# Patient Record
Sex: MALE | Race: WHITE | Employment: UNEMPLOYED | URBAN - METROPOLITAN AREA
[De-identification: names, ages, dates, MRNs, and addresses within clinical notes are randomized per-mention and may not be internally consistent; named-entity substitution may affect disease eponyms.]

---

## 2022-06-30 ENCOUNTER — APPOINTMENT (OUTPATIENT)
Dept: GENERAL RADIOLOGY | Age: 62
DRG: 271 | End: 2022-06-30
Attending: EMERGENCY MEDICINE

## 2022-06-30 ENCOUNTER — APPOINTMENT (OUTPATIENT)
Dept: NON INVASIVE DIAGNOSTICS | Age: 62
DRG: 271 | End: 2022-06-30
Attending: FAMILY MEDICINE

## 2022-06-30 ENCOUNTER — HOSPITAL ENCOUNTER (INPATIENT)
Age: 62
LOS: 8 days | Discharge: HOME HEALTH | DRG: 271 | End: 2022-07-08
Attending: EMERGENCY MEDICINE | Admitting: FAMILY MEDICINE

## 2022-06-30 DIAGNOSIS — L03.115 CELLULITIS OF RIGHT FOOT: ICD-10-CM

## 2022-06-30 DIAGNOSIS — M10.9 GOUTY ARTHRITIS: ICD-10-CM

## 2022-06-30 DIAGNOSIS — L08.9 TOE INFECTION: Primary | ICD-10-CM

## 2022-06-30 DIAGNOSIS — I73.9 PAD (PERIPHERAL ARTERY DISEASE) (HCC): ICD-10-CM

## 2022-06-30 DIAGNOSIS — M86.9 OSTEOMYELITIS OF RIGHT FOOT, UNSPECIFIED TYPE (HCC): ICD-10-CM

## 2022-06-30 LAB
ALBUMIN SERPL-MCNC: 3.6 G/DL (ref 3.5–5)
ALBUMIN/GLOB SERPL: 0.8 {RATIO} (ref 1.1–2.2)
ALP SERPL-CCNC: 119 U/L (ref 45–117)
ALT SERPL-CCNC: 26 U/L (ref 12–78)
ANION GAP SERPL CALC-SCNC: 5 MMOL/L (ref 5–15)
AST SERPL W P-5'-P-CCNC: 13 U/L (ref 15–37)
BASOPHILS # BLD: 0.1 K/UL (ref 0–0.1)
BASOPHILS NFR BLD: 1 % (ref 0–1)
BILIRUB SERPL-MCNC: 0.5 MG/DL (ref 0.2–1)
BUN SERPL-MCNC: 11 MG/DL (ref 6–20)
BUN/CREAT SERPL: 14 (ref 12–20)
CA-I BLD-MCNC: 9.2 MG/DL (ref 8.5–10.1)
CHLORIDE SERPL-SCNC: 100 MMOL/L (ref 97–108)
CO2 SERPL-SCNC: 29 MMOL/L (ref 21–32)
CREAT SERPL-MCNC: 0.78 MG/DL (ref 0.7–1.3)
DIFFERENTIAL METHOD BLD: ABNORMAL
EOSINOPHIL # BLD: 0.3 K/UL (ref 0–0.4)
EOSINOPHIL NFR BLD: 2 % (ref 0–7)
ERYTHROCYTE [DISTWIDTH] IN BLOOD BY AUTOMATED COUNT: 13.1 % (ref 11.5–14.5)
ERYTHROCYTE [SEDIMENTATION RATE] IN BLOOD: 52 MM/HR (ref 0–20)
GLOBULIN SER CALC-MCNC: 4.4 G/DL (ref 2–4)
GLUCOSE BLD STRIP.AUTO-MCNC: 130 MG/DL (ref 65–117)
GLUCOSE BLD STRIP.AUTO-MCNC: 138 MG/DL (ref 65–117)
GLUCOSE BLD STRIP.AUTO-MCNC: 175 MG/DL (ref 65–117)
GLUCOSE SERPL-MCNC: 228 MG/DL (ref 65–100)
HCT VFR BLD AUTO: 45.9 % (ref 36.6–50.3)
HGB BLD-MCNC: 15.1 G/DL (ref 12.1–17)
IMM GRANULOCYTES # BLD AUTO: 0.1 K/UL (ref 0–0.04)
IMM GRANULOCYTES NFR BLD AUTO: 1 % (ref 0–0.5)
LYMPHOCYTES # BLD: 1.7 K/UL (ref 0.8–3.5)
LYMPHOCYTES NFR BLD: 13 % (ref 12–49)
MCH RBC QN AUTO: 29.5 PG (ref 26–34)
MCHC RBC AUTO-ENTMCNC: 32.9 G/DL (ref 30–36.5)
MCV RBC AUTO: 89.8 FL (ref 80–99)
MONOCYTES # BLD: 0.9 K/UL (ref 0–1)
MONOCYTES NFR BLD: 7 % (ref 5–13)
NEUTS SEG # BLD: 9.5 K/UL (ref 1.8–8)
NEUTS SEG NFR BLD: 76 % (ref 32–75)
NRBC # BLD: 0 K/UL (ref 0–0.01)
NRBC BLD-RTO: 0 PER 100 WBC
PERFORMED BY, TECHID: ABNORMAL
PLATELET # BLD AUTO: 272 K/UL (ref 150–400)
PMV BLD AUTO: 10.2 FL (ref 8.9–12.9)
POTASSIUM SERPL-SCNC: 4 MMOL/L (ref 3.5–5.1)
PROT SERPL-MCNC: 8 G/DL (ref 6.4–8.2)
RBC # BLD AUTO: 5.11 M/UL (ref 4.1–5.7)
SODIUM SERPL-SCNC: 134 MMOL/L (ref 136–145)
URATE SERPL-MCNC: 3.9 MG/DL (ref 3.5–7.2)
WBC # BLD AUTO: 12.7 K/UL (ref 4.1–11.1)

## 2022-06-30 PROCEDURE — 74011250636 HC RX REV CODE- 250/636: Performed by: EMERGENCY MEDICINE

## 2022-06-30 PROCEDURE — 85652 RBC SED RATE AUTOMATED: CPT

## 2022-06-30 PROCEDURE — 99222 1ST HOSP IP/OBS MODERATE 55: CPT | Performed by: INTERNAL MEDICINE

## 2022-06-30 PROCEDURE — 96374 THER/PROPH/DIAG INJ IV PUSH: CPT

## 2022-06-30 PROCEDURE — 80053 COMPREHEN METABOLIC PANEL: CPT

## 2022-06-30 PROCEDURE — 84550 ASSAY OF BLOOD/URIC ACID: CPT

## 2022-06-30 PROCEDURE — 82962 GLUCOSE BLOOD TEST: CPT

## 2022-06-30 PROCEDURE — 96375 TX/PRO/DX INJ NEW DRUG ADDON: CPT

## 2022-06-30 PROCEDURE — 74011250636 HC RX REV CODE- 250/636: Performed by: FAMILY MEDICINE

## 2022-06-30 PROCEDURE — 93922 UPR/L XTREMITY ART 2 LEVELS: CPT | Performed by: SURGERY

## 2022-06-30 PROCEDURE — 74011250637 HC RX REV CODE- 250/637: Performed by: FAMILY MEDICINE

## 2022-06-30 PROCEDURE — 87040 BLOOD CULTURE FOR BACTERIA: CPT

## 2022-06-30 PROCEDURE — 74011000258 HC RX REV CODE- 258: Performed by: EMERGENCY MEDICINE

## 2022-06-30 PROCEDURE — 93922 UPR/L XTREMITY ART 2 LEVELS: CPT

## 2022-06-30 PROCEDURE — 74011000258 HC RX REV CODE- 258: Performed by: FAMILY MEDICINE

## 2022-06-30 PROCEDURE — 65270000029 HC RM PRIVATE

## 2022-06-30 PROCEDURE — 99285 EMERGENCY DEPT VISIT HI MDM: CPT

## 2022-06-30 PROCEDURE — 83036 HEMOGLOBIN GLYCOSYLATED A1C: CPT

## 2022-06-30 PROCEDURE — 94761 N-INVAS EAR/PLS OXIMETRY MLT: CPT

## 2022-06-30 PROCEDURE — 36415 COLL VENOUS BLD VENIPUNCTURE: CPT

## 2022-06-30 PROCEDURE — 73630 X-RAY EXAM OF FOOT: CPT

## 2022-06-30 PROCEDURE — 85025 COMPLETE CBC W/AUTO DIFF WBC: CPT

## 2022-06-30 RX ORDER — AMLODIPINE AND BENAZEPRIL HYDROCHLORIDE 5; 20 MG/1; MG/1
1 CAPSULE ORAL DAILY
COMMUNITY
End: 2022-07-08

## 2022-06-30 RX ORDER — MORPHINE SULFATE 4 MG/ML
4 INJECTION INTRAVENOUS ONCE
Status: COMPLETED | OUTPATIENT
Start: 2022-06-30 | End: 2022-06-30

## 2022-06-30 RX ORDER — AMLODIPINE AND BENAZEPRIL HYDROCHLORIDE 5; 10 MG/1; MG/1
2 CAPSULE ORAL DAILY
Status: DISCONTINUED | OUTPATIENT
Start: 2022-07-01 | End: 2022-06-30 | Stop reason: SDUPTHER

## 2022-06-30 RX ORDER — POLYETHYLENE GLYCOL 3350 17 G/17G
17 POWDER, FOR SOLUTION ORAL DAILY PRN
Status: DISCONTINUED | OUTPATIENT
Start: 2022-06-30 | End: 2022-07-08 | Stop reason: HOSPADM

## 2022-06-30 RX ORDER — ENOXAPARIN SODIUM 100 MG/ML
40 INJECTION SUBCUTANEOUS DAILY
Status: DISCONTINUED | OUTPATIENT
Start: 2022-06-30 | End: 2022-07-04

## 2022-06-30 RX ORDER — MORPHINE SULFATE 2 MG/ML
1 INJECTION, SOLUTION INTRAMUSCULAR; INTRAVENOUS
Status: DISPENSED | OUTPATIENT
Start: 2022-06-30 | End: 2022-07-02

## 2022-06-30 RX ORDER — ACETAMINOPHEN 650 MG/1
650 SUPPOSITORY RECTAL
Status: DISCONTINUED | OUTPATIENT
Start: 2022-06-30 | End: 2022-07-08 | Stop reason: HOSPADM

## 2022-06-30 RX ORDER — ONDANSETRON 2 MG/ML
4 INJECTION INTRAMUSCULAR; INTRAVENOUS
Status: DISCONTINUED | OUTPATIENT
Start: 2022-06-30 | End: 2022-07-08 | Stop reason: HOSPADM

## 2022-06-30 RX ORDER — INSULIN LISPRO 100 [IU]/ML
INJECTION, SOLUTION INTRAVENOUS; SUBCUTANEOUS
Status: DISCONTINUED | OUTPATIENT
Start: 2022-06-30 | End: 2022-07-08 | Stop reason: HOSPADM

## 2022-06-30 RX ORDER — MAGNESIUM SULFATE 100 %
4 CRYSTALS MISCELLANEOUS AS NEEDED
Status: DISCONTINUED | OUTPATIENT
Start: 2022-06-30 | End: 2022-07-08 | Stop reason: HOSPADM

## 2022-06-30 RX ORDER — PANTOPRAZOLE SODIUM 40 MG/1
40 TABLET, DELAYED RELEASE ORAL
Status: DISCONTINUED | OUTPATIENT
Start: 2022-07-01 | End: 2022-07-08 | Stop reason: HOSPADM

## 2022-06-30 RX ORDER — ONDANSETRON 4 MG/1
4 TABLET, ORALLY DISINTEGRATING ORAL
Status: DISCONTINUED | OUTPATIENT
Start: 2022-06-30 | End: 2022-07-08 | Stop reason: HOSPADM

## 2022-06-30 RX ORDER — METFORMIN HYDROCHLORIDE 500 MG/1
500 TABLET ORAL 2 TIMES DAILY WITH MEALS
Status: DISCONTINUED | OUTPATIENT
Start: 2022-06-30 | End: 2022-07-08 | Stop reason: HOSPADM

## 2022-06-30 RX ORDER — ACETAMINOPHEN 325 MG/1
650 TABLET ORAL
Status: DISCONTINUED | OUTPATIENT
Start: 2022-06-30 | End: 2022-07-08 | Stop reason: HOSPADM

## 2022-06-30 RX ORDER — PANTOPRAZOLE SODIUM 40 MG/1
40 TABLET, DELAYED RELEASE ORAL DAILY
COMMUNITY

## 2022-06-30 RX ORDER — SODIUM CHLORIDE 9 MG/ML
75 INJECTION, SOLUTION INTRAVENOUS CONTINUOUS
Status: DISCONTINUED | OUTPATIENT
Start: 2022-06-30 | End: 2022-06-30

## 2022-06-30 RX ORDER — AMLODIPINE BESYLATE 5 MG/1
5 TABLET ORAL DAILY
Status: DISCONTINUED | OUTPATIENT
Start: 2022-07-01 | End: 2022-07-05

## 2022-06-30 RX ORDER — NAPROXEN 250 MG/1
500 TABLET ORAL DAILY
COMMUNITY
End: 2022-07-08

## 2022-06-30 RX ORDER — METFORMIN HYDROCHLORIDE 500 MG/1
500 TABLET ORAL
COMMUNITY

## 2022-06-30 RX ORDER — ONDANSETRON 2 MG/ML
4 INJECTION INTRAMUSCULAR; INTRAVENOUS
Status: COMPLETED | OUTPATIENT
Start: 2022-06-30 | End: 2022-06-30

## 2022-06-30 RX ORDER — SODIUM CHLORIDE 9 MG/ML
100 INJECTION, SOLUTION INTRAVENOUS ONCE
Status: COMPLETED | OUTPATIENT
Start: 2022-06-30 | End: 2022-06-30

## 2022-06-30 RX ORDER — LISINOPRIL 10 MG/1
10 TABLET ORAL DAILY
Status: DISCONTINUED | OUTPATIENT
Start: 2022-07-01 | End: 2022-07-05

## 2022-06-30 RX ORDER — TRAMADOL HYDROCHLORIDE 50 MG/1
50 TABLET ORAL
Status: DISCONTINUED | OUTPATIENT
Start: 2022-06-30 | End: 2022-07-08 | Stop reason: HOSPADM

## 2022-06-30 RX ADMIN — ONDANSETRON 4 MG: 2 INJECTION INTRAMUSCULAR; INTRAVENOUS at 09:01

## 2022-06-30 RX ADMIN — MORPHINE SULFATE 4 MG: 4 INJECTION INTRAVENOUS at 09:01

## 2022-06-30 RX ADMIN — MORPHINE SULFATE 4 MG: 4 INJECTION INTRAVENOUS at 14:35

## 2022-06-30 RX ADMIN — PIPERACILLIN AND TAZOBACTAM 3.38 G: 3; .375 INJECTION, POWDER, LYOPHILIZED, FOR SOLUTION INTRAVENOUS at 09:01

## 2022-06-30 RX ADMIN — METFORMIN HYDROCHLORIDE 500 MG: 500 TABLET ORAL at 17:59

## 2022-06-30 RX ADMIN — VANCOMYCIN HYDROCHLORIDE 1000 MG: 1 INJECTION, POWDER, LYOPHILIZED, FOR SOLUTION INTRAVENOUS at 09:01

## 2022-06-30 RX ADMIN — AMPICILLIN SODIUM AND SULBACTAM SODIUM 3 G: 2; 1 INJECTION, POWDER, FOR SOLUTION INTRAMUSCULAR; INTRAVENOUS at 13:17

## 2022-06-30 RX ADMIN — MORPHINE SULFATE 1 MG: 2 INJECTION, SOLUTION INTRAMUSCULAR; INTRAVENOUS at 22:09

## 2022-06-30 RX ADMIN — VANCOMYCIN HYDROCHLORIDE 1000 MG: 1 INJECTION, POWDER, LYOPHILIZED, FOR SOLUTION INTRAVENOUS at 17:59

## 2022-06-30 RX ADMIN — SODIUM CHLORIDE 100 ML/HR: 9 INJECTION, SOLUTION INTRAVENOUS at 09:05

## 2022-06-30 RX ADMIN — AMPICILLIN SODIUM AND SULBACTAM SODIUM 3 G: 2; 1 INJECTION, POWDER, FOR SOLUTION INTRAMUSCULAR; INTRAVENOUS at 19:05

## 2022-06-30 NOTE — H&P
History and Physical    Patient: Yee Pascual MRN: [id number]  SSN: [id number]    YOB: 1960  Age: 65 y.o. Sex: male      Subjective:      Yee Pascual is a 65 y.o. male with a significant PMH of HTN, lifelong 1.5ppd smoker, and Diabetes  RA and Dellillis Lenora presents to the ED today with a R great toe wound. Started 2 weeks ago treated with oral antibiotics the patient states that the wound appeared 5 days ago when it \"burst\" from swelling with light yellow discharge. He has taken Rocefin and Clindamycin for the wound with subjective improvement. The wound is not acutely painful but he has a small amount of pain in the MTP joint. Patient denies other symptoms associated with the wound. Patient also treated for gout    In ED:  WBC 12.7  Na 134  Glucose 228  X-ray impression: Cortical cystic erosive changes involving the distal aspects of the  first and fifth metatarsals and base of the proximal phalanx of the great toe  compatible with chronic degenerative/osteoarthritic changes. Consider gout. Diabetes Mellitus  Hypertension  GERD  Gout  RA    Social History     Tobacco Use    Smoking status: Not on file    Smokeless tobacco: Not on file   Substance Use Topics    Alcohol use: Not on file      Prior to Admission medications    Not on File        No Known Allergies    Review of Systems:  Review of Systems   Constitutional: Negative. Negative for chills, fatigue and fever. HENT: Negative. Negative for congestion, ear pain, nosebleeds and sore throat. Eyes: Negative. Negative for pain, discharge and visual disturbance. Respiratory: Negative. Negative for cough, chest tightness and shortness of breath. Cardiovascular: Negative. Negative for chest pain and leg swelling. Gastrointestinal: Negative. Negative for abdominal pain, blood in stool, constipation, diarrhea, nausea and vomiting. Endocrine: Negative. Genitourinary: Negative.   Negative for difficulty urinating, dysuria and flank pain. Musculoskeletal: Positive for joint swelling. Negative for back pain and myalgias. Skin: Negative. Negative for rash and wound. Allergic/Immunologic: Negative. Neurological: Negative. Negative for dizziness, syncope, weakness, numbness and headaches. Hematological: Negative. Does not bruise/bleed easily. Psychiatric/Behavioral: Negative. Negative for agitation, confusion, hallucinations and suicidal ideas. All other systems reviewed and are negative. Objective:     Vitals:    06/30/22 0821   BP: (!) 182/94   Pulse: 81   Resp: 16   Temp: 97.6 °F (36.4 °C)   SpO2: 98%   Weight: 115.7 kg (255 lb)   Height: 6' 1\" (1.854 m)        Physical Exam:  Physical Exam  Vitals and nursing note reviewed. Constitutional:       General: He is not in acute distress. Appearance: He is normal weight. He is not ill-appearing. HENT:      Head: Normocephalic and atraumatic. Right Ear: External ear normal.      Left Ear: External ear normal.      Nose: Nose normal. No rhinorrhea. Mouth/Throat:      Mouth: Mucous membranes are moist.      Pharynx: Oropharynx is clear. Eyes:      Extraocular Movements: Extraocular movements intact. Conjunctiva/sclera: Conjunctivae normal.      Pupils: Pupils are equal, round, and reactive to light. Cardiovascular:      Rate and Rhythm: Normal rate and regular rhythm. Pulses: Normal pulses. Heart sounds: Normal heart sounds. Pulmonary:      Effort: Pulmonary effort is normal. No respiratory distress. Breath sounds: Normal breath sounds. Abdominal:      General: Abdomen is flat. Bowel sounds are normal.      Palpations: Abdomen is soft. Musculoskeletal:         General: No tenderness or deformity. Normal range of motion. Cervical back: Normal range of motion and neck supple. Comments: Right toe infection. Macerated. Wound at the base of the first lower phalanx. Drainage. Foot swelling. Slightly cooler to touch. Good cap refill. Skin:     General: Skin is warm and dry. Capillary Refill: Capillary refill takes less than 2 seconds. Findings: Significant pedal edema. Violaceous right foot wound with eschar. Neurological:      General: No focal deficit present. Mental Status: He is alert and oriented to person, place, and time. Mental status is at baseline. Psychiatric:         Mood and Affect: Mood normal.         Behavior: Behavior normal.         Thought Content:  Thought content normal.         Judgment: Judgment normal.     Assessment:   Right great toe infection  Diabetic foot injury  Arterial Insufficiency  Diabetes Mellitus  Hypertension  GERD  Gout  RA  Smoking    Plan:   Plan to admit and monitor patient  Continue Vancomycin and Unasyn  Consult podiatry  Consult wound care  Consult ID  Order PVR  F/u labs  Obtain uric acid  Obtain wound culture and blood culture  Resume at home medications:  Metformin 500mg twice daily  Amlodipine/Benazepril 5mg/20mg  Protonix 40mg  Lovenox 40mg every day Sub Q  Order blood cultures  Sliding-scale insulin    Discussed about smoke cessation      Current Facility-Administered Medications:     piperacillin-tazobactam (ZOSYN) 3.375 g in 0.9% sodium chloride (MBP/ADV) 100 mL MBP, 3.375 g, IntraVENous, NOW, Milly Niño MD, Last Rate: 25 mL/hr at 06/30/22 0901, 3.375 g at 06/30/22 0901    insulin lispro (HUMALOG) injection, , SubCUTAneous, AC&HS, Thomas Ferrer MD    glucose chewable tablet 16 g, 4 Tablet, Oral, PRN, Thomas Ferrer MD    glucagon (GLUCAGEN) injection 1 mg, 1 mg, IntraMUSCular, PRN, Thomas Ferrer MD    0.9% sodium chloride infusion, 75 mL/hr, IntraVENous, CONTINUOUS, Thomas Ferrer MD    acetaminophen (TYLENOL) tablet 650 mg, 650 mg, Oral, Q6H PRN **OR** acetaminophen (TYLENOL) suppository 650 mg, 650 mg, Rectal, Q6H PRN, Satnam Ferrer MD    polyethylene glycol (MIRALAX) packet 17 g, 17 g, Oral, DAILY PRN, Thomas Ferrer, MD    ondansetron (ZOFRAN ODT) tablet 4 mg, 4 mg, Oral, Q8H PRN **OR** ondansetron (ZOFRAN) injection 4 mg, 4 mg, IntraVENous, Q6H PRN, Osmin Ferrer MD    enoxaparin (LOVENOX) injection 40 mg, 40 mg, SubCUTAneous, DAILY, Osmin Ferrer MD    ampicillin-sulbactam (UNASYN) 3 g in 0.9% sodium chloride (MBP/ADV) 100 mL MBP, 3 g, IntraVENous, Q6H, Satnam Ferrer MD    metFORMIN (GLUCOPHAGE) tablet 500 mg, 500 mg, Oral, BID WITH MEALS, Satnam Ferrer MD    [START ON 7/1/2022] amLODIPine-benazepril (LOTREL) 5-10 mg per capsule 2 Capsule, 2 Capsule, Oral, DAILY, Satnam Ferrer MD Jonny Spina  [START ON 7/1/2022] pantoprazole (PROTONIX) tablet 40 mg, 40 mg, Oral, ACB, Osmin Ferrer MD  No current outpatient medications on file.         Signed By: Vince Orlando MD     June 30, 2022

## 2022-06-30 NOTE — Clinical Note
Status[de-identified] INPATIENT [101]   Type of Bed: Medical [8]   Inpatient Hospitalization Certified Necessary for the Following Reasons: 3.  Patient receiving treatment that can only be provided in an inpatient setting (further clarification in H&P documentation)   Admitting Diagnosis: Toe infection [[id number]]   Admitting Physician: Mary Morelos [[id number]]   Attending Physician: Mary Morelos [[id number]]   Estimated Length of Stay: 2 Midnights   Discharge Plan[de-identified] Home with Office Follow-up

## 2022-06-30 NOTE — CONSULTS
Infectious Disease Consult Note    Reason for Consult: Left great toe infection   Date of Consultation: June 30, 2022  Date of Admission: 6/30/2022  Referring Physician: Dr Jenny Cali     HPI: Very pleasant 65 y.o WM presenting w right great toe infection/osteomyelitis for which ID has been consulted. His medical history is significant for DM, PAD, HTN, GERD, gouty arthritis, and chronic tobacco use. He reports right great toe discoloration/swelling for over a month, initially thought to be related to gouty arthritis, but did not improve with gout treatment or antibiotics (clindamycin and IM Ceftriaxone). He noticed an ulcerated area involving the base of his right great toe which prompted current hospitalization. Pt states he initially presented to [facility], but requested transfer to [facility] for further care. He is afebrile, hypertensive and maintaining O2 sats on RA. Lab work today showed WBC 12.7, ESR 52, and Cr 0.78. X-ray of his left foot revealed erosive changes involving distal aspects of his first and fifth metatarsals concerning for DJD or gouty arthritis. ANTIONETTE was done in the ED, results are pending. There are no pending Cxs. He is on Vanc and Zosyn.      Review of Systems:     Gen: Negative for chills, fevers, weight loss, weight gain   CV:  Negative for chest pain, dyspnea on exertion, leg edema   Lungs: Negative for shortness of breath, cough, wheezing   Abdomen: Negative for abdominal pain, nausea, vomiting, diarrhea, constipation   Genitourinary: Negative for genital pain or genital discharge     Neuro: Negative for headache, numbness, tingling, extremity weakness   Skin: Negative for rash, sores/open wounds   Musculoskeletal: right great toe swelling and discoloration    Psych: Negative for manic behavior       Past Medical History: Per HPI     Past surgical history: Unknown     Social History   Social History     Tobacco Use    Smoking status: Not on file    Smokeless tobacco: Not on file   Substance Use Topics    Alcohol use: Not on file    Drug use: Not on file        Family history   No family history on file. Allergies:  No Known Allergies      Medications:  No current facility-administered medications on file prior to encounter. Current Outpatient Medications on File Prior to Encounter   Medication Sig Dispense Refill    pantoprazole (PROTONIX) 40 mg tablet Take 40 mg by mouth daily.  amLODIPine-benazepril (LOTREL) 5-20 mg per capsule Take 1 Capsule by mouth daily.  metFORMIN (GLUCOPHAGE) 500 mg tablet Take 500 mg by mouth daily (with dinner).  naproxen (NAPROSYN) 250 mg tablet Take 500 mg by mouth daily. Physical Exam:    Vitals:   Patient Vitals for the past 24 hrs:   Temp Pulse Resp BP SpO2   06/30/22 1737 97.9 °F (36.6 °C) 65 17 (!) 141/80 97 %   06/30/22 1307 -- 62 16 (!) 167/99 99 %   06/30/22 1259 -- 60 16 -- 98 %   06/30/22 1159 -- 66 21 -- 98 %   06/30/22 1059 -- 62 18 -- 96 %   06/30/22 0821 97.6 °F (36.4 °C) 81 16 (!) 182/94 98 %   ·   · GEN: NAD, AAO x 4  · HEENT: NCAT, PERRLA  · HEART: S1, S2+, RRR, No murmur   · Lungs: CTA B/l, decreased at the bases, no wheeze/rhonchi   · Abdomen: soft, ND, NT, +BS   · Genitourinary: no genital discharge, no herbert  · Extremities: cyanotic changes involving right great toe, ulceration on base of right great toe plantar surface, minimal drainage   · Skin: Right great toe ulcer     Labs:   Recent Labs     06/30/22  0849   WBC 12.7*   HGB 15.1   HCT 45.9        Recent Labs     06/30/22  0849   BUN 11   CREA 0.78       Microbiology Data: None     Imaging:   X-ray of right great toe 06/30: Cortical cystic erosive changes involving the distal aspects of the  first and fifth metatarsals and base of the proximal phalanx of the great toe  compatible with chronic degenerative/osteoarthritic changes. Consider gout. Assessment / Plan:     1.  Right great toe infection, Cortical cystic erosive changes involving 1st and 5th toes on X-ray       Ulceration involving base of right great toe, minimal drainage, some swelling, no erythema or tenderness       Afebrile w a moderate leukocytosis on routine labs       Continue on Vanc and Unasyn for now       MRI of right foot to eval for osteomyelitis       CBC, CRP and ESR w AM labs, wound Cx ordered     2. H/o gouty arthritis, ? Reason for # 1, serum uric acid is pending     3. Suspected PAD, cyanotic skin changes involving right great toe       Pt denies being diagnosed w PAD, ANTIONETTE ordered     4.  H/o DM, BS controlled per pt, A1C is pending     tSiven Domingo MD     6/30/2022

## 2022-06-30 NOTE — H&P
History and Physical    Patient: Ariela Norris MRN: [id number]  SSN: [id number]    YOB: 1960  Age: 65 y.o. Sex: male      Subjective:      Ariela Norris is a 65 y.o. male with a significant PMH of HTN, lifelong 1.5ppd smoker, and Diabetes presents to the ED today with a R great toe wound. The patient states that the wound appeared 5 days ago when it \"burst\" from swelling with light yellow discharge. He has taken Rocefin and Clindamycin for the wound with subjective improvement. The wound is not acutely painful but he has a small amount of pain in the MTP joint. Patient denies other symptoms associated with the wound. In ED:  WBC 12.7  Na 134  Glucose 228  X-ray impression: Cortical cystic erosive changes involving the distal aspects of the  first and fifth metatarsals and base of the proximal phalanx of the great toe  compatible with chronic degenerative/osteoarthritic changes. Consider gout. Diabetes Mellitus  Hypertension  GERD  Gout  RA    Social History     Tobacco Use    Smoking status: Not on file    Smokeless tobacco: Not on file   Substance Use Topics    Alcohol use: Not on file      Prior to Admission medications    Not on File        No Known Allergies    Review of Systems:  Review of Systems   Constitutional: Negative. Negative for chills, fatigue and fever. HENT: Negative. Negative for congestion, ear pain, nosebleeds and sore throat. Eyes: Negative. Negative for pain, discharge and visual disturbance. Respiratory: Negative. Negative for cough, chest tightness and shortness of breath. Cardiovascular: Negative. Negative for chest pain and leg swelling. Gastrointestinal: Negative. Negative for abdominal pain, blood in stool, constipation, diarrhea, nausea and vomiting. Endocrine: Negative. Genitourinary: Negative. Negative for difficulty urinating, dysuria and flank pain. Musculoskeletal: Positive for joint swelling.  Negative for back pain and myalgias. Skin: Negative. Negative for rash and wound. Allergic/Immunologic: Negative. Neurological: Negative. Negative for dizziness, syncope, weakness, numbness and headaches. Hematological: Negative. Does not bruise/bleed easily. Psychiatric/Behavioral: Negative. Negative for agitation, confusion, hallucinations and suicidal ideas. All other systems reviewed and are negative. Objective:     Vitals:    06/30/22 0821   BP: (!) 182/94   Pulse: 81   Resp: 16   Temp: 97.6 °F (36.4 °C)   SpO2: 98%   Weight: 115.7 kg (255 lb)   Height: 6' 1\" (1.854 m)        Physical Exam:  Physical Exam  Vitals and nursing note reviewed. Constitutional:       General: He is not in acute distress. Appearance: He is normal weight. He is not ill-appearing. HENT:      Head: Normocephalic and atraumatic. Right Ear: External ear normal.      Left Ear: External ear normal.      Nose: Nose normal. No rhinorrhea. Mouth/Throat:      Mouth: Mucous membranes are moist.      Pharynx: Oropharynx is clear. Eyes:      Extraocular Movements: Extraocular movements intact. Conjunctiva/sclera: Conjunctivae normal.      Pupils: Pupils are equal, round, and reactive to light. Cardiovascular:      Rate and Rhythm: Normal rate and regular rhythm. Pulses: Normal pulses. Heart sounds: Normal heart sounds. Pulmonary:      Effort: Pulmonary effort is normal. No respiratory distress. Breath sounds: Normal breath sounds. Abdominal:      General: Abdomen is flat. Bowel sounds are normal.      Palpations: Abdomen is soft. Musculoskeletal:         General: No tenderness or deformity. Normal range of motion. Cervical back: Normal range of motion and neck supple. Comments: Right toe infection. Macerated. Wound at the base of the first lower phalanx. Drainage. Foot swelling. Slightly cooler to touch. Good cap refill. Skin:     General: Skin is warm and dry.       Capillary Refill: Capillary refill takes less than 2 seconds. Findings: Significant pedal edema. Violaceous right foot wound with eschar. Neurological:      General: No focal deficit present. Mental Status: He is alert and oriented to person, place, and time. Mental status is at baseline. Psychiatric:         Mood and Affect: Mood normal.         Behavior: Behavior normal.         Thought Content:  Thought content normal.         Judgment: Judgment normal.     Assessment:   Right great toe infection  Diabetic foot injury  Arterial Insufficiency  Diabetes Mellitus  Hypertension  GERD  Gout  RA  Smoking    Plan:   Plan to admit and monitor patient  Continue Vancomycin and zosyn  Consult podiatry  Consult wound care  Consult ID  Consult vascular  F/u labs  Obtain uric acid  Obtain wound culture and blood culture  D/c Clindamycin  Resume at home medications:  Metformin 500mg  Amlodipine/Benazepril 5mg/20mg  Indomethacin 50mg  Protonix 40mg  Lovenox 40mg every day Sub Q        Signed By: Jeanette Townsend     June 30, 2022

## 2022-06-30 NOTE — PROGRESS NOTES
Vancomycin Dosing Consult  Day #1 of vancomycin therapy  Consult ordered by Dr. Mckenna Norris for this 65 y.o. male for indication of SSTI.   Antibiotic regimen: Vancomycin + Unasyn    Temp (24hrs), Av.6 °F (36.4 °C), Min:97.6 °F (36.4 °C), Max:97.6 °F (36.4 °C)    Recent Labs     22  0849   WBC 12.7*   CREA 0.78   BUN 11     Est CrCl: 132 mL/min  Concomitant nephrotoxic drugs: None    Cultures:   none    MRSA Swab: Not ordered, patient already received first dose of vancomycin    Target range: AUC/-500, Trough <15 mcg/mL    Recent level history:  Date/Time Dose & Interval Measured Level (mcg/mL) Associated AUC/TAY              Assessment/Plan:   Patient with R great toe wound for several days, leukocytosis, afebrile  Will begin vancomycin 1000mg IV Q8H  Random level  at 06:00  Antimicrobial stop date TBD

## 2022-06-30 NOTE — PROGRESS NOTES
Admission Medication Reconciliation:    Information obtained from:  Patient    Comments/Recommendations: Reviewed PTA medications and patient's allergies. Allergies:  Patient has no known allergies. Significant PMH/Disease States: No past medical history on file. Chief Complaint for this Admission:    Chief Complaint   Patient presents with    Wound Check     Prior to Admission Medications:   Prior to Admission Medications   Prescriptions Last Dose Informant Patient Reported? Taking? amLODIPine-benazepril (LOTREL) 5-20 mg per capsule 6/30/2022 at Unknown time Self Yes Yes   Sig: Take 1 Capsule by mouth daily. metFORMIN (GLUCOPHAGE) 500 mg tablet 6/30/2022 at Unknown time Self Yes Yes   Sig: Take 500 mg by mouth daily (with dinner). naproxen (NAPROSYN) 250 mg tablet 6/30/2022 at Unknown time Self Yes Yes   Sig: Take 500 mg by mouth daily. pantoprazole (PROTONIX) 40 mg tablet 6/30/2022 at Unknown time Self Yes Yes   Sig: Take 40 mg by mouth daily.       Facility-Administered Medications: None       Amanda Solis

## 2022-06-30 NOTE — ED NOTES
TRANSFER - OUT REPORT:    Verbal report given to Luly Josue RN (name) on [facility]  being transferred to  (unit) for routine progression of care       Report consisted of patients Situation, Background, Assessment and   Recommendations(SBAR). Information from the following report(s) SBAR and MAR was reviewed with the receiving nurse. Lines:   Peripheral IV 06/30/22 Posterior;Right Forearm (Active)        Opportunity for questions and clarification was provided.       Patient transported with:   Monitor  Registered Nurse

## 2022-06-30 NOTE — ACP (ADVANCE CARE PLANNING)
Advance Care Planning   Healthcare Decision Maker:       Primary Decision Maker: Haven Davis [facility] - [phone]

## 2022-06-30 NOTE — PROGRESS NOTES
Reason for Admission:  Infected Toe                     RUR Score:  N/A                   Plan for utilizing home health:   None @ this time/uses no DME. PCP: First and Last name:  Eleno Em MD     Name of Practice:    Are you a current patient: Yes/No: Yes   Approximate date of last visit:  Today @ 6am.   Can you participate in a virtual visit with your PCP: Yes/Call                    Current Advanced Directive/Advance Care Plan: No Order      Healthcare Decision Maker:              Primary Decision MakerScelina Leta [facility] - [phone]                  Transition of Care Plan:  D/C Plan is home with wife & wife to transport home. Call Rxs into [facility] in [place] upon discharge.

## 2022-06-30 NOTE — ED PROVIDER NOTES
EMERGENCY DEPARTMENT HISTORY AND PHYSICAL EXAM      Date: 6/30/2022  Patient Name: [facility]    History of Presenting Illness     Chief Complaint   Patient presents with    Wound Check       History Provided By: Patient and Patient's Wife    HPI: [facility], 65 y.o. male with a past medical history significant diabetes presents to the ED with chief complaint of Wound Check  . -year-old male with significant great toe infection. Has been following with his PCP on oral antibiotics and has received at least 4 to 5 injections throughout the weekend. Despite the medications he is still having increasing swelling drainage and pain. He has not been able to rested as the pain is severe throbbing not relieved with tramadol. He asked why he has been up walking around he thinks that is because the swelling to get worse. There are no other complaints, changes, or physical findings at this time. PCP: Joaquin Otero MD    Current Facility-Administered Medications   Medication Dose Route Frequency Provider Last Rate Last Admin    piperacillin-tazobactam (ZOSYN) 3.375 g in 0.9% sodium chloride (MBP/ADV) 100 mL MBP  3.375 g IntraVENous NOW Estevan East MD 25 mL/hr at 06/30/22 0901 3.375 g at 06/30/22 0901       Past History     Past Medical History:  No past medical history on file. Past Surgical History:  No past surgical history on file. Family History:  No family history on file. Social History:  Social History     Tobacco Use    Smoking status: Not on file    Smokeless tobacco: Not on file   Substance Use Topics    Alcohol use: Not on file    Drug use: Not on file       Allergies:  No Known Allergies      Review of Systems   Review of Systems   Constitutional: Negative. Negative for chills, fatigue and fever. HENT: Negative. Negative for congestion, ear pain, nosebleeds and sore throat. Eyes: Negative. Negative for pain, discharge and visual disturbance.    Respiratory: Negative. Negative for cough, chest tightness and shortness of breath. Cardiovascular: Negative. Negative for chest pain and leg swelling. Gastrointestinal: Negative. Negative for abdominal pain, blood in stool, constipation, diarrhea, nausea and vomiting. Endocrine: Negative. Genitourinary: Negative. Negative for difficulty urinating, dysuria and flank pain. Musculoskeletal: Positive for joint swelling. Negative for back pain and myalgias. Skin: Negative. Negative for rash and wound. Allergic/Immunologic: Negative. Neurological: Negative. Negative for dizziness, syncope, weakness, numbness and headaches. Hematological: Negative. Does not bruise/bleed easily. Psychiatric/Behavioral: Negative. Negative for agitation, confusion, hallucinations and suicidal ideas. All other systems reviewed and are negative. Physical Exam   Physical Exam  Vitals and nursing note reviewed. Constitutional:       General: He is not in acute distress. Appearance: He is normal weight. He is not ill-appearing. HENT:      Head: Normocephalic and atraumatic. Right Ear: External ear normal.      Left Ear: External ear normal.      Nose: Nose normal. No rhinorrhea. Mouth/Throat:      Mouth: Mucous membranes are moist.      Pharynx: Oropharynx is clear. Eyes:      Extraocular Movements: Extraocular movements intact. Conjunctiva/sclera: Conjunctivae normal.      Pupils: Pupils are equal, round, and reactive to light. Cardiovascular:      Rate and Rhythm: Normal rate and regular rhythm. Pulses: Normal pulses. Heart sounds: Normal heart sounds. Pulmonary:      Effort: Pulmonary effort is normal. No respiratory distress. Breath sounds: Normal breath sounds. Abdominal:      General: Abdomen is flat. Bowel sounds are normal.      Palpations: Abdomen is soft. Musculoskeletal:         General: No tenderness or deformity. Normal range of motion.       Cervical back: Normal range of motion and neck supple. Comments: Right toe infection. Macerated. Wound at the base of the first lower phalanx. Drainage. Foot swelling. Slightly cooler to touch. Good cap refill. Skin:     General: Skin is warm and dry. Capillary Refill: Capillary refill takes less than 2 seconds. Findings: No bruising, lesion or rash. Neurological:      General: No focal deficit present. Mental Status: He is alert and oriented to person, place, and time. Mental status is at baseline. Psychiatric:         Mood and Affect: Mood normal.         Behavior: Behavior normal.         Thought Content: Thought content normal.         Judgment: Judgment normal.         Diagnostic Study Results     Labs -     Recent Results (from the past 12 hour(s))   CBC WITH AUTOMATED DIFF    Collection Time: 06/30/22  8:49 AM   Result Value Ref Range    WBC 12.7 (H) 4.1 - 11.1 K/uL    RBC 5.11 4.10 - 5.70 M/uL    HGB 15.1 12.1 - 17.0 g/dL    HCT 45.9 36.6 - 50.3 %    MCV 89.8 80.0 - 99.0 FL    MCH 29.5 26.0 - 34.0 PG    MCHC 32.9 30.0 - 36.5 g/dL    RDW 13.1 11.5 - 14.5 %    PLATELET 358 278 - 896 K/uL    MPV 10.2 8.9 - 12.9 FL    NRBC 0.0 0.0  WBC    ABSOLUTE NRBC 0.00 0.00 - 0.01 K/uL    NEUTROPHILS 76 (H) 32 - 75 %    LYMPHOCYTES 13 12 - 49 %    MONOCYTES 7 5 - 13 %    EOSINOPHILS 2 0 - 7 %    BASOPHILS 1 0 - 1 %    IMMATURE GRANULOCYTES 1 (H) 0 - 0.5 %    ABS. NEUTROPHILS 9.5 (H) 1.8 - 8.0 K/UL    ABS. LYMPHOCYTES 1.7 0.8 - 3.5 K/UL    ABS. MONOCYTES 0.9 0.0 - 1.0 K/UL    ABS. EOSINOPHILS 0.3 0.0 - 0.4 K/UL    ABS. BASOPHILS 0.1 0.0 - 0.1 K/UL    ABS. IMM.  GRANS. 0.1 (H) 0.00 - 0.04 K/UL    DF AUTOMATED     METABOLIC PANEL, COMPREHENSIVE    Collection Time: 06/30/22  8:49 AM   Result Value Ref Range    Sodium 134 (L) 136 - 145 mmol/L    Potassium 4.0 3.5 - 5.1 mmol/L    Chloride 100 97 - 108 mmol/L    CO2 29 21 - 32 mmol/L    Anion gap 5 5 - 15 mmol/L    Glucose 228 (H) 65 - 100 mg/dL    BUN 11 6 - 20 mg/dL    Creatinine 0.78 0.70 - 1.30 mg/dL    BUN/Creatinine ratio 14 12 - 20      GFR est AA >60 >60 ml/min/1.73m2    GFR est non-AA >60 >60 ml/min/1.73m2    Calcium 9.2 8.5 - 10.1 mg/dL    Bilirubin, total 0.5 0.2 - 1.0 mg/dL    AST (SGOT) 13 (L) 15 - 37 U/L    ALT (SGPT) 26 12 - 78 U/L    Alk. phosphatase 119 (H) 45 - 117 U/L    Protein, total 8.0 6.4 - 8.2 g/dL    Albumin 3.6 3.5 - 5.0 g/dL    Globulin 4.4 (H) 2.0 - 4.0 g/dL    A-G Ratio 0.8 (L) 1.1 - 2.2     SED RATE (ESR)    Collection Time: 06/30/22  8:49 AM   Result Value Ref Range    Sed rate, automated 52 (H) 0 - 20 mm/hr         Radiologic Studies -   XR FOOT RT MIN 3 V   Final Result   Cortical cystic erosive changes involving the distal aspects of the   first and fifth metatarsals and base of the proximal phalanx of the great toe   compatible with chronic degenerative/osteoarthritic changes. Consider gout. CT Results  (Last 48 hours)    None        CXR Results  (Last 48 hours)    None          Medical Decision Making and ED Course   I am the first provider for this patient. I reviewed the vital signs, available nursing notes, past medical history, past surgical history, family history and social history. Vital Signs-Reviewed the patient's vital signs. Patient Vitals for the past 12 hrs:   Temp Pulse Resp BP SpO2   06/30/22 0821 97.6 °F (36.4 °C) 81 16 (!) 182/94 98 %       EKG interpretation:         Records Reviewed: Previous Hospital chart. EMS run report      ED Course:   Initial assessment performed. The patients presenting problems have been discussed, and they are in agreement with the care plan formulated and outlined with them. I have encouraged them to ask questions as they arise throughout their visit.     Orders Placed This Encounter    XR FOOT RT MIN 3 V     Standing Status:   Standing     Number of Occurrences:   1     Order Specific Question:   Transport     Answer:   BED [2]     Order Specific Question:   Reason for Exam     Answer:   Infection of R great toe    CBC WITH AUTOMATED DIFF     Standing Status:   Standing     Number of Occurrences:   1    METABOLIC PANEL, COMPREHENSIVE     Standing Status:   Standing     Number of Occurrences:   1    SED RATE (ESR)     Standing Status:   Standing     Number of Occurrences:   1    IP CONSULT TO PODIATRY     Standing Status:   Standing     Number of Occurrences:   1     Order Specific Question:   Reason for Consult: Answer:   foot infection     Order Specific Question:   Did you call or speak to the consulting provider? Answer:   No     Order Specific Question:   Consult To     Answer:   toe infection     Order Specific Question:   Schedule When? Answer:   TODAY    vancomycin (VANCOCIN) 1,000 mg in 0.9% sodium chloride 250 mL ([id number])     Order Specific Question:   Antibiotic Indications     Answer:   Urinary Tract Infection    piperacillin-tazobactam (ZOSYN) 3.375 g in 0.9% sodium chloride (MBP/ADV) 100 mL MBP     Order Specific Question:   Antibiotic Indications     Answer:   Skin and Soft Tissue Infection    0.9% sodium chloride infusion    morphine injection 4 mg    ondansetron (ZOFRAN) injection 4 mg    INITIAL PHYSICIAN ORDER: INPATIENT Medical; 3. Patient receiving treatment that can only be provided in an inpatient setting (further clarification in H&P documentation)     Standing Status:   Standing     Number of Occurrences:   1     Order Specific Question:   Status: Answer:   INPATIENT [101]     Order Specific Question:   Type of Bed     Answer:   Medical [8]     Order Specific Question:   Inpatient Hospitalization Certified Necessary for the Following Reasons     Answer:   3. Patient receiving treatment that can only be provided in an inpatient setting (further clarification in H&P documentation)     Order Specific Question:   Admitting Diagnosis     Answer:    Toe infection [[id number]]     Order Specific Question:   Admitting Physician     Answer: Keenancricket Carrillo [[id number]]     Order Specific Question:   Attending Physician     Answer:   Jess Huertas     Order Specific Question:   Estimated Length of Stay     Answer:   2 Midnights     Order Specific Question:   Discharge Plan:     Answer:   Home with Office Follow-up                 Provider Notes (Medical Decision Making):   61-year-old with macerated great toe failed outpatient antibiotics will need admission for IV antibiotics. Patient does not meet sepsis criteria. Podiatry consulted. Consults  [facility] admit      ED Course as of 06/30/22 1032   Thu Jun 30, 2022   1025 WBC(!): 12.7 [HP]   1025 Sodium(!): 134 [HP]   1025 Glucose(!): 228 [HP]      ED Course User Index  [HP] Carlton Amanda MD         Admitted    Procedures                       Disposition       Emergency Department Disposition:  Admitted      Diagnosis     Clinical Impression:   1. Toe infection        Attestations:    Jaciel Mcqueen MD    Please note that this dictation was completed with [employer], the computer voice recognition software. Quite often unanticipated grammatical, syntax, homophones, and other interpretive errors are inadvertently transcribed by the computer software. Please disregard these errors. Please excuse any errors that have escaped final proofreading. Thank you.

## 2022-07-01 ENCOUNTER — APPOINTMENT (OUTPATIENT)
Dept: MRI IMAGING | Age: 62
DRG: 271 | End: 2022-07-01
Attending: INTERNAL MEDICINE

## 2022-07-01 LAB
ALBUMIN SERPL-MCNC: 3.2 G/DL (ref 3.5–5)
ALBUMIN/GLOB SERPL: 0.8 {RATIO} (ref 1.1–2.2)
ALP SERPL-CCNC: 103 U/L (ref 45–117)
ALT SERPL-CCNC: 24 U/L (ref 12–78)
ANION GAP SERPL CALC-SCNC: 6 MMOL/L (ref 5–15)
AST SERPL W P-5'-P-CCNC: 15 U/L (ref 15–37)
BASOPHILS # BLD: 0.1 K/UL (ref 0–0.1)
BASOPHILS NFR BLD: 1 % (ref 0–1)
BILIRUB SERPL-MCNC: 0.4 MG/DL (ref 0.2–1)
BUN SERPL-MCNC: 11 MG/DL (ref 6–20)
BUN/CREAT SERPL: 19 (ref 12–20)
CA-I BLD-MCNC: 9 MG/DL (ref 8.5–10.1)
CHLORIDE SERPL-SCNC: 104 MMOL/L (ref 97–108)
CO2 SERPL-SCNC: 28 MMOL/L (ref 21–32)
CREAT SERPL-MCNC: 0.59 MG/DL (ref 0.7–1.3)
CRP SERPL-MCNC: 2.79 MG/DL (ref 0–0.6)
DIFFERENTIAL METHOD BLD: ABNORMAL
EOSINOPHIL # BLD: 0.5 K/UL (ref 0–0.4)
EOSINOPHIL NFR BLD: 4 % (ref 0–7)
ERYTHROCYTE [DISTWIDTH] IN BLOOD BY AUTOMATED COUNT: 13 % (ref 11.5–14.5)
ERYTHROCYTE [SEDIMENTATION RATE] IN BLOOD: 47 MM/HR (ref 0–20)
EST. AVERAGE GLUCOSE BLD GHB EST-MCNC: 174 MG/DL
GLOBULIN SER CALC-MCNC: 4.2 G/DL (ref 2–4)
GLUCOSE BLD STRIP.AUTO-MCNC: 132 MG/DL (ref 65–117)
GLUCOSE BLD STRIP.AUTO-MCNC: 140 MG/DL (ref 65–117)
GLUCOSE BLD STRIP.AUTO-MCNC: 151 MG/DL (ref 65–117)
GLUCOSE BLD STRIP.AUTO-MCNC: 182 MG/DL (ref 65–117)
GLUCOSE SERPL-MCNC: 145 MG/DL (ref 65–100)
HBA1C MFR BLD: 7.7 % (ref 4–5.6)
HCT VFR BLD AUTO: 41.9 % (ref 36.6–50.3)
HGB BLD-MCNC: 14.4 G/DL (ref 12.1–17)
IMM GRANULOCYTES # BLD AUTO: 0.1 K/UL (ref 0–0.04)
IMM GRANULOCYTES NFR BLD AUTO: 1 % (ref 0–0.5)
LEFT ABI: 0.86
LEFT ARM BP: 159 MMHG
LEFT POSTERIOR TIBIAL: 143 MMHG
LEFT TBI: 0.58
LEFT TOE PRESSURE: 97 MMHG
LYMPHOCYTES # BLD: 1.6 K/UL (ref 0.8–3.5)
LYMPHOCYTES NFR BLD: 14 % (ref 12–49)
MCH RBC QN AUTO: 30.6 PG (ref 26–34)
MCHC RBC AUTO-ENTMCNC: 34.4 G/DL (ref 30–36.5)
MCV RBC AUTO: 89.1 FL (ref 80–99)
MONOCYTES # BLD: 0.9 K/UL (ref 0–1)
MONOCYTES NFR BLD: 8 % (ref 5–13)
NEUTS SEG # BLD: 8 K/UL (ref 1.8–8)
NEUTS SEG NFR BLD: 72 % (ref 32–75)
NRBC # BLD: 0 K/UL (ref 0–0.01)
NRBC BLD-RTO: 0 PER 100 WBC
PERFORMED BY, TECHID: ABNORMAL
PLATELET # BLD AUTO: 265 K/UL (ref 150–400)
PMV BLD AUTO: 10.2 FL (ref 8.9–12.9)
POTASSIUM SERPL-SCNC: 4.2 MMOL/L (ref 3.5–5.1)
PROT SERPL-MCNC: 7.4 G/DL (ref 6.4–8.2)
RBC # BLD AUTO: 4.7 M/UL (ref 4.1–5.7)
RIGHT ABI: 0.48
RIGHT ARM BP: 166 MMHG
RIGHT POSTERIOR TIBIAL: 79 MMHG
SODIUM SERPL-SCNC: 138 MMOL/L (ref 136–145)
VANCOMYCIN TROUGH SERPL-MCNC: 10.3 UG/ML (ref 5–10)
VAS LEFT DORSALIS PEDIS BP: 133 MMHG
VAS RIGHT DORSALIS PEDIS BP: 78 MMHG
WBC # BLD AUTO: 11.2 K/UL (ref 4.1–11.1)

## 2022-07-01 PROCEDURE — 99232 SBSQ HOSP IP/OBS MODERATE 35: CPT | Performed by: INTERNAL MEDICINE

## 2022-07-01 PROCEDURE — 73718 MRI LOWER EXTREMITY W/O DYE: CPT

## 2022-07-01 PROCEDURE — 36415 COLL VENOUS BLD VENIPUNCTURE: CPT

## 2022-07-01 PROCEDURE — 85025 COMPLETE CBC W/AUTO DIFF WBC: CPT

## 2022-07-01 PROCEDURE — 80202 ASSAY OF VANCOMYCIN: CPT

## 2022-07-01 PROCEDURE — 86140 C-REACTIVE PROTEIN: CPT

## 2022-07-01 PROCEDURE — 74011000258 HC RX REV CODE- 258: Performed by: FAMILY MEDICINE

## 2022-07-01 PROCEDURE — 85652 RBC SED RATE AUTOMATED: CPT

## 2022-07-01 PROCEDURE — 65270000029 HC RM PRIVATE

## 2022-07-01 PROCEDURE — 74011636637 HC RX REV CODE- 636/637: Performed by: FAMILY MEDICINE

## 2022-07-01 PROCEDURE — 80053 COMPREHEN METABOLIC PANEL: CPT

## 2022-07-01 PROCEDURE — 74011250636 HC RX REV CODE- 250/636: Performed by: FAMILY MEDICINE

## 2022-07-01 PROCEDURE — 74011250637 HC RX REV CODE- 250/637: Performed by: FAMILY MEDICINE

## 2022-07-01 PROCEDURE — 82962 GLUCOSE BLOOD TEST: CPT

## 2022-07-01 RX ORDER — OXYCODONE AND ACETAMINOPHEN 5; 325 MG/1; MG/1
1 TABLET ORAL
Status: DISCONTINUED | OUTPATIENT
Start: 2022-07-01 | End: 2022-07-08 | Stop reason: HOSPADM

## 2022-07-01 RX ADMIN — LISINOPRIL 10 MG: 10 TABLET ORAL at 09:08

## 2022-07-01 RX ADMIN — ENOXAPARIN SODIUM 40 MG: 100 INJECTION SUBCUTANEOUS at 09:09

## 2022-07-01 RX ADMIN — OXYCODONE AND ACETAMINOPHEN 1 TABLET: 5; 325 TABLET ORAL at 11:16

## 2022-07-01 RX ADMIN — AMPICILLIN SODIUM AND SULBACTAM SODIUM 3 G: 2; 1 INJECTION, POWDER, FOR SOLUTION INTRAMUSCULAR; INTRAVENOUS at 11:57

## 2022-07-01 RX ADMIN — METFORMIN HYDROCHLORIDE 500 MG: 500 TABLET ORAL at 17:24

## 2022-07-01 RX ADMIN — AMPICILLIN SODIUM AND SULBACTAM SODIUM 3 G: 2; 1 INJECTION, POWDER, FOR SOLUTION INTRAMUSCULAR; INTRAVENOUS at 18:48

## 2022-07-01 RX ADMIN — MORPHINE SULFATE 1 MG: 2 INJECTION, SOLUTION INTRAMUSCULAR; INTRAVENOUS at 13:28

## 2022-07-01 RX ADMIN — VANCOMYCIN HYDROCHLORIDE 1000 MG: 1 INJECTION, POWDER, LYOPHILIZED, FOR SOLUTION INTRAVENOUS at 09:09

## 2022-07-01 RX ADMIN — METFORMIN HYDROCHLORIDE 500 MG: 500 TABLET ORAL at 09:08

## 2022-07-01 RX ADMIN — AMLODIPINE BESYLATE 5 MG: 5 TABLET ORAL at 09:09

## 2022-07-01 RX ADMIN — VANCOMYCIN HYDROCHLORIDE 1000 MG: 1 INJECTION, POWDER, LYOPHILIZED, FOR SOLUTION INTRAVENOUS at 00:49

## 2022-07-01 RX ADMIN — AMPICILLIN SODIUM AND SULBACTAM SODIUM 3 G: 2; 1 INJECTION, POWDER, FOR SOLUTION INTRAMUSCULAR; INTRAVENOUS at 06:04

## 2022-07-01 RX ADMIN — MORPHINE SULFATE 1 MG: 2 INJECTION, SOLUTION INTRAMUSCULAR; INTRAVENOUS at 22:08

## 2022-07-01 RX ADMIN — PANTOPRAZOLE SODIUM 40 MG: 40 TABLET, DELAYED RELEASE ORAL at 09:08

## 2022-07-01 RX ADMIN — AMPICILLIN SODIUM AND SULBACTAM SODIUM 3 G: 2; 1 INJECTION, POWDER, FOR SOLUTION INTRAMUSCULAR; INTRAVENOUS at 00:02

## 2022-07-01 RX ADMIN — INSULIN LISPRO 3 UNITS: 100 INJECTION, SOLUTION INTRAVENOUS; SUBCUTANEOUS at 09:09

## 2022-07-01 RX ADMIN — TRAMADOL HYDROCHLORIDE 50 MG: 50 TABLET ORAL at 06:10

## 2022-07-01 RX ADMIN — MORPHINE SULFATE 1 MG: 2 INJECTION, SOLUTION INTRAMUSCULAR; INTRAVENOUS at 02:30

## 2022-07-01 RX ADMIN — VANCOMYCIN HYDROCHLORIDE 1000 MG: 1 INJECTION, POWDER, LYOPHILIZED, FOR SOLUTION INTRAVENOUS at 17:24

## 2022-07-01 RX ADMIN — OXYCODONE AND ACETAMINOPHEN 1 TABLET: 5; 325 TABLET ORAL at 17:43

## 2022-07-01 NOTE — PROGRESS NOTES
Infectious Disease Progress Note           Subjective:   Pt seen and examined at bedside. Stable, denies new complaints. No acute events since last seen   Objective:   Physical Exam:     Visit Vitals  BP (!) 177/75 (BP 1 Location: Left upper arm, BP Patient Position: At rest;Lying)   Pulse 82   Temp 98 °F (36.7 °C)   Resp 19   Ht 6' 1\" (1.854 m)   Wt 255 lb (115.7 kg)   SpO2 96%   BMI 33.64 kg/m²      O2 Device: None (Room air)    Temp (24hrs), Av °F (36.7 °C), Min:97.9 °F (36.6 °C), Max:98 °F (36.7 °C)    No intake/output data recorded. No intake/output data recorded. General: NAD, AAO x 4  HEENT: FLOYD, Moist mucosa   Lungs: CTA b/l, decreased at the bases, no wheeze/rhonchi   Heart: S1S2+, RRR, no murmur  Abdo: Soft, NT, ND, +BS   : No herbert cath   Exts: Right foot dressing in place   Skin: No pressure ulcers or andre     Data Review:       Recent Days:  Recent Labs     22  0609 22  0849   WBC 11.2* 12.7*   HGB 14.4 15.1   HCT 41.9 45.9    272     Recent Labs     22  0609 22  0849   BUN 11 11   CREA 0.59* 0.78       Lab Results   Component Value Date/Time    C-Reactive protein 2.79 (H) 2022 06:09 AM        Microbiology     Results     Procedure Component Value Units Date/Time    CULTURE, BLOOD #1 [[id number]] Collected: 22    Order Status: Completed Specimen: Blood Updated: 22     Special Requests: No Special Requests        Culture result: No growth after 2 hours       CULTURE, BLOOD #2 [[id number]] Collected: 22    Order Status: Completed Specimen: Blood Updated: 22     Special Requests: No Special Requests        Culture result: No growth after 2 hours       CULTURE, WOUND Roosvelt Elliot STAIN [[id number]]     Order Status: Sent Specimen: Wound from Ulcer          Diagnostics   CXR Results  (Last 48 hours)    None         Assessment/Plan     1.  Right great toe infection, Cortical cystic erosive changes involving 1st and 5th toes on X-ray       Stable ulceration involving base of right great toe, minimal drainage       Afebrile, mild decrease in WBC on todays labs, normal serum uric acid, CRP 2.79, ESR      MRI of right foot done, results are pending, will follow       Continue on Vanc and Unasyn. Routine labs in the morning      2. H/o gouty arthritis, ? Reason for # 1, uric acid level of 3.9    3. PAD, cyanotic skin changes involving right great toe, Abnormal ANTIONETTE       Seen by Dr Beth Harada, plans for arteriogram        4.  H/o DM, BS controlled per pt, A1C of 7.7, BS mg per primary       Jakub MD Jose    7/1/2022

## 2022-07-01 NOTE — PROGRESS NOTES
7851: Chart reviewed. Per MD notes, patient scheduled for MRI of right foot and on double IV ABX therapy. When medically stable, plan is for patient to discharge home, self care. : Deborah Harris, [phone].     CM will continue to follow patient and recs of medical team.

## 2022-07-01 NOTE — PROGRESS NOTES
General Daily Progress Note          Patient Name:   Alexis Wooten       YOB: 1960       Age:  65 y.o. Admit Date: 6/30/2022      Subjective:       Alexis Wooten is a 65 y.o. male with a significant PMH of HTN, lifelong 1.5ppd smoker, and Diabetes  RA and Amy Craig presents to the ED today with a R great toe wound. Started 2 weeks ago treated with oral antibiotics the patient states that the wound appeared 5 days ago when it \"burst\" from swelling with light yellow discharge. He has taken Rocefin and Clindamycin for the wound with subjective improvement. The wound is not acutely painful but he has a small amount of pain in the MTP joint. Patient denies other symptoms associated with the wound.   Patient also treated for gout      Complaining of pain 8 out of the 10    Seen by infectious disease recommend to continue vancomycin and Zosyn           Objective:     Visit Vitals  BP (!) 177/75 (BP 1 Location: Left upper arm, BP Patient Position: At rest;Lying)   Pulse 82   Temp 98 °F (36.7 °C)   Resp 19   Ht 6' 1\" (1.854 m)   Wt 115.7 kg (255 lb)   SpO2 96%   BMI 33.64 kg/m²        Recent Results (from the past 24 hour(s))   GLUCOSE, POC    Collection Time: 06/30/22 11:47 AM   Result Value Ref Range    Glucose (POC) 138 (H) 65 - 117 mg/dL    Performed by [place] INDEX    Collection Time: 06/30/22  4:20 PM   Result Value Ref Range    Right dorsalis pedis BP 78 mmHg    Left dorsalis pedis  mmHg    Left arm  mmHg    Right arm  mmHg    Left posterior tibial 143 mmHg    Right posterior tibial 79 mmHg    Left ANTIONETTE 0.86     Right ANTIONETTE 0.48     Left toe pressure 97 mmHg    Left TBI 0.58    GLUCOSE, POC    Collection Time: 06/30/22  4:49 PM   Result Value Ref Range    Glucose (POC) 130 (H) 65 - 117 mg/dL    Performed by [facility] ACID    Collection Time: 06/30/22  6:36 PM   Result Value Ref Range    Uric acid 3.9 3.5 - 7.2 mg/dL   GLUCOSE, POC Collection Time: 06/30/22  7:53 PM   Result Value Ref Range    Glucose (POC) 175 (H) 65 - 117 mg/dL    Performed by Uli Sernas    METABOLIC PANEL, COMPREHENSIVE    Collection Time: 07/01/22  6:09 AM   Result Value Ref Range    Sodium 138 136 - 145 mmol/L    Potassium 4.2 3.5 - 5.1 mmol/L    Chloride 104 97 - 108 mmol/L    CO2 28 21 - 32 mmol/L    Anion gap 6 5 - 15 mmol/L    Glucose 145 (H) 65 - 100 mg/dL    BUN 11 6 - 20 mg/dL    Creatinine 0.59 (L) 0.70 - 1.30 mg/dL    BUN/Creatinine ratio 19 12 - 20      GFR est AA >60 >60 ml/min/1.73m2    GFR est non-AA >60 >60 ml/min/1.73m2    Calcium 9.0 8.5 - 10.1 mg/dL    Bilirubin, total 0.4 0.2 - 1.0 mg/dL    AST (SGOT) 15 15 - 37 U/L    ALT (SGPT) 24 12 - 78 U/L    Alk. phosphatase 103 45 - 117 U/L    Protein, total 7.4 6.4 - 8.2 g/dL    Albumin 3.2 (L) 3.5 - 5.0 g/dL    Globulin 4.2 (H) 2.0 - 4.0 g/dL    A-G Ratio 0.8 (L) 1.1 - 2.2     CBC WITH AUTOMATED DIFF    Collection Time: 07/01/22  6:09 AM   Result Value Ref Range    WBC 11.2 (H) 4.1 - 11.1 K/uL    RBC 4.70 4. 10 - 5.70 M/uL    HGB 14.4 12.1 - 17.0 g/dL    HCT 41.9 36.6 - 50.3 %    MCV 89.1 80.0 - 99.0 FL    MCH 30.6 26.0 - 34.0 PG    MCHC 34.4 30.0 - 36.5 g/dL    RDW 13.0 11.5 - 14.5 %    PLATELET 669 348 - 274 K/uL    MPV 10.2 8.9 - 12.9 FL    NRBC 0.0 0.0  WBC    ABSOLUTE NRBC 0.00 0.00 - 0.01 K/uL    NEUTROPHILS 72 32 - 75 %    LYMPHOCYTES 14 12 - 49 %    MONOCYTES 8 5 - 13 %    EOSINOPHILS 4 0 - 7 %    BASOPHILS 1 0 - 1 %    IMMATURE GRANULOCYTES 1 (H) 0 - 0.5 %    ABS. NEUTROPHILS 8.0 1.8 - 8.0 K/UL    ABS. LYMPHOCYTES 1.6 0.8 - 3.5 K/UL    ABS. MONOCYTES 0.9 0.0 - 1.0 K/UL    ABS. EOSINOPHILS 0.5 (H) 0.0 - 0.4 K/UL    ABS. BASOPHILS 0.1 0.0 - 0.1 K/UL    ABS. IMM.  GRANS. 0.1 (H) 0.00 - 0.04 K/UL    DF AUTOMATED     VANCOMYCIN, TROUGH    Collection Time: 07/01/22  6:09 AM   Result Value Ref Range    Vancomycin,trough 10.3 (H) 5.0 - 10.0 ug/mL   C REACTIVE PROTEIN, QT    Collection Time: 07/01/22 6:09 AM   Result Value Ref Range    C-Reactive protein 2.79 (H) 0.00 - 0.60 mg/dL   SED RATE (ESR)    Collection Time: 07/01/22  6:09 AM   Result Value Ref Range    Sed rate, automated 47 (H) 0 - 20 mm/hr   GLUCOSE, POC    Collection Time: 07/01/22  8:03 AM   Result Value Ref Range    Glucose (POC) 151 (H) 65 - 117 mg/dL    Performed by Edwin Amaro      [unfilled]      Review of Systems    Constitutional: Negative for chills and fever. HENT: Negative. Eyes: Negative. Respiratory: Negative. Cardiovascular: Negative. Gastrointestinal: Negative for abdominal pain and nausea. Skin: Negative. Neurological: Negative. Physical Exam:      Constitutional: pt is oriented to person, place, and time. HENT:   Head: Normocephalic and atraumatic. Eyes: Pupils are equal, round, and reactive to light. EOM are normal.   Cardiovascular: Normal rate, regular rhythm and normal heart sounds. Pulmonary/Chest: Breath sounds normal. No wheezes. No rales. Exhibits no tenderness. Abdominal: Soft. Bowel sounds are normal. There is no abdominal tenderness. There is no rebound and no guarding. Musculoskeletal: Normal range of motion. Neurological: pt is alert and oriented to person, place, and time. ANKLE BRACHIAL INDEX   Final Result      XR FOOT RT MIN 3 V   Final Result   Cortical cystic erosive changes involving the distal aspects of the   first and fifth metatarsals and base of the proximal phalanx of the great toe   compatible with chronic degenerative/osteoarthritic changes. Consider gout.       MRI FOOT RT WO CONT    (Results Pending)        Recent Results (from the past 24 hour(s))   GLUCOSE, POC    Collection Time: 06/30/22 11:47 AM   Result Value Ref Range    Glucose (POC) 138 (H) 65 - 117 mg/dL    Performed by [facility] [place]    Collection Time: 06/30/22  4:20 PM   Result Value Ref Range    Right dorsalis pedis BP 78 mmHg    Left dorsalis pedis  mmHg Left arm  mmHg    Right arm  mmHg    Left posterior tibial 143 mmHg    Right posterior tibial 79 mmHg    Left ANTIONETTE 0.86     Right ANTIONETTE 0.48     Left toe pressure 97 mmHg    Left TBI 0.58    GLUCOSE, POC    Collection Time: 06/30/22  4:49 PM   Result Value Ref Range    Glucose (POC) 130 (H) 65 - 117 mg/dL    Performed by [address] [place] [address] ACID    Collection Time: 06/30/22  6:36 PM   Result Value Ref Range    Uric acid 3.9 3.5 - 7.2 mg/dL   GLUCOSE, POC    Collection Time: 06/30/22  7:53 PM   Result Value Ref Range    Glucose (POC) 175 (H) 65 - 117 mg/dL    Performed by Uli Garcia    METABOLIC PANEL, COMPREHENSIVE    Collection Time: 07/01/22  6:09 AM   Result Value Ref Range    Sodium 138 136 - 145 mmol/L    Potassium 4.2 3.5 - 5.1 mmol/L    Chloride 104 97 - 108 mmol/L    CO2 28 21 - 32 mmol/L    Anion gap 6 5 - 15 mmol/L    Glucose 145 (H) 65 - 100 mg/dL    BUN 11 6 - 20 mg/dL    Creatinine 0.59 (L) 0.70 - 1.30 mg/dL    BUN/Creatinine ratio 19 12 - 20      GFR est AA >60 >60 ml/min/1.73m2    GFR est non-AA >60 >60 ml/min/1.73m2    Calcium 9.0 8.5 - 10.1 mg/dL    Bilirubin, total 0.4 0.2 - 1.0 mg/dL    AST (SGOT) 15 15 - 37 U/L    ALT (SGPT) 24 12 - 78 U/L    Alk. phosphatase 103 45 - 117 U/L    Protein, total 7.4 6.4 - 8.2 g/dL    Albumin 3.2 (L) 3.5 - 5.0 g/dL    Globulin 4.2 (H) 2.0 - 4.0 g/dL    A-G Ratio 0.8 (L) 1.1 - 2.2     CBC WITH AUTOMATED DIFF    Collection Time: 07/01/22  6:09 AM   Result Value Ref Range    WBC 11.2 (H) 4.1 - 11.1 K/uL    RBC 4.70 4. 10 - 5.70 M/uL    HGB 14.4 12.1 - 17.0 g/dL    HCT 41.9 36.6 - 50.3 %    MCV 89.1 80.0 - 99.0 FL    MCH 30.6 26.0 - 34.0 PG    MCHC 34.4 30.0 - 36.5 g/dL    RDW 13.0 11.5 - 14.5 %    PLATELET 177 457 - 494 K/uL    MPV 10.2 8.9 - 12.9 FL    NRBC 0.0 0.0  WBC    ABSOLUTE NRBC 0.00 0.00 - 0.01 K/uL    NEUTROPHILS 72 32 - 75 %    LYMPHOCYTES 14 12 - 49 %    MONOCYTES 8 5 - 13 %    EOSINOPHILS 4 0 - 7 %    BASOPHILS 1 0 - 1 % IMMATURE GRANULOCYTES 1 (H) 0 - 0.5 %    ABS. NEUTROPHILS 8.0 1.8 - 8.0 K/UL    ABS. LYMPHOCYTES 1.6 0.8 - 3.5 K/UL    ABS. MONOCYTES 0.9 0.0 - 1.0 K/UL    ABS. EOSINOPHILS 0.5 (H) 0.0 - 0.4 K/UL    ABS. BASOPHILS 0.1 0.0 - 0.1 K/UL    ABS. IMM. GRANS. 0.1 (H) 0.00 - 0.04 K/UL    DF AUTOMATED     VANCOMYCIN, TROUGH    Collection Time: 07/01/22  6:09 AM   Result Value Ref Range    Vancomycin,trough 10.3 (H) 5.0 - 10.0 ug/mL   C REACTIVE PROTEIN, QT    Collection Time: 07/01/22  6:09 AM   Result Value Ref Range    C-Reactive protein 2.79 (H) 0.00 - 0.60 mg/dL   SED RATE (ESR)    Collection Time: 07/01/22  6:09 AM   Result Value Ref Range    Sed rate, automated 47 (H) 0 - 20 mm/hr   GLUCOSE, POC    Collection Time: 07/01/22  8:03 AM   Result Value Ref Range    Glucose (POC) 151 (H) 65 - 117 mg/dL    Performed by Leia Crawford        Results     Procedure Component Value Units Date/Time    CULTURE, BLOOD #1 [[id number]] Collected: 06/30/22 1840    Order Status: Completed Specimen: Blood Updated: 06/30/22 1914    CULTURE, BLOOD #2 [[id number]] Collected: 06/30/22 1840    Order Status: Completed Specimen: Blood Updated: 06/30/22 1914    CULTURE, [employer] STAIN [[id number]]     Order Status: Sent Specimen: Wound from Ulcer            Labs:     Recent Labs     07/01/22 0609 06/30/22  0849   WBC 11.2* 12.7*   HGB 14.4 15.1   HCT 41.9 45.9    272     Recent Labs     07/01/22  0609 06/30/22  1836 06/30/22  0849     --  134*   K 4.2  --  4.0     --  100   CO2 28  --  29   BUN 11  --  11   CREA 0.59*  --  0.78   *  --  228*   CA 9.0  --  9.2   URICA  --  3.9  --      Recent Labs     07/01/22  0609 06/30/22  0849   ALT 24 26    119*   TBILI 0.4 0.5   TP 7.4 8.0   ALB 3.2* 3.6   GLOB 4.2* 4.4*     No results for input(s): INR, PTP, APTT, INREXT in the last 72 hours. No results for input(s): FE, TIBC, PSAT, FERR in the last 72 hours.    No results found for: FOL, RBCF   No results for input(s): PH, PCO2, PO2 in the last 72 hours. No results for input(s): CPK, CKNDX, TROIQ in the last 72 hours.     No lab exists for component: CPKMB  No results found for: CHOL, CHOLX, CHLST, CHOLV, HDL, HDLP, LDL, LDLC, DLDLP, TGLX, TRIGL, TRIGP, CHHD, CHHDX  Lab Results   Component Value Date/Time    Glucose (POC) 151 (H) 07/01/2022 08:03 AM    Glucose (POC) 175 (H) 06/30/2022 07:53 PM    Glucose (POC) 130 (H) 06/30/2022 04:49 PM    Glucose (POC) 138 (H) 06/30/2022 11:47 AM     No results found for: COLOR, APPRN, SPGRU, REFSG, EPHRAIM, PROTU, GLUCU, KETU, BILU, UROU, VIANEY, LEUKU, GLUKE, EPSU, BACTU, WBCU, RBCU, CASTS, UCRY      Assessment:     Right great toe infection  Diabetic foot injury  Arterial Insufficiency  Diabetes Mellitus  Hypertension  GERD  Gout  RA  Smoking  Peripheral vascular disease      Plan:     IV vancomycin and IV Zosyn  Started on oxycodone for pain  Podiatry consult pending  MRI pending  Vascular surgical consult        Current Facility-Administered Medications:     [START ON 7/3/2022] Vancomycin - Trough to be drawn prior to dose 7/3 @ 0900, , Other, ONCE, Satnam Ferrer MD    insulin lispro (HUMALOG) injection, , SubCUTAneous, AC&HS, aStnam Ferrer MD, 3 Units at 07/01/22 0909    glucose chewable tablet 16 g, 4 Tablet, Oral, PRN, Latha Ferrer MD    glucagon (GLUCAGEN) injection 1 mg, 1 mg, IntraMUSCular, PRN, Latha Ferrer MD    acetaminophen (TYLENOL) tablet 650 mg, 650 mg, Oral, Q6H PRN **OR** acetaminophen (TYLENOL) suppository 650 mg, 650 mg, Rectal, Q6H PRN, Latha Ferrer MD    polyethylene glycol (MIRALAX) packet 17 g, 17 g, Oral, DAILY PRN, Latha Ferrer MD    ondansetron (ZOFRAN ODT) tablet 4 mg, 4 mg, Oral, Q8H PRN **OR** ondansetron (ZOFRAN) injection 4 mg, 4 mg, IntraVENous, Q6H PRN, Satnam Ferrer MD    enoxaparin (LOVENOX) injection 40 mg, 40 mg, SubCUTAneous, DAILY, Satnam Ferrer MD, 40 mg at 07/01/22 0909    ampicillin-sulbactam (UNASYN) 3 g in 0.9% sodium chloride (MBP/ADV) 100 mL MBP, 3 g, IntraVENous, Q6H, Satnam Ferrer MD, Last Rate: 200 mL/hr at 07/01/22 0604, 3 g at 07/01/22 0604    metFORMIN (GLUCOPHAGE) tablet 500 mg, 500 mg, Oral, BID WITH MEALS, Satnam Ferrer MD, 500 mg at 07/01/22 0908    pantoprazole (PROTONIX) tablet 40 mg, 40 mg, Oral, ACB, Satnam Ferrer MD, 40 mg at 07/01/22 0908    vancomycin (VANCOCIN) 1,000 mg in 0.9% sodium chloride 250 mL ([id number]), 1,000 mg, IntraVENous, Q8H, Satnam Ferrer MD, Last Rate: 250 mL/hr at 07/01/22 0909, 1,000 mg at 07/01/22 0909    VANCOMYCIN INFORMATION NOTE 1 Each, 1 Each, Other, Rx Dosing/Monitoring, Satnam Ferrer MD    lisinopriL (PRINIVIL, ZESTRIL) tablet 10 mg, 10 mg, Oral, DAILY, 10 mg at 07/01/22 0908 **AND** amLODIPine (NORVASC) tablet 5 mg, 5 mg, Oral, DAILY, Satnam Ferrer MD, 5 mg at 07/01/22 0909    traMADoL (ULTRAM) tablet 50 mg, 50 mg, Oral, Q6H PRN, Satnam Ferrer MD, 50 mg at 07/01/22 0610    morphine injection 1 mg, 1 mg, IntraVENous, Q4H PRN, Satnam Ferrer MD, 1 mg at 07/01/22 0230

## 2022-07-01 NOTE — PROGRESS NOTES
Problem: Falls - Risk of  Goal: *Absence of Falls  Description: Document Dayton Nugent Fall Risk and appropriate interventions in the flowsheet.   Outcome: Progressing Towards Goal  Note: Fall Risk Interventions:            Medication Interventions: Teach patient to arise slowly                   Problem: Patient Education: Go to Patient Education Activity  Goal: Patient/Family Education  Outcome: Progressing Towards Goal

## 2022-07-01 NOTE — PROGRESS NOTES
Vancomycin Dosing Consult  Day #2 of vancomycin therapy  Consult ordered by Dr. Mehul White for this 65 y.o. male for indication of left great toe infection w/ possible osteomyelitis.   Antibiotic regimen: Vancomycin + Unasyn    Temp (24hrs), Av °F (36.7 °C), Min:97.9 °F (36.6 °C), Max:98 °F (36.7 °C)    Recent Labs     22  0609 22  0849   WBC 11.2* 12.7*   CREA 0.59* 0.78   BUN 11 11     Est CrCl: >100mL/min  Concomitant nephrotoxic drugs: None    Cultures:    Blood: Pending    MRSA Swab: Not ordered, patient already received first dose of vancomycin    Target range: AUC/-600    Recent level history:  Date/Time Dose & Interval Measured Level (mcg/mL) Associated AUC/TAY    @ 0609 1000 mg q8h 10.3 507        Assessment/Plan:   Possible osteomyelitis, MRI pending, goal AUC of 400-600 unless ruled out  Afebrile, threshold leukocytosis, ESR and CRP elevated  SCr trending down  Extrapolated AUC of 507 is therapeutic, continue current regimen  Next trough prior to dose 7/3 @ 0900  Antimicrobial stop date TBD

## 2022-07-01 NOTE — WOUND CARE
IP WOUND CONSULT    [facility]  MEDICAL RECORD NUMBER:  [id number]  AGE: 65 y.o. GENDER: male  : 1960  TODAY'S DATE:  2022    GENERAL     [] Follow-up   [x] New Consult    [facility] is a 65 y.o. male referred by:   [x] Physician  [] Nursing  [] Other:         PAST MEDICAL HISTORY    No past medical history on file. PAST SURGICAL HISTORY    No past surgical history on file. FAMILY HISTORY    No family history on file. ALLERGIES    No Known Allergies    MEDICATIONS    No current facility-administered medications on file prior to encounter. Current Outpatient Medications on File Prior to Encounter   Medication Sig Dispense Refill    pantoprazole (PROTONIX) 40 mg tablet Take 40 mg by mouth daily.  amLODIPine-benazepril (LOTREL) 5-20 mg per capsule Take 1 Capsule by mouth daily.  metFORMIN (GLUCOPHAGE) 500 mg tablet Take 500 mg by mouth daily (with dinner).  naproxen (NAPROSYN) 250 mg tablet Take 500 mg by mouth daily.            [unfilled]  Visit Vitals  BP (!) 177/75 (BP 1 Location: Left upper arm, BP Patient Position: At rest;Lying)   Pulse 82   Temp 98 °F (36.7 °C)   Resp 19   Ht 6' 1\" (1.854 m)   Wt 115.7 kg (255 lb)   SpO2 96%   BMI 33.64 kg/m²       ASSESSMENT     Wound Identification & Type: Trauma to right 1st toe  Dressing change: Yes, applied Therahoney to crust and wrapped with gauze and kerlix  Verbal consent for picture: Yes    Contributing Factors: anticoagulation therapy, edema, diabetes, poor glucose control, obesity, smoking and arterial insufficiency    Wound Toe (Comment  which one) Anterior;Right Edematous, erythematous 22 (Active)   Wound Image    22 1315   Wound Etiology Traumatic 22 1315   Dressing Status New dressing applied 22 1315   Cleansed Cleansed with saline 22 1315   Dressing/Treatment Honey gel/honey paste;Gauze dressing/dressing sponge;Roll gauze 22 1315   Wound Length (cm) 0.6 cm 22 1315 Wound Width (cm) 3.1 cm 07/01/22 1315   Wound Depth (cm) 0.1 cm 07/01/22 1315   Wound Surface Area (cm^2) 1.86 cm^2 07/01/22 1315   Wound Volume (cm^3) 0.186 cm^3 07/01/22 1315   Wound Assessment Dry; Other (Comment) 07/01/22 1315   Drainage Amount None 07/01/22 1315   Wound Odor None 07/01/22 1315   Mamie-Wound/Incision Assessment Edematous; Other (Comment) 07/01/22 1315   Edges Undefined edges 07/01/22 1315   Number of days: 1          PLAN     Skin Care & Pressure Relief Recommendations  Minimize layers of linen    Phil 21  Blood Glucose: 151 on 7/1/22                             Albumin: 3.2 on 7/1/22  WBCs: 11.2 on     Support Surface: Gel mattress    Physician/Provider notified:   Recommendations: Per patient, injury to right 1st toe originally occurred when he stepped on a rock a few weeks ago. Treated with antibiotics for 1 week but did not improve. Pain relieved when dangling foot, elevation increases pain. Edema to right foot, toe is erythematic with blue tinge discoloration. Cleaned with NS and applied Therahoney gel to crust and wrapped with gauze and kerlix. Dr. Sorin Madden will enter wound care orders pending his evaluation. Vascular Surgeon Dr. Rivera Score also on consult. No other wound care needs noted at this time. Patient is independent with ADLs and ambulation. No incontinence issues. Re-consult [facility] if skin condition changes. Discharge Wound Care Needs: TBD by Dr. Sorin Madden. Teaching completed with:   [x] Patient           [] Family member       [] Caregiver          [] Nursing  [] Other    Patient/Caregiver Teaching: Diabetic patients are high-risk for wound infections. Feet must be inspected daily for skin abnormalities or changes in skin integrity that could lead to infection.     Level of patient/caregiver understanding able to:   [] Indicates understanding       [] Needs reinforcement  [] Unsuccessful      [x] Verbal Understanding  [] Demonstrated understanding       [] No evidence of learning  [] Refused teaching         [] N/A       Electronically signed by Dinora Sánchez RN on 7/1/2022 at 1:18 PM

## 2022-07-01 NOTE — ADVANCED PRACTICE NURSE
Bedside and Verbal shift change report given to Dick Jackson  (oncoming nurse) by Judah No RN (offgoing nurse). Report included the following information SBAR, Kardex, MAR, Accordion, and Recent Results.

## 2022-07-02 ENCOUNTER — ANESTHESIA EVENT (OUTPATIENT)
Dept: SURGERY | Age: 62
DRG: 271 | End: 2022-07-02

## 2022-07-02 LAB
APTT PPP: 27.7 SEC (ref 21.2–34.1)
APTT PPP: 29.1 SEC (ref 21.2–34.1)
BASOPHILS # BLD: 0.1 K/UL (ref 0–0.1)
BASOPHILS NFR BLD: 1 % (ref 0–1)
DIFFERENTIAL METHOD BLD: ABNORMAL
EOSINOPHIL # BLD: 0.4 K/UL (ref 0–0.4)
EOSINOPHIL NFR BLD: 3 % (ref 0–7)
ERYTHROCYTE [DISTWIDTH] IN BLOOD BY AUTOMATED COUNT: 12.9 % (ref 11.5–14.5)
GLUCOSE BLD STRIP.AUTO-MCNC: 142 MG/DL (ref 65–117)
GLUCOSE BLD STRIP.AUTO-MCNC: 148 MG/DL (ref 65–117)
GLUCOSE BLD STRIP.AUTO-MCNC: 155 MG/DL (ref 65–117)
GLUCOSE BLD STRIP.AUTO-MCNC: 194 MG/DL (ref 65–117)
HCT VFR BLD AUTO: 42.1 % (ref 36.6–50.3)
HGB BLD-MCNC: 14.3 G/DL (ref 12.1–17)
IMM GRANULOCYTES # BLD AUTO: 0.1 K/UL (ref 0–0.04)
IMM GRANULOCYTES NFR BLD AUTO: 1 % (ref 0–0.5)
LYMPHOCYTES # BLD: 1.5 K/UL (ref 0.8–3.5)
LYMPHOCYTES NFR BLD: 12 % (ref 12–49)
MCH RBC QN AUTO: 30.6 PG (ref 26–34)
MCHC RBC AUTO-ENTMCNC: 34 G/DL (ref 30–36.5)
MCV RBC AUTO: 90 FL (ref 80–99)
MONOCYTES # BLD: 1 K/UL (ref 0–1)
MONOCYTES NFR BLD: 8 % (ref 5–13)
NEUTS SEG # BLD: 9.9 K/UL (ref 1.8–8)
NEUTS SEG NFR BLD: 75 % (ref 32–75)
NRBC # BLD: 0 K/UL (ref 0–0.01)
NRBC BLD-RTO: 0 PER 100 WBC
PERFORMED BY, TECHID: ABNORMAL
PLATELET # BLD AUTO: 288 K/UL (ref 150–400)
PMV BLD AUTO: 10 FL (ref 8.9–12.9)
RBC # BLD AUTO: 4.68 M/UL (ref 4.1–5.7)
THERAPEUTIC RANGE,PTTT: NORMAL SEC (ref 82–109)
THERAPEUTIC RANGE,PTTT: NORMAL SEC (ref 82–109)
WBC # BLD AUTO: 12.9 K/UL (ref 4.1–11.1)

## 2022-07-02 PROCEDURE — 99232 SBSQ HOSP IP/OBS MODERATE 35: CPT | Performed by: INTERNAL MEDICINE

## 2022-07-02 PROCEDURE — 99223 1ST HOSP IP/OBS HIGH 75: CPT | Performed by: PODIATRIST

## 2022-07-02 PROCEDURE — 36415 COLL VENOUS BLD VENIPUNCTURE: CPT

## 2022-07-02 PROCEDURE — 74011000258 HC RX REV CODE- 258: Performed by: FAMILY MEDICINE

## 2022-07-02 PROCEDURE — 82962 GLUCOSE BLOOD TEST: CPT

## 2022-07-02 PROCEDURE — 99232 SBSQ HOSP IP/OBS MODERATE 35: CPT | Performed by: SURGERY

## 2022-07-02 PROCEDURE — 74011250636 HC RX REV CODE- 250/636: Performed by: FAMILY MEDICINE

## 2022-07-02 PROCEDURE — 85730 THROMBOPLASTIN TIME PARTIAL: CPT

## 2022-07-02 PROCEDURE — 65270000029 HC RM PRIVATE

## 2022-07-02 PROCEDURE — 74011250637 HC RX REV CODE- 250/637: Performed by: FAMILY MEDICINE

## 2022-07-02 PROCEDURE — 74011250636 HC RX REV CODE- 250/636: Performed by: SURGERY

## 2022-07-02 PROCEDURE — 85025 COMPLETE CBC W/AUTO DIFF WBC: CPT

## 2022-07-02 PROCEDURE — 74011250637 HC RX REV CODE- 250/637: Performed by: INTERNAL MEDICINE

## 2022-07-02 RX ORDER — HEPARIN SODIUM 1000 [USP'U]/ML
2000 INJECTION, SOLUTION INTRAVENOUS; SUBCUTANEOUS AS NEEDED
Status: DISCONTINUED | OUTPATIENT
Start: 2022-07-02 | End: 2022-07-08 | Stop reason: HOSPADM

## 2022-07-02 RX ORDER — HEPARIN SODIUM 1000 [USP'U]/ML
4000 INJECTION, SOLUTION INTRAVENOUS; SUBCUTANEOUS AS NEEDED
Status: DISCONTINUED | OUTPATIENT
Start: 2022-07-02 | End: 2022-07-08 | Stop reason: HOSPADM

## 2022-07-02 RX ORDER — COLCHICINE 0.6 MG/1
1.2 TABLET ORAL ONCE
Status: COMPLETED | OUTPATIENT
Start: 2022-07-02 | End: 2022-07-02

## 2022-07-02 RX ORDER — COLCHICINE 0.6 MG/1
0.6 TABLET ORAL DAILY
Status: DISCONTINUED | OUTPATIENT
Start: 2022-07-03 | End: 2022-07-08 | Stop reason: HOSPADM

## 2022-07-02 RX ORDER — HEPARIN SODIUM 10000 [USP'U]/100ML
12-25 INJECTION, SOLUTION INTRAVENOUS
Status: DISCONTINUED | OUTPATIENT
Start: 2022-07-02 | End: 2022-07-08 | Stop reason: HOSPADM

## 2022-07-02 RX ORDER — HEPARIN SODIUM 10000 [USP'U]/100ML
12-25 INJECTION, SOLUTION INTRAVENOUS
Status: DISCONTINUED | OUTPATIENT
Start: 2022-07-02 | End: 2022-07-02 | Stop reason: SDUPTHER

## 2022-07-02 RX ORDER — SODIUM CHLORIDE 9 MG/ML
75 INJECTION, SOLUTION INTRAVENOUS CONTINUOUS
Status: DISCONTINUED | OUTPATIENT
Start: 2022-07-02 | End: 2022-07-05

## 2022-07-02 RX ORDER — HYDROMORPHONE HYDROCHLORIDE 1 MG/ML
1 INJECTION, SOLUTION INTRAMUSCULAR; INTRAVENOUS; SUBCUTANEOUS
Status: DISPENSED | OUTPATIENT
Start: 2022-07-02 | End: 2022-07-04

## 2022-07-02 RX ADMIN — VANCOMYCIN HYDROCHLORIDE 1000 MG: 1 INJECTION, POWDER, LYOPHILIZED, FOR SOLUTION INTRAVENOUS at 01:01

## 2022-07-02 RX ADMIN — AMLODIPINE BESYLATE 5 MG: 5 TABLET ORAL at 08:45

## 2022-07-02 RX ADMIN — MORPHINE SULFATE 1 MG: 2 INJECTION, SOLUTION INTRAMUSCULAR; INTRAVENOUS at 00:09

## 2022-07-02 RX ADMIN — VANCOMYCIN HYDROCHLORIDE 1000 MG: 1 INJECTION, POWDER, LYOPHILIZED, FOR SOLUTION INTRAVENOUS at 08:38

## 2022-07-02 RX ADMIN — PANTOPRAZOLE SODIUM 40 MG: 40 TABLET, DELAYED RELEASE ORAL at 08:45

## 2022-07-02 RX ADMIN — HEPARIN SODIUM AND DEXTROSE 12 UNITS/KG/HR: 10000; 5 INJECTION INTRAVENOUS at 13:03

## 2022-07-02 RX ADMIN — HEPARIN SODIUM 4000 UNITS: 1000 INJECTION INTRAVENOUS; SUBCUTANEOUS at 22:10

## 2022-07-02 RX ADMIN — METFORMIN HYDROCHLORIDE 500 MG: 500 TABLET ORAL at 17:32

## 2022-07-02 RX ADMIN — LISINOPRIL 10 MG: 10 TABLET ORAL at 08:45

## 2022-07-02 RX ADMIN — OXYCODONE AND ACETAMINOPHEN 1 TABLET: 5; 325 TABLET ORAL at 22:17

## 2022-07-02 RX ADMIN — HYDROMORPHONE HYDROCHLORIDE 1 MG: 1 INJECTION, SOLUTION INTRAMUSCULAR; INTRAVENOUS; SUBCUTANEOUS at 08:36

## 2022-07-02 RX ADMIN — AMPICILLIN SODIUM AND SULBACTAM SODIUM 3 G: 2; 1 INJECTION, POWDER, FOR SOLUTION INTRAMUSCULAR; INTRAVENOUS at 11:40

## 2022-07-02 RX ADMIN — ENOXAPARIN SODIUM 40 MG: 100 INJECTION SUBCUTANEOUS at 08:38

## 2022-07-02 RX ADMIN — AMPICILLIN SODIUM AND SULBACTAM SODIUM 3 G: 2; 1 INJECTION, POWDER, FOR SOLUTION INTRAMUSCULAR; INTRAVENOUS at 21:37

## 2022-07-02 RX ADMIN — COLCHICINE 1.2 MG: 0.6 TABLET, FILM COATED ORAL at 17:43

## 2022-07-02 RX ADMIN — SODIUM CHLORIDE 75 ML/HR: 9 INJECTION, SOLUTION INTRAVENOUS at 22:02

## 2022-07-02 RX ADMIN — MORPHINE SULFATE 1 MG: 2 INJECTION, SOLUTION INTRAMUSCULAR; INTRAVENOUS at 04:29

## 2022-07-02 RX ADMIN — AMPICILLIN SODIUM AND SULBACTAM SODIUM 3 G: 2; 1 INJECTION, POWDER, FOR SOLUTION INTRAMUSCULAR; INTRAVENOUS at 00:01

## 2022-07-02 RX ADMIN — AMPICILLIN SODIUM AND SULBACTAM SODIUM 3 G: 2; 1 INJECTION, POWDER, FOR SOLUTION INTRAMUSCULAR; INTRAVENOUS at 06:36

## 2022-07-02 RX ADMIN — HYDROMORPHONE HYDROCHLORIDE 1 MG: 1 INJECTION, SOLUTION INTRAMUSCULAR; INTRAVENOUS; SUBCUTANEOUS at 17:32

## 2022-07-02 RX ADMIN — HYDROMORPHONE HYDROCHLORIDE 1 MG: 1 INJECTION, SOLUTION INTRAMUSCULAR; INTRAVENOUS; SUBCUTANEOUS at 21:10

## 2022-07-02 RX ADMIN — HYDROMORPHONE HYDROCHLORIDE 1 MG: 1 INJECTION, SOLUTION INTRAMUSCULAR; INTRAVENOUS; SUBCUTANEOUS at 13:03

## 2022-07-02 RX ADMIN — METFORMIN HYDROCHLORIDE 500 MG: 500 TABLET ORAL at 08:45

## 2022-07-02 RX ADMIN — OXYCODONE AND ACETAMINOPHEN 1 TABLET: 5; 325 TABLET ORAL at 06:41

## 2022-07-02 NOTE — CONSULTS
[facility] CONSULT NOTE    Subjective:         Date of Consultation: July 2, 2022     Referring Physician: Josesito Kendrick MD    Patient is a 65 y.o. male who is being seen for right first toe infx. Patient has a hx of diabetes mellitus type 2 with neuropathy, PAD, hypertension, GERD, gouty arthritis and chronic tobacco use. Patient states he has suffered with a right great toe wound for the past months. His PCP originally thought the issue was gout related but as the issue had not resolved believed it to be infection. After multiple rounds of antibiotics, patient was referred to the emergency department for possible IV antibiotics. He was initially seen at [facility] but transferred to [facility] for further care. He states he does not have a vascular specialist.  He denies any overt trauma. Does admit to mild pain. Denies any other lower extremity complaints    Patient Active Problem List    Diagnosis Date Noted    Toe infection 06/30/2022    Osteomyelitis ([address] 06/30/2022     No past medical history on file. No past surgical history on file. No family history on file.    Social History     Tobacco Use    Smoking status: Not on file    Smokeless tobacco: Not on file   Substance Use Topics    Alcohol use: Not on file     Current Facility-Administered Medications   Medication Dose Route Frequency Provider Last Rate Last Admin    HYDROmorphone (DILAUDID) injection 1 mg  1 mg IntraVENous Q4H PRN Uli Martínez MD   1 mg at 07/02/22 0836    heparin 25,000 units in D5W 250 ml infusion  12-25 Units/kg/hr (Adjusted) IntraVENous TITRATE Uli Martínez MD        heparin (porcine) 1,000 unit/mL injection 4,000 Units  4,000 Units IntraVENous PRN Uli Martínez MD        Or    heparin (porcine) 1,000 unit/mL injection 2,000 Units  2,000 Units IntraVENous PRN Alison Patel MD        [START ON 7/3/2022] Vancomycin - Trough to be drawn prior to dose 7/3 @ 0900   Other ONCE Stacey Ferrer MD        oxyCODONE-acetaminophen (PERCOCET) 5-325 mg per tablet 1 Tablet  1 Tablet Oral Q6H PRN Stacey Ferrer MD   1 Tablet at 07/02/22 0641    insulin lispro (HUMALOG) injection   SubCUTAneous AC&HS Meagan Marcos MD   3 Units at 07/01/22 0909    glucose chewable tablet 16 g  4 Tablet Oral PRN Stacey Ferrer MD        glucagon (GLUCAGEN) injection 1 mg  1 mg IntraMUSCular PRN Stacey Ferrer MD Sarajane Maywood acetaminophen (TYLENOL) tablet 650 mg  650 mg Oral Q6H PRN Stacey Ferrer MD        Or   Azeem Fisher acetaminophen (TYLENOL) suppository 650 mg  650 mg Rectal Q6H PRN Stacey Ferrer MD        polyethylene glycol (MIRALAX) packet 17 g  17 g Oral DAILY PRN Stacey Ferrer MD        ondansetron (ZOFRAN ODT) tablet 4 mg  4 mg Oral Q8H PRN Stacey Ferrer MD        Or    ondansetron [facility]) injection 4 mg  4 mg IntraVENous Q6H PRN Stacey Ferrer MD        enoxaparin (LOVENOX) injection 40 mg  40 mg SubCUTAneous DAILY Satnam Ferrer MD   40 mg at 07/02/22 1092    ampicillin-sulbactam (UNASYN) 3 g in 0.9% sodium chloride (MBP/ADV) 100 mL MBP  3 g IntraVENous Q6H Satnam Ferrer  mL/hr at 07/02/22 0636 3 g at 07/02/22 0636    metFORMIN (GLUCOPHAGE) tablet 500 mg  500 mg Oral BID WITH MEALS Satnam Ferrer MD   500 mg at 07/02/22 0845    pantoprazole (PROTONIX) tablet 40 mg  40 mg Oral ACB Satnam Ferrer MD   40 mg at 07/02/22 0845    vancomycin (VANCOCIN) 1,000 mg in 0.9% sodium chloride 250 mL ([id number])  1,000 mg IntraVENous Q8H Meagan Marcos  mL/hr at 07/02/22 0838 1,000 mg at 07/02/22 0838    VANCOMYCIN INFORMATION NOTE 1 Each  1 Each Other Rx Dosing/Monitoring Satnam Ferrer MD        lisinopriL (PRINIVIL, ZESTRIL) tablet 10 mg  10 mg Oral DAILY Satnam Ferrer MD   10 mg at 07/02/22 0845    And    amLODIPine (NORVASC) tablet 5 mg  5 mg Oral DAILY Satnam Ferrer MD   5 mg at 07/02/22 0845    traMADoL (ULTRAM) tablet 50 mg  50 mg Oral Q6H PRN Lincoln Ferrer MD   50 mg at 22 0610    morphine injection 1 mg  1 mg IntraVENous Q4H PRN Lincoln Ferrer MD   1 mg at 22 0429      No Known Allergies     Review of Systems:    Constitutional: No fever, No chills, No sweats, No weakness, No fatigue  Respiratory: No shortness of breath, No cough, No sputum production, No hemoptysis, No wheezing, No cyanosis. Cardiovascular: No chest pain, No palpitations, No bradycardia, No tachycardia, + peripheral edema, No syncope. Gastrointestinal: No nausea, No vomiting, No diarrhea, No constipation, No heartburn, No abdominal pain. Endocrine: No excessive thirst, No polyuria, No cold intolerance, No heat intolerance, No excessive hunger. Immunologic: + immunocompromised, No recurrent fevers, + recurrent infections. Musculoskeletal: No back pain, No neck pain, No joint pain, No muscle pain, No claudication, + decreased range of motion, No trauma. Integumentary: + Right great toe ulcer, No rash, No pruritus, No abrasions. Neurologic: Alert and oriented X4, No abnormal balance, No headache, No confusion, + numbness, No tingling. Psychiatric: No anxiety, No depression, No dong. Objective:     Patient Vitals for the past 8 hrs:   BP Temp Pulse Resp SpO2   22 0850 (!) 165/91 97.7 °F (36.5 °C) 74 20 98 %     Temp (24hrs), Av.6 °F (36.4 °C), Min:97.4 °F (36.3 °C), Max:97.8 °F (36.6 °C)      Physical Exam:    GEN: Pt is AAOx4 and in NAD. No dressing noted to B/L LE. No family noted at [facility]  DERM: Wounds noted to the right great toe with ischemic changes. Mild drainage noted. No malodor or purulence. VASC: Pedal pulses (DP/PT) non-palpable to B/L LE. CFT<3sec to all digits of B/L LE, except delayed to the right great toe. No pedal hair growth noted to the level of the digits for B/L LE. Skin temp is warm to cool from proximal to distal for B/L LE. Neg homans/heaven signs to B/L LE.  No varicosities or telangectasias noted to B/L LE.  NEURO: Protective and epicritic sensations grossly diminished to B/L LE  MSK: (+) POP, No gross deformities. Good muscle tone and bulk noted to B/L LE.  PSYCH: Cooperative with normal mood and affect      Lab Review:   Recent Results (from the past 24 hour(s))   GLUCOSE, POC    Collection Time: 07/01/22 11:49 AM   Result Value Ref Range    Glucose (POC) 140 (H) 65 - 117 mg/dL    Performed by [place], POC    Collection Time: 07/01/22  3:53 PM   Result Value Ref Range    Glucose (POC) 182 (H) 65 - 117 mg/dL    Performed by Angel Carvajal    GLUCOSE, POC    Collection Time: 07/01/22  9:29 PM   Result Value Ref Range    Glucose (POC) 132 (H) 65 - 117 mg/dL    Performed by Belkis Rodriguez    CBC WITH AUTOMATED DIFF    Collection Time: 07/02/22  8:20 AM   Result Value Ref Range    WBC 12.9 (H) 4.1 - 11.1 K/uL    RBC 4.68 4.10 - 5.70 M/uL    HGB 14.3 12.1 - 17.0 g/dL    HCT 42.1 36.6 - 50.3 %    MCV 90.0 80.0 - 99.0 FL    MCH 30.6 26.0 - 34.0 PG    MCHC 34.0 30.0 - 36.5 g/dL    RDW 12.9 11.5 - 14.5 %    PLATELET 541 270 - 036 K/uL    MPV 10.0 8.9 - 12.9 FL    NRBC 0.0 0.0  WBC    ABSOLUTE NRBC 0.00 0.00 - 0.01 K/uL    NEUTROPHILS 75 32 - 75 %    LYMPHOCYTES 12 12 - 49 %    MONOCYTES 8 5 - 13 %    EOSINOPHILS 3 0 - 7 %    BASOPHILS 1 0 - 1 %    IMMATURE GRANULOCYTES 1 (H) 0 - 0.5 %    ABS. NEUTROPHILS 9.9 (H) 1.8 - 8.0 K/UL    ABS. LYMPHOCYTES 1.5 0.8 - 3.5 K/UL    ABS. MONOCYTES 1.0 0.0 - 1.0 K/UL    ABS. EOSINOPHILS 0.4 0.0 - 0.4 K/UL    ABS. BASOPHILS 0.1 0.0 - 0.1 K/UL    ABS. IMM.  GRANS. 0.1 (H) 0.00 - 0.04 K/UL    DF AUTOMATED     GLUCOSE, POC    Collection Time: 07/02/22  8:38 AM   Result Value Ref Range    Glucose (POC) 155 (H) 65 - 117 mg/dL    Performed by Rut Molina    PTT    Collection Time: 07/02/22  8:40 AM   Result Value Ref Range    aPTT 27.7 21.2 - 34.1 sec    aPTT, therapeutic range   82 - 109 sec       Impression:     Cellulitis, right great toe  Critical limb ischemia, right lower extremity  DM T2    Recommendation:     Patient seen and evaluated at bedside  - Current labs personally reviewed and findings dicussed with patient  - XR and MRI images of the right foot personally reviewed and findings discussed findings with patient  - Consult placed to vascular surgery for critical limb ischemia to the right lower extremity  - Cont wound care. Offloading for wound healing purposes  - Abx per ID  - I will follow closely    Thank you for the consult! Once vascular status has been optimized, will discuss treatment options with patient        Junior Sinha.  Catie Escalera, [address] [facility], [address] [place] [address] and [facility]  [address] [place] [address] Chloe:  [phone]  F:  [phone]  C:  [phone]

## 2022-07-02 NOTE — PROGRESS NOTES
Infectious Disease Progress Note           Subjective:   Assessed pt at bedside, stable, denies new complaints, no acute events since last seen, scheduled for arteriogram in AM   Objective:   Physical Exam:     Visit Vitals  BP (!) 165/91 (BP 1 Location: Right upper arm, BP Patient Position: At rest;Lying)   Pulse 74   Temp 97.7 °F (36.5 °C)   Resp 20   Ht 6' 1\" (1.854 m)   Wt 255 lb (115.7 kg)   SpO2 98%   BMI 33.64 kg/m²      O2 Device: None (Room air)    Temp (24hrs), Av.5 °F (36.4 °C), Min:97.4 °F (36.3 °C), Max:97.7 °F (36.5 °C)    701 - 1900  In: -   Out: 1000 [Urine:1000]   No intake/output data recorded.     General: NAD, AAO x 4  HEENT: FLOYD, Moist mucosa   Lungs: CTA b/l, decreased at the bases, no wheeze/rhonchi   Heart: S1S2+, RRR, no murmur  Abdo: Soft, NT, ND, +BS   : No herbert cath   Exts: Right foot dressing in place   Skin: No pressure ulcers or andre     Data Review:       Recent Days:  Recent Labs     22  0820 22  0609 22  0849   WBC 12.9* 11.2* 12.7*   HGB 14.3 14.4 15.1   HCT 42.1 41.9 45.9    265 272     Recent Labs     22  0609 22  0849   BUN 11 11   CREA 0.59* 0.78       Lab Results   Component Value Date/Time    C-Reactive protein 2.79 (H) 2022 06:09 AM        Microbiology     Results     Procedure Component Value Units Date/Time    CULTURE, BLOOD #1 [[id number]] Collected: 22    Order Status: Completed Specimen: Blood Updated: 22     Special Requests: No Special Requests        Culture result: No growth 1 day       CULTURE, BLOOD #2 [[id number]] Collected: 22    Order Status: Completed Specimen: Blood Updated: 22     Special Requests: No Special Requests        Culture result: No growth 1 day       CULTURE, WOUND Roc Dias STAIN [[id number]]     Order Status: Sent Specimen: Wound from Ulcer          Diagnostics   CXR Results  (Last 48 hours)    None         Assessment/Plan 1. Right great toe infection, Findings on MRI consistent w gouty arthritis, no reports of osteomyelitis       Stable ulceration involving base of right great toe, decreased right foot/leg Cx       Blood Cx neg, no wound Cx done. Continue empiric Unasyn, Vanc discontinued      Routine labs in the morning      2. H/o gouty arthritis, ? Reason for # 1, uric acid level of 3.9      Start on Colchicine for right foot pain, likely from gouty arthritis     3. PAD, Abnormal ANTIONETTE, Scheduled for arteriogram in AM by Dr Martínez        4. H/o DM, BS controlled per pt, A1C of 7.7, BS mg per primary     5.  Right foot pain, contributed by # 2, continue symptomatic mgt, gout tx initiated as above       Aquiles Boggs MD    7/2/2022

## 2022-07-02 NOTE — ANESTHESIA PREPROCEDURE EVALUATION
Relevant Problems   HEMATOLOGY   (+) Osteomyelitis (HCC)       Anesthetic History   No history of anesthetic complications            Review of Systems / Medical History  Patient summary reviewed, nursing notes reviewed and pertinent labs reviewed    Pulmonary          Smoker      Comments: SNORING. Neuro/Psych              Cardiovascular    Hypertension          PAD         GI/Hepatic/Renal     GERD           Endo/Other    Diabetes    Obesity and arthritis (Rheumatoid arthritis. )    Comments: Gout. Other Findings   Comments: Right BIG TOE INFECTION. OSTEOMYELITIS. Physical Exam    Airway  Mallampati: III  TM Distance: < 4 cm  Neck ROM: short neck   Mouth opening: Normal     Cardiovascular    Rhythm: regular  Rate: normal         Dental      Comments: DENTURES.     Pulmonary  Breath sounds clear to auscultation               Abdominal  GI exam deferred       Other Findings            Anesthetic Plan    ASA: 4, emergent  Anesthesia type: MAC          Induction: Intravenous  Anesthetic plan and risks discussed with: Patient

## 2022-07-02 NOTE — PROGRESS NOTES
PROGRESS NOTE      Chief Complaints:  Still complaining of pain right foot  HPI and  Objective:    Patient has no fever no chest pain shortness of breath. Right foot is still pretty swollen as well as the cellulitis denies any chest pain shortness breath. Denies abdominal pain  Review of Systems:  Rest of review of system negative, personally reviewed. EXAM:  Visit Vitals  BP (!) 165/91 (BP 1 Location: Right upper arm, BP Patient Position: At rest;Lying)   Pulse 74   Temp 97.7 °F (36.5 °C)   Resp 20   Ht 6' 1\" (1.854 m)   Wt 255 lb (115.7 kg)   SpO2 98%   BMI 33.64 kg/m²       Patient is awake and alert  Head and neck atraumatic, normocephalic. ENT: No hoarse voice  Cardiac system regular rate rhythm. Pulmonary no audible wheeze  Chest wall excursion normal with respiration cycle  Abdomen is soft not particularly distended. Neurologically nonfocal.  Skin is warm and moist.  Psychosocial: Cooperative. Vascular examination as previously noted no changes. Recent Results (from the past 24 hour(s))   GLUCOSE, POC    Collection Time: 07/01/22  9:29 PM   Result Value Ref Range    Glucose (POC) 132 (H) 65 - 117 mg/dL    Performed by Anuradha Choi    CBC WITH AUTOMATED DIFF    Collection Time: 07/02/22  8:20 AM   Result Value Ref Range    WBC 12.9 (H) 4.1 - 11.1 K/uL    RBC 4.68 4.10 - 5.70 M/uL    HGB 14.3 12.1 - 17.0 g/dL    HCT 42.1 36.6 - 50.3 %    MCV 90.0 80.0 - 99.0 FL    MCH 30.6 26.0 - 34.0 PG    MCHC 34.0 30.0 - 36.5 g/dL    RDW 12.9 11.5 - 14.5 %    PLATELET 396 261 - 361 K/uL    MPV 10.0 8.9 - 12.9 FL    NRBC 0.0 0.0  WBC    ABSOLUTE NRBC 0.00 0.00 - 0.01 K/uL    NEUTROPHILS 75 32 - 75 %    LYMPHOCYTES 12 12 - 49 %    MONOCYTES 8 5 - 13 %    EOSINOPHILS 3 0 - 7 %    BASOPHILS 1 0 - 1 %    IMMATURE GRANULOCYTES 1 (H) 0 - 0.5 %    ABS. NEUTROPHILS 9.9 (H) 1.8 - 8.0 K/UL    ABS. LYMPHOCYTES 1.5 0.8 - 3.5 K/UL    ABS. MONOCYTES 1.0 0.0 - 1.0 K/UL    ABS. EOSINOPHILS 0.4 0.0 - 0.4 K/UL    ABS. BASOPHILS 0.1 0.0 - 0.1 K/UL    ABS. IMM. GRANS. 0.1 (H) 0.00 - 0.04 K/UL    DF AUTOMATED     GLUCOSE, POC    Collection Time: 07/02/22  8:38 AM   Result Value Ref Range    Glucose (POC) 155 (H) 65 - 117 mg/dL    Performed by Bam Doyle    PTT    Collection Time: 07/02/22  8:40 AM   Result Value Ref Range    aPTT 27.7 21.2 - 34.1 sec    aPTT, therapeutic range   82 - 109 sec   GLUCOSE, POC    Collection Time: 07/02/22 11:24 AM   Result Value Ref Range    Glucose (POC) 194 (H) 65 - 117 mg/dL    Performed by Princess Doe    GLUCOSE, POC    Collection Time: 07/02/22  5:38 PM   Result Value Ref Range    Glucose (POC) 142 (H) 65 - 117 mg/dL    Performed by Bam Doyle        ASSESSMENT:   Patient is 65 y.o. with diagnosis of : Active Problems:    Toe infection (6/30/2022)      Osteomyelitis ([address] (6/30/2022)        PLAN:                 Patient was started on heparin drip. Patient's ABIs pretty poor on the right side. We talked about angiographic examination on the right leg with intervention. I have advised patient to keep the right foot elevated with 3 pillows to minimize swelling. Cellulitis did not improve significantly. Patient was scheduled for right leg angiogram tomorrow. Patient's renal function is normal.  I will also add maintenance IV fluid as well.

## 2022-07-02 NOTE — PROGRESS NOTES
General Daily Progress Note          Patient Name:   Vee Pond       YOB: 1960       Age:  65 y.o. Admit Date: 6/30/2022      Subjective:       Vee Pond is a 65 y.o. male with a significant PMH of HTN, lifelong 1.5ppd smoker, and Diabetes  RA and Lorn Picking presents to the ED today with a R great toe wound. Started 2 weeks ago treated with oral antibiotics the patient states that the wound appeared 5 days ago when it \"burst\" from swelling with light yellow discharge. He has taken Rocefin and Clindamycin for the wound with subjective improvement. The wound is not acutely painful but he has a small amount of pain in the MTP joint. Patient denies other symptoms associated with the wound.   Patient also treated for gout      Complaining of pain 8 out of the 10    Seen by infectious disease recommend to continue vancomycin and Unasyn     Seen by vascular surgeon plan for arteriogram      Objective:     Visit Vitals  BP (!) 165/91 (BP 1 Location: Right upper arm, BP Patient Position: At rest;Lying)   Pulse 74   Temp 97.7 °F (36.5 °C)   Resp 20   Ht 6' 1\" (1.854 m)   Wt 115.7 kg (255 lb)   SpO2 98%   BMI 33.64 kg/m²        Recent Results (from the past 24 hour(s))   GLUCOSE, POC    Collection Time: 07/01/22  3:53 PM   Result Value Ref Range    Glucose (POC) 182 (H) 65 - 117 mg/dL    Performed by Luther Hernandez    GLUCOSE, POC    Collection Time: 07/01/22  9:29 PM   Result Value Ref Range    Glucose (POC) 132 (H) 65 - 117 mg/dL    Performed by Anderson Starr    CBC WITH AUTOMATED DIFF    Collection Time: 07/02/22  8:20 AM   Result Value Ref Range    WBC 12.9 (H) 4.1 - 11.1 K/uL    RBC 4.68 4.10 - 5.70 M/uL    HGB 14.3 12.1 - 17.0 g/dL    HCT 42.1 36.6 - 50.3 %    MCV 90.0 80.0 - 99.0 FL    MCH 30.6 26.0 - 34.0 PG    MCHC 34.0 30.0 - 36.5 g/dL    RDW 12.9 11.5 - 14.5 %    PLATELET 704 993 - 866 K/uL    MPV 10.0 8.9 - 12.9 FL    NRBC 0.0 0.0  WBC    ABSOLUTE NRBC 0.00 0.00 - 0.01 K/uL    NEUTROPHILS 75 32 - 75 %    LYMPHOCYTES 12 12 - 49 %    MONOCYTES 8 5 - 13 %    EOSINOPHILS 3 0 - 7 %    BASOPHILS 1 0 - 1 %    IMMATURE GRANULOCYTES 1 (H) 0 - 0.5 %    ABS. NEUTROPHILS 9.9 (H) 1.8 - 8.0 K/UL    ABS. LYMPHOCYTES 1.5 0.8 - 3.5 K/UL    ABS. MONOCYTES 1.0 0.0 - 1.0 K/UL    ABS. EOSINOPHILS 0.4 0.0 - 0.4 K/UL    ABS. BASOPHILS 0.1 0.0 - 0.1 K/UL    ABS. IMM. GRANS. 0.1 (H) 0.00 - 0.04 K/UL    DF AUTOMATED     GLUCOSE, POC    Collection Time: 07/02/22  8:38 AM   Result Value Ref Range    Glucose (POC) 155 (H) 65 - 117 mg/dL    Performed by Willis Butcher    PTT    Collection Time: 07/02/22  8:40 AM   Result Value Ref Range    aPTT 27.7 21.2 - 34.1 sec    aPTT, therapeutic range   82 - 109 sec   GLUCOSE, POC    Collection Time: 07/02/22 11:24 AM   Result Value Ref Range    Glucose (POC) 194 (H) 65 - 117 mg/dL    Performed by Bernardino Alba      [unfilled]      Review of Systems    Constitutional: Negative for chills and fever. HENT: Negative. Eyes: Negative. Respiratory: Negative. Cardiovascular: Negative. Gastrointestinal: Negative for abdominal pain and nausea. Skin: Negative. Neurological: Negative. Physical Exam:      Constitutional: pt is oriented to person, place, and time. HENT:   Head: Normocephalic and atraumatic. Eyes: Pupils are equal, round, and reactive to light. EOM are normal.   Cardiovascular: Normal rate, regular rhythm and normal heart sounds. Pulmonary/Chest: Breath sounds normal. No wheezes. No rales. Exhibits no tenderness. Abdominal: Soft. Bowel sounds are normal. There is no abdominal tenderness. There is no rebound and no guarding. Musculoskeletal: Normal range of motion. Neurological: pt is alert and oriented to person, place, and time. MRI FOOT RT WO CONT   Final Result   1. Marginal erosions are seen surrounding the first MTP joint with a minimal   first MTP joint effusion.  Small erosions are seen in the second metatarsal head   and fifth metatarsal head as well. The overall appearance is most consistent   with gout. Inflammatory and infectious arthritides however would be within the   differential.   2. Significant subcutaneous edema         ANKLE BRACHIAL INDEX   Final Result      XR FOOT RT MIN 3 V   Final Result   Cortical cystic erosive changes involving the distal aspects of the   first and fifth metatarsals and base of the proximal phalanx of the great toe   compatible with chronic degenerative/osteoarthritic changes. Consider gout. Recent Results (from the past 24 hour(s))   GLUCOSE, POC    Collection Time: 07/01/22  3:53 PM   Result Value Ref Range    Glucose (POC) 182 (H) 65 - 117 mg/dL    Performed by Saturn[facility] [employer]    GLUCOSE, POC    Collection Time: 07/01/22  9:29 PM   Result Value Ref Range    Glucose (POC) 132 (H) 65 - 117 mg/dL    Performed by Charles Briceno    CBC WITH AUTOMATED DIFF    Collection Time: 07/02/22  8:20 AM   Result Value Ref Range    WBC 12.9 (H) 4.1 - 11.1 K/uL    RBC 4.68 4.10 - 5.70 M/uL    HGB 14.3 12.1 - 17.0 g/dL    HCT 42.1 36.6 - 50.3 %    MCV 90.0 80.0 - 99.0 FL    MCH 30.6 26.0 - 34.0 PG    MCHC 34.0 30.0 - 36.5 g/dL    RDW 12.9 11.5 - 14.5 %    PLATELET 810 713 - 366 K/uL    MPV 10.0 8.9 - 12.9 FL    NRBC 0.0 0.0  WBC    ABSOLUTE NRBC 0.00 0.00 - 0.01 K/uL    NEUTROPHILS 75 32 - 75 %    LYMPHOCYTES 12 12 - 49 %    MONOCYTES 8 5 - 13 %    EOSINOPHILS 3 0 - 7 %    BASOPHILS 1 0 - 1 %    IMMATURE GRANULOCYTES 1 (H) 0 - 0.5 %    ABS. NEUTROPHILS 9.9 (H) 1.8 - 8.0 K/UL    ABS. LYMPHOCYTES 1.5 0.8 - 3.5 K/UL    ABS. MONOCYTES 1.0 0.0 - 1.0 K/UL    ABS. EOSINOPHILS 0.4 0.0 - 0.4 K/UL    ABS. BASOPHILS 0.1 0.0 - 0.1 K/UL    ABS. IMM.  GRANS. 0.1 (H) 0.00 - 0.04 K/UL    DF AUTOMATED     GLUCOSE, POC    Collection Time: 07/02/22  8:38 AM   Result Value Ref Range    Glucose (POC) 155 (H) 65 - 117 mg/dL    Performed by [employer]    PTT    Collection Time: 07/02/22  8:40 AM   Result Value Ref Range aPTT 27.7 21.2 - 34.1 sec    aPTT, therapeutic range   82 - 109 sec   GLUCOSE, POC    Collection Time: 07/02/22 11:24 AM   Result Value Ref Range    Glucose (POC) 194 (H) 65 - 117 mg/dL    Performed by [facility]        Results     Procedure Component Value Units Date/Time    CULTURE, BLOOD #1 [[id number]] Collected: 06/30/22 1840    Order Status: Completed Specimen: Blood Updated: 07/02/22 1020     Special Requests: No Special Requests        Culture result: No growth after 23 hours       CULTURE, BLOOD #2 [[id number]] Collected: 06/30/22 1840    Order Status: Completed Specimen: Blood Updated: 07/02/22 1020     Special Requests: No Special Requests        Culture result: No growth after 23 hours       CULTURE, WOUND Lavada Gambles STAIN [[id number]]     Order Status: Sent Specimen: Wound from Ulcer            Labs:     Recent Labs     07/02/22  0820 07/01/22  0609   WBC 12.9* 11.2*   HGB 14.3 14.4   HCT 42.1 41.9    265     Recent Labs     07/01/22  0609 06/30/22  1836 06/30/22  0849     --  134*   K 4.2  --  4.0     --  100   CO2 28  --  29   BUN 11  --  11   CREA 0.59*  --  0.78   *  --  228*   CA 9.0  --  9.2   URICA  --  3.9  --      Recent Labs     07/01/22  0609 06/30/22  0849   ALT 24 26    119*   TBILI 0.4 0.5   TP 7.4 8.0   ALB 3.2* 3.6   GLOB 4.2* 4.4*     Recent Labs     07/02/22  0840   APTT 27.7      No results for input(s): FE, TIBC, PSAT, FERR in the last 72 hours. No results found for: FOL, RBCF   No results for input(s): PH, PCO2, PO2 in the last 72 hours. No results for input(s): CPK, CKNDX, TROIQ in the last 72 hours.     No lab exists for component: CPKMB  No results found for: CHOL, CHOLX, CHLST, CHOLV, HDL, HDLP, LDL, LDLC, DLDLP, TGLX, TRIGL, TRIGP, CHHD, CHHDX  Lab Results   Component Value Date/Time    Glucose (POC) 194 (H) 07/02/2022 11:24 AM    Glucose (POC) 155 (H) 07/02/2022 08:38 AM    Glucose (POC) 132 (H) 07/01/2022 09:29 PM    Glucose (POC) 182 (H) 07/01/2022 03:53 PM    Glucose (POC) 140 (H) 07/01/2022 11:49 AM     No results found for: COLOR, APPRN, SPGRU, REFSG, EPHRAIM, PROTU, GLUCU, KETU, BILU, UROU, VIANEY, LEUKU, GLUKE, EPSU, BACTU, WBCU, RBCU, CASTS, UCRY      Assessment:     Right great toe infection  Diabetic foot injury  Arterial Insufficiency  Diabetes Mellitus  Hypertension  GERD  Gout  RA  Smoking  Peripheral vascular disease    1. Marginal erosions are seen surrounding the first MTP joint with a minimal  first MTP joint effusion. Small erosions are seen in the second metatarsal head  and fifth metatarsal head as well. The overall appearance is most consistent  with gout.  Inflammatory and infectious arthritides however would be within the  differential.  2. Significant subcutaneous edema      Plan:     IV vancomycin and IV Zosyn  Started on oxycodone for pain  Podiatry consult pending  MRI pending  For arteriogram        Current Facility-Administered Medications:     HYDROmorphone (DILAUDID) injection 1 mg, 1 mg, IntraVENous, Q4H PRN, Jose Martínez MD, 1 mg at 07/02/22 1303    heparin 25,000 units in D5W 250 ml infusion, 12-25 Units/kg/hr (Adjusted), IntraVENous, TITRATE, Jose Martínez MD, Last Rate: 11.3 mL/hr at 07/02/22 1303, 12 Units/kg/hr at 07/02/22 1303    heparin (porcine) 1,000 unit/mL injection 4,000 Units, 4,000 Units, IntraVENous, PRN **OR** heparin (porcine) 1,000 unit/mL injection 2,000 Units, 2,000 Units, IntraVENous, PRN, Jose Martínez MD    [START ON 7/3/2022] Vancomycin - Trough to be drawn prior to dose 7/3 @ 0900, , Other, ONCE, Satnam Ferrer MD    oxyCODONE-acetaminophen (PERCOCET) 5-325 mg per tablet 1 Tablet, 1 Tablet, Oral, Q6H PRN, Satnam Ferrer MD, 1 Tablet at 07/02/22 0641    insulin lispro (HUMALOG) injection, , SubCUTAneous, AC&HS, Satnam Ferrer MD, 3 Units at 07/01/22 0909    glucose chewable tablet 16 g, 4 Tablet, Oral, PRN, Satnam Ferrer MD    glucagon (GLUCAGEN) injection 1 mg, 1 mg, IntraMUSCular, PRN, Tarun Ferrer MD    acetaminophen (TYLENOL) tablet 650 mg, 650 mg, Oral, Q6H PRN **OR** acetaminophen (TYLENOL) suppository 650 mg, 650 mg, Rectal, Q6H PRN, Tarun Ferrer MD    polyethylene glycol (MIRALAX) packet 17 g, 17 g, Oral, DAILY PRN, Tarun Ferrer MD    ondansetron (ZOFRAN ODT) tablet 4 mg, 4 mg, Oral, Q8H PRN **OR** ondansetron (ZOFRAN) injection 4 mg, 4 mg, IntraVENous, Q6H PRN, Satnam Ferrer MD    enoxaparin (LOVENOX) injection 40 mg, 40 mg, SubCUTAneous, DAILY, Satnam Ferrer MD, 40 mg at 07/02/22 4194    ampicillin-sulbactam (UNASYN) 3 g in 0.9% sodium chloride (MBP/ADV) 100 mL MBP, 3 g, IntraVENous, Q6H, Satnam Ferrer MD, Last Rate: 200 mL/hr at 07/02/22 1140, 3 g at 07/02/22 1140    metFORMIN (GLUCOPHAGE) tablet 500 mg, 500 mg, Oral, BID WITH MEALS, Satnam Ferrer MD, 500 mg at 07/02/22 0845    pantoprazole (PROTONIX) tablet 40 mg, 40 mg, Oral, ACB, Satnam Ferrer MD, 40 mg at 07/02/22 0845    vancomycin (VANCOCIN) 1,000 mg in 0.9% sodium chloride 250 mL ([id number]), 1,000 mg, IntraVENous, Q8H, Satnam Ferrer MD, Last Rate: 250 mL/hr at 07/02/22 0838, 1,000 mg at 07/02/22 0838    VANCOMYCIN INFORMATION NOTE 1 Each, 1 Each, Other, Rx Dosing/Monitoring, Satnam Ferrer MD    lisinopriL (PRINIVIL, ZESTRIL) tablet 10 mg, 10 mg, Oral, DAILY, 10 mg at 07/02/22 0845 **AND** amLODIPine (NORVASC) tablet 5 mg, 5 mg, Oral, DAILY, Satnam Ferrer MD, 5 mg at 07/02/22 0801    traMADoL (ULTRAM) tablet 50 mg, 50 mg, Oral, Q6H PRN, Satnam Ferrer MD, 50 mg at 07/01/22 9505

## 2022-07-02 NOTE — ROUTINE PROCESS
Bedside and Verbal shift change report given to Talisha Thibodeaux RN  (oncoming nurse) by Ligia Alfonso RN (offgoing nurse). Report included the following information SBAR, Kardex, MAR, Accordion, and Recent Results.

## 2022-07-03 ENCOUNTER — APPOINTMENT (OUTPATIENT)
Dept: GENERAL RADIOLOGY | Age: 62
DRG: 271 | End: 2022-07-03
Attending: SURGERY

## 2022-07-03 ENCOUNTER — ANESTHESIA (OUTPATIENT)
Dept: SURGERY | Age: 62
DRG: 271 | End: 2022-07-03

## 2022-07-03 LAB
ALBUMIN SERPL-MCNC: 2.7 G/DL (ref 3.5–5)
ALBUMIN SERPL-MCNC: 3 G/DL (ref 3.5–5)
ALBUMIN/GLOB SERPL: 0.7 {RATIO} (ref 1.1–2.2)
ALBUMIN/GLOB SERPL: 0.9 {RATIO} (ref 1.1–2.2)
ALP SERPL-CCNC: 82 U/L (ref 45–117)
ALP SERPL-CCNC: 88 U/L (ref 45–117)
ALT SERPL-CCNC: 24 U/L (ref 12–78)
ALT SERPL-CCNC: 25 U/L (ref 12–78)
ANION GAP SERPL CALC-SCNC: 6 MMOL/L (ref 5–15)
ANION GAP SERPL CALC-SCNC: 6 MMOL/L (ref 5–15)
APTT PPP: 29.9 SEC (ref 21.2–34.1)
APTT PPP: 31.9 SEC (ref 21.2–34.1)
APTT PPP: 32.4 SEC (ref 21.2–34.1)
APTT PPP: 46.9 SEC (ref 21.2–34.1)
AST SERPL W P-5'-P-CCNC: 18 U/L (ref 15–37)
AST SERPL W P-5'-P-CCNC: 22 U/L (ref 15–37)
BASOPHILS # BLD: 0.1 K/UL (ref 0–0.1)
BASOPHILS # BLD: 0.1 K/UL (ref 0–0.1)
BASOPHILS NFR BLD: 1 % (ref 0–1)
BASOPHILS NFR BLD: 1 % (ref 0–1)
BILIRUB SERPL-MCNC: 0.5 MG/DL (ref 0.2–1)
BILIRUB SERPL-MCNC: 0.6 MG/DL (ref 0.2–1)
BUN SERPL-MCNC: 10 MG/DL (ref 6–20)
BUN SERPL-MCNC: 10 MG/DL (ref 6–20)
BUN/CREAT SERPL: 14 (ref 12–20)
BUN/CREAT SERPL: 17 (ref 12–20)
CA-I BLD-MCNC: 8.4 MG/DL (ref 8.5–10.1)
CA-I BLD-MCNC: 8.8 MG/DL (ref 8.5–10.1)
CHLORIDE SERPL-SCNC: 104 MMOL/L (ref 97–108)
CHLORIDE SERPL-SCNC: 105 MMOL/L (ref 97–108)
CO2 SERPL-SCNC: 27 MMOL/L (ref 21–32)
CO2 SERPL-SCNC: 27 MMOL/L (ref 21–32)
CREAT SERPL-MCNC: 0.59 MG/DL (ref 0.7–1.3)
CREAT SERPL-MCNC: 0.69 MG/DL (ref 0.7–1.3)
DIFFERENTIAL METHOD BLD: ABNORMAL
DIFFERENTIAL METHOD BLD: ABNORMAL
EOSINOPHIL # BLD: 0.1 K/UL (ref 0–0.4)
EOSINOPHIL # BLD: 0.3 K/UL (ref 0–0.4)
EOSINOPHIL NFR BLD: 1 % (ref 0–7)
EOSINOPHIL NFR BLD: 2 % (ref 0–7)
ERYTHROCYTE [DISTWIDTH] IN BLOOD BY AUTOMATED COUNT: 12.6 % (ref 11.5–14.5)
ERYTHROCYTE [DISTWIDTH] IN BLOOD BY AUTOMATED COUNT: 12.7 % (ref 11.5–14.5)
GLOBULIN SER CALC-MCNC: 3.3 G/DL (ref 2–4)
GLOBULIN SER CALC-MCNC: 3.7 G/DL (ref 2–4)
GLUCOSE BLD STRIP.AUTO-MCNC: 118 MG/DL (ref 65–117)
GLUCOSE BLD STRIP.AUTO-MCNC: 128 MG/DL (ref 65–117)
GLUCOSE BLD STRIP.AUTO-MCNC: 138 MG/DL (ref 65–117)
GLUCOSE BLD STRIP.AUTO-MCNC: 150 MG/DL (ref 65–117)
GLUCOSE BLD STRIP.AUTO-MCNC: 236 MG/DL (ref 65–117)
GLUCOSE SERPL-MCNC: 122 MG/DL (ref 65–100)
GLUCOSE SERPL-MCNC: 130 MG/DL (ref 65–100)
HCT VFR BLD AUTO: 36.4 % (ref 36.6–50.3)
HCT VFR BLD AUTO: 37.4 % (ref 36.6–50.3)
HGB BLD-MCNC: 12.3 G/DL (ref 12.1–17)
HGB BLD-MCNC: 12.7 G/DL (ref 12.1–17)
IMM GRANULOCYTES # BLD AUTO: 0 K/UL (ref 0–0.04)
IMM GRANULOCYTES # BLD AUTO: 0.1 K/UL (ref 0–0.04)
IMM GRANULOCYTES NFR BLD AUTO: 0 % (ref 0–0.5)
IMM GRANULOCYTES NFR BLD AUTO: 1 % (ref 0–0.5)
LYMPHOCYTES # BLD: 0.9 K/UL (ref 0.8–3.5)
LYMPHOCYTES # BLD: 1.7 K/UL (ref 0.8–3.5)
LYMPHOCYTES NFR BLD: 14 % (ref 12–49)
LYMPHOCYTES NFR BLD: 7 % (ref 12–49)
MCH RBC QN AUTO: 30.2 PG (ref 26–34)
MCH RBC QN AUTO: 30.3 PG (ref 26–34)
MCHC RBC AUTO-ENTMCNC: 33.8 G/DL (ref 30–36.5)
MCHC RBC AUTO-ENTMCNC: 34 G/DL (ref 30–36.5)
MCV RBC AUTO: 89 FL (ref 80–99)
MCV RBC AUTO: 89.7 FL (ref 80–99)
MONOCYTES # BLD: 0.5 K/UL (ref 0–1)
MONOCYTES # BLD: 0.9 K/UL (ref 0–1)
MONOCYTES NFR BLD: 4 % (ref 5–13)
MONOCYTES NFR BLD: 7 % (ref 5–13)
NEUTS SEG # BLD: 8.9 K/UL (ref 1.8–8)
NEUTS SEG # BLD: 9.9 K/UL (ref 1.8–8)
NEUTS SEG NFR BLD: 75 % (ref 32–75)
NEUTS SEG NFR BLD: 87 % (ref 32–75)
NRBC # BLD: 0 K/UL (ref 0–0.01)
NRBC # BLD: 0 K/UL (ref 0–0.01)
NRBC BLD-RTO: 0 PER 100 WBC
NRBC BLD-RTO: 0 PER 100 WBC
PERFORMED BY, TECHID: ABNORMAL
PLATELET # BLD AUTO: 239 K/UL (ref 150–400)
PLATELET # BLD AUTO: 240 K/UL (ref 150–400)
PMV BLD AUTO: 10 FL (ref 8.9–12.9)
PMV BLD AUTO: 9.6 FL (ref 8.9–12.9)
POTASSIUM SERPL-SCNC: 4.1 MMOL/L (ref 3.5–5.1)
POTASSIUM SERPL-SCNC: 4.4 MMOL/L (ref 3.5–5.1)
PROT SERPL-MCNC: 6.3 G/DL (ref 6.4–8.2)
PROT SERPL-MCNC: 6.4 G/DL (ref 6.4–8.2)
RBC # BLD AUTO: 4.06 M/UL (ref 4.1–5.7)
RBC # BLD AUTO: 4.2 M/UL (ref 4.1–5.7)
SODIUM SERPL-SCNC: 137 MMOL/L (ref 136–145)
SODIUM SERPL-SCNC: 138 MMOL/L (ref 136–145)
THERAPEUTIC RANGE,PTTT: ABNORMAL SEC (ref 82–109)
THERAPEUTIC RANGE,PTTT: NORMAL SEC (ref 82–109)
WBC # BLD AUTO: 11.5 K/UL (ref 4.1–11.1)
WBC # BLD AUTO: 12 K/UL (ref 4.1–11.1)

## 2022-07-03 PROCEDURE — 74011250637 HC RX REV CODE- 250/637: Performed by: FAMILY MEDICINE

## 2022-07-03 PROCEDURE — 74011000250 HC RX REV CODE- 250: Performed by: ANESTHESIOLOGY

## 2022-07-03 PROCEDURE — 37227 PR REVSC OPN/PRQ FEM/POP W/STNT/ATHRC/ANGIOP SM VSL: CPT | Performed by: SURGERY

## 2022-07-03 PROCEDURE — C1725 CATH, TRANSLUMIN NON-LASER: HCPCS | Performed by: SURGERY

## 2022-07-03 PROCEDURE — 77030013516 HC DEV INFL ANGI MRTM -B: Performed by: SURGERY

## 2022-07-03 PROCEDURE — 77030018842 HC SOL IRR SOD CL 9% BAXT -A: Performed by: SURGERY

## 2022-07-03 PROCEDURE — 76210000006 HC OR PH I REC 0.5 TO 1 HR: Performed by: SURGERY

## 2022-07-03 PROCEDURE — 65270000029 HC RM PRIVATE

## 2022-07-03 PROCEDURE — C1769 GUIDE WIRE: HCPCS | Performed by: SURGERY

## 2022-07-03 PROCEDURE — 74011250637 HC RX REV CODE- 250/637: Performed by: INTERNAL MEDICINE

## 2022-07-03 PROCEDURE — 74011000636 HC RX REV CODE- 636: Performed by: SURGERY

## 2022-07-03 PROCEDURE — 80053 COMPREHEN METABOLIC PANEL: CPT

## 2022-07-03 PROCEDURE — 74011250636 HC RX REV CODE- 250/636: Performed by: SURGERY

## 2022-07-03 PROCEDURE — 74011250636 HC RX REV CODE- 250/636: Performed by: NURSE ANESTHETIST, CERTIFIED REGISTERED

## 2022-07-03 PROCEDURE — 85730 THROMBOPLASTIN TIME PARTIAL: CPT

## 2022-07-03 PROCEDURE — 99232 SBSQ HOSP IP/OBS MODERATE 35: CPT | Performed by: SURGERY

## 2022-07-03 PROCEDURE — 047K3ZZ DILATION OF RIGHT FEMORAL ARTERY, PERCUTANEOUS APPROACH: ICD-10-PCS | Performed by: SURGERY

## 2022-07-03 PROCEDURE — B41F1ZZ FLUOROSCOPY OF RIGHT LOWER EXTREMITY ARTERIES USING LOW OSMOLAR CONTRAST: ICD-10-PCS | Performed by: SURGERY

## 2022-07-03 PROCEDURE — 75716 ARTERY X-RAYS ARMS/LEGS: CPT | Performed by: SURGERY

## 2022-07-03 PROCEDURE — 74011000250 HC RX REV CODE- 250: Performed by: SURGERY

## 2022-07-03 PROCEDURE — 76010000171 HC OR TIME 2 TO 2.5 HR INTENSV-TIER 1: Performed by: SURGERY

## 2022-07-03 PROCEDURE — C1714 CATH, TRANS ATHERECTOMY, DIR: HCPCS | Performed by: SURGERY

## 2022-07-03 PROCEDURE — C1884 EMBOLIZATION PROTECT SYST: HCPCS | Performed by: SURGERY

## 2022-07-03 PROCEDURE — 74011250636 HC RX REV CODE- 250/636: Performed by: FAMILY MEDICINE

## 2022-07-03 PROCEDURE — 99232 SBSQ HOSP IP/OBS MODERATE 35: CPT | Performed by: INTERNAL MEDICINE

## 2022-07-03 PROCEDURE — 047K3DZ DILATION OF RIGHT FEMORAL ARTERY WITH INTRALUMINAL DEVICE, PERCUTANEOUS APPROACH: ICD-10-PCS | Performed by: SURGERY

## 2022-07-03 PROCEDURE — 2709999900 HC NON-CHARGEABLE SUPPLY: Performed by: SURGERY

## 2022-07-03 PROCEDURE — 36415 COLL VENOUS BLD VENIPUNCTURE: CPT

## 2022-07-03 PROCEDURE — C1894 INTRO/SHEATH, NON-LASER: HCPCS | Performed by: SURGERY

## 2022-07-03 PROCEDURE — 74011250636 HC RX REV CODE- 250/636: Performed by: ANESTHESIOLOGY

## 2022-07-03 PROCEDURE — C1876 STENT, NON-COA/NON-COV W/DEL: HCPCS | Performed by: SURGERY

## 2022-07-03 PROCEDURE — 76000 FLUOROSCOPY <1 HR PHYS/QHP: CPT

## 2022-07-03 PROCEDURE — 85025 COMPLETE CBC W/AUTO DIFF WBC: CPT

## 2022-07-03 PROCEDURE — 77030003629 HC NDL PERC VASC COOK -A: Performed by: SURGERY

## 2022-07-03 PROCEDURE — 94762 N-INVAS EAR/PLS OXIMTRY CONT: CPT

## 2022-07-03 PROCEDURE — 74011000250 HC RX REV CODE- 250: Performed by: NURSE ANESTHETIST, CERTIFIED REGISTERED

## 2022-07-03 PROCEDURE — 74011000258 HC RX REV CODE- 258: Performed by: FAMILY MEDICINE

## 2022-07-03 PROCEDURE — 77030041029 HC DEV TORQ GDWIRE MRTM -A: Performed by: SURGERY

## 2022-07-03 PROCEDURE — C1760 CLOSURE DEV, VASC: HCPCS | Performed by: SURGERY

## 2022-07-03 PROCEDURE — 82962 GLUCOSE BLOOD TEST: CPT

## 2022-07-03 PROCEDURE — 04CK3ZZ EXTIRPATION OF MATTER FROM RIGHT FEMORAL ARTERY, PERCUTANEOUS APPROACH: ICD-10-PCS | Performed by: SURGERY

## 2022-07-03 PROCEDURE — 76060000035 HC ANESTHESIA 2 TO 2.5 HR: Performed by: SURGERY

## 2022-07-03 PROCEDURE — C1887 CATHETER, GUIDING: HCPCS | Performed by: SURGERY

## 2022-07-03 DEVICE — IMPLANTABLE DEVICE: Type: IMPLANTABLE DEVICE | Site: ARTERIAL | Status: FUNCTIONAL

## 2022-07-03 RX ORDER — LIDOCAINE HYDROCHLORIDE 20 MG/ML
INJECTION, SOLUTION INFILTRATION; PERINEURAL AS NEEDED
Status: DISCONTINUED | OUTPATIENT
Start: 2022-07-03 | End: 2022-07-03 | Stop reason: HOSPADM

## 2022-07-03 RX ORDER — HYDROMORPHONE HYDROCHLORIDE 1 MG/ML
0.5 INJECTION, SOLUTION INTRAMUSCULAR; INTRAVENOUS; SUBCUTANEOUS
Status: DISCONTINUED | OUTPATIENT
Start: 2022-07-03 | End: 2022-07-03 | Stop reason: HOSPADM

## 2022-07-03 RX ORDER — SODIUM CHLORIDE 0.9 % (FLUSH) 0.9 %
5-40 SYRINGE (ML) INJECTION AS NEEDED
Status: DISCONTINUED | OUTPATIENT
Start: 2022-07-03 | End: 2022-07-03 | Stop reason: HOSPADM

## 2022-07-03 RX ORDER — HEPARIN SODIUM 1000 [USP'U]/ML
INJECTION, SOLUTION INTRAVENOUS; SUBCUTANEOUS AS NEEDED
Status: DISCONTINUED | OUTPATIENT
Start: 2022-07-03 | End: 2022-07-03 | Stop reason: HOSPADM

## 2022-07-03 RX ORDER — ONDANSETRON 2 MG/ML
INJECTION INTRAMUSCULAR; INTRAVENOUS AS NEEDED
Status: DISCONTINUED | OUTPATIENT
Start: 2022-07-03 | End: 2022-07-03 | Stop reason: HOSPADM

## 2022-07-03 RX ORDER — DEXAMETHASONE SODIUM PHOSPHATE 4 MG/ML
INJECTION, SOLUTION INTRA-ARTICULAR; INTRALESIONAL; INTRAMUSCULAR; INTRAVENOUS; SOFT TISSUE AS NEEDED
Status: DISCONTINUED | OUTPATIENT
Start: 2022-07-03 | End: 2022-07-03 | Stop reason: HOSPADM

## 2022-07-03 RX ORDER — SODIUM CHLORIDE 0.9 % (FLUSH) 0.9 %
5-40 SYRINGE (ML) INJECTION EVERY 8 HOURS
Status: CANCELLED | OUTPATIENT
Start: 2022-07-03

## 2022-07-03 RX ORDER — LIDOCAINE HYDROCHLORIDE 20 MG/ML
INJECTION, SOLUTION EPIDURAL; INFILTRATION; INTRACAUDAL; PERINEURAL AS NEEDED
Status: DISCONTINUED | OUTPATIENT
Start: 2022-07-03 | End: 2022-07-03 | Stop reason: HOSPADM

## 2022-07-03 RX ORDER — SODIUM CHLORIDE 0.9 % (FLUSH) 0.9 %
5-40 SYRINGE (ML) INJECTION EVERY 8 HOURS
Status: DISCONTINUED | OUTPATIENT
Start: 2022-07-03 | End: 2022-07-08 | Stop reason: HOSPADM

## 2022-07-03 RX ORDER — MIDAZOLAM HYDROCHLORIDE 1 MG/ML
INJECTION, SOLUTION INTRAMUSCULAR; INTRAVENOUS AS NEEDED
Status: DISCONTINUED | OUTPATIENT
Start: 2022-07-03 | End: 2022-07-03 | Stop reason: HOSPADM

## 2022-07-03 RX ORDER — SODIUM CHLORIDE 0.9 % (FLUSH) 0.9 %
5-40 SYRINGE (ML) INJECTION AS NEEDED
Status: DISCONTINUED | OUTPATIENT
Start: 2022-07-03 | End: 2022-07-08 | Stop reason: HOSPADM

## 2022-07-03 RX ORDER — FENTANYL CITRATE 50 UG/ML
INJECTION, SOLUTION INTRAMUSCULAR; INTRAVENOUS AS NEEDED
Status: DISCONTINUED | OUTPATIENT
Start: 2022-07-03 | End: 2022-07-03 | Stop reason: HOSPADM

## 2022-07-03 RX ORDER — DIPHENHYDRAMINE HYDROCHLORIDE 50 MG/ML
12.5 INJECTION, SOLUTION INTRAMUSCULAR; INTRAVENOUS AS NEEDED
Status: DISCONTINUED | OUTPATIENT
Start: 2022-07-03 | End: 2022-07-03 | Stop reason: HOSPADM

## 2022-07-03 RX ORDER — FENTANYL CITRATE 50 UG/ML
50 INJECTION, SOLUTION INTRAMUSCULAR; INTRAVENOUS AS NEEDED
Status: CANCELLED | OUTPATIENT
Start: 2022-07-03

## 2022-07-03 RX ORDER — SODIUM CHLORIDE 0.9 % (FLUSH) 0.9 %
5-40 SYRINGE (ML) INJECTION AS NEEDED
Status: CANCELLED | OUTPATIENT
Start: 2022-07-03

## 2022-07-03 RX ORDER — SODIUM CHLORIDE 0.9 % (FLUSH) 0.9 %
5-40 SYRINGE (ML) INJECTION EVERY 8 HOURS
Status: DISCONTINUED | OUTPATIENT
Start: 2022-07-03 | End: 2022-07-03 | Stop reason: HOSPADM

## 2022-07-03 RX ORDER — LIDOCAINE HYDROCHLORIDE 10 MG/ML
0.1 INJECTION, SOLUTION EPIDURAL; INFILTRATION; INTRACAUDAL; PERINEURAL AS NEEDED
Status: CANCELLED | OUTPATIENT
Start: 2022-07-03

## 2022-07-03 RX ORDER — NITROGLYCERIN 5 MG/ML
300 INJECTION, SOLUTION INTRAVENOUS ONCE
Status: DISPENSED | OUTPATIENT
Start: 2022-07-03 | End: 2022-07-03

## 2022-07-03 RX ORDER — NORETHINDRONE AND ETHINYL ESTRADIOL 0.5-0.035
5 KIT ORAL AS NEEDED
Status: DISCONTINUED | OUTPATIENT
Start: 2022-07-03 | End: 2022-07-03 | Stop reason: HOSPADM

## 2022-07-03 RX ORDER — FENTANYL CITRATE 50 UG/ML
50 INJECTION, SOLUTION INTRAMUSCULAR; INTRAVENOUS
Status: DISCONTINUED | OUTPATIENT
Start: 2022-07-03 | End: 2022-07-03 | Stop reason: HOSPADM

## 2022-07-03 RX ORDER — SODIUM CHLORIDE, SODIUM LACTATE, POTASSIUM CHLORIDE, CALCIUM CHLORIDE 600; 310; 30; 20 MG/100ML; MG/100ML; MG/100ML; MG/100ML
INJECTION, SOLUTION INTRAVENOUS
Status: DISCONTINUED | OUTPATIENT
Start: 2022-07-03 | End: 2022-07-03 | Stop reason: HOSPADM

## 2022-07-03 RX ORDER — PROPOFOL 10 MG/ML
INJECTION, EMULSION INTRAVENOUS AS NEEDED
Status: DISCONTINUED | OUTPATIENT
Start: 2022-07-03 | End: 2022-07-03 | Stop reason: HOSPADM

## 2022-07-03 RX ADMIN — METFORMIN HYDROCHLORIDE 500 MG: 500 TABLET ORAL at 16:02

## 2022-07-03 RX ADMIN — MIDAZOLAM HYDROCHLORIDE 2 MG: 2 INJECTION, SOLUTION INTRAMUSCULAR; INTRAVENOUS at 08:26

## 2022-07-03 RX ADMIN — HYDROMORPHONE HYDROCHLORIDE 1 MG: 1 INJECTION, SOLUTION INTRAMUSCULAR; INTRAVENOUS; SUBCUTANEOUS at 20:24

## 2022-07-03 RX ADMIN — PHENYLEPHRINE HYDROCHLORIDE 100 MCG: 10 INJECTION INTRAVENOUS at 09:06

## 2022-07-03 RX ADMIN — AMPICILLIN SODIUM AND SULBACTAM SODIUM 3 G: 2; 1 INJECTION, POWDER, FOR SOLUTION INTRAMUSCULAR; INTRAVENOUS at 18:01

## 2022-07-03 RX ADMIN — HEPARIN SODIUM 3000 UNITS: 1000 INJECTION, SOLUTION INTRAVENOUS; SUBCUTANEOUS at 10:16

## 2022-07-03 RX ADMIN — AMPICILLIN SODIUM AND SULBACTAM SODIUM 3 G: 2; 1 INJECTION, POWDER, FOR SOLUTION INTRAMUSCULAR; INTRAVENOUS at 12:38

## 2022-07-03 RX ADMIN — SODIUM CHLORIDE, PRESERVATIVE FREE 10 ML: 5 INJECTION INTRAVENOUS at 22:02

## 2022-07-03 RX ADMIN — LISINOPRIL 10 MG: 10 TABLET ORAL at 16:02

## 2022-07-03 RX ADMIN — FENTANYL CITRATE 50 MCG: 0.05 INJECTION, SOLUTION INTRAMUSCULAR; INTRAVENOUS at 11:15

## 2022-07-03 RX ADMIN — HYDROMORPHONE HYDROCHLORIDE 0.5 MG: 1 INJECTION, SOLUTION INTRAMUSCULAR; INTRAVENOUS; SUBCUTANEOUS at 11:25

## 2022-07-03 RX ADMIN — FENTANYL CITRATE 50 MCG: 0.05 INJECTION, SOLUTION INTRAMUSCULAR; INTRAVENOUS at 11:09

## 2022-07-03 RX ADMIN — HEPARIN SODIUM AND DEXTROSE 16 UNITS/KG/HR: 10000; 5 INJECTION INTRAVENOUS at 13:40

## 2022-07-03 RX ADMIN — AMPICILLIN SODIUM AND SULBACTAM SODIUM 3 G: 2; 1 INJECTION, POWDER, FOR SOLUTION INTRAMUSCULAR; INTRAVENOUS at 03:26

## 2022-07-03 RX ADMIN — LIDOCAINE HYDROCHLORIDE 60 MG: 20 INJECTION, SOLUTION EPIDURAL; INFILTRATION; INTRACAUDAL; PERINEURAL at 08:30

## 2022-07-03 RX ADMIN — SODIUM CHLORIDE 75 ML/HR: 9 INJECTION, SOLUTION INTRAVENOUS at 12:42

## 2022-07-03 RX ADMIN — OXYCODONE AND ACETAMINOPHEN 1 TABLET: 5; 325 TABLET ORAL at 18:05

## 2022-07-03 RX ADMIN — DEXAMETHASONE SODIUM PHOSPHATE 4 MG: 4 INJECTION, SOLUTION INTRA-ARTICULAR; INTRALESIONAL; INTRAMUSCULAR; INTRAVENOUS; SOFT TISSUE at 10:27

## 2022-07-03 RX ADMIN — SODIUM CHLORIDE, POTASSIUM CHLORIDE, SODIUM LACTATE AND CALCIUM CHLORIDE: 600; 310; 30; 20 INJECTION, SOLUTION INTRAVENOUS at 08:26

## 2022-07-03 RX ADMIN — PHENYLEPHRINE HYDROCHLORIDE 100 MCG: 10 INJECTION INTRAVENOUS at 09:14

## 2022-07-03 RX ADMIN — HYDROMORPHONE HYDROCHLORIDE 1 MG: 1 INJECTION, SOLUTION INTRAMUSCULAR; INTRAVENOUS; SUBCUTANEOUS at 16:06

## 2022-07-03 RX ADMIN — ONDANSETRON 4 MG: 2 INJECTION INTRAMUSCULAR; INTRAVENOUS at 10:27

## 2022-07-03 RX ADMIN — HYDROMORPHONE HYDROCHLORIDE 1 MG: 1 INJECTION, SOLUTION INTRAMUSCULAR; INTRAVENOUS; SUBCUTANEOUS at 12:29

## 2022-07-03 RX ADMIN — HEPARIN SODIUM 5000 UNITS: 1000 INJECTION, SOLUTION INTRAVENOUS; SUBCUTANEOUS at 08:40

## 2022-07-03 RX ADMIN — HYDROMORPHONE HYDROCHLORIDE 1 MG: 1 INJECTION, SOLUTION INTRAMUSCULAR; INTRAVENOUS; SUBCUTANEOUS at 03:26

## 2022-07-03 RX ADMIN — AMLODIPINE BESYLATE 5 MG: 5 TABLET ORAL at 16:02

## 2022-07-03 RX ADMIN — COLCHICINE 0.6 MG: 0.6 TABLET, FILM COATED ORAL at 16:03

## 2022-07-03 RX ADMIN — FENTANYL CITRATE 100 MCG: 50 INJECTION, SOLUTION INTRAMUSCULAR; INTRAVENOUS at 08:30

## 2022-07-03 RX ADMIN — OXYCODONE AND ACETAMINOPHEN 1 TABLET: 5; 325 TABLET ORAL at 07:07

## 2022-07-03 RX ADMIN — SODIUM CHLORIDE, PRESERVATIVE FREE 10 ML: 5 INJECTION INTRAVENOUS at 16:01

## 2022-07-03 RX ADMIN — PROPOFOL 200 MG: 10 INJECTION, EMULSION INTRAVENOUS at 08:30

## 2022-07-03 RX ADMIN — HEPARIN SODIUM 4000 UNITS: 1000 INJECTION INTRAVENOUS; SUBCUTANEOUS at 22:09

## 2022-07-03 NOTE — PROGRESS NOTES
Focus: heparin gtt    Received verbal order from PACU nurse, per Dr. Yo jacobson heparin gtt restarted at 16 units/kg/hr, no bolus given per MD. This RN restarted heparin gtt late due to awaiting Pharmacy to verify medication and send up medication.

## 2022-07-03 NOTE — PROGRESS NOTES
Infectious Disease Progress Note           Subjective:   Pt seen and and examined at bedside. Denies new complaints. No acute events since last seen, underwent arteriogram by Dr Yo Hi today, operative report/findings are pending.  Denies new complaints    Objective:   Physical Exam:     Visit Vitals  /70 (BP 1 Location: Right upper arm, BP Patient Position: Semi fowlers)   Pulse 73   Temp 98.1 °F (36.7 °C)   Resp 16   Ht 6' 1\" (1.854 m)   Wt 255 lb (115.7 kg)   SpO2 95%   BMI 33.64 kg/m²      O2 Device: None (Room air)    Temp (24hrs), Av.8 °F (36.6 °C), Min:97.5 °F (36.4 °C), Max:98.3 °F (36.8 °C)    701 - 1900  In: 1000 [I.V.:1000]  Out: -    1901 -  07  In: -   Out: 1000 [Urine:1000]    General: NAD, AAO x 4  HEENT: FLOYD, Moist mucosa   Lungs: CTA b/l, decreased at the bases, no wheeze/rhonchi   Heart: S1S2+, RRR, no murmur  Abdo: Soft, NT, ND, +BS   : No herbert cath   Exts: Right great toe swelling, decreased cyanosis, stable gangrenous changes on plantar surface of toe, minimal drainage   Skin: No pressure ulcers or andre     Data Review:       Recent Days:  Recent Labs     22  1201 22  1100 22  0820   WBC 11.5* 12.0* 12.9*   HGB 12.7 12.3 14.3   HCT 37.4 36.4* 42.1    240 288     Recent Labs     22  1201 22  1100 22  0609   BUN 10 10 11   CREA 0.59* 0.69* 0.59*       Lab Results   Component Value Date/Time    C-Reactive protein 2.79 (H) 2022 06:09 AM        Microbiology     Results     Procedure Component Value Units Date/Time    CULTURE, BLOOD #1 [[id number]] Collected: 22 1840    Order Status: Completed Specimen: Blood Updated: 07/03/22 1058     Special Requests: No Special Requests        Culture result: No growth 2 days       CULTURE, BLOOD #2 [[id number]] Collected: 22 1840    Order Status: Completed Specimen: Blood Updated: 22     Special Requests: No Special Requests        Culture result: No growth 2 days       CULTURE, Sylvia Luevano [[id number]] Collected: 06/30/22 1815    Order Status: Canceled Specimen: Wound from Ulcer          Diagnostics   CXR Results  (Last 48 hours)    None         Assessment/Plan     1. Right great toe infection, Findings on MRI consistent w gouty arthritis, no reports of osteomyelitis       Stable ulceration involving base of right great toe, no sig drainage from ulcer      Afebrile, WBC trending down on todays labs       Continue on empiric Unasyn. Routine labs in the morning     2. H/o gouty arthritis, ? Reason for # 1, uric acid level of 3.9         Continue on Colchicine, will add allopurinol after acute gout flare     3. PAD, Abnormal ANTIONETTE, S/p arteriogram today by Dr Anna Cruz,       Decreased cyanosis. Toes are cold to touch        4. H/o DM, BS controlled per pt, A1C of 7.7, BS mg per primary     5. Right foot pain, contributed by # 2, continue symptomatic mgt     6.  Chronic tobacco use: counseled       Sara Olivo MD    7/3/2022

## 2022-07-03 NOTE — ROUTINE PROCESS
TRANSFER - OUT REPORT:    Verbal report given to Moses Estrada (name) on [employer]  being transferred to  (unit) for routine post - op       Report consisted of patients Situation, Background, Assessment and   Recommendations(SBAR). Information from the following report(s) SBAR, OR Summary, MAR and Recent Results was reviewed with the receiving nurse. Lines:   Peripheral IV 06/30/22 Posterior;Right Forearm (Active)   Site Assessment Clean, dry, & intact 07/03/22 1140   Phlebitis Assessment 0 07/03/22 1140   Infiltration Assessment 0 07/03/22 1140   Dressing Status Clean, dry, & intact 07/03/22 1140   Hub Color/Line Status Pink; Infusing 07/03/22 1140       Peripheral IV 07/02/22 Posterior; Left Forearm (Active)   Site Assessment Clean, dry, & intact 07/03/22 1140   Phlebitis Assessment 0 07/03/22 1140   Infiltration Assessment 0 07/03/22 1140   Dressing Status Clean, dry, & intact 07/03/22 1140   Dressing Type Transparent 07/03/22 1140   Hub Color/Line Status Pink;Flushed 07/03/22 1140   Alcohol Cap Used Yes 07/03/22 1140        Opportunity for questions and clarification was provided.

## 2022-07-03 NOTE — PROGRESS NOTES
General Daily Progress Note          Patient Name:   Dennis Tejada       YOB: 1960       Age:  65 y.o. Admit Date: 6/30/2022      Subjective:       Dennis Tejada is a 65 y.o. male with a significant PMH of HTN, lifelong 1.5ppd smoker, and Diabetes  KHUSHI and Nydia Stout presents to the ED today with a R great toe wound. Started 2 weeks ago treated with oral antibiotics the patient states that the wound appeared 5 days ago when it \"burst\" from swelling with light yellow discharge. He has taken Rocefin and Clindamycin for the wound with subjective improvement. The wound is not acutely painful but he has a small amount of pain in the MTP joint. Patient denies other symptoms associated with the wound.   Patient also treated for gout      Complaining of pain 8 out of the 10    Seen by infectious disease recommend to continue vancomycin and Unasyn         Patient have angiogram done today      Objective:     Visit Vitals  BP (!) 145/78 (BP 1 Location: Right upper arm, BP Patient Position: At rest;Supine)   Pulse 70   Temp 98 °F (36.7 °C)   Resp 16   Ht 6' 1\" (1.854 m)   Wt 115.7 kg (255 lb)   SpO2 92%   BMI 33.64 kg/m²        Recent Results (from the past 24 hour(s))   GLUCOSE, POC    Collection Time: 07/02/22  5:38 PM   Result Value Ref Range    Glucose (POC) 142 (H) 65 - 117 mg/dL    Performed by Nava Duffy    PTT    Collection Time: 07/02/22  7:04 PM   Result Value Ref Range    aPTT 29.1 21.2 - 34.1 sec    aPTT, therapeutic range   82 - 109 sec   GLUCOSE, POC    Collection Time: 07/02/22  8:25 PM   Result Value Ref Range    Glucose (POC) 148 (H) 65 - 117 mg/dL    Performed by Christine Mejias    PTT    Collection Time: 07/03/22  2:31 AM   Result Value Ref Range    aPTT 32.4 21.2 - 34.1 sec    aPTT, therapeutic range   82 - 109 sec   GLUCOSE, POC    Collection Time: 07/03/22 10:45 AM   Result Value Ref Range    Glucose (POC) 118 (H) 65 - 117 mg/dL    Performed by David Velazquez    PTT    Collection Time: 07/03/22 11:00 AM   Result Value Ref Range    aPTT 46.9 (H) 21.2 - 34.1 sec    aPTT, therapeutic range   82 - 109 sec   CBC WITH AUTOMATED DIFF    Collection Time: 07/03/22 11:00 AM   Result Value Ref Range    WBC 12.0 (H) 4.1 - 11.1 K/uL    RBC 4.06 (L) 4.10 - 5.70 M/uL    HGB 12.3 12.1 - 17.0 g/dL    HCT 36.4 (L) 36.6 - 50.3 %    MCV 89.7 80.0 - 99.0 FL    MCH 30.3 26.0 - 34.0 PG    MCHC 33.8 30.0 - 36.5 g/dL    RDW 12.7 11.5 - 14.5 %    PLATELET 939 519 - 948 K/uL    MPV 9.6 8.9 - 12.9 FL    NRBC 0.0 0.0  WBC    ABSOLUTE NRBC 0.00 0.00 - 0.01 K/uL    NEUTROPHILS 75 32 - 75 %    LYMPHOCYTES 14 12 - 49 %    MONOCYTES 7 5 - 13 %    EOSINOPHILS 2 0 - 7 %    BASOPHILS 1 0 - 1 %    IMMATURE GRANULOCYTES 1 (H) 0 - 0.5 %    ABS. NEUTROPHILS 8.9 (H) 1.8 - 8.0 K/UL    ABS. LYMPHOCYTES 1.7 0.8 - 3.5 K/UL    ABS. MONOCYTES 0.9 0.0 - 1.0 K/UL    ABS. EOSINOPHILS 0.3 0.0 - 0.4 K/UL    ABS. BASOPHILS 0.1 0.0 - 0.1 K/UL    ABS. IMM. GRANS. 0.1 (H) 0.00 - 0.04 K/UL    DF AUTOMATED     METABOLIC PANEL, COMPREHENSIVE    Collection Time: 07/03/22 11:00 AM   Result Value Ref Range    Sodium 137 136 - 145 mmol/L    Potassium 4.1 3.5 - 5.1 mmol/L    Chloride 104 97 - 108 mmol/L    CO2 27 21 - 32 mmol/L    Anion gap 6 5 - 15 mmol/L    Glucose 122 (H) 65 - 100 mg/dL    BUN 10 6 - 20 mg/dL    Creatinine 0.69 (L) 0.70 - 1.30 mg/dL    BUN/Creatinine ratio 14 12 - 20      GFR est AA >60 >60 ml/min/1.73m2    GFR est non-AA >60 >60 ml/min/1.73m2    Calcium 8.4 (L) 8.5 - 10.1 mg/dL    Bilirubin, total 0.5 0.2 - 1.0 mg/dL    AST (SGOT) 18 15 - 37 U/L    ALT (SGPT) 24 12 - 78 U/L    Alk. phosphatase 82 45 - 117 U/L    Protein, total 6.4 6.4 - 8.2 g/dL    Albumin 2.7 (L) 3.5 - 5.0 g/dL    Globulin 3.7 2.0 - 4.0 g/dL    A-G Ratio 0.7 (L) 1.1 - 2.2       [unfilled]      Review of Systems    Constitutional: Negative for chills and fever. HENT: Negative. Eyes: Negative. Respiratory: Negative. Cardiovascular: Negative. Gastrointestinal: Negative for abdominal pain and nausea. Skin: Negative. Neurological: Negative. Physical Exam:      Constitutional: pt is oriented to person, place, and time. HENT:   Head: Normocephalic and atraumatic. Eyes: Pupils are equal, round, and reactive to light. EOM are normal.   Cardiovascular: Normal rate, regular rhythm and normal heart sounds. Pulmonary/Chest: Breath sounds normal. No wheezes. No rales. Exhibits no tenderness. Abdominal: Soft. Bowel sounds are normal. There is no abdominal tenderness. There is no rebound and no guarding. Musculoskeletal: Normal range of motion. Neurological: pt is alert and oriented to person, place, and time. MRI FOOT RT WO CONT   Final Result   1. Marginal erosions are seen surrounding the first MTP joint with a minimal   first MTP joint effusion. Small erosions are seen in the second metatarsal head   and fifth metatarsal head as well. The overall appearance is most consistent   with gout. Inflammatory and infectious arthritides however would be within the   differential.   2. Significant subcutaneous edema         ANKLE BRACHIAL INDEX   Final Result      XR FOOT RT MIN 3 V   Final Result   Cortical cystic erosive changes involving the distal aspects of the   first and fifth metatarsals and base of the proximal phalanx of the great toe   compatible with chronic degenerative/osteoarthritic changes. Consider gout.            Recent Results (from the past 24 hour(s))   GLUCOSE, POC    Collection Time: 07/02/22  5:38 PM   Result Value Ref Range    Glucose (POC) 142 (H) 65 - 117 mg/dL    Performed by Jazmine Booker    PTT    Collection Time: 07/02/22  7:04 PM   Result Value Ref Range    aPTT 29.1 21.2 - 34.1 sec    aPTT, therapeutic range   82 - 109 sec   GLUCOSE, POC    Collection Time: 07/02/22  8:25 PM   Result Value Ref Range    Glucose (POC) 148 (H) 65 - 117 mg/dL    Performed by [facility]    PTT    Collection Time: 07/03/22 2:31 AM   Result Value Ref Range    aPTT 32.4 21.2 - 34.1 sec    aPTT, therapeutic range   82 - 109 sec   GLUCOSE, POC    Collection Time: 07/03/22 10:45 AM   Result Value Ref Range    Glucose (POC) 118 (H) 65 - 117 mg/dL    Performed by Catie Tesfaye    PTT    Collection Time: 07/03/22 11:00 AM   Result Value Ref Range    aPTT 46.9 (H) 21.2 - 34.1 sec    aPTT, therapeutic range   82 - 109 sec   CBC WITH AUTOMATED DIFF    Collection Time: 07/03/22 11:00 AM   Result Value Ref Range    WBC 12.0 (H) 4.1 - 11.1 K/uL    RBC 4.06 (L) 4.10 - 5.70 M/uL    HGB 12.3 12.1 - 17.0 g/dL    HCT 36.4 (L) 36.6 - 50.3 %    MCV 89.7 80.0 - 99.0 FL    MCH 30.3 26.0 - 34.0 PG    MCHC 33.8 30.0 - 36.5 g/dL    RDW 12.7 11.5 - 14.5 %    PLATELET 219 719 - 879 K/uL    MPV 9.6 8.9 - 12.9 FL    NRBC 0.0 0.0  WBC    ABSOLUTE NRBC 0.00 0.00 - 0.01 K/uL    NEUTROPHILS 75 32 - 75 %    LYMPHOCYTES 14 12 - 49 %    MONOCYTES 7 5 - 13 %    EOSINOPHILS 2 0 - 7 %    BASOPHILS 1 0 - 1 %    IMMATURE GRANULOCYTES 1 (H) 0 - 0.5 %    ABS. NEUTROPHILS 8.9 (H) 1.8 - 8.0 K/UL    ABS. LYMPHOCYTES 1.7 0.8 - 3.5 K/UL    ABS. MONOCYTES 0.9 0.0 - 1.0 K/UL    ABS. EOSINOPHILS 0.3 0.0 - 0.4 K/UL    ABS. BASOPHILS 0.1 0.0 - 0.1 K/UL    ABS. IMM. GRANS. 0.1 (H) 0.00 - 0.04 K/UL    DF AUTOMATED     METABOLIC PANEL, COMPREHENSIVE    Collection Time: 07/03/22 11:00 AM   Result Value Ref Range    Sodium 137 136 - 145 mmol/L    Potassium 4.1 3.5 - 5.1 mmol/L    Chloride 104 97 - 108 mmol/L    CO2 27 21 - 32 mmol/L    Anion gap 6 5 - 15 mmol/L    Glucose 122 (H) 65 - 100 mg/dL    BUN 10 6 - 20 mg/dL    Creatinine 0.69 (L) 0.70 - 1.30 mg/dL    BUN/Creatinine ratio 14 12 - 20      GFR est AA >60 >60 ml/min/1.73m2    GFR est non-AA >60 >60 ml/min/1.73m2    Calcium 8.4 (L) 8.5 - 10.1 mg/dL    Bilirubin, total 0.5 0.2 - 1.0 mg/dL    AST (SGOT) 18 15 - 37 U/L    ALT (SGPT) 24 12 - 78 U/L    Alk.  phosphatase 82 45 - 117 U/L    Protein, total 6.4 6.4 - 8.2 g/dL    Albumin 2.7 (L) 3.5 - 5.0 g/dL    Globulin 3.7 2.0 - 4.0 g/dL    A-G Ratio 0.7 (L) 1.1 - 2.2         Results     Procedure Component Value Units Date/Time    CULTURE, BLOOD #1 [[id number]] Collected: 06/30/22 1840    Order Status: Completed Specimen: Blood Updated: 07/03/22 1058     Special Requests: No Special Requests        Culture result: No growth 2 days       CULTURE, BLOOD #2 [[id number]] Collected: 06/30/22 1840    Order Status: Completed Specimen: Blood Updated: 07/03/22 1058     Special Requests: No Special Requests        Culture result: No growth 2 days       CULTURE, Janes Bernalmin STAIN [[id number]] Collected: 06/30/22 1815    Order Status: Canceled Specimen: Wound from Ulcer            Labs:     Recent Labs     07/03/22 1100 07/02/22  0820   WBC 12.0* 12.9*   HGB 12.3 14.3   HCT 36.4* 42.1    288     Recent Labs     07/03/22 1100 07/01/22  0609 06/30/22  1836    138  --    K 4.1 4.2  --     104  --    CO2 27 28  --    BUN 10 11  --    CREA 0.69* 0.59*  --    * 145*  --    CA 8.4* 9.0  --    URICA  --   --  3.9     Recent Labs     07/03/22 1100 07/01/22  0609   ALT 24 24   AP 82 103   TBILI 0.5 0.4   TP 6.4 7.4   ALB 2.7* 3.2*   GLOB 3.7 4.2*     Recent Labs     07/03/22 1100 07/03/22  0231 07/02/22  1904   APTT 46.9* 32.4 29.1      No results for input(s): FE, TIBC, PSAT, FERR in the last 72 hours. No results found for: FOL, RBCF   No results for input(s): PH, PCO2, PO2 in the last 72 hours. No results for input(s): CPK, CKNDX, TROIQ in the last 72 hours.     No lab exists for component: CPKMB  No results found for: CHOL, CHOLX, CHLST, CHOLV, HDL, HDLP, LDL, LDLC, DLDLP, TGLX, TRIGL, TRIGP, CHHD, CHHDX  Lab Results   Component Value Date/Time    Glucose (POC) 118 (H) 07/03/2022 10:45 AM    Glucose (POC) 148 (H) 07/02/2022 08:25 PM    Glucose (POC) 142 (H) 07/02/2022 05:38 PM    Glucose (POC) 194 (H) 07/02/2022 11:24 AM    Glucose (POC) 155 (H) 07/02/2022 08:38 AM     No results found for: COLOR, APPRN, SPGRU, REFSG, EPHRAIM, PROTU, GLUCU, KETU, BILU, UROU, VIANEY, LEUKU, GLUKE, EPSU, BACTU, WBCU, RBCU, CASTS, UCRY      Assessment:     Right great toe infection  Diabetic foot injury  Arterial Insufficiency  Diabetes Mellitus  Hypertension  GERD  Gout  RA  Smoking  Peripheral vascular disease    1. Marginal erosions are seen surrounding the first MTP joint with a minimal  first MTP joint effusion. Small erosions are seen in the second metatarsal head  and fifth metatarsal head as well. The overall appearance is most consistent  with gout.  Inflammatory and infectious arthritides however would be within the  differential.  2. Significant subcutaneous edema      Plan:     IV vancomycin and IV Zosyn  Started on oxycodone for pain  Podiatry consult pending  MRI pending  Follow-up angiogram results        Current Facility-Administered Medications:     nitroglycerin injection 0.3 mg, 300 mcg, IntraVENous, ONCE, Hernando Martínez MD    sodium chloride (NS) flush 5-40 mL, 5-40 mL, IntraVENous, Q8H, Hernando Martínez MD    sodium chloride (NS) flush 5-40 mL, 5-40 mL, IntraVENous, PRN, Hernando Martínez MD    HYDROmorphone (DILAUDID) injection 1 mg, 1 mg, IntraVENous, Q4H PRN, Hernando Martínez MD, 1 mg at 07/03/22 0326    heparin 25,000 units in D5W 250 ml infusion, 12-25 Units/kg/hr (Adjusted), IntraVENous, TITRATE, Hernando Martínez MD, Stopped at 07/03/22 0622    heparin (porcine) 1,000 unit/mL injection 4,000 Units, 4,000 Units, IntraVENous, PRN, 4,000 Units at 07/02/22 2210 **OR** heparin (porcine) 1,000 unit/mL injection 2,000 Units, 2,000 Units, IntraVENous, PRN, Hernando Martínez MD    colchicine tablet 0.6 mg, 0.6 mg, Oral, DAILY, Vicky West MD    0.9% sodium chloride infusion, 75 mL/hr, IntraVENous, CONTINUOUS, Hernando Martínez MD, Last Rate: 75 mL/hr at 07/02/22 2202, 75 mL/hr at 07/02/22 2202    oxyCODONE-acetaminophen (PERCOCET) 5-325 mg per tablet 1 Tablet, 1 Tablet, Oral, Q6H PRN, Oleg Loyola MD, 1 Tablet at 07/03/22 4195    insulin lispro (HUMALOG) injection, , SubCUTAneous, AC&HS, Oleg Loyola MD, 3 Units at 07/01/22 0909    glucose chewable tablet 16 g, 4 Tablet, Oral, PRN, Hilary Ferrer MD    glucagon [facility]) injection 1 mg, 1 mg, IntraMUSCular, PRN, Hilary Ferrer MD    acetaminophen (TYLENOL) tablet 650 mg, 650 mg, Oral, Q6H PRN **OR** acetaminophen (TYLENOL) suppository 650 mg, 650 mg, Rectal, Q6H PRN, Hilary Ferrer MD    polyethylene glycol (MIRALAX) packet 17 g, 17 g, Oral, DAILY PRN, Hilary Ferrer MD    ondansetron (ZOFRAN ODT) tablet 4 mg, 4 mg, Oral, Q8H PRN **OR** ondansetron (ZOFRAN) injection 4 mg, 4 mg, IntraVENous, Q6H PRN, Satnam Ferrer MD    enoxaparin (LOVENOX) injection 40 mg, 40 mg, SubCUTAneous, DAILY, Satnam Ferrer MD, 40 mg at 07/02/22 0838    ampicillin-sulbactam (UNASYN) 3 g in 0.9% sodium chloride (MBP/ADV) 100 mL MBP, 3 g, IntraVENous, Q6H, Satnam Ferrer MD, Last Rate: 200 mL/hr at 07/03/22 0326, 3 g at 07/03/22 0326    metFORMIN (GLUCOPHAGE) tablet 500 mg, 500 mg, Oral, BID WITH MEALS, Satnam Ferrer MD, 500 mg at 07/02/22 1732    pantoprazole (PROTONIX) tablet 40 mg, 40 mg, Oral, ACB, Satnam Ferrer MD, 40 mg at 07/02/22 0845    lisinopriL (PRINIVIL, ZESTRIL) tablet 10 mg, 10 mg, Oral, DAILY, 10 mg at 07/02/22 0845 **AND** amLODIPine (NORVASC) tablet 5 mg, 5 mg, Oral, DAILY, Satnam Ferrer MD, 5 mg at 07/02/22 0804    traMADoL (ULTRAM) tablet 50 mg, 50 mg, Oral, Q6H PRN, Satnam Ferrer MD, 50 mg at 07/01/22 4475

## 2022-07-03 NOTE — ANESTHESIA POSTPROCEDURE EVALUATION
Procedure(s):  BALLOON ANGIOPLASTY LOWER EXTREMITY RIGHT WITH STENTING. MAC    Anesthesia Post Evaluation        Patient location during evaluation: PACU  Patient participation: complete - patient participated  Level of consciousness: awake and alert  Pain score: 0  Pain management: adequate  Airway patency: patent  Anesthetic complications: no  Cardiovascular status: hemodynamically stable  Respiratory status: acceptable  Hydration status: acceptable  Post anesthesia nausea and vomiting:  controlled  Final Post Anesthesia Temperature Assessment:  Normothermia (36.0-37.5 degrees C)      INITIAL Post-op Vital signs:   Vitals Value Taken Time   /75 07/03/22 1115   Temp 36.4 °C (97.5 °F) 07/03/22 1041   Pulse 72 07/03/22 1121   Resp 16 07/03/22 1121   SpO2 99 % 07/03/22 1121   Vitals shown include unvalidated device data.

## 2022-07-03 NOTE — PROGRESS NOTES
Problem: Falls - Risk of  Goal: *Absence of Falls  Description: Document [facility] Fall Risk and appropriate interventions in the flowsheet. Outcome: Progressing Towards Goal  Note: Fall Risk Interventions:            Medication Interventions: Teach patient to arise slowly                   Problem: Patient Education: Go to Patient Education Activity  Goal: Patient/Family Education  Outcome: Progressing Towards Goal     Problem: Diabetes Self-Management  Goal: *Disease process and treatment process  Description: Define diabetes and identify own type of diabetes; list 3 options for treating diabetes. Outcome: Progressing Towards Goal  Goal: *Incorporating nutritional management into lifestyle  Description: Describe effect of type, amount and timing of food on blood glucose; list 3 methods for planning meals. Outcome: Progressing Towards Goal  Goal: *Incorporating physical activity into lifestyle  Description: State effect of exercise on blood glucose levels. Outcome: Progressing Towards Goal  Goal: *Developing strategies to promote health/change behavior  Description: Define the ABC's of diabetes; identify appropriate screenings, schedule and personal plan for screenings. Outcome: Progressing Towards Goal  Goal: *Using medications safely  Description: State effect of diabetes medications on diabetes; name diabetes medication taking, action and side effects. Outcome: Progressing Towards Goal  Goal: *Monitoring blood glucose, interpreting and using results  Description: Identify recommended blood glucose targets  and personal targets. Outcome: Progressing Towards Goal  Goal: *Prevention, detection, treatment of acute complications  Description: List symptoms of hyper- and hypoglycemia; describe how to treat low blood sugar and actions for lowering  high blood glucose level.   Outcome: Progressing Towards Goal  Goal: *Prevention, detection and treatment of chronic complications  Description: Define the natural course of diabetes and describe the relationship of blood glucose levels to long term complications of diabetes.   Outcome: Progressing Towards Goal  Goal: *Developing strategies to address psychosocial issues  Description: Describe feelings about living with diabetes; identify support needed and support network  Outcome: Progressing Towards Goal  Goal: *Insulin pump training  Outcome: Progressing Towards Goal  Goal: *Sick day guidelines  Outcome: Progressing Towards Goal  Goal: *Patient Specific Goal (EDIT GOAL, INSERT TEXT)  Outcome: Progressing Towards Goal     Problem: Patient Education: Go to Patient Education Activity  Goal: Patient/Family Education  Outcome: Progressing Towards Goal

## 2022-07-04 LAB
ALBUMIN SERPL-MCNC: 2.6 G/DL (ref 3.5–5)
ALBUMIN/GLOB SERPL: 0.7 {RATIO} (ref 1.1–2.2)
ALP SERPL-CCNC: 73 U/L (ref 45–117)
ALT SERPL-CCNC: 22 U/L (ref 12–78)
ANION GAP SERPL CALC-SCNC: 6 MMOL/L (ref 5–15)
APTT PPP: 45.9 SEC (ref 21.2–34.1)
APTT PPP: 48 SEC (ref 21.2–34.1)
APTT PPP: 51.9 SEC (ref 21.2–34.1)
APTT PPP: 63.8 SEC (ref 21.2–34.1)
AST SERPL W P-5'-P-CCNC: 11 U/L (ref 15–37)
BILIRUB SERPL-MCNC: 0.4 MG/DL (ref 0.2–1)
BUN SERPL-MCNC: 8 MG/DL (ref 6–20)
BUN/CREAT SERPL: 15 (ref 12–20)
CA-I BLD-MCNC: 7.6 MG/DL (ref 8.5–10.1)
CHLORIDE SERPL-SCNC: 109 MMOL/L (ref 97–108)
CO2 SERPL-SCNC: 25 MMOL/L (ref 21–32)
CREAT SERPL-MCNC: 0.54 MG/DL (ref 0.7–1.3)
ERYTHROCYTE [DISTWIDTH] IN BLOOD BY AUTOMATED COUNT: 12.7 % (ref 11.5–14.5)
GLOBULIN SER CALC-MCNC: 3.5 G/DL (ref 2–4)
GLUCOSE BLD STRIP.AUTO-MCNC: 127 MG/DL (ref 65–117)
GLUCOSE BLD STRIP.AUTO-MCNC: 134 MG/DL (ref 65–117)
GLUCOSE BLD STRIP.AUTO-MCNC: 148 MG/DL (ref 65–117)
GLUCOSE BLD STRIP.AUTO-MCNC: 170 MG/DL (ref 65–117)
GLUCOSE SERPL-MCNC: 108 MG/DL (ref 65–100)
HCT VFR BLD AUTO: 34.6 % (ref 36.6–50.3)
HGB BLD-MCNC: 11.7 G/DL (ref 12.1–17)
MCH RBC QN AUTO: 30.3 PG (ref 26–34)
MCHC RBC AUTO-ENTMCNC: 33.8 G/DL (ref 30–36.5)
MCV RBC AUTO: 89.6 FL (ref 80–99)
NRBC # BLD: 0 K/UL (ref 0–0.01)
NRBC BLD-RTO: 0 PER 100 WBC
PERFORMED BY, TECHID: ABNORMAL
PLATELET # BLD AUTO: 237 K/UL (ref 150–400)
PMV BLD AUTO: 9.7 FL (ref 8.9–12.9)
POTASSIUM SERPL-SCNC: 3.3 MMOL/L (ref 3.5–5.1)
PROT SERPL-MCNC: 6.1 G/DL (ref 6.4–8.2)
RBC # BLD AUTO: 3.86 M/UL (ref 4.1–5.7)
SODIUM SERPL-SCNC: 140 MMOL/L (ref 136–145)
THERAPEUTIC RANGE,PTTT: ABNORMAL SEC (ref 82–109)
WBC # BLD AUTO: 12.9 K/UL (ref 4.1–11.1)

## 2022-07-04 PROCEDURE — 74011000250 HC RX REV CODE- 250: Performed by: SURGERY

## 2022-07-04 PROCEDURE — 99232 SBSQ HOSP IP/OBS MODERATE 35: CPT | Performed by: SURGERY

## 2022-07-04 PROCEDURE — 82962 GLUCOSE BLOOD TEST: CPT

## 2022-07-04 PROCEDURE — 36415 COLL VENOUS BLD VENIPUNCTURE: CPT

## 2022-07-04 PROCEDURE — 74011000258 HC RX REV CODE- 258: Performed by: FAMILY MEDICINE

## 2022-07-04 PROCEDURE — 85027 COMPLETE CBC AUTOMATED: CPT

## 2022-07-04 PROCEDURE — 85730 THROMBOPLASTIN TIME PARTIAL: CPT

## 2022-07-04 PROCEDURE — 74011250637 HC RX REV CODE- 250/637: Performed by: INTERNAL MEDICINE

## 2022-07-04 PROCEDURE — 74011250636 HC RX REV CODE- 250/636: Performed by: SURGERY

## 2022-07-04 PROCEDURE — 94762 N-INVAS EAR/PLS OXIMTRY CONT: CPT

## 2022-07-04 PROCEDURE — 65270000029 HC RM PRIVATE

## 2022-07-04 PROCEDURE — 74011250636 HC RX REV CODE- 250/636: Performed by: FAMILY MEDICINE

## 2022-07-04 PROCEDURE — 74011250637 HC RX REV CODE- 250/637: Performed by: FAMILY MEDICINE

## 2022-07-04 PROCEDURE — 80053 COMPREHEN METABOLIC PANEL: CPT

## 2022-07-04 RX ORDER — HYDRALAZINE HYDROCHLORIDE 20 MG/ML
20 INJECTION INTRAMUSCULAR; INTRAVENOUS
Status: DISCONTINUED | OUTPATIENT
Start: 2022-07-04 | End: 2022-07-08 | Stop reason: HOSPADM

## 2022-07-04 RX ORDER — HYDROMORPHONE HYDROCHLORIDE 1 MG/ML
1 INJECTION, SOLUTION INTRAMUSCULAR; INTRAVENOUS; SUBCUTANEOUS
Status: DISPENSED | OUTPATIENT
Start: 2022-07-04 | End: 2022-07-07

## 2022-07-04 RX ORDER — POTASSIUM CHLORIDE 750 MG/1
40 TABLET, FILM COATED, EXTENDED RELEASE ORAL
Status: COMPLETED | OUTPATIENT
Start: 2022-07-04 | End: 2022-07-04

## 2022-07-04 RX ADMIN — AMPICILLIN SODIUM AND SULBACTAM SODIUM 3 G: 2; 1 INJECTION, POWDER, FOR SOLUTION INTRAMUSCULAR; INTRAVENOUS at 23:02

## 2022-07-04 RX ADMIN — AMPICILLIN SODIUM AND SULBACTAM SODIUM 3 G: 2; 1 INJECTION, POWDER, FOR SOLUTION INTRAMUSCULAR; INTRAVENOUS at 06:12

## 2022-07-04 RX ADMIN — PANTOPRAZOLE SODIUM 40 MG: 40 TABLET, DELAYED RELEASE ORAL at 06:11

## 2022-07-04 RX ADMIN — SODIUM CHLORIDE 75 ML/HR: 9 INJECTION, SOLUTION INTRAVENOUS at 15:24

## 2022-07-04 RX ADMIN — AMPICILLIN SODIUM AND SULBACTAM SODIUM 3 G: 2; 1 INJECTION, POWDER, FOR SOLUTION INTRAMUSCULAR; INTRAVENOUS at 00:13

## 2022-07-04 RX ADMIN — HEPARIN SODIUM 2000 UNITS: 1000 INJECTION INTRAVENOUS; SUBCUTANEOUS at 23:01

## 2022-07-04 RX ADMIN — METFORMIN HYDROCHLORIDE 500 MG: 500 TABLET ORAL at 16:11

## 2022-07-04 RX ADMIN — HYDROMORPHONE HYDROCHLORIDE 1 MG: 1 INJECTION, SOLUTION INTRAMUSCULAR; INTRAVENOUS; SUBCUTANEOUS at 00:14

## 2022-07-04 RX ADMIN — HYDROMORPHONE HYDROCHLORIDE 1 MG: 1 INJECTION, SOLUTION INTRAMUSCULAR; INTRAVENOUS; SUBCUTANEOUS at 16:11

## 2022-07-04 RX ADMIN — HEPARIN SODIUM AND DEXTROSE 26 UNITS/KG/HR: 10000; 5 INJECTION INTRAVENOUS at 17:25

## 2022-07-04 RX ADMIN — SODIUM CHLORIDE, PRESERVATIVE FREE 10 ML: 5 INJECTION INTRAVENOUS at 21:15

## 2022-07-04 RX ADMIN — HYDROMORPHONE HYDROCHLORIDE 1 MG: 1 INJECTION, SOLUTION INTRAMUSCULAR; INTRAVENOUS; SUBCUTANEOUS at 20:09

## 2022-07-04 RX ADMIN — AMPICILLIN SODIUM AND SULBACTAM SODIUM 3 G: 2; 1 INJECTION, POWDER, FOR SOLUTION INTRAMUSCULAR; INTRAVENOUS at 11:30

## 2022-07-04 RX ADMIN — HYDROMORPHONE HYDROCHLORIDE 1 MG: 1 INJECTION, SOLUTION INTRAMUSCULAR; INTRAVENOUS; SUBCUTANEOUS at 04:15

## 2022-07-04 RX ADMIN — HEPARIN SODIUM 2000 UNITS: 1000 INJECTION INTRAVENOUS; SUBCUTANEOUS at 05:01

## 2022-07-04 RX ADMIN — HYDROMORPHONE HYDROCHLORIDE 1 MG: 1 INJECTION, SOLUTION INTRAMUSCULAR; INTRAVENOUS; SUBCUTANEOUS at 08:14

## 2022-07-04 RX ADMIN — OXYCODONE AND ACETAMINOPHEN 1 TABLET: 5; 325 TABLET ORAL at 17:30

## 2022-07-04 RX ADMIN — SODIUM CHLORIDE 75 ML/HR: 9 INJECTION, SOLUTION INTRAVENOUS at 00:12

## 2022-07-04 RX ADMIN — LISINOPRIL 10 MG: 10 TABLET ORAL at 08:10

## 2022-07-04 RX ADMIN — HYDROMORPHONE HYDROCHLORIDE 1 MG: 1 INJECTION, SOLUTION INTRAMUSCULAR; INTRAVENOUS; SUBCUTANEOUS at 12:23

## 2022-07-04 RX ADMIN — HEPARIN SODIUM 2000 UNITS: 1000 INJECTION INTRAVENOUS; SUBCUTANEOUS at 09:24

## 2022-07-04 RX ADMIN — SODIUM CHLORIDE, PRESERVATIVE FREE 10 ML: 5 INJECTION INTRAVENOUS at 06:12

## 2022-07-04 RX ADMIN — METFORMIN HYDROCHLORIDE 500 MG: 500 TABLET ORAL at 08:10

## 2022-07-04 RX ADMIN — HEPARIN SODIUM 2000 UNITS: 1000 INJECTION INTRAVENOUS; SUBCUTANEOUS at 15:23

## 2022-07-04 RX ADMIN — AMPICILLIN SODIUM AND SULBACTAM SODIUM 3 G: 2; 1 INJECTION, POWDER, FOR SOLUTION INTRAMUSCULAR; INTRAVENOUS at 17:23

## 2022-07-04 RX ADMIN — COLCHICINE 0.6 MG: 0.6 TABLET, FILM COATED ORAL at 08:10

## 2022-07-04 RX ADMIN — HEPARIN SODIUM AND DEXTROSE 20 UNITS/KG/HR: 10000; 5 INJECTION INTRAVENOUS at 04:55

## 2022-07-04 RX ADMIN — AMLODIPINE BESYLATE 5 MG: 5 TABLET ORAL at 08:10

## 2022-07-04 RX ADMIN — POTASSIUM CHLORIDE 40 MEQ: 750 TABLET, FILM COATED, EXTENDED RELEASE ORAL at 11:30

## 2022-07-04 NOTE — OP NOTES
This is operative report on [facility], patient's YOB: 1960. Date of procedure: July 3, 2022. Surgeon: Dr. Leah Melendez diagnosis:  Right foot ischemic changes. Postprocedure diagnosis:  1. 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 2.  Right below-knee runoff with significantly diseased right posterior tibial artery and anterior tibial artery. Procedure:  1. Left common femoral access using ultrasound guidance with a micropuncture kit. 2.  Left common femoral artery access sheath placement using 6 [place] sheath followed by 7 [place] 45 cm destination sheath placement. 3.  Bilateral iliac angiogram supervision and interpretation. 4.  Right common femoral artery first-order angiogram supervision and interpretation. 5.  Right popliteal artery second-order angiogram supervision and interpretation. 6. right superficial femoral artery angioplasty using directional atherectomy with a ClickFoxk 1 LX device. 6.  Right superficial femoral artery long segment balloon PTA angioplasty using various sizes including predilation with a 4 mm balloon, followed by 5 x 150 mm chocolate balloon, 6 x 250 mm balloon, 6 x 120 mm chocolate balloon. 7.  Right superficial femoral artery long segment angioplasty with bare-metal stent placement using ever flex, 6 x 120, 6 x 150, and 6 x 40 mm stent. 8.  Closure of the left common femoral access using Vascade closure system. Anesthesia: General with LMA. Anesthesiologist: Dr. Geovanna Adorno  Assistant: Mr. Taryn Dolan  EBL: 75 cc   Complication: None  Implants as described above. Specimen: None  Condition: Stable    Indication for surgery: Mr. Barrington Patricia currently hospitalized with significant right foot infection with pain. Right great toe has ischemic changes. ANTIONETTE shows a severe peripheral artery disease. Patient was discussed angiographic examination on the right leg with intervention.   Patient was discussed rational for the procedure risks benefits complication. Patient has signed the procedure consent. Procedure: Patient was brought to operating room and comfortable supine position. Followed by left groin was prepped usual sterile technique using DuraPrep's after patient was placed on general anesthesia. Followed by sterile drapes of procedure patient. At this time timeout was called after confirmation was made procedure commenced. Left common femoral artery was accessed using ultrasound guidance with micropuncture kit. Followed by using micropuncture kit sheath, 0.035 angled Glidewire was advanced to the aorta. Followed by rim catheter was advanced aorta after the sheath was placed. Followed by bilateral leg angiograms obtained. Iliac angiogram shows widely patent iliac vessels bilaterally, hypogastrics patent as well. Right iliac artery was accessed and cannulated using 0.035 angled Glidewire and [place] cross catheter. First-order angiogram was obtained the right common femoral artery. Patient has profundofemoral artery patent however superficial femoral artery was a diffusely occluded from bifurcation to adductor canal.  There is reconstitution noted proximal popliteal artery. Followed by stiff wire was exchanged and placed on the profundofemoral artery and a 7 Syrian destination sheath was placed on the proximal right common femoral artery. Followed by using Abdullahi Mantle cross catheter supporting 0.035 angled glide.,  I was able to cannulate superficial femoral artery to the popliteal artery without difficulty. Followed by [place] catheter was advanced to the popliteal artery, second order angiogram was obtained. Popliteal artery angiogram shows mid to distal L patent however below-knee patient has a significant diseased runoff through the anterior tibial artery and posterior tibial artery. It appears peroneal artery was occluded.   So the plan at this time was intervene superficial femoral artery chronic total occlusion. Using [place] cross catheter placed on the popliteal artery, 6 mm spider Fx filter was deployed at the distal popliteal artery. Followed by [facility] 1 LX device was advanced to the superficial femoral artery and atherectomy was performed. Post atherectomy did not show recanalization of the superficial femoral artery. Followed by multiple balloons were used to dilate superficial femoral artery including initially 4 mm balloon, followed by 5 x 150 mm drug-coated balloon, followed by 6 x 120 mm balloon multiple segments from proximal to distal superficial femoral artery. Angiogram shows partial recanalization noted throughout the superficial femoral artery. Significant plaque noted throughout the superficial femoral artery. Therefore I decided to use multiple stents. I used multiple bare-metal stents including 6 x 150 mm, 6 x 120 mm and 6 x 40 mm ever flex stent overlapping. Final angiogram shows superficial femoral artery recanalized through the stent and distally to the popliteal artery. Contrast flow to the popliteal level was rather sluggish however contrast was visualized to below knee vessel. At this time working sheath was removed under fluoroscopic imaging guidance and the left common femoral access was closed with a Vascade closure system. After hemostasis obtained sterile dressing was applied. Post procedure Doppler interrogation shows monophasic signal noted on distal anterior tibial artery and posterior tibial artery. Patient was extubated in the OR transferred recovery in stable condition. Intraoperative angiographic findings, supervision and interpretation:  Described as above.

## 2022-07-04 NOTE — PROGRESS NOTES
PROGRESS NOTE      Chief Complaints:  Patient examined this morning. HPI and  Objective:    Patient says right foot feels much better. No fever. Denies any chest pain shortness of breath denies abdominal pain nausea. Review of Systems:  Rest of review of system negative, personally  EXAM:  Visit Vitals  /71 (BP 1 Location: Left upper arm, BP Patient Position: At rest)   Pulse 62   Temp 98 °F (36.7 °C)   Resp 17   Ht 6' 1\" (1.854 m)   Wt 255 lb (115.7 kg)   SpO2 97%   BMI 33.64 kg/m²       Patient is awake. And alert  Head and neck atraumatic, normocephalic. ENT: No hoarse voice  Cardiac system regular rate rhythm. Pulmonary no audible wheeze  Chest wall excursion normal with respiration cycle  Abdomen is soft not particularly distended. Neurologically nonfocal.  Skin is warm and moist.  Psychosocial: Cooperative. Vascular examination as previously noted no changes. Recent Results (from the past 24 hour(s))   GLUCOSE, POC    Collection Time: 07/03/22 10:45 AM   Result Value Ref Range    Glucose (POC) 118 (H) 65 - 117 mg/dL    Performed by Mary Bare    PTT    Collection Time: 07/03/22 11:00 AM   Result Value Ref Range    aPTT 46.9 (H) 21.2 - 34.1 sec    aPTT, therapeutic range   82 - 109 sec   CBC WITH AUTOMATED DIFF    Collection Time: 07/03/22 11:00 AM   Result Value Ref Range    WBC 12.0 (H) 4.1 - 11.1 K/uL    RBC 4.06 (L) 4.10 - 5.70 M/uL    HGB 12.3 12.1 - 17.0 g/dL    HCT 36.4 (L) 36.6 - 50.3 %    MCV 89.7 80.0 - 99.0 FL    MCH 30.3 26.0 - 34.0 PG    MCHC 33.8 30.0 - 36.5 g/dL    RDW 12.7 11.5 - 14.5 %    PLATELET 412 104 - 047 K/uL    MPV 9.6 8.9 - 12.9 FL    NRBC 0.0 0.0  WBC    ABSOLUTE NRBC 0.00 0.00 - 0.01 K/uL    NEUTROPHILS 75 32 - 75 %    LYMPHOCYTES 14 12 - 49 %    MONOCYTES 7 5 - 13 %    EOSINOPHILS 2 0 - 7 %    BASOPHILS 1 0 - 1 %    IMMATURE GRANULOCYTES 1 (H) 0 - 0.5 %    ABS. NEUTROPHILS 8.9 (H) 1.8 - 8.0 K/UL    ABS. LYMPHOCYTES 1.7 0.8 - 3.5 K/UL    ABS.  MONOCYTES 0.9 0.0 - 1.0 K/UL    ABS. EOSINOPHILS 0.3 0.0 - 0.4 K/UL    ABS. BASOPHILS 0.1 0.0 - 0.1 K/UL    ABS. IMM. GRANS. 0.1 (H) 0.00 - 0.04 K/UL    DF AUTOMATED     METABOLIC PANEL, COMPREHENSIVE    Collection Time: 07/03/22 11:00 AM   Result Value Ref Range    Sodium 137 136 - 145 mmol/L    Potassium 4.1 3.5 - 5.1 mmol/L    Chloride 104 97 - 108 mmol/L    CO2 27 21 - 32 mmol/L    Anion gap 6 5 - 15 mmol/L    Glucose 122 (H) 65 - 100 mg/dL    BUN 10 6 - 20 mg/dL    Creatinine 0.69 (L) 0.70 - 1.30 mg/dL    BUN/Creatinine ratio 14 12 - 20      GFR est AA >60 >60 ml/min/1.73m2    GFR est non-AA >60 >60 ml/min/1.73m2    Calcium 8.4 (L) 8.5 - 10.1 mg/dL    Bilirubin, total 0.5 0.2 - 1.0 mg/dL    AST (SGOT) 18 15 - 37 U/L    ALT (SGPT) 24 12 - 78 U/L    Alk. phosphatase 82 45 - 117 U/L    Protein, total 6.4 6.4 - 8.2 g/dL    Albumin 2.7 (L) 3.5 - 5.0 g/dL    Globulin 3.7 2.0 - 4.0 g/dL    A-G Ratio 0.7 (L) 1.1 - 2.2     METABOLIC PANEL, COMPREHENSIVE    Collection Time: 07/03/22 12:01 PM   Result Value Ref Range    Sodium 138 136 - 145 mmol/L    Potassium 4.4 3.5 - 5.1 mmol/L    Chloride 105 97 - 108 mmol/L    CO2 27 21 - 32 mmol/L    Anion gap 6 5 - 15 mmol/L    Glucose 130 (H) 65 - 100 mg/dL    BUN 10 6 - 20 mg/dL    Creatinine 0.59 (L) 0.70 - 1.30 mg/dL    BUN/Creatinine ratio 17 12 - 20      GFR est AA >60 >60 ml/min/1.73m2    GFR est non-AA >60 >60 ml/min/1.73m2    Calcium 8.8 8.5 - 10.1 mg/dL    Bilirubin, total 0.6 0.2 - 1.0 mg/dL    AST (SGOT) 22 15 - 37 U/L    ALT (SGPT) 25 12 - 78 U/L    Alk. phosphatase 88 45 - 117 U/L    Protein, total 6.3 (L) 6.4 - 8.2 g/dL    Albumin 3.0 (L) 3.5 - 5.0 g/dL    Globulin 3.3 2.0 - 4.0 g/dL    A-G Ratio 0.9 (L) 1.1 - 2.2     CBC WITH AUTOMATED DIFF    Collection Time: 07/03/22 12:01 PM   Result Value Ref Range    WBC 11.5 (H) 4.1 - 11.1 K/uL    RBC 4.20 4. 10 - 5.70 M/uL    HGB 12.7 12.1 - 17.0 g/dL    HCT 37.4 36.6 - 50.3 %    MCV 89.0 80.0 - 99.0 FL    MCH 30.2 26.0 - 34.0 PG MCHC 34.0 30.0 - 36.5 g/dL    RDW 12.6 11.5 - 14.5 %    PLATELET 816 033 - 962 K/uL    MPV 10.0 8.9 - 12.9 FL    NRBC 0.0 0.0  WBC    ABSOLUTE NRBC 0.00 0.00 - 0.01 K/uL    NEUTROPHILS 87 (H) 32 - 75 %    LYMPHOCYTES 7 (L) 12 - 49 %    MONOCYTES 4 (L) 5 - 13 %    EOSINOPHILS 1 0 - 7 %    BASOPHILS 1 0 - 1 %    IMMATURE GRANULOCYTES 0 0 - 0.5 %    ABS. NEUTROPHILS 9.9 (H) 1.8 - 8.0 K/UL    ABS. LYMPHOCYTES 0.9 0.8 - 3.5 K/UL    ABS. MONOCYTES 0.5 0.0 - 1.0 K/UL    ABS. EOSINOPHILS 0.1 0.0 - 0.4 K/UL    ABS. BASOPHILS 0.1 0.0 - 0.1 K/UL    ABS. IMM.  GRANS. 0.0 0.00 - 0.04 K/UL    DF AUTOMATED     PTT    Collection Time: 07/03/22 12:01 PM   Result Value Ref Range    aPTT 29.9 21.2 - 34.1 sec    aPTT, therapeutic range   82 - 109 sec   GLUCOSE, POC    Collection Time: 07/03/22 12:34 PM   Result Value Ref Range    Glucose (POC) 128 (H) 65 - 117 mg/dL    Performed by Christopher Weaver    GLUCOSE, POC    Collection Time: 07/03/22  3:50 PM   Result Value Ref Range    Glucose (POC) 236 (H) 65 - 117 mg/dL    Performed by Christopher Collinss    GLUCOSE, POC    Collection Time: 07/03/22  8:13 PM   Result Value Ref Range    Glucose (POC) 150 (H) 65 - 117 mg/dL    Performed by Shay Barba    PTT    Collection Time: 07/03/22  8:39 PM   Result Value Ref Range    aPTT 31.9 21.2 - 34.1 sec    aPTT, therapeutic range   82 - 109 sec   GLUCOSE, POC    Collection Time: 07/03/22  9:07 PM   Result Value Ref Range    Glucose (POC) 138 (H) 65 - 117 mg/dL    Performed by Monica Barney    CBC W/O DIFF    Collection Time: 07/04/22  4:33 AM   Result Value Ref Range    WBC 12.9 (H) 4.1 - 11.1 K/uL    RBC 3.86 (L) 4.10 - 5.70 M/uL    HGB 11.7 (L) 12.1 - 17.0 g/dL    HCT 34.6 (L) 36.6 - 50.3 %    MCV 89.6 80.0 - 99.0 FL    MCH 30.3 26.0 - 34.0 PG    MCHC 33.8 30.0 - 36.5 g/dL    RDW 12.7 11.5 - 14.5 %    PLATELET 448 483 - 662 K/uL    MPV 9.7 8.9 - 12.9 FL    NRBC 0.0 0.0  WBC    ABSOLUTE NRBC 0.00 0.00 - 3.40 K/uL   METABOLIC PANEL, COMPREHENSIVE    Collection Time: 07/04/22  4:33 AM   Result Value Ref Range    Sodium 140 136 - 145 mmol/L    Potassium 3.3 (L) 3.5 - 5.1 mmol/L    Chloride 109 (H) 97 - 108 mmol/L    CO2 25 21 - 32 mmol/L    Anion gap 6 5 - 15 mmol/L    Glucose 108 (H) 65 - 100 mg/dL    BUN 8 6 - 20 mg/dL    Creatinine 0.54 (L) 0.70 - 1.30 mg/dL    BUN/Creatinine ratio 15 12 - 20      GFR est AA >60 >60 ml/min/1.73m2    GFR est non-AA >60 >60 ml/min/1.73m2    Calcium 7.6 (L) 8.5 - 10.1 mg/dL    Bilirubin, total 0.4 0.2 - 1.0 mg/dL    AST (SGOT) 11 (L) 15 - 37 U/L    ALT (SGPT) 22 12 - 78 U/L    Alk. phosphatase 73 45 - 117 U/L    Protein, total 6.1 (L) 6.4 - 8.2 g/dL    Albumin 2.6 (L) 3.5 - 5.0 g/dL    Globulin 3.5 2.0 - 4.0 g/dL    A-G Ratio 0.7 (L) 1.1 - 2.2     PTT    Collection Time: 07/04/22  4:33 AM   Result Value Ref Range    aPTT 48.0 (H) 21.2 - 34.1 sec    aPTT, therapeutic range   82 - 109 sec   GLUCOSE, POC    Collection Time: 07/04/22  7:46 AM   Result Value Ref Range    Glucose (POC) 134 (H) 65 - 117 mg/dL    Performed by Jeremie Sloan        ASSESSMENT:   Patient is 65 y.o. with diagnosis of : Active Problems:    Toe infection (6/30/2022)      Osteomyelitis ([address] (6/30/2022)        PLAN:                 Patient now has Doppler signal noted both DP and PT. Foot is relatively warm. Discolored toes on the great toe on the right foot is about the same. Swelling is moderate. Cellulitis is somewhat better. Continue to keep the right foot elevated with a 3 pillows to minimize swelling. Continue heparin drip for now. Once the cellulitis is better and swelling is improved then patient can go home with a higher dose of Eliquis and Plavix. Patient has 3 stents on the right superficial femoral artery. Continue current IV antibiotic.

## 2022-07-04 NOTE — PROGRESS NOTES
PROGRESS NOTE      Chief Complaints:  Patient still complaining significant pain on the right foot  HPI and  Objective:    Patient denies chest pain shortness of breath. Denies any fever chills. Pain is a 6 out of 10. Patient has been elevating his right foot. Review of Systems:  Rest of review of system negative, personally reviewed. EXAM:  Visit Vitals  /74 (BP 1 Location: Left upper arm, BP Patient Position: At rest)   Pulse 65   Temp 98 °F (36.7 °C)   Resp 18   Ht 6' 1\" (1.854 m)   Wt 255 lb (115.7 kg)   SpO2 96%   BMI 33.64 kg/m²       Patient is awake and alert. Head and neck atraumatic, normocephalic. ENT: No hoarse voice  Cardiac system regular rate rhythm. Pulmonary no audible wheeze  Chest wall excursion normal with respiration cycle  Abdomen is soft not particularly distended. Neurologically nonfocal.  Skin is warm and moist.  Psychosocial: Cooperative. Vascular examination as previously noted no changes. Recent Results (from the past 24 hour(s))   GLUCOSE, POC    Collection Time: 07/03/22 10:45 AM   Result Value Ref Range    Glucose (POC) 118 (H) 65 - 117 mg/dL    Performed by Sharyn Harry    PTT    Collection Time: 07/03/22 11:00 AM   Result Value Ref Range    aPTT 46.9 (H) 21.2 - 34.1 sec    aPTT, therapeutic range   82 - 109 sec   CBC WITH AUTOMATED DIFF    Collection Time: 07/03/22 11:00 AM   Result Value Ref Range    WBC 12.0 (H) 4.1 - 11.1 K/uL    RBC 4.06 (L) 4.10 - 5.70 M/uL    HGB 12.3 12.1 - 17.0 g/dL    HCT 36.4 (L) 36.6 - 50.3 %    MCV 89.7 80.0 - 99.0 FL    MCH 30.3 26.0 - 34.0 PG    MCHC 33.8 30.0 - 36.5 g/dL    RDW 12.7 11.5 - 14.5 %    PLATELET 997 885 - 836 K/uL    MPV 9.6 8.9 - 12.9 FL    NRBC 0.0 0.0  WBC    ABSOLUTE NRBC 0.00 0.00 - 0.01 K/uL    NEUTROPHILS 75 32 - 75 %    LYMPHOCYTES 14 12 - 49 %    MONOCYTES 7 5 - 13 %    EOSINOPHILS 2 0 - 7 %    BASOPHILS 1 0 - 1 %    IMMATURE GRANULOCYTES 1 (H) 0 - 0.5 %    ABS.  NEUTROPHILS 8.9 (H) 1.8 - 8.0 K/UL ABS. LYMPHOCYTES 1.7 0.8 - 3.5 K/UL    ABS. MONOCYTES 0.9 0.0 - 1.0 K/UL    ABS. EOSINOPHILS 0.3 0.0 - 0.4 K/UL    ABS. BASOPHILS 0.1 0.0 - 0.1 K/UL    ABS. IMM. GRANS. 0.1 (H) 0.00 - 0.04 K/UL    DF AUTOMATED     METABOLIC PANEL, COMPREHENSIVE    Collection Time: 07/03/22 11:00 AM   Result Value Ref Range    Sodium 137 136 - 145 mmol/L    Potassium 4.1 3.5 - 5.1 mmol/L    Chloride 104 97 - 108 mmol/L    CO2 27 21 - 32 mmol/L    Anion gap 6 5 - 15 mmol/L    Glucose 122 (H) 65 - 100 mg/dL    BUN 10 6 - 20 mg/dL    Creatinine 0.69 (L) 0.70 - 1.30 mg/dL    BUN/Creatinine ratio 14 12 - 20      GFR est AA >60 >60 ml/min/1.73m2    GFR est non-AA >60 >60 ml/min/1.73m2    Calcium 8.4 (L) 8.5 - 10.1 mg/dL    Bilirubin, total 0.5 0.2 - 1.0 mg/dL    AST (SGOT) 18 15 - 37 U/L    ALT (SGPT) 24 12 - 78 U/L    Alk. phosphatase 82 45 - 117 U/L    Protein, total 6.4 6.4 - 8.2 g/dL    Albumin 2.7 (L) 3.5 - 5.0 g/dL    Globulin 3.7 2.0 - 4.0 g/dL    A-G Ratio 0.7 (L) 1.1 - 2.2     METABOLIC PANEL, COMPREHENSIVE    Collection Time: 07/03/22 12:01 PM   Result Value Ref Range    Sodium 138 136 - 145 mmol/L    Potassium 4.4 3.5 - 5.1 mmol/L    Chloride 105 97 - 108 mmol/L    CO2 27 21 - 32 mmol/L    Anion gap 6 5 - 15 mmol/L    Glucose 130 (H) 65 - 100 mg/dL    BUN 10 6 - 20 mg/dL    Creatinine 0.59 (L) 0.70 - 1.30 mg/dL    BUN/Creatinine ratio 17 12 - 20      GFR est AA >60 >60 ml/min/1.73m2    GFR est non-AA >60 >60 ml/min/1.73m2    Calcium 8.8 8.5 - 10.1 mg/dL    Bilirubin, total 0.6 0.2 - 1.0 mg/dL    AST (SGOT) 22 15 - 37 U/L    ALT (SGPT) 25 12 - 78 U/L    Alk. phosphatase 88 45 - 117 U/L    Protein, total 6.3 (L) 6.4 - 8.2 g/dL    Albumin 3.0 (L) 3.5 - 5.0 g/dL    Globulin 3.3 2.0 - 4.0 g/dL    A-G Ratio 0.9 (L) 1.1 - 2.2     CBC WITH AUTOMATED DIFF    Collection Time: 07/03/22 12:01 PM   Result Value Ref Range    WBC 11.5 (H) 4.1 - 11.1 K/uL    RBC 4.20 4. 10 - 5.70 M/uL    HGB 12.7 12.1 - 17.0 g/dL    HCT 37.4 36.6 - 50.3 %    MCV 89.0 80.0 - 99.0 FL    MCH 30.2 26.0 - 34.0 PG    MCHC 34.0 30.0 - 36.5 g/dL    RDW 12.6 11.5 - 14.5 %    PLATELET 858 281 - 068 K/uL    MPV 10.0 8.9 - 12.9 FL    NRBC 0.0 0.0  WBC    ABSOLUTE NRBC 0.00 0.00 - 0.01 K/uL    NEUTROPHILS 87 (H) 32 - 75 %    LYMPHOCYTES 7 (L) 12 - 49 %    MONOCYTES 4 (L) 5 - 13 %    EOSINOPHILS 1 0 - 7 %    BASOPHILS 1 0 - 1 %    IMMATURE GRANULOCYTES 0 0 - 0.5 %    ABS. NEUTROPHILS 9.9 (H) 1.8 - 8.0 K/UL    ABS. LYMPHOCYTES 0.9 0.8 - 3.5 K/UL    ABS. MONOCYTES 0.5 0.0 - 1.0 K/UL    ABS. EOSINOPHILS 0.1 0.0 - 0.4 K/UL    ABS. BASOPHILS 0.1 0.0 - 0.1 K/UL    ABS. IMM. GRANS. 0.0 0.00 - 0.04 K/UL    DF AUTOMATED     PTT    Collection Time: 07/03/22 12:01 PM   Result Value Ref Range    aPTT 29.9 21.2 - 34.1 sec    aPTT, therapeutic range   82 - 109 sec   GLUCOSE, POC    Collection Time: 07/03/22 12:34 PM   Result Value Ref Range    Glucose (POC) 128 (H) 65 - 117 mg/dL    Performed by Adri Hernández, POC    Collection Time: 07/03/22  3:50 PM   Result Value Ref Range    Glucose (POC) 236 (H) 65 - 117 mg/dL    Performed by Adri Hernández, POC    Collection Time: 07/03/22  8:13 PM   Result Value Ref Range    Glucose (POC) 150 (H) 65 - 117 mg/dL    Performed by Lorelle Schlatter    PTT    Collection Time: 07/03/22  8:39 PM   Result Value Ref Range    aPTT 31.9 21.2 - 34.1 sec    aPTT, therapeutic range   82 - 109 sec   GLUCOSE, POC    Collection Time: 07/03/22  9:07 PM   Result Value Ref Range    Glucose (POC) 138 (H) 65 - 117 mg/dL    Performed by Nolberto De Leon        ASSESSMENT:   Patient is 65 y.o. with diagnosis of : Active Problems:    Toe infection (6/30/2022)      Osteomyelitis ([address] (6/30/2022)        PLAN:                 Patient scheduled for right leg angiogram today. Patient has been Heparin on hold since 6:00 this morning. Patient was explained rationale for the procedure risks benefits complication.

## 2022-07-04 NOTE — PROGRESS NOTES
Problem: Falls - Risk of  Goal: *Absence of Falls  Description: Document Alyson Don Fall Risk and appropriate interventions in the flowsheet. Outcome: Progressing Towards Goal  Note: Fall Risk Interventions:            Medication Interventions: Bed/chair exit alarm,Patient to call before getting OOB,Teach patient to arise slowly    Elimination Interventions: Bed/chair exit alarm,Call light in reach,Patient to call for help with toileting needs,Urinal in reach              Problem: Patient Education: Go to Patient Education Activity  Goal: Patient/Family Education  Outcome: Progressing Towards Goal     Problem: Diabetes Self-Management  Goal: *Disease process and treatment process  Description: Define diabetes and identify own type of diabetes; list 3 options for treating diabetes. Outcome: Progressing Towards Goal  Goal: *Incorporating nutritional management into lifestyle  Description: Describe effect of type, amount and timing of food on blood glucose; list 3 methods for planning meals. Outcome: Progressing Towards Goal  Goal: *Incorporating physical activity into lifestyle  Description: State effect of exercise on blood glucose levels. Outcome: Progressing Towards Goal  Goal: *Developing strategies to promote health/change behavior  Description: Define the ABC's of diabetes; identify appropriate screenings, schedule and personal plan for screenings. Outcome: Progressing Towards Goal  Goal: *Using medications safely  Description: State effect of diabetes medications on diabetes; name diabetes medication taking, action and side effects. Outcome: Progressing Towards Goal  Goal: *Monitoring blood glucose, interpreting and using results  Description: Identify recommended blood glucose targets  and personal targets.   Outcome: Progressing Towards Goal  Goal: *Prevention, detection, treatment of acute complications  Description: List symptoms of hyper- and hypoglycemia; describe how to treat low blood sugar and actions for lowering  high blood glucose level. Outcome: Progressing Towards Goal  Goal: *Prevention, detection and treatment of chronic complications  Description: Define the natural course of diabetes and describe the relationship of blood glucose levels to long term complications of diabetes. Outcome: Progressing Towards Goal  Goal: *Developing strategies to address psychosocial issues  Description: Describe feelings about living with diabetes; identify support needed and support network  Outcome: Progressing Towards Goal  Goal: *Insulin pump training  Outcome: Progressing Towards Goal  Goal: *Sick day guidelines  Outcome: Progressing Towards Goal  Goal: *Patient Specific Goal (EDIT GOAL, INSERT TEXT)  Outcome: Progressing Towards Goal     Problem: Patient Education: Go to Patient Education Activity  Goal: Patient/Family Education  Outcome: Progressing Towards Goal     Problem: Pressure Injury - Risk of  Goal: *Prevention of pressure injury  Description: Document Phil Scale and appropriate interventions in the flowsheet.   Outcome: Progressing Towards Goal  Note: Pressure Injury Interventions:       Moisture Interventions: Absorbent underpads,Minimize layers    Activity Interventions: Assess need for specialty bed    Mobility Interventions: Assess need for specialty bed,HOB 30 degrees or less    Nutrition Interventions: Document food/fluid/supplement intake                     Problem: Patient Education: Go to Patient Education Activity  Goal: Patient/Family Education  Outcome: Progressing Towards Goal

## 2022-07-04 NOTE — PROGRESS NOTES
General Daily Progress Note          Patient Name:   Andrés Morocho       YOB: 1960       Age:  65 y.o. Admit Date: 6/30/2022      Subjective:       Andrés Morocho is a 65 y.o. male with a significant PMH of HTN, lifelong 1.5ppd smoker, and Diabetes  RA and Adrianna Kind presents to the ED today with a R great toe wound. Started 2 weeks ago treated with oral antibiotics the patient states that the wound appeared 5 days ago when it \"burst\" from swelling with light yellow discharge. He has taken Rocefin and Clindamycin for the wound with subjective improvement. The wound is not acutely painful but he has a small amount of pain in the MTP joint. Patient denies other symptoms associated with the wound. Patient also treated for gout        Patient alert awake pain is much better control      Postprocedure diagnosis:  1. 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 2.  Right below-knee runoff with significantly diseased right posterior tibial artery and anterior tibial artery. s/p    1. Left common femoral access using ultrasound guidance with a micropuncture kit. 2.  Left common femoral artery access sheath placement using 6 Wolof [place] sheath followed by 7 Wolof 45 cm destination sheath placement. 3.  Bilateral iliac angiogram supervision and interpretation. 4.  Right common femoral artery first-order angiogram supervision and interpretation. 5.  Right popliteal artery second-order angiogram supervision and interpretation. 6. right superficial femoral artery angioplasty using directional atherectomy with a Hawk 1 LX device. 6.  Right superficial femoral artery long segment balloon PTA angioplasty using various sizes including predilation with a 4 mm balloon, followed by 5 x 150 mm chocolate balloon, 6 x 250 mm balloon, 6 x 120 mm chocolate balloon.   7.  Right superficial femoral artery long segment angioplasty with bare-metal stent placement using ever flex, 6 x 120, 6 x 150, and 6 x 40 mm stent. 8.  Closure of the left common femoral access using Vascade closure system. Objective:     Visit Vitals  /71 (BP 1 Location: Left upper arm, BP Patient Position: At rest)   Pulse 62   Temp 98 °F (36.7 °C)   Resp 17   Ht 6' 1\" (1.854 m)   Wt 115.7 kg (255 lb)   SpO2 97%   BMI 33.64 kg/m²        Recent Results (from the past 24 hour(s))   GLUCOSE, POC    Collection Time: 07/03/22 10:45 AM   Result Value Ref Range    Glucose (POC) 118 (H) 65 - 117 mg/dL    Performed by Kristen Henderson    PTT    Collection Time: 07/03/22 11:00 AM   Result Value Ref Range    aPTT 46.9 (H) 21.2 - 34.1 sec    aPTT, therapeutic range   82 - 109 sec   CBC WITH AUTOMATED DIFF    Collection Time: 07/03/22 11:00 AM   Result Value Ref Range    WBC 12.0 (H) 4.1 - 11.1 K/uL    RBC 4.06 (L) 4.10 - 5.70 M/uL    HGB 12.3 12.1 - 17.0 g/dL    HCT 36.4 (L) 36.6 - 50.3 %    MCV 89.7 80.0 - 99.0 FL    MCH 30.3 26.0 - 34.0 PG    MCHC 33.8 30.0 - 36.5 g/dL    RDW 12.7 11.5 - 14.5 %    PLATELET 710 909 - 043 K/uL    MPV 9.6 8.9 - 12.9 FL    NRBC 0.0 0.0  WBC    ABSOLUTE NRBC 0.00 0.00 - 0.01 K/uL    NEUTROPHILS 75 32 - 75 %    LYMPHOCYTES 14 12 - 49 %    MONOCYTES 7 5 - 13 %    EOSINOPHILS 2 0 - 7 %    BASOPHILS 1 0 - 1 %    IMMATURE GRANULOCYTES 1 (H) 0 - 0.5 %    ABS. NEUTROPHILS 8.9 (H) 1.8 - 8.0 K/UL    ABS. LYMPHOCYTES 1.7 0.8 - 3.5 K/UL    ABS. MONOCYTES 0.9 0.0 - 1.0 K/UL    ABS. EOSINOPHILS 0.3 0.0 - 0.4 K/UL    ABS. BASOPHILS 0.1 0.0 - 0.1 K/UL    ABS. IMM.  GRANS. 0.1 (H) 0.00 - 0.04 K/UL    DF AUTOMATED     METABOLIC PANEL, COMPREHENSIVE    Collection Time: 07/03/22 11:00 AM   Result Value Ref Range    Sodium 137 136 - 145 mmol/L    Potassium 4.1 3.5 - 5.1 mmol/L    Chloride 104 97 - 108 mmol/L    CO2 27 21 - 32 mmol/L    Anion gap 6 5 - 15 mmol/L    Glucose 122 (H) 65 - 100 mg/dL    BUN 10 6 - 20 mg/dL    Creatinine 0.69 (L) 0.70 - 1.30 mg/dL    BUN/Creatinine ratio 14 12 - 20      GFR est AA >60 >60 ml/min/1.73m2    GFR est non-AA >60 >60 ml/min/1.73m2    Calcium 8.4 (L) 8.5 - 10.1 mg/dL    Bilirubin, total 0.5 0.2 - 1.0 mg/dL    AST (SGOT) 18 15 - 37 U/L    ALT (SGPT) 24 12 - 78 U/L    Alk. phosphatase 82 45 - 117 U/L    Protein, total 6.4 6.4 - 8.2 g/dL    Albumin 2.7 (L) 3.5 - 5.0 g/dL    Globulin 3.7 2.0 - 4.0 g/dL    A-G Ratio 0.7 (L) 1.1 - 2.2     METABOLIC PANEL, COMPREHENSIVE    Collection Time: 07/03/22 12:01 PM   Result Value Ref Range    Sodium 138 136 - 145 mmol/L    Potassium 4.4 3.5 - 5.1 mmol/L    Chloride 105 97 - 108 mmol/L    CO2 27 21 - 32 mmol/L    Anion gap 6 5 - 15 mmol/L    Glucose 130 (H) 65 - 100 mg/dL    BUN 10 6 - 20 mg/dL    Creatinine 0.59 (L) 0.70 - 1.30 mg/dL    BUN/Creatinine ratio 17 12 - 20      GFR est AA >60 >60 ml/min/1.73m2    GFR est non-AA >60 >60 ml/min/1.73m2    Calcium 8.8 8.5 - 10.1 mg/dL    Bilirubin, total 0.6 0.2 - 1.0 mg/dL    AST (SGOT) 22 15 - 37 U/L    ALT (SGPT) 25 12 - 78 U/L    Alk. phosphatase 88 45 - 117 U/L    Protein, total 6.3 (L) 6.4 - 8.2 g/dL    Albumin 3.0 (L) 3.5 - 5.0 g/dL    Globulin 3.3 2.0 - 4.0 g/dL    A-G Ratio 0.9 (L) 1.1 - 2.2     CBC WITH AUTOMATED DIFF    Collection Time: 07/03/22 12:01 PM   Result Value Ref Range    WBC 11.5 (H) 4.1 - 11.1 K/uL    RBC 4.20 4. 10 - 5.70 M/uL    HGB 12.7 12.1 - 17.0 g/dL    HCT 37.4 36.6 - 50.3 %    MCV 89.0 80.0 - 99.0 FL    MCH 30.2 26.0 - 34.0 PG    MCHC 34.0 30.0 - 36.5 g/dL    RDW 12.6 11.5 - 14.5 %    PLATELET 221 675 - 935 K/uL    MPV 10.0 8.9 - 12.9 FL    NRBC 0.0 0.0  WBC    ABSOLUTE NRBC 0.00 0.00 - 0.01 K/uL    NEUTROPHILS 87 (H) 32 - 75 %    LYMPHOCYTES 7 (L) 12 - 49 %    MONOCYTES 4 (L) 5 - 13 %    EOSINOPHILS 1 0 - 7 %    BASOPHILS 1 0 - 1 %    IMMATURE GRANULOCYTES 0 0 - 0.5 %    ABS. NEUTROPHILS 9.9 (H) 1.8 - 8.0 K/UL    ABS. LYMPHOCYTES 0.9 0.8 - 3.5 K/UL    ABS. MONOCYTES 0.5 0.0 - 1.0 K/UL    ABS. EOSINOPHILS 0.1 0.0 - 0.4 K/UL    ABS.  BASOPHILS 0.1 0.0 - 0.1 K/UL ABS. IMM.  GRANS. 0.0 0.00 - 0.04 K/UL    DF AUTOMATED     PTT    Collection Time: 07/03/22 12:01 PM   Result Value Ref Range    aPTT 29.9 21.2 - 34.1 sec    aPTT, therapeutic range   82 - 109 sec   GLUCOSE, POC    Collection Time: 07/03/22 12:34 PM   Result Value Ref Range    Glucose (POC) 128 (H) 65 - 117 mg/dL    Performed by [place]    GLUCOSE, POC    Collection Time: 07/03/22  3:50 PM   Result Value Ref Range    Glucose (POC) 236 (H) 65 - 117 mg/dL    Performed by [place]    GLUCOSE, POC    Collection Time: 07/03/22  8:13 PM   Result Value Ref Range    Glucose (POC) 150 (H) 65 - 117 mg/dL    Performed by Fabi Lombardi    PTT    Collection Time: 07/03/22  8:39 PM   Result Value Ref Range    aPTT 31.9 21.2 - 34.1 sec    aPTT, therapeutic range   82 - 109 sec   GLUCOSE, POC    Collection Time: 07/03/22  9:07 PM   Result Value Ref Range    Glucose (POC) 138 (H) 65 - 117 mg/dL    Performed by Amada Taylor    CBC W/O DIFF    Collection Time: 07/04/22  4:33 AM   Result Value Ref Range    WBC 12.9 (H) 4.1 - 11.1 K/uL    RBC 3.86 (L) 4.10 - 5.70 M/uL    HGB 11.7 (L) 12.1 - 17.0 g/dL    HCT 34.6 (L) 36.6 - 50.3 %    MCV 89.6 80.0 - 99.0 FL    MCH 30.3 26.0 - 34.0 PG    MCHC 33.8 30.0 - 36.5 g/dL    RDW 12.7 11.5 - 14.5 %    PLATELET 753 961 - 998 K/uL    MPV 9.7 8.9 - 12.9 FL    NRBC 0.0 0.0  WBC    ABSOLUTE NRBC 0.00 0.00 - 7.94 K/uL   METABOLIC PANEL, COMPREHENSIVE    Collection Time: 07/04/22  4:33 AM   Result Value Ref Range    Sodium 140 136 - 145 mmol/L    Potassium 3.3 (L) 3.5 - 5.1 mmol/L    Chloride 109 (H) 97 - 108 mmol/L    CO2 25 21 - 32 mmol/L    Anion gap 6 5 - 15 mmol/L    Glucose 108 (H) 65 - 100 mg/dL    BUN 8 6 - 20 mg/dL    Creatinine 0.54 (L) 0.70 - 1.30 mg/dL    BUN/Creatinine ratio 15 12 - 20      GFR est AA >60 >60 ml/min/1.73m2    GFR est non-AA >60 >60 ml/min/1.73m2    Calcium 7.6 (L) 8.5 - 10.1 mg/dL    Bilirubin, total 0.4 0.2 - 1.0 mg/dL    AST (SGOT) 11 (L) 15 - 37 U/L    ALT (SGPT) 22 12 - 78 U/L    Alk. phosphatase 73 45 - 117 U/L    Protein, total 6.1 (L) 6.4 - 8.2 g/dL    Albumin 2.6 (L) 3.5 - 5.0 g/dL    Globulin 3.5 2.0 - 4.0 g/dL    A-G Ratio 0.7 (L) 1.1 - 2.2     PTT    Collection Time: 07/04/22  4:33 AM   Result Value Ref Range    aPTT 48.0 (H) 21.2 - 34.1 sec    aPTT, therapeutic range   82 - 109 sec   GLUCOSE, POC    Collection Time: 07/04/22  7:46 AM   Result Value Ref Range    Glucose (POC) 134 (H) 65 - 117 mg/dL    Performed by Lilo Molina    PTT    Collection Time: 07/04/22  8:06 AM   Result Value Ref Range    aPTT 45.9 (H) 21.2 - 34.1 sec    aPTT, therapeutic range   82 - 109 sec     [unfilled]      Review of Systems    Constitutional: Negative for chills and fever. HENT: Negative. Eyes: Negative. Respiratory: Negative. Cardiovascular: Negative. Gastrointestinal: Negative for abdominal pain and nausea. Skin: Negative. Neurological: Negative. Physical Exam:      Constitutional: pt is oriented to person, place, and time. HENT:   Head: Normocephalic and atraumatic. Eyes: Pupils are equal, round, and reactive to light. EOM are normal.   Cardiovascular: Normal rate, regular rhythm and normal heart sounds. Pulmonary/Chest: Breath sounds normal. No wheezes. No rales. Exhibits no tenderness. Abdominal: Soft. Bowel sounds are normal. There is no abdominal tenderness. There is no rebound and no guarding. Musculoskeletal: Normal range of motion. Neurological: pt is alert and oriented to person, place, and time. XR FLUOROSCOPY UNDER 60 MINUTES   Final Result   Status post right lower extremity angiogram and stenting. INTERPRETATION PROVIDED FOR COMPLIANCE ONLY AT NO CHARGE        MRI FOOT RT WO CONT   Final Result   1. Marginal erosions are seen surrounding the first MTP joint with a minimal   first MTP joint effusion. Small erosions are seen in the second metatarsal head   and fifth metatarsal head as well.  The overall appearance is most consistent   with gout. Inflammatory and infectious arthritides however would be within the   differential.   2. Significant subcutaneous edema         ANKLE BRACHIAL INDEX   Final Result      XR FOOT RT MIN 3 V   Final Result   Cortical cystic erosive changes involving the distal aspects of the   first and fifth metatarsals and base of the proximal phalanx of the great toe   compatible with chronic degenerative/osteoarthritic changes. Consider gout. Recent Results (from the past 24 hour(s))   GLUCOSE, POC    Collection Time: 07/03/22 10:45 AM   Result Value Ref Range    Glucose (POC) 118 (H) 65 - 117 mg/dL    Performed by Refugio Cranker    PTT    Collection Time: 07/03/22 11:00 AM   Result Value Ref Range    aPTT 46.9 (H) 21.2 - 34.1 sec    aPTT, therapeutic range   82 - 109 sec   CBC WITH AUTOMATED DIFF    Collection Time: 07/03/22 11:00 AM   Result Value Ref Range    WBC 12.0 (H) 4.1 - 11.1 K/uL    RBC 4.06 (L) 4.10 - 5.70 M/uL    HGB 12.3 12.1 - 17.0 g/dL    HCT 36.4 (L) 36.6 - 50.3 %    MCV 89.7 80.0 - 99.0 FL    MCH 30.3 26.0 - 34.0 PG    MCHC 33.8 30.0 - 36.5 g/dL    RDW 12.7 11.5 - 14.5 %    PLATELET 151 130 - 577 K/uL    MPV 9.6 8.9 - 12.9 FL    NRBC 0.0 0.0  WBC    ABSOLUTE NRBC 0.00 0.00 - 0.01 K/uL    NEUTROPHILS 75 32 - 75 %    LYMPHOCYTES 14 12 - 49 %    MONOCYTES 7 5 - 13 %    EOSINOPHILS 2 0 - 7 %    BASOPHILS 1 0 - 1 %    IMMATURE GRANULOCYTES 1 (H) 0 - 0.5 %    ABS. NEUTROPHILS 8.9 (H) 1.8 - 8.0 K/UL    ABS. LYMPHOCYTES 1.7 0.8 - 3.5 K/UL    ABS. MONOCYTES 0.9 0.0 - 1.0 K/UL    ABS. EOSINOPHILS 0.3 0.0 - 0.4 K/UL    ABS. BASOPHILS 0.1 0.0 - 0.1 K/UL    ABS. IMM.  GRANS. 0.1 (H) 0.00 - 0.04 K/UL    DF AUTOMATED     METABOLIC PANEL, COMPREHENSIVE    Collection Time: 07/03/22 11:00 AM   Result Value Ref Range    Sodium 137 136 - 145 mmol/L    Potassium 4.1 3.5 - 5.1 mmol/L    Chloride 104 97 - 108 mmol/L    CO2 27 21 - 32 mmol/L    Anion gap 6 5 - 15 mmol/L Glucose 122 (H) 65 - 100 mg/dL    BUN 10 6 - 20 mg/dL    Creatinine 0.69 (L) 0.70 - 1.30 mg/dL    BUN/Creatinine ratio 14 12 - 20      GFR est AA >60 >60 ml/min/1.73m2    GFR est non-AA >60 >60 ml/min/1.73m2    Calcium 8.4 (L) 8.5 - 10.1 mg/dL    Bilirubin, total 0.5 0.2 - 1.0 mg/dL    AST (SGOT) 18 15 - 37 U/L    ALT (SGPT) 24 12 - 78 U/L    Alk. phosphatase 82 45 - 117 U/L    Protein, total 6.4 6.4 - 8.2 g/dL    Albumin 2.7 (L) 3.5 - 5.0 g/dL    Globulin 3.7 2.0 - 4.0 g/dL    A-G Ratio 0.7 (L) 1.1 - 2.2     METABOLIC PANEL, COMPREHENSIVE    Collection Time: 07/03/22 12:01 PM   Result Value Ref Range    Sodium 138 136 - 145 mmol/L    Potassium 4.4 3.5 - 5.1 mmol/L    Chloride 105 97 - 108 mmol/L    CO2 27 21 - 32 mmol/L    Anion gap 6 5 - 15 mmol/L    Glucose 130 (H) 65 - 100 mg/dL    BUN 10 6 - 20 mg/dL    Creatinine 0.59 (L) 0.70 - 1.30 mg/dL    BUN/Creatinine ratio 17 12 - 20      GFR est AA >60 >60 ml/min/1.73m2    GFR est non-AA >60 >60 ml/min/1.73m2    Calcium 8.8 8.5 - 10.1 mg/dL    Bilirubin, total 0.6 0.2 - 1.0 mg/dL    AST (SGOT) 22 15 - 37 U/L    ALT (SGPT) 25 12 - 78 U/L    Alk. phosphatase 88 45 - 117 U/L    Protein, total 6.3 (L) 6.4 - 8.2 g/dL    Albumin 3.0 (L) 3.5 - 5.0 g/dL    Globulin 3.3 2.0 - 4.0 g/dL    A-G Ratio 0.9 (L) 1.1 - 2.2     CBC WITH AUTOMATED DIFF    Collection Time: 07/03/22 12:01 PM   Result Value Ref Range    WBC 11.5 (H) 4.1 - 11.1 K/uL    RBC 4.20 4. 10 - 5.70 M/uL    HGB 12.7 12.1 - 17.0 g/dL    HCT 37.4 36.6 - 50.3 %    MCV 89.0 80.0 - 99.0 FL    MCH 30.2 26.0 - 34.0 PG    MCHC 34.0 30.0 - 36.5 g/dL    RDW 12.6 11.5 - 14.5 %    PLATELET 411 167 - 328 K/uL    MPV 10.0 8.9 - 12.9 FL    NRBC 0.0 0.0  WBC    ABSOLUTE NRBC 0.00 0.00 - 0.01 K/uL    NEUTROPHILS 87 (H) 32 - 75 %    LYMPHOCYTES 7 (L) 12 - 49 %    MONOCYTES 4 (L) 5 - 13 %    EOSINOPHILS 1 0 - 7 %    BASOPHILS 1 0 - 1 %    IMMATURE GRANULOCYTES 0 0 - 0.5 %    ABS. NEUTROPHILS 9.9 (H) 1.8 - 8.0 K/UL    ABS. LYMPHOCYTES 0.9 0.8 - 3.5 K/UL    ABS. MONOCYTES 0.5 0.0 - 1.0 K/UL    ABS. EOSINOPHILS 0.1 0.0 - 0.4 K/UL    ABS. BASOPHILS 0.1 0.0 - 0.1 K/UL    ABS. IMM.  GRANS. 0.0 0.00 - 0.04 K/UL    DF AUTOMATED     PTT    Collection Time: 07/03/22 12:01 PM   Result Value Ref Range    aPTT 29.9 21.2 - 34.1 sec    aPTT, therapeutic range   82 - 109 sec   GLUCOSE, POC    Collection Time: 07/03/22 12:34 PM   Result Value Ref Range    Glucose (POC) 128 (H) 65 - 117 mg/dL    Performed by Kevin Boyce    GLUCOSE, POC    Collection Time: 07/03/22  3:50 PM   Result Value Ref Range    Glucose (POC) 236 (H) 65 - 117 mg/dL    Performed by Kevin Boyce    GLUCOSE, POC    Collection Time: 07/03/22  8:13 PM   Result Value Ref Range    Glucose (POC) 150 (H) 65 - 117 mg/dL    Performed by Ziggy Reddy    PTT    Collection Time: 07/03/22  8:39 PM   Result Value Ref Range    aPTT 31.9 21.2 - 34.1 sec    aPTT, therapeutic range   82 - 109 sec   GLUCOSE, POC    Collection Time: 07/03/22  9:07 PM   Result Value Ref Range    Glucose (POC) 138 (H) 65 - 117 mg/dL    Performed by Annie Kumar    CBC W/O DIFF    Collection Time: 07/04/22  4:33 AM   Result Value Ref Range    WBC 12.9 (H) 4.1 - 11.1 K/uL    RBC 3.86 (L) 4.10 - 5.70 M/uL    HGB 11.7 (L) 12.1 - 17.0 g/dL    HCT 34.6 (L) 36.6 - 50.3 %    MCV 89.6 80.0 - 99.0 FL    MCH 30.3 26.0 - 34.0 PG    MCHC 33.8 30.0 - 36.5 g/dL    RDW 12.7 11.5 - 14.5 %    PLATELET 191 032 - 329 K/uL    MPV 9.7 8.9 - 12.9 FL    NRBC 0.0 0.0  WBC    ABSOLUTE NRBC 0.00 0.00 - 3.80 K/uL   METABOLIC PANEL, COMPREHENSIVE    Collection Time: 07/04/22  4:33 AM   Result Value Ref Range    Sodium 140 136 - 145 mmol/L    Potassium 3.3 (L) 3.5 - 5.1 mmol/L    Chloride 109 (H) 97 - 108 mmol/L    CO2 25 21 - 32 mmol/L    Anion gap 6 5 - 15 mmol/L    Glucose 108 (H) 65 - 100 mg/dL    BUN 8 6 - 20 mg/dL    Creatinine 0.54 (L) 0.70 - 1.30 mg/dL    BUN/Creatinine ratio 15 12 - 20      GFR est AA >60 >60 ml/min/1.73m2    GFR est non-AA >60 >60 ml/min/1.73m2    Calcium 7.6 (L) 8.5 - 10.1 mg/dL    Bilirubin, total 0.4 0.2 - 1.0 mg/dL    AST (SGOT) 11 (L) 15 - 37 U/L    ALT (SGPT) 22 12 - 78 U/L    Alk.  phosphatase 73 45 - 117 U/L    Protein, total 6.1 (L) 6.4 - 8.2 g/dL    Albumin 2.6 (L) 3.5 - 5.0 g/dL    Globulin 3.5 2.0 - 4.0 g/dL    A-G Ratio 0.7 (L) 1.1 - 2.2     PTT    Collection Time: 07/04/22  4:33 AM   Result Value Ref Range    aPTT 48.0 (H) 21.2 - 34.1 sec    aPTT, therapeutic range   82 - 109 sec   GLUCOSE, POC    Collection Time: 07/04/22  7:46 AM   Result Value Ref Range    Glucose (POC) 134 (H) 65 - 117 mg/dL    Performed by Nubia CUNNINGHAM    PTT    Collection Time: 07/04/22  8:06 AM   Result Value Ref Range    aPTT 45.9 (H) 21.2 - 34.1 sec    aPTT, therapeutic range   82 - 109 sec       Results     Procedure Component Value Units Date/Time    CULTURE, BLOOD #1 [[id number]] Collected: 06/30/22 1840    Order Status: Completed Specimen: Blood Updated: 07/03/22 1058     Special Requests: No Special Requests        Culture result: No growth 2 days       CULTURE, BLOOD #2 [[id number]] Collected: 06/30/22 1840    Order Status: Completed Specimen: Blood Updated: 07/03/22 1058     Special Requests: No Special Requests        Culture result: No growth 2 days       CULTURE, Darral Pears STAIN [[id number]] Collected: 06/30/22 1815    Order Status: Canceled Specimen: Wound from Ulcer            Labs:     Recent Labs     07/04/22 0433 07/03/22  1201   WBC 12.9* 11.5*   HGB 11.7* 12.7   HCT 34.6* 37.4    239     Recent Labs     07/04/22  0433 07/03/22  1201 07/03/22  1100    138 137   K 3.3* 4.4 4.1   * 105 104   CO2 25 27 27   BUN 8 10 10   CREA 0.54* 0.59* 0.69*   * 130* 122*   CA 7.6* 8.8 8.4*     Recent Labs     07/04/22  0433 07/03/22  1201 07/03/22  1100   ALT 22 25 24   AP 73 88 82   TBILI 0.4 0.6 0.5   TP 6.1* 6.3* 6.4   ALB 2.6* 3.0* 2.7*   GLOB 3.5 3.3 3.7     Recent Labs 07/04/22  0806 07/04/22  0433 07/03/22 2039   APTT 45.9* 48.0* 31.9      No results for input(s): FE, TIBC, PSAT, FERR in the last 72 hours. No results found for: FOL, RBCF   No results for input(s): PH, PCO2, PO2 in the last 72 hours. No results for input(s): CPK, CKNDX, TROIQ in the last 72 hours. No lab exists for component: CPKMB  No results found for: CHOL, CHOLX, CHLST, CHOLV, HDL, HDLP, LDL, LDLC, DLDLP, TGLX, TRIGL, TRIGP, CHHD, CHHDX  Lab Results   Component Value Date/Time    Glucose (POC) 134 (H) 07/04/2022 07:46 AM    Glucose (POC) 138 (H) 07/03/2022 09:07 PM    Glucose (POC) 150 (H) 07/03/2022 08:13 PM    Glucose (POC) 236 (H) 07/03/2022 03:50 PM    Glucose (POC) 128 (H) 07/03/2022 12:34 PM     No results found for: COLOR, APPRN, SPGRU, REFSG, EPHRAIM, PROTU, GLUCU, KETU, BILU, UROU, VIANEY, LEUKU, GLUKE, EPSU, BACTU, WBCU, RBCU, CASTS, UCRY      Assessment:     Right great toe infection  Diabetic foot injury  Arterial Insufficiency  Diabetes Mellitus  Hypertension  GERD  Gout  RA  Smoking  Peripheral vascular disease  Postprocedure diagnosis:  1. 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 2.  Right below-knee runoff with significantly diseased right posterior tibial artery and anterior tibial artery. 1. Marginal erosions are seen surrounding the first MTP joint with a minimal  first MTP joint effusion. Small erosions are seen in the second metatarsal head  and fifth metatarsal head as well. The overall appearance is most consistent  with gout. Inflammatory and infectious arthritides however would be within the  differential.  2. Significant subcutaneous edema      S/p  1. Left common femoral access using ultrasound guidance with a micropuncture kit. 2.  Left common femoral artery access sheath placement using 6 Sami [place] sheath followed by 7 Sami 45 cm destination sheath placement. 3.  Bilateral iliac angiogram supervision and interpretation.   4. Right common femoral artery first-order angiogram supervision and interpretation. 5.  Right popliteal artery second-order angiogram supervision and interpretation. 6. right superficial femoral artery angioplasty using directional atherectomy with a Hawk 1 LX device. 6.  Right superficial femoral artery long segment balloon PTA angioplasty using various sizes including predilation with a 4 mm balloon, followed by 5 x 150 mm chocolate balloon, 6 x 250 mm balloon, 6 x 120 mm chocolate balloon. 7.  Right superficial femoral artery long segment angioplasty with bare-metal stent placement using ever flex, 6 x 120, 6 x 150, and 6 x 40 mm stent. 8.  Closure of the left common femoral access using Vascade closure system.       Plan:     Patient IV Unasyn 3 g IV every 6 hours  Start on heparin drip  Continue sliding scale insulin  Lisinopril 10 mg daily  Amlodipine 5 mg daily  Metformin 500 twice a day  Protonix 40 daily      Repeat the labs  PT OT consult      Current Facility-Administered Medications:     sodium chloride (NS) flush 5-40 mL, 5-40 mL, IntraVENous, Q8H, Lisa Martínez MD, 10 mL at 07/04/22 0612    sodium chloride (NS) flush 5-40 mL, 5-40 mL, IntraVENous, PRN, Lisa Martínez MD    HYDROmorphone (DILAUDID) injection 1 mg, 1 mg, IntraVENous, Q4H PRN, Lisa Martínez MD, 1 mg at 07/04/22 0814    heparin 25,000 units in D5W 250 ml infusion, 12-25 Units/kg/hr (Adjusted), IntraVENous, TITRATE, Lisa Martínez MD, Last Rate: 22.6 mL/hr at 07/04/22 0925, 24 Units/kg/hr at 07/04/22 0925    heparin (porcine) 1,000 unit/mL injection 4,000 Units, 4,000 Units, IntraVENous, PRN, 4,000 Units at 07/03/22 2209 **OR** heparin (porcine) 1,000 unit/mL injection 2,000 Units, 2,000 Units, IntraVENous, PRN, Lisa Martínez MD, 2,000 Units at 07/04/22 9503    colchicine tablet 0.6 mg, 0.6 mg, Oral, DAILY, Vicky West MD, 0.6 mg at 07/04/22 0810    0.9% sodium chloride infusion, 75 mL/hr, IntraVENous, CONTINUOUS, Tae Albert MD, Last Rate: 75 mL/hr at 07/04/22 0012, 75 mL/hr at 07/04/22 0012    oxyCODONE-acetaminophen (PERCOCET) 5-325 mg per tablet 1 Tablet, 1 Tablet, Oral, Q6H PRN, Satnam Ferrer MD, 1 Tablet at 07/03/22 1805    insulin lispro (HUMALOG) injection, , SubCUTAneous, AC&HS, Satnam Ferrer MD, 3 Units at 07/01/22 0909    glucose chewable tablet 16 g, 4 Tablet, Oral, PRN, Gia Ferrer MD    glucagon (GLUCAGEN) injection 1 mg, 1 mg, IntraMUSCular, PRN, Gia Ferrer MD    acetaminophen (TYLENOL) tablet 650 mg, 650 mg, Oral, Q6H PRN **OR** acetaminophen (TYLENOL) suppository 650 mg, 650 mg, Rectal, Q6H PRN, Gia Ferrer MD    polyethylene glycol (MIRALAX) packet 17 g, 17 g, Oral, DAILY PRN, Gia Ferrer MD    ondansetron (ZOFRAN ODT) tablet 4 mg, 4 mg, Oral, Q8H PRN **OR** ondansetron (ZOFRAN) injection 4 mg, 4 mg, IntraVENous, Q6H PRN, Satnam Ferrer MD    enoxaparin (LOVENOX) injection 40 mg, 40 mg, SubCUTAneous, DAILY, Satnam Ferrer MD, 40 mg at 07/02/22 0838    ampicillin-sulbactam (UNASYN) 3 g in 0.9% sodium chloride (MBP/ADV) 100 mL MBP, 3 g, IntraVENous, Q6H, Satnam Ferrer MD, Last Rate: 200 mL/hr at 07/04/22 0612, 3 g at 07/04/22 0612    metFORMIN (GLUCOPHAGE) tablet 500 mg, 500 mg, Oral, BID WITH MEALS, Satnam Ferrer MD, 500 mg at 07/04/22 0810    pantoprazole (PROTONIX) tablet 40 mg, 40 mg, Oral, ACB, Satnam Ferrer MD, 40 mg at 07/04/22 9934    lisinopriL (PRINIVIL, ZESTRIL) tablet 10 mg, 10 mg, Oral, DAILY, 10 mg at 07/04/22 0810 **AND** amLODIPine (NORVASC) tablet 5 mg, 5 mg, Oral, DAILY, Satnam Ferrer MD, 5 mg at 07/04/22 0810    traMADoL (ULTRAM) tablet 50 mg, 50 mg, Oral, Q6H PRN, Satnam Ferrer MD, 50 mg at 07/01/22 2753

## 2022-07-04 NOTE — PROGRESS NOTES
Problem: Falls - Risk of  Goal: *Absence of Falls  Description: Document Angel Dodson Fall Risk and appropriate interventions in the flowsheet. Outcome: Progressing Towards Goal  Note: Fall Risk Interventions:  Mobility Interventions: Bed/chair exit alarm,Patient to call before getting OOB,Utilize walker, cane, or other assistive device         Medication Interventions: Bed/chair exit alarm,Patient to call before getting OOB,Teach patient to arise slowly    Elimination Interventions: Bed/chair exit alarm,Call light in reach,Patient to call for help with toileting needs,Urinal in reach              Problem: Patient Education: Go to Patient Education Activity  Goal: Patient/Family Education  Outcome: Progressing Towards Goal     Problem: Diabetes Self-Management  Goal: *Disease process and treatment process  Description: Define diabetes and identify own type of diabetes; list 3 options for treating diabetes. Outcome: Progressing Towards Goal  Goal: *Incorporating nutritional management into lifestyle  Description: Describe effect of type, amount and timing of food on blood glucose; list 3 methods for planning meals. Outcome: Progressing Towards Goal  Goal: *Incorporating physical activity into lifestyle  Description: State effect of exercise on blood glucose levels. Outcome: Progressing Towards Goal  Goal: *Developing strategies to promote health/change behavior  Description: Define the ABC's of diabetes; identify appropriate screenings, schedule and personal plan for screenings. Outcome: Progressing Towards Goal  Goal: *Using medications safely  Description: State effect of diabetes medications on diabetes; name diabetes medication taking, action and side effects. Outcome: Progressing Towards Goal  Goal: *Monitoring blood glucose, interpreting and using results  Description: Identify recommended blood glucose targets  and personal targets.   Outcome: Progressing Towards Goal  Goal: *Prevention, detection, treatment of acute complications  Description: List symptoms of hyper- and hypoglycemia; describe how to treat low blood sugar and actions for lowering  high blood glucose level. Outcome: Progressing Towards Goal  Goal: *Prevention, detection and treatment of chronic complications  Description: Define the natural course of diabetes and describe the relationship of blood glucose levels to long term complications of diabetes. Outcome: Progressing Towards Goal  Goal: *Developing strategies to address psychosocial issues  Description: Describe feelings about living with diabetes; identify support needed and support network  Outcome: Progressing Towards Goal  Goal: *Insulin pump training  Outcome: Progressing Towards Goal  Goal: *Sick day guidelines  Outcome: Progressing Towards Goal  Goal: *Patient Specific Goal (EDIT GOAL, INSERT TEXT)  Outcome: Progressing Towards Goal     Problem: Patient Education: Go to Patient Education Activity  Goal: Patient/Family Education  Outcome: Progressing Towards Goal     Problem: Pressure Injury - Risk of  Goal: *Prevention of pressure injury  Description: Document Phil Scale and appropriate interventions in the flowsheet.   Outcome: Progressing Towards Goal  Note: Pressure Injury Interventions:       Moisture Interventions: Absorbent underpads,Minimize layers    Activity Interventions: Assess need for specialty bed,Increase time out of bed    Mobility Interventions: Assess need for specialty bed,HOB 30 degrees or less    Nutrition Interventions: Document food/fluid/supplement intake    Friction and Shear Interventions: Apply protective barrier, creams and emollients,HOB 30 degrees or less,Minimize layers                Problem: Patient Education: Go to Patient Education Activity  Goal: Patient/Family Education  Outcome: Progressing Towards Goal

## 2022-07-04 NOTE — PROGRESS NOTES
Infectious Disease Progress Note           Subjective:   Stable, denies new complaints, still w right foot pain and discomfort   Objective:   Physical Exam:     Visit Vitals  /71 (BP 1 Location: Left upper arm, BP Patient Position: At rest)   Pulse 62   Temp 98 °F (36.7 °C)   Resp 17   Ht 6' 1\" (1.854 m)   Wt 255 lb (115.7 kg)   SpO2 97%   BMI 33.64 kg/m²      O2 Device: None (Room air)    Temp (24hrs), Av.4 °F (36.9 °C), Min:98 °F (36.7 °C), Max:99 °F (37.2 °C)    No intake/output data recorded.  1901 -  0700  In: 4149.6 [P.O.:300;  I.V.:3849.6]  Out: 400 [Urine:400]    General: NAD, AAO x 4  HEENT: FLOYD, Moist mucosa   Lungs: CTA b/l, decreased at the bases, no wheeze/rhonchi   Heart: S1S2+, RRR, no murmur  Abdo: Soft, NT, ND, +BS   : No herbert cath   Exts: Right great toe swelling, decreased cyanosis, stable gangrenous changes on plantar surface of toe, minimal drainage   Skin: No pressure ulcers or andre     Data Review:       Recent Days:  Recent Labs     22  0433 22  1201 22  1100   WBC 12.9* 11.5* 12.0*   HGB 11.7* 12.7 12.3   HCT 34.6* 37.4 36.4*    239 240     Recent Labs     22  0433 22  1201 22  1100   BUN 8 10 10   CREA 0.54* 0.59* 0.69*       Lab Results   Component Value Date/Time    C-Reactive protein 2.79 (H) 2022 06:09 AM        Microbiology     Results     Procedure Component Value Units Date/Time    CULTURE, BLOOD #1 [[id number]] Collected: 22    Order Status: Completed Specimen: Blood Updated: 22 1043     Special Requests: No Special Requests        Culture result: No growth 3 days       CULTURE, BLOOD #2 [[id number]] Collected: 22 184    Order Status: Completed Specimen: Blood Updated: 22 1043     Special Requests: No Special Requests        Culture result: No growth 3 days       CULTURE, Annie Leavitt STAIN [[id number]] Collected: 22    Order Status: Canceled Specimen: Wound from Ulcer          Diagnostics   CXR Results  (Last 48 hours)    None         Assessment/Plan     1. Right great toe infection, Findings on MRI consistent w gouty arthritis, no reports of osteomyelitis       Stable ulceration involving base of right great toe, no sig drainage from ulcer      Afebrile, moderate leukocytosis on routine labs         Routine labs in the morning, counseled on leg elevation to help w venous return     2. H/o gouty arthritis, ? Reason for # 1, uric acid level of 3.9         Continue on Colchicine for gout treatment     3. PAD, Abnormal ANTIONETTE, S/p arteriogram by Dr Patrick Potts, on 07/03       4. H/o DM, BS controlled per pt, A1C of 7.7, BS mg per primary     5.  Right foot pain, contributed by # 2, continue symptomatic mgt       Toni Mckinney MD    7/4/2022

## 2022-07-04 NOTE — PROGRESS NOTES
Problem: Falls - Risk of  Goal: *Absence of Falls  Description: Document Merrickperry Pelaez Fall Risk and appropriate interventions in the flowsheet.   Outcome: Not Met  Note: Fall Risk Interventions:  Mobility Interventions: Patient to call before getting OOB         Medication Interventions: Bed/chair exit alarm,Patient to call before getting OOB,Teach patient to arise slowly    Elimination Interventions: Bed/chair exit alarm,Call light in reach,Patient to call for help with toileting needs,Urinal in reach              Problem: Patient Education: Go to Patient Education Activity  Goal: Patient/Family Education  Outcome: Not Met

## 2022-07-05 LAB
ALBUMIN SERPL-MCNC: 3.3 G/DL (ref 3.5–5)
ALBUMIN/GLOB SERPL: 0.9 {RATIO} (ref 1.1–2.2)
ALP SERPL-CCNC: 92 U/L (ref 45–117)
ALT SERPL-CCNC: 33 U/L (ref 12–78)
ANION GAP SERPL CALC-SCNC: 6 MMOL/L (ref 5–15)
APTT PPP: 61.4 SEC (ref 21.2–34.1)
APTT PPP: 66.5 SEC (ref 21.2–34.1)
APTT PPP: 68 SEC (ref 21.2–34.1)
APTT PPP: 72.2 SEC (ref 21.2–34.1)
AST SERPL W P-5'-P-CCNC: 23 U/L (ref 15–37)
BILIRUB SERPL-MCNC: 0.4 MG/DL (ref 0.2–1)
BUN SERPL-MCNC: 9 MG/DL (ref 6–20)
BUN/CREAT SERPL: 14 (ref 12–20)
CA-I BLD-MCNC: 9.2 MG/DL (ref 8.5–10.1)
CHLORIDE SERPL-SCNC: 104 MMOL/L (ref 97–108)
CO2 SERPL-SCNC: 27 MMOL/L (ref 21–32)
CREAT SERPL-MCNC: 0.64 MG/DL (ref 0.7–1.3)
ERYTHROCYTE [DISTWIDTH] IN BLOOD BY AUTOMATED COUNT: 12.7 % (ref 11.5–14.5)
GLOBULIN SER CALC-MCNC: 3.7 G/DL (ref 2–4)
GLUCOSE BLD STRIP.AUTO-MCNC: 125 MG/DL (ref 65–117)
GLUCOSE BLD STRIP.AUTO-MCNC: 150 MG/DL (ref 65–117)
GLUCOSE BLD STRIP.AUTO-MCNC: 161 MG/DL (ref 65–117)
GLUCOSE BLD STRIP.AUTO-MCNC: 168 MG/DL (ref 65–117)
GLUCOSE SERPL-MCNC: 145 MG/DL (ref 65–100)
HCT VFR BLD AUTO: 40.5 % (ref 36.6–50.3)
HGB BLD-MCNC: 13.6 G/DL (ref 12.1–17)
MCH RBC QN AUTO: 29.9 PG (ref 26–34)
MCHC RBC AUTO-ENTMCNC: 33.6 G/DL (ref 30–36.5)
MCV RBC AUTO: 89 FL (ref 80–99)
NRBC # BLD: 0 K/UL (ref 0–0.01)
NRBC BLD-RTO: 0 PER 100 WBC
PERFORMED BY, TECHID: ABNORMAL
PLATELET # BLD AUTO: 278 K/UL (ref 150–400)
PMV BLD AUTO: 10.1 FL (ref 8.9–12.9)
POTASSIUM SERPL-SCNC: 3.9 MMOL/L (ref 3.5–5.1)
PROT SERPL-MCNC: 7 G/DL (ref 6.4–8.2)
RBC # BLD AUTO: 4.55 M/UL (ref 4.1–5.7)
SODIUM SERPL-SCNC: 137 MMOL/L (ref 136–145)
THERAPEUTIC RANGE,PTTT: ABNORMAL SEC (ref 82–109)
WBC # BLD AUTO: 11.6 K/UL (ref 4.1–11.1)

## 2022-07-05 PROCEDURE — 65270000029 HC RM PRIVATE

## 2022-07-05 PROCEDURE — 74011000258 HC RX REV CODE- 258: Performed by: FAMILY MEDICINE

## 2022-07-05 PROCEDURE — 74011250637 HC RX REV CODE- 250/637: Performed by: FAMILY MEDICINE

## 2022-07-05 PROCEDURE — 74011250637 HC RX REV CODE- 250/637: Performed by: INTERNAL MEDICINE

## 2022-07-05 PROCEDURE — 74011250636 HC RX REV CODE- 250/636: Performed by: FAMILY MEDICINE

## 2022-07-05 PROCEDURE — 85730 THROMBOPLASTIN TIME PARTIAL: CPT

## 2022-07-05 PROCEDURE — 99232 SBSQ HOSP IP/OBS MODERATE 35: CPT | Performed by: SURGERY

## 2022-07-05 PROCEDURE — 97161 PT EVAL LOW COMPLEX 20 MIN: CPT

## 2022-07-05 PROCEDURE — 85027 COMPLETE CBC AUTOMATED: CPT

## 2022-07-05 PROCEDURE — 36415 COLL VENOUS BLD VENIPUNCTURE: CPT

## 2022-07-05 PROCEDURE — 82962 GLUCOSE BLOOD TEST: CPT

## 2022-07-05 PROCEDURE — 97530 THERAPEUTIC ACTIVITIES: CPT

## 2022-07-05 PROCEDURE — 80053 COMPREHEN METABOLIC PANEL: CPT

## 2022-07-05 PROCEDURE — 74011250636 HC RX REV CODE- 250/636: Performed by: SURGERY

## 2022-07-05 PROCEDURE — 74011000250 HC RX REV CODE- 250: Performed by: SURGERY

## 2022-07-05 RX ORDER — LISINOPRIL 20 MG/1
20 TABLET ORAL DAILY
Status: DISCONTINUED | OUTPATIENT
Start: 2022-07-05 | End: 2022-07-05

## 2022-07-05 RX ORDER — AMLODIPINE BESYLATE 5 MG/1
10 TABLET ORAL DAILY
Status: DISCONTINUED | OUTPATIENT
Start: 2022-07-05 | End: 2022-07-08 | Stop reason: HOSPADM

## 2022-07-05 RX ORDER — LISINOPRIL 20 MG/1
20 TABLET ORAL DAILY
Status: DISCONTINUED | OUTPATIENT
Start: 2022-07-05 | End: 2022-07-08 | Stop reason: HOSPADM

## 2022-07-05 RX ORDER — AMLODIPINE BESYLATE 5 MG/1
5 TABLET ORAL DAILY
Status: DISCONTINUED | OUTPATIENT
Start: 2022-07-05 | End: 2022-07-05

## 2022-07-05 RX ADMIN — HYDROMORPHONE HYDROCHLORIDE 1 MG: 1 INJECTION, SOLUTION INTRAMUSCULAR; INTRAVENOUS; SUBCUTANEOUS at 00:10

## 2022-07-05 RX ADMIN — HEPARIN SODIUM 2000 UNITS: 1000 INJECTION INTRAVENOUS; SUBCUTANEOUS at 07:16

## 2022-07-05 RX ADMIN — SODIUM CHLORIDE, PRESERVATIVE FREE 10 ML: 5 INJECTION INTRAVENOUS at 06:17

## 2022-07-05 RX ADMIN — AMPICILLIN SODIUM AND SULBACTAM SODIUM 3 G: 2; 1 INJECTION, POWDER, FOR SOLUTION INTRAMUSCULAR; INTRAVENOUS at 20:44

## 2022-07-05 RX ADMIN — HEPARIN SODIUM AND DEXTROSE 28 UNITS/KG/HR: 10000; 5 INJECTION INTRAVENOUS at 04:42

## 2022-07-05 RX ADMIN — HYDROMORPHONE HYDROCHLORIDE 1 MG: 1 INJECTION, SOLUTION INTRAMUSCULAR; INTRAVENOUS; SUBCUTANEOUS at 18:26

## 2022-07-05 RX ADMIN — AMPICILLIN SODIUM AND SULBACTAM SODIUM 3 G: 2; 1 INJECTION, POWDER, FOR SOLUTION INTRAMUSCULAR; INTRAVENOUS at 06:13

## 2022-07-05 RX ADMIN — AMPICILLIN SODIUM AND SULBACTAM SODIUM 3 G: 2; 1 INJECTION, POWDER, FOR SOLUTION INTRAMUSCULAR; INTRAVENOUS at 14:28

## 2022-07-05 RX ADMIN — HYDRALAZINE HYDROCHLORIDE 20 MG: 20 INJECTION, SOLUTION INTRAMUSCULAR; INTRAVENOUS at 00:10

## 2022-07-05 RX ADMIN — HYDROMORPHONE HYDROCHLORIDE 1 MG: 1 INJECTION, SOLUTION INTRAMUSCULAR; INTRAVENOUS; SUBCUTANEOUS at 04:11

## 2022-07-05 RX ADMIN — HYDROMORPHONE HYDROCHLORIDE 1 MG: 1 INJECTION, SOLUTION INTRAMUSCULAR; INTRAVENOUS; SUBCUTANEOUS at 14:28

## 2022-07-05 RX ADMIN — HEPARIN SODIUM 2000 UNITS: 1000 INJECTION INTRAVENOUS; SUBCUTANEOUS at 22:26

## 2022-07-05 RX ADMIN — COLCHICINE 0.6 MG: 0.6 TABLET, FILM COATED ORAL at 10:42

## 2022-07-05 RX ADMIN — AMLODIPINE BESYLATE 10 MG: 5 TABLET ORAL at 10:42

## 2022-07-05 RX ADMIN — METFORMIN HYDROCHLORIDE 500 MG: 500 TABLET ORAL at 18:26

## 2022-07-05 RX ADMIN — HYDROMORPHONE HYDROCHLORIDE 1 MG: 1 INJECTION, SOLUTION INTRAMUSCULAR; INTRAVENOUS; SUBCUTANEOUS at 22:27

## 2022-07-05 RX ADMIN — HEPARIN SODIUM 2000 UNITS: 1000 INJECTION INTRAVENOUS; SUBCUTANEOUS at 15:37

## 2022-07-05 RX ADMIN — HYDROMORPHONE HYDROCHLORIDE 1 MG: 1 INJECTION, SOLUTION INTRAMUSCULAR; INTRAVENOUS; SUBCUTANEOUS at 10:42

## 2022-07-05 RX ADMIN — LISINOPRIL 20 MG: 20 TABLET ORAL at 10:42

## 2022-07-05 RX ADMIN — HEPARIN SODIUM AND DEXTROSE 32 UNITS/KG/HR: 10000; 5 INJECTION INTRAVENOUS at 15:34

## 2022-07-05 RX ADMIN — METFORMIN HYDROCHLORIDE 500 MG: 500 TABLET ORAL at 10:42

## 2022-07-05 RX ADMIN — PANTOPRAZOLE SODIUM 40 MG: 40 TABLET, DELAYED RELEASE ORAL at 06:14

## 2022-07-05 RX ADMIN — SODIUM CHLORIDE, PRESERVATIVE FREE 10 ML: 5 INJECTION INTRAVENOUS at 22:11

## 2022-07-05 RX ADMIN — SODIUM CHLORIDE, PRESERVATIVE FREE 10 ML: 5 INJECTION INTRAVENOUS at 15:41

## 2022-07-05 RX ADMIN — SODIUM CHLORIDE, PRESERVATIVE FREE 10 ML: 5 INJECTION INTRAVENOUS at 15:42

## 2022-07-05 NOTE — PROGRESS NOTES
PHYSICAL THERAPY EVALUATION/DISCHARGE  Patient: Krsytin Georges [de-identified]65 y.o. male)  Date: 7/5/2022  Primary Diagnosis: Toe infection [L08.9]  Osteomyelitis (HCC) [M86.9]  Procedure(s) (LRB):  BALLOON ANGIOPLASTY LOWER EXTREMITY RIGHT WITH STENTING (Right) 2 Days Post-Op   Precautions: fall, hell touch WB        ASSESSMENT  This is a 65 yrs old male adm for rt big toe infection. Patient is s/p balloon angioplasty with stent. Patient is heel touch WB as per DR HUNG. Patient was given post op shoe for the amb. Patient indep in all mobility. amb 40ft with post op shoe and heel touch WB with RW but can amb with straight cane nd also may benefit from using the sneaker on the left foot to balance the height difference. Based on the objective data described below, the patient presents with decreased gen strength and endurance. At baseline with mobility otherwise    Functional Outcome Measure: The patient scored 22/24 on the outcome measure which is indicative of low complexity. Other factors to consider for discharge: home with family   Further skilled acute physical therapy is not indicated at this time. PLAN :  Recommendation for discharge: (in order for the patient to meet his/her long term goals)  No skilled physical therapy/ follow up rehabilitation needs identified at this time. This discharge recommendation:  Has been made in collaboration with the attending provider and/or case management    IF patient discharges home will need the following DME: straight cane       SUBJECTIVE:   Patient stated I am doing ok.     OBJECTIVE DATA SUMMARY:   HISTORY:    No past medical history on file. No past surgical history on file.     Prior level of function: indep  Personal factors and/or comorbidities impacting plan of care:   Home Situation  Home Environment: Private residence  # Steps to Enter: 8  One/Two Story Residence: One story  Living Alone: No  Support Systems: Spouse/Significant Other  Patient Expects to be Discharged to[de-identified] Home  Current DME Used/Available at Home: None    EXAMINATION/PRESENTATION/DECISION MAKING:   Critical Behavior:  Neurologic State: Alert  Orientation Level: Oriented X4  Cognition: Appropriate decision making     Hearing:    Range Of Motion:  AROM: Generally decreased, functional                       Strength:    Strength: Generally decreased, functional                    Tone & Sensation:   Tone: Normal                              Functional Mobility:  Bed Mobility:  Rolling: Independent  Supine to Sit: Independent  Sit to Supine: Independent  Scooting: Independent  Transfers:  Sit to Stand: Independent  Stand to Sit: Independent                       Balance:   Sitting: Intact  Standing: Intact; With support  Ambulation/Gait Training:  Distance (ft): 40 Feet (ft)  Assistive Device: Gait belt;Walker, rolling; Other (comment) (post op shoe)  Ambulation - Level of Assistance: Modified independent     Gait Description (WDL): Exceptions to WDL     Right Side Weight Bearing: Heel                               Functional Measure:  [address] [place] [id number] AM-PAC 6 Clicks         Basic Mobility Inpatient Short Form  How much difficulty does the patient currently have. .. Unable A Lot A Little None   1. Turning over in bed (including adjusting bedclothes, sheets and blankets)? [] 1   [] 2   [] 3   [x] 4   2. Sitting down on and standing up from a chair with arms ( e.g., wheelchair, bedside commode, etc.)   [] 1   [] 2   [] 3   [x] 4   3. Moving from lying on back to sitting on the side of the bed? [] 1   [] 2   [] 3   [x] 4          How much help from another person does the patient currently need. .. Total A Lot A Little None   4. Moving to and from a bed to a chair (including a wheelchair)? [] 1   [] 2   [] 3   [x] 4   5. Need to walk in hospital room? [] 1   [] 2   [x] 3   [] 4   6. Climbing 3-5 steps with a railing?    [] 1   [] 2   [x] 3   [] 4   © 2007, Trustees of [address] [place] [address] [address] [address], under license to [employer]. All rights reserved     Score:  Initial: 22/24 Most Recent: X (Date:07/05/2022 )   Interpretation of Tool:  Represents activities that are increasingly more difficult (i.e. Bed mobility, Transfers, Gait). Score 24 23 22-20 19-15 14-10 9-7 6   Modifier CH CI CJ CK CL CM CN           Physical Therapy Evaluation Charge Determination   History Examination Presentation Decision-Making   LOW Complexity : Zero comorbidities / personal factors that will impact the outcome / POC LOW Complexity : 1-2 Standardized tests and measures addressing body structure, function, activity limitation and / or participation in recreation  LOW Complexity : Stable, uncomplicated  LOW Complexity : FOTO score of       Based on the above components, the patient evaluation is determined to be of the following complexity level: LOW     Pain Rating:  3/10 rt foot    Activity Tolerance:   Good  Please refer to the flowsheet for vital signs taken during this treatment. After treatment patient left in no apparent distress:   Supine in bed, Heels elevated for pressure relief, and Call bell within reach    COMMUNICATION/EDUCATION:   The patients plan of care was discussed with: Registered nurse. Fall prevention education was provided and the patient/caregiver indicated understanding.     Thank you for this referral.  Tonio Client PT   Time Calculation: 20 mins

## 2022-07-05 NOTE — PROGRESS NOTES
General Daily Progress Note          Patient Name:   Dennis Tejada       YOB: 1960       Age:  65 y.o. Admit Date: 6/30/2022      Subjective:       Dennis Tejada is a 65 y.o. male with a significant PMH of HTN, lifelong 1.5ppd smoker, and Diabetes  KHUSHI and Nydia Stout presents to the ED today with a R great toe wound. Started 2 weeks ago treated with oral antibiotics the patient states that the wound appeared 5 days ago when it \"burst\" from swelling with light yellow discharge. He has taken Rocefin and Clindamycin for the wound with subjective improvement. The wound is not acutely painful but he has a small amount of pain in the MTP joint. Patient denies other symptoms associated with the wound. Patient also treated for gout        Patient alert awake pain is much better control      Postprocedure diagnosis:  1. 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 2.  Right below-knee runoff with significantly diseased right posterior tibial artery and anterior tibial artery. s/p    1. Left common femoral access using ultrasound guidance with a micropuncture kit. 2.  Left common femoral artery access sheath placement using 6 Citizen of [place] sheath followed by 7 [place] 45 cm destination sheath placement. 3.  Bilateral iliac angiogram supervision and interpretation. 4.  Right common femoral artery first-order angiogram supervision and interpretation. 5.  Right popliteal artery second-order angiogram supervision and interpretation. 6. right superficial femoral artery angioplasty using directional atherectomy with a Hawk 1 LX device. 6.  Right superficial femoral artery long segment balloon PTA angioplasty using various sizes including predilation with a 4 mm balloon, followed by 5 x 150 mm chocolate balloon, 6 x 250 mm balloon, 6 x 120 mm chocolate balloon.   7.  Right superficial femoral artery long segment angioplasty with bare-metal stent placement using ever flex, 6 x 120, 6 x 150, and 6 x 40 mm stent. 8.  Closure of the left common femoral access using Vascade closure system.       Blood pressure running high  Denies any chest pain or shortness of breath no nausea no vomiting      Objective:     Visit Vitals  BP (!) 175/82 (BP 1 Location: Left upper arm, BP Patient Position: Semi fowlers)   Pulse 73   Temp 98.2 °F (36.8 °C)   Resp 16   Ht 6' 1\" (1.854 m)   Wt 115.7 kg (255 lb)   SpO2 98%   BMI 33.64 kg/m²        Recent Results (from the past 24 hour(s))   GLUCOSE, POC    Collection Time: 07/04/22 11:15 AM   Result Value Ref Range    Glucose (POC) 148 (H) 65 - 117 mg/dL    Performed by Donn CUNNINGHAM    PTT    Collection Time: 07/04/22  1:53 PM   Result Value Ref Range    aPTT 51.9 (H) 21.2 - 34.1 sec    aPTT, therapeutic range   82 - 109 sec   GLUCOSE, POC    Collection Time: 07/04/22  4:25 PM   Result Value Ref Range    Glucose (POC) 127 (H) 65 - 117 mg/dL    Performed by Ella Gardner    GLUCOSE, POC    Collection Time: 07/04/22  8:16 PM   Result Value Ref Range    Glucose (POC) 170 (H) 65 - 117 mg/dL    Performed by Coleman Morel    PTT    Collection Time: 07/04/22  9:53 PM   Result Value Ref Range    aPTT 63.8 (H) 21.2 - 34.1 sec    aPTT, therapeutic range   82 - 109 sec   CBC W/O DIFF    Collection Time: 07/05/22  3:17 AM   Result Value Ref Range    WBC 11.6 (H) 4.1 - 11.1 K/uL    RBC 4.55 4.10 - 5.70 M/uL    HGB 13.6 12.1 - 17.0 g/dL    HCT 40.5 36.6 - 50.3 %    MCV 89.0 80.0 - 99.0 FL    MCH 29.9 26.0 - 34.0 PG    MCHC 33.6 30.0 - 36.5 g/dL    RDW 12.7 11.5 - 14.5 %    PLATELET 728 636 - 453 K/uL    MPV 10.1 8.9 - 12.9 FL    NRBC 0.0 0.0  WBC    ABSOLUTE NRBC 0.00 0.00 - 7.17 K/uL   METABOLIC PANEL, COMPREHENSIVE    Collection Time: 07/05/22  3:17 AM   Result Value Ref Range    Sodium 137 136 - 145 mmol/L    Potassium 3.9 3.5 - 5.1 mmol/L    Chloride 104 97 - 108 mmol/L    CO2 27 21 - 32 mmol/L    Anion gap 6 5 - 15 mmol/L    Glucose 145 (H) 65 - 100 mg/dL    BUN 9 6 - 20 mg/dL    Creatinine 0.64 (L) 0.70 - 1.30 mg/dL    BUN/Creatinine ratio 14 12 - 20      GFR est AA >60 >60 ml/min/1.73m2    GFR est non-AA >60 >60 ml/min/1.73m2    Calcium 9.2 8.5 - 10.1 mg/dL    Bilirubin, total 0.4 0.2 - 1.0 mg/dL    AST (SGOT) 23 15 - 37 U/L    ALT (SGPT) 33 12 - 78 U/L    Alk. phosphatase 92 45 - 117 U/L    Protein, total 7.0 6.4 - 8.2 g/dL    Albumin 3.3 (L) 3.5 - 5.0 g/dL    Globulin 3.7 2.0 - 4.0 g/dL    A-G Ratio 0.9 (L) 1.1 - 2.2     PTT    Collection Time: 07/05/22  3:17 AM   Result Value Ref Range    aPTT 66.5 (H) 21.2 - 34.1 sec    aPTT, therapeutic range   82 - 109 sec   PTT    Collection Time: 07/05/22  5:24 AM   Result Value Ref Range    aPTT 61.4 (H) 21.2 - 34.1 sec    aPTT, therapeutic range   82 - 109 sec     [unfilled]      Review of Systems    Constitutional: Negative for chills and fever. HENT: Negative. Eyes: Negative. Respiratory: Negative. Cardiovascular: Negative. Gastrointestinal: Negative for abdominal pain and nausea. Skin: Negative. Neurological: Negative. Physical Exam:      Constitutional: pt is oriented to person, place, and time. HENT:   Head: Normocephalic and atraumatic. Eyes: Pupils are equal, round, and reactive to light. EOM are normal.   Cardiovascular: Normal rate, regular rhythm and normal heart sounds. Pulmonary/Chest: Breath sounds normal. No wheezes. No rales. Exhibits no tenderness. Abdominal: Soft. Bowel sounds are normal. There is no abdominal tenderness. There is no rebound and no guarding. Musculoskeletal: Normal range of motion. Neurological: pt is alert and oriented to person, place, and time. XR FLUOROSCOPY UNDER 60 MINUTES   Final Result   Status post right lower extremity angiogram and stenting. INTERPRETATION PROVIDED FOR COMPLIANCE ONLY AT NO CHARGE        MRI FOOT RT WO CONT   Final Result   1.  Marginal erosions are seen surrounding the first MTP joint with a minimal   first MTP joint effusion. Small erosions are seen in the second metatarsal head   and fifth metatarsal head as well. The overall appearance is most consistent   with gout. Inflammatory and infectious arthritides however would be within the   differential.   2. Significant subcutaneous edema         ANKLE BRACHIAL INDEX   Final Result      XR FOOT RT MIN 3 V   Final Result   Cortical cystic erosive changes involving the distal aspects of the   first and fifth metatarsals and base of the proximal phalanx of the great toe   compatible with chronic degenerative/osteoarthritic changes. Consider gout.            Recent Results (from the past 24 hour(s))   GLUCOSE, POC    Collection Time: 07/04/22 11:15 AM   Result Value Ref Range    Glucose (POC) 148 (H) 65 - 117 mg/dL    Performed by Preston Norman    PTT    Collection Time: 07/04/22  1:53 PM   Result Value Ref Range    aPTT 51.9 (H) 21.2 - 34.1 sec    aPTT, therapeutic range   82 - 109 sec   GLUCOSE, POC    Collection Time: 07/04/22  4:25 PM   Result Value Ref Range    Glucose (POC) 127 (H) 65 - 117 mg/dL    Performed by Preston Norman    GLUCOSE, POC    Collection Time: 07/04/22  8:16 PM   Result Value Ref Range    Glucose (POC) 170 (H) 65 - 117 mg/dL    Performed by Lety Boswell    PTT    Collection Time: 07/04/22  9:53 PM   Result Value Ref Range    aPTT 63.8 (H) 21.2 - 34.1 sec    aPTT, therapeutic range   82 - 109 sec   CBC W/O DIFF    Collection Time: 07/05/22  3:17 AM   Result Value Ref Range    WBC 11.6 (H) 4.1 - 11.1 K/uL    RBC 4.55 4.10 - 5.70 M/uL    HGB 13.6 12.1 - 17.0 g/dL    HCT 40.5 36.6 - 50.3 %    MCV 89.0 80.0 - 99.0 FL    MCH 29.9 26.0 - 34.0 PG    MCHC 33.6 30.0 - 36.5 g/dL    RDW 12.7 11.5 - 14.5 %    PLATELET 158 810 - 789 K/uL    MPV 10.1 8.9 - 12.9 FL    NRBC 0.0 0.0  WBC    ABSOLUTE NRBC 0.00 0.00 - 5.35 K/uL   METABOLIC PANEL, COMPREHENSIVE    Collection Time: 07/05/22  3:17 AM   Result Value Ref Range    Sodium 137 136 - 145 mmol/L    Potassium 3.9 3.5 - 5.1 mmol/L    Chloride 104 97 - 108 mmol/L    CO2 27 21 - 32 mmol/L    Anion gap 6 5 - 15 mmol/L    Glucose 145 (H) 65 - 100 mg/dL    BUN 9 6 - 20 mg/dL    Creatinine 0.64 (L) 0.70 - 1.30 mg/dL    BUN/Creatinine ratio 14 12 - 20      GFR est AA >60 >60 ml/min/1.73m2    GFR est non-AA >60 >60 ml/min/1.73m2    Calcium 9.2 8.5 - 10.1 mg/dL    Bilirubin, total 0.4 0.2 - 1.0 mg/dL    AST (SGOT) 23 15 - 37 U/L    ALT (SGPT) 33 12 - 78 U/L    Alk.  phosphatase 92 45 - 117 U/L    Protein, total 7.0 6.4 - 8.2 g/dL    Albumin 3.3 (L) 3.5 - 5.0 g/dL    Globulin 3.7 2.0 - 4.0 g/dL    A-G Ratio 0.9 (L) 1.1 - 2.2     PTT    Collection Time: 07/05/22  3:17 AM   Result Value Ref Range    aPTT 66.5 (H) 21.2 - 34.1 sec    aPTT, therapeutic range   82 - 109 sec   PTT    Collection Time: 07/05/22  5:24 AM   Result Value Ref Range    aPTT 61.4 (H) 21.2 - 34.1 sec    aPTT, therapeutic range   82 - 109 sec       Results     Procedure Component Value Units Date/Time    CULTURE, BLOOD #1 [[id number]] Collected: 06/30/22 1840    Order Status: Completed Specimen: Blood Updated: 07/04/22 1043     Special Requests: No Special Requests        Culture result: No growth 3 days       CULTURE, BLOOD #2 [[id number]] Collected: 06/30/22 1840    Order Status: Completed Specimen: Blood Updated: 07/04/22 1043     Special Requests: No Special Requests        Culture result: No growth 3 days       CULTURE, Naa Coral STAIN [[id number]] Collected: 06/30/22 1815    Order Status: Canceled Specimen: Wound from Ulcer            Labs:     Recent Labs     07/05/22 0317 07/04/22  0433   WBC 11.6* 12.9*   HGB 13.6 11.7*   HCT 40.5 34.6*    237     Recent Labs     07/05/22 0317 07/04/22  0433 07/03/22  1201    140 138   K 3.9 3.3* 4.4    109* 105   CO2 27 25 27   BUN 9 8 10   CREA 0.64* 0.54* 0.59*   * 108* 130*   CA 9.2 7.6* 8.8     Recent Labs     07/05/22 0317 07/04/22  0433 07/03/22  1201   ALT 33 22 25   AP 92 73 88   TBILI 0.4 0.4 0.6 TP 7.0 6.1* 6.3*   ALB 3.3* 2.6* 3.0*   GLOB 3.7 3.5 3.3     Recent Labs     07/05/22  0524 07/05/22  0317 07/04/22  2153   APTT 61.4* 66.5* 63.8*      No results for input(s): FE, TIBC, PSAT, FERR in the last 72 hours. No results found for: FOL, RBCF   No results for input(s): PH, PCO2, PO2 in the last 72 hours. No results for input(s): CPK, CKNDX, TROIQ in the last 72 hours. No lab exists for component: CPKMB  No results found for: CHOL, CHOLX, CHLST, CHOLV, HDL, HDLP, LDL, LDLC, DLDLP, TGLX, TRIGL, TRIGP, CHHD, CHHDX  Lab Results   Component Value Date/Time    Glucose (POC) 170 (H) 07/04/2022 08:16 PM    Glucose (POC) 127 (H) 07/04/2022 04:25 PM    Glucose (POC) 148 (H) 07/04/2022 11:15 AM    Glucose (POC) 134 (H) 07/04/2022 07:46 AM    Glucose (POC) 138 (H) 07/03/2022 09:07 PM     No results found for: COLOR, APPRN, SPGRU, REFSG, EPHRAIM, PROTU, GLUCU, KETU, BILU, UROU, VIANEY, LEUKU, GLUKE, EPSU, BACTU, WBCU, RBCU, CASTS, UCRY      Assessment:     Right great toe infection  Diabetic foot injury  Arterial Insufficiency  Diabetes Mellitus  Hypertension  GERD  Gout  RA  Smoking  Peripheral vascular disease  Postprocedure diagnosis:  1. 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 2.  Right below-knee runoff with significantly diseased right posterior tibial artery and anterior tibial artery. 1. Marginal erosions are seen surrounding the first MTP joint with a minimal  first MTP joint effusion. Small erosions are seen in the second metatarsal head  and fifth metatarsal head as well. The overall appearance is most consistent  with gout. Inflammatory and infectious arthritides however would be within the  differential.  2. Significant subcutaneous edema      S/p  1. Left common femoral access using ultrasound guidance with a micropuncture kit.   2.  Left common femoral artery access sheath placement using 6 [place] sheath followed by 7 [place] 45 cm destination sheath placement. 3.  Bilateral iliac angiogram supervision and interpretation. 4.  Right common femoral artery first-order angiogram supervision and interpretation. 5.  Right popliteal artery second-order angiogram supervision and interpretation. 6. right superficial femoral artery angioplasty using directional atherectomy with a Hawk 1 LX device. 6.  Right superficial femoral artery long segment balloon PTA angioplasty using various sizes including predilation with a 4 mm balloon, followed by 5 x 150 mm chocolate balloon, 6 x 250 mm balloon, 6 x 120 mm chocolate balloon. 7.  Right superficial femoral artery long segment angioplasty with bare-metal stent placement using ever flex, 6 x 120, 6 x 150, and 6 x 40 mm stent. 8.  Closure of the left common femoral access using Vascade closure system.       Plan:     Patient IV Unasyn 3 g IV every 6 hours  Start on heparin drip  Continue sliding scale insulin  Increase lisinopril to 20 mg daily amlodipine 10 mg daily  Metformin 500 twice a day  Protonix 40 daily      Repeat the labs  PT OT consult    Seen by the vascular surgeon recommend to continue antibiotic for few more days and switch to oral antibiotic on discharge and also switch to Eliquis on discharge      Current Facility-Administered Medications:     lisinopriL (PRINIVIL, ZESTRIL) tablet 20 mg, 20 mg, Oral, DAILY **AND** amLODIPine (NORVASC) tablet 10 mg, 10 mg, Oral, DAILY, Satnam Ferrer MD    HYDROmorphone (DILAUDID) injection 1 mg, 1 mg, IntraVENous, Q4H PRN, Satnam Ferrer MD, 1 mg at 07/05/22 0411    hydrALAZINE (APRESOLINE) 20 mg/mL injection 20 mg, 20 mg, IntraVENous, Q4H PRN, Satnam Ferrer MD, 20 mg at 07/05/22 0010    sodium chloride (NS) flush 5-40 mL, 5-40 mL, IntraVENous, Q8H, Christian Martínez MD, 10 mL at 07/05/22 0617    sodium chloride (NS) flush 5-40 mL, 5-40 mL, IntraVENous, PRN, Christian Martínez MD    heparin 25,000 units in D5W 250 ml infusion, 12-25 Units/kg/hr (Adjusted), IntraVENous, TITRATE, MauricioColleen MD, Last Rate: 28.3 mL/hr at 07/05/22 0717, 30 Units/kg/hr at 07/05/22 0717    heparin (porcine) 1,000 unit/mL injection 4,000 Units, 4,000 Units, IntraVENous, PRN, 4,000 Units at 07/03/22 2209 **OR** heparin (porcine) 1,000 unit/mL injection 2,000 Units, 2,000 Units, IntraVENous, PRN, Mauricio, Colleen Drake MD, 2,000 Units at 07/05/22 8508    colchicine tablet 0.6 mg, 0.6 mg, Oral, DAILY, Vicky West MD, 0.6 mg at 07/04/22 0810    oxyCODONE-acetaminophen (PERCOCET) 5-325 mg per tablet 1 Tablet, 1 Tablet, Oral, Q6H PRN, Satnam Ferrer MD, 1 Tablet at 07/04/22 1730    insulin lispro (HUMALOG) injection, , SubCUTAneous, AC&HS, Cierra Pineda MD, 3 Units at 07/01/22 0909    glucose chewable tablet 16 g, 4 Tablet, Oral, PRN, Mohiuddin, Romana Dallas, MD    glucagon (GLUCAGEN) injection 1 mg, 1 mg, IntraMUSCular, PRN, Mohiuddin, Romana Dallas, MD    acetaminophen (TYLENOL) tablet 650 mg, 650 mg, Oral, Q6H PRN **OR** acetaminophen (TYLENOL) suppository 650 mg, 650 mg, Rectal, Q6H PRN, Mohiuddin, Romana Dallas, MD    polyethylene glycol (MIRALAX) packet 17 g, 17 g, Oral, DAILY PRN, Mohiuddin, Romana Dallas, MD    ondansetron (ZOFRAN ODT) tablet 4 mg, 4 mg, Oral, Q8H PRN **OR** ondansetron (ZOFRAN) injection 4 mg, 4 mg, IntraVENous, Q6H PRN, Satnam Ferrer MD    ampicillin-sulbactam (UNASYN) 3 g in 0.9% sodium chloride (MBP/ADV) 100 mL MBP, 3 g, IntraVENous, Q6H, Satnam Ferrer MD, Last Rate: 200 mL/hr at 07/05/22 0613, 3 g at 07/05/22 8148    metFORMIN (GLUCOPHAGE) tablet 500 mg, 500 mg, Oral, BID WITH MEALS, Satnam Ferrer MD, 500 mg at 07/04/22 1611    pantoprazole (PROTONIX) tablet 40 mg, 40 mg, Oral, ACB, Satnam Ferrer MD, 40 mg at 07/05/22 9730    traMADoL (ULTRAM) tablet 50 mg, 50 mg, Oral, Q6H PRN, Satnam Ferrer MD, 50 mg at 07/01/22 2785

## 2022-07-05 NOTE — PROGRESS NOTES
CM reviewed chart. Patient currently on IV blood thinners. Once medically stable current discharge plans are home self care.

## 2022-07-05 NOTE — PROGRESS NOTES
PROGRESS NOTE      Chief Complaints:  Patient has no new complaints. HPI and  Objective:    Patient says her right foot feels much better. Patient has been elevating his right foot. No fever. No chest pain shortness of breath abdominal pain generalized weakness. Review of Systems:  Rest of the review of system negative, personally reviewed. EXAM:  Visit Vitals  BP (!) 175/82 (BP 1 Location: Left upper arm, BP Patient Position: Semi fowlers)   Pulse 73   Temp 98.2 °F (36.8 °C)   Resp 16   Ht 6' 1\" (1.854 m)   Wt 255 lb (115.7 kg)   SpO2 98%   BMI 33.64 kg/m²       Patient is awake and alert  Head and neck atraumatic, normocephalic. ENT: No hoarse voice  Cardiac system regular rate rhythm. Pulmonary no audible wheeze  Chest wall excursion normal with respiration cycle  Abdomen is soft not particularly distended. Neurologically nonfocal.  Skin is warm and moist.  Psychosocial: Cooperative. Vascular examination as previously noted no changes.     Recent Results (from the past 24 hour(s))   GLUCOSE, POC    Collection Time: 07/04/22 11:15 AM   Result Value Ref Range    Glucose (POC) 148 (H) 65 - 117 mg/dL    Performed by Trenton CUNNINGHAM    PTT    Collection Time: 07/04/22  1:53 PM   Result Value Ref Range    aPTT 51.9 (H) 21.2 - 34.1 sec    aPTT, therapeutic range   82 - 109 sec   GLUCOSE, POC    Collection Time: 07/04/22  4:25 PM   Result Value Ref Range    Glucose (POC) 127 (H) 65 - 117 mg/dL    Performed by Suzanne Fagan    GLUCOSE, POC    Collection Time: 07/04/22  8:16 PM   Result Value Ref Range    Glucose (POC) 170 (H) 65 - 117 mg/dL    Performed by Miah Farley    PTT    Collection Time: 07/04/22  9:53 PM   Result Value Ref Range    aPTT 63.8 (H) 21.2 - 34.1 sec    aPTT, therapeutic range   82 - 109 sec   CBC W/O DIFF    Collection Time: 07/05/22  3:17 AM   Result Value Ref Range    WBC 11.6 (H) 4.1 - 11.1 K/uL    RBC 4.55 4.10 - 5.70 M/uL    HGB 13.6 12.1 - 17.0 g/dL    HCT 40.5 36.6 - 50.3 %    MCV 89.0 80.0 - 99.0 FL    MCH 29.9 26.0 - 34.0 PG    MCHC 33.6 30.0 - 36.5 g/dL    RDW 12.7 11.5 - 14.5 %    PLATELET 580 036 - 657 K/uL    MPV 10.1 8.9 - 12.9 FL    NRBC 0.0 0.0  WBC    ABSOLUTE NRBC 0.00 0.00 - 4.76 K/uL   METABOLIC PANEL, COMPREHENSIVE    Collection Time: 07/05/22  3:17 AM   Result Value Ref Range    Sodium 137 136 - 145 mmol/L    Potassium 3.9 3.5 - 5.1 mmol/L    Chloride 104 97 - 108 mmol/L    CO2 27 21 - 32 mmol/L    Anion gap 6 5 - 15 mmol/L    Glucose 145 (H) 65 - 100 mg/dL    BUN 9 6 - 20 mg/dL    Creatinine 0.64 (L) 0.70 - 1.30 mg/dL    BUN/Creatinine ratio 14 12 - 20      GFR est AA >60 >60 ml/min/1.73m2    GFR est non-AA >60 >60 ml/min/1.73m2    Calcium 9.2 8.5 - 10.1 mg/dL    Bilirubin, total 0.4 0.2 - 1.0 mg/dL    AST (SGOT) 23 15 - 37 U/L    ALT (SGPT) 33 12 - 78 U/L    Alk. phosphatase 92 45 - 117 U/L    Protein, total 7.0 6.4 - 8.2 g/dL    Albumin 3.3 (L) 3.5 - 5.0 g/dL    Globulin 3.7 2.0 - 4.0 g/dL    A-G Ratio 0.9 (L) 1.1 - 2.2     PTT    Collection Time: 07/05/22  3:17 AM   Result Value Ref Range    aPTT 66.5 (H) 21.2 - 34.1 sec    aPTT, therapeutic range   82 - 109 sec   PTT    Collection Time: 07/05/22  5:24 AM   Result Value Ref Range    aPTT 61.4 (H) 21.2 - 34.1 sec    aPTT, therapeutic range   82 - 109 sec       ASSESSMENT:   Patient is 65 y.o. with diagnosis of : Active Problems:    Toe infection (6/30/2022)      Osteomyelitis ([address] (6/30/2022)        PLAN:                 Right foot interrogation shows a strong Doppler signal noted both DP and PT. Swelling is minimal however cellulitis is somewhat better. Great toe tip appears to be more pink       I changed dressing this morning on the great toe area. Continue right foot elevation. Continue IV antibiotic therapy. Continue heparin drip. Patient will switch to Eliquis 10 mg p.o. twice daily for 10 days followed by 5 mg p.o. twice daily upon discharge.     Please keep the patient here a few more days until cellulitis and swelling is improved significantly.

## 2022-07-05 NOTE — PROGRESS NOTES
Bedside shift change report given to DEBBY Reyes (oncoming nurse) by Jamir Potter (offgoing nurse). Report included the following information SBAR.

## 2022-07-05 NOTE — PROGRESS NOTES
Problem: Falls - Risk of  Goal: *Absence of Falls  Description: Document Alyson Don Fall Risk and appropriate interventions in the flowsheet. Outcome: Progressing Towards Goal  Note: Fall Risk Interventions:  Mobility Interventions: Bed/chair exit alarm,OT consult for ADLs,PT Consult for assist device competence,PT Consult for mobility concerns,Patient to call before getting OOB,Utilize walker, cane, or other assistive device         Medication Interventions: Bed/chair exit alarm,Patient to call before getting OOB,Teach patient to arise slowly    Elimination Interventions: Bed/chair exit alarm,Call light in reach,Patient to call for help with toileting needs,Urinal in reach              Problem: Patient Education: Go to Patient Education Activity  Goal: Patient/Family Education  Outcome: Progressing Towards Goal     Problem: Diabetes Self-Management  Goal: *Disease process and treatment process  Description: Define diabetes and identify own type of diabetes; list 3 options for treating diabetes. Outcome: Progressing Towards Goal  Goal: *Incorporating nutritional management into lifestyle  Description: Describe effect of type, amount and timing of food on blood glucose; list 3 methods for planning meals. Outcome: Progressing Towards Goal  Goal: *Incorporating physical activity into lifestyle  Description: State effect of exercise on blood glucose levels. Outcome: Progressing Towards Goal  Goal: *Developing strategies to promote health/change behavior  Description: Define the ABC's of diabetes; identify appropriate screenings, schedule and personal plan for screenings. Outcome: Progressing Towards Goal  Goal: *Using medications safely  Description: State effect of diabetes medications on diabetes; name diabetes medication taking, action and side effects.   Outcome: Progressing Towards Goal  Goal: *Monitoring blood glucose, interpreting and using results  Description: Identify recommended blood glucose targets  and personal targets. Outcome: Progressing Towards Goal  Goal: *Prevention, detection, treatment of acute complications  Description: List symptoms of hyper- and hypoglycemia; describe how to treat low blood sugar and actions for lowering  high blood glucose level. Outcome: Progressing Towards Goal  Goal: *Prevention, detection and treatment of chronic complications  Description: Define the natural course of diabetes and describe the relationship of blood glucose levels to long term complications of diabetes. Outcome: Progressing Towards Goal  Goal: *Developing strategies to address psychosocial issues  Description: Describe feelings about living with diabetes; identify support needed and support network  Outcome: Progressing Towards Goal  Goal: *Insulin pump training  Outcome: Progressing Towards Goal  Goal: *Sick day guidelines  Outcome: Progressing Towards Goal  Goal: *Patient Specific Goal (EDIT GOAL, INSERT TEXT)  Outcome: Progressing Towards Goal     Problem: Patient Education: Go to Patient Education Activity  Goal: Patient/Family Education  Outcome: Progressing Towards Goal     Problem: Pressure Injury - Risk of  Goal: *Prevention of pressure injury  Description: Document Phil Scale and appropriate interventions in the flowsheet.   Outcome: Progressing Towards Goal  Note: Pressure Injury Interventions:       Moisture Interventions: Minimize layers    Activity Interventions: PT/OT evaluation,Increase time out of bed    Mobility Interventions: Float heels,HOB 30 degrees or less,PT/OT evaluation    Nutrition Interventions: Document food/fluid/supplement intake    Friction and Shear Interventions: HOB 30 degrees or less,Minimize layers                Problem: Patient Education: Go to Patient Education Activity  Goal: Patient/Family Education  Outcome: Progressing Towards Goal     Problem: Patient Education: Go to Patient Education Activity  Goal: Patient/Family Education  Outcome: Progressing Towards Goal

## 2022-07-06 LAB
APTT PPP: 32.1 SEC (ref 21.2–34.1)
APTT PPP: 88.5 SEC (ref 21.2–34.1)
APTT PPP: 98.4 SEC (ref 21.2–34.1)
GLUCOSE BLD STRIP.AUTO-MCNC: 124 MG/DL (ref 65–117)
GLUCOSE BLD STRIP.AUTO-MCNC: 127 MG/DL (ref 65–117)
GLUCOSE BLD STRIP.AUTO-MCNC: 129 MG/DL (ref 65–117)
GLUCOSE BLD STRIP.AUTO-MCNC: 144 MG/DL (ref 65–117)
GLUCOSE BLD STRIP.AUTO-MCNC: 187 MG/DL (ref 65–117)
PERFORMED BY, TECHID: ABNORMAL
THERAPEUTIC RANGE,PTTT: ABNORMAL SEC (ref 82–109)
THERAPEUTIC RANGE,PTTT: ABNORMAL SEC (ref 82–109)
THERAPEUTIC RANGE,PTTT: NORMAL SEC (ref 82–109)

## 2022-07-06 PROCEDURE — 74011250637 HC RX REV CODE- 250/637: Performed by: INTERNAL MEDICINE

## 2022-07-06 PROCEDURE — 74011250637 HC RX REV CODE- 250/637: Performed by: FAMILY MEDICINE

## 2022-07-06 PROCEDURE — 36415 COLL VENOUS BLD VENIPUNCTURE: CPT

## 2022-07-06 PROCEDURE — 94762 N-INVAS EAR/PLS OXIMTRY CONT: CPT

## 2022-07-06 PROCEDURE — 82962 GLUCOSE BLOOD TEST: CPT

## 2022-07-06 PROCEDURE — 65270000029 HC RM PRIVATE

## 2022-07-06 PROCEDURE — 85730 THROMBOPLASTIN TIME PARTIAL: CPT

## 2022-07-06 PROCEDURE — 74011000250 HC RX REV CODE- 250: Performed by: SURGERY

## 2022-07-06 PROCEDURE — 74011000258 HC RX REV CODE- 258: Performed by: FAMILY MEDICINE

## 2022-07-06 PROCEDURE — 74011250636 HC RX REV CODE- 250/636: Performed by: SURGERY

## 2022-07-06 PROCEDURE — 99232 SBSQ HOSP IP/OBS MODERATE 35: CPT | Performed by: SURGERY

## 2022-07-06 PROCEDURE — 74011250636 HC RX REV CODE- 250/636: Performed by: FAMILY MEDICINE

## 2022-07-06 PROCEDURE — 99232 SBSQ HOSP IP/OBS MODERATE 35: CPT | Performed by: INTERNAL MEDICINE

## 2022-07-06 RX ORDER — GABAPENTIN 100 MG/1
200 CAPSULE ORAL 2 TIMES DAILY
Status: DISCONTINUED | OUTPATIENT
Start: 2022-07-06 | End: 2022-07-08 | Stop reason: HOSPADM

## 2022-07-06 RX ADMIN — COLCHICINE 0.6 MG: 0.6 TABLET, FILM COATED ORAL at 08:46

## 2022-07-06 RX ADMIN — HYDROMORPHONE HYDROCHLORIDE 1 MG: 1 INJECTION, SOLUTION INTRAMUSCULAR; INTRAVENOUS; SUBCUTANEOUS at 16:34

## 2022-07-06 RX ADMIN — LISINOPRIL 20 MG: 20 TABLET ORAL at 08:46

## 2022-07-06 RX ADMIN — PANTOPRAZOLE SODIUM 40 MG: 40 TABLET, DELAYED RELEASE ORAL at 06:27

## 2022-07-06 RX ADMIN — HEPARIN SODIUM AND DEXTROSE 34 UNITS/KG/HR: 10000; 5 INJECTION INTRAVENOUS at 00:18

## 2022-07-06 RX ADMIN — METFORMIN HYDROCHLORIDE 500 MG: 500 TABLET ORAL at 16:34

## 2022-07-06 RX ADMIN — SODIUM CHLORIDE, PRESERVATIVE FREE 10 ML: 5 INJECTION INTRAVENOUS at 06:33

## 2022-07-06 RX ADMIN — SODIUM CHLORIDE, PRESERVATIVE FREE 10 ML: 5 INJECTION INTRAVENOUS at 16:36

## 2022-07-06 RX ADMIN — HYDROMORPHONE HYDROCHLORIDE 1 MG: 1 INJECTION, SOLUTION INTRAMUSCULAR; INTRAVENOUS; SUBCUTANEOUS at 02:28

## 2022-07-06 RX ADMIN — OXYCODONE AND ACETAMINOPHEN 1 TABLET: 5; 325 TABLET ORAL at 08:50

## 2022-07-06 RX ADMIN — AMPICILLIN SODIUM AND SULBACTAM SODIUM 3 G: 2; 1 INJECTION, POWDER, FOR SOLUTION INTRAMUSCULAR; INTRAVENOUS at 16:33

## 2022-07-06 RX ADMIN — GABAPENTIN 200 MG: 100 CAPSULE ORAL at 20:36

## 2022-07-06 RX ADMIN — HYDROMORPHONE HYDROCHLORIDE 1 MG: 1 INJECTION, SOLUTION INTRAMUSCULAR; INTRAVENOUS; SUBCUTANEOUS at 11:21

## 2022-07-06 RX ADMIN — HYDROMORPHONE HYDROCHLORIDE 1 MG: 1 INJECTION, SOLUTION INTRAMUSCULAR; INTRAVENOUS; SUBCUTANEOUS at 20:35

## 2022-07-06 RX ADMIN — AMPICILLIN SODIUM AND SULBACTAM SODIUM 3 G: 2; 1 INJECTION, POWDER, FOR SOLUTION INTRAMUSCULAR; INTRAVENOUS at 02:28

## 2022-07-06 RX ADMIN — OXYCODONE AND ACETAMINOPHEN 1 TABLET: 5; 325 TABLET ORAL at 22:23

## 2022-07-06 RX ADMIN — HEPARIN SODIUM AND DEXTROSE 38 UNITS/KG/HR: 10000; 5 INJECTION INTRAVENOUS at 11:28

## 2022-07-06 RX ADMIN — HEPARIN SODIUM AND DEXTROSE 38 UNITS/KG/HR: 10000; 5 INJECTION INTRAVENOUS at 20:08

## 2022-07-06 RX ADMIN — AMLODIPINE BESYLATE 10 MG: 5 TABLET ORAL at 08:46

## 2022-07-06 RX ADMIN — METFORMIN HYDROCHLORIDE 500 MG: 500 TABLET ORAL at 08:46

## 2022-07-06 RX ADMIN — AMPICILLIN SODIUM AND SULBACTAM SODIUM 3 G: 2; 1 INJECTION, POWDER, FOR SOLUTION INTRAMUSCULAR; INTRAVENOUS at 08:51

## 2022-07-06 RX ADMIN — HEPARIN SODIUM 4000 UNITS: 1000 INJECTION INTRAVENOUS; SUBCUTANEOUS at 07:23

## 2022-07-06 RX ADMIN — SODIUM CHLORIDE, PRESERVATIVE FREE 10 ML: 5 INJECTION INTRAVENOUS at 21:18

## 2022-07-06 RX ADMIN — HYDROMORPHONE HYDROCHLORIDE 1 MG: 1 INJECTION, SOLUTION INTRAMUSCULAR; INTRAVENOUS; SUBCUTANEOUS at 06:27

## 2022-07-06 NOTE — PROGRESS NOTES
Infectious Disease Progress Note           Subjective:   Pt seen and examined at bedside. Stable, denies new complaints, no acute events since last seen   Objective:   Physical Exam:     Visit Vitals  BP (!) 144/74 Comment: RN notified   Pulse 64   Temp 97.6 °F (36.4 °C)   Resp 18   Ht 6' 1\" (1.854 m)   Wt 255 lb (115.7 kg)   SpO2 96%   BMI 33.64 kg/m²      O2 Device: None (Room air)    Temp (24hrs), Av.2 °F (36.8 °C), Min:97.6 °F (36.4 °C), Max:99.2 °F (37.3 °C)    No intake/output data recorded.  1901 -  0700  In: 2100.5 [P.O.:1000;  I.V.:1100.5]  Out: 950 [Urine:950]    General: NAD, AAO x 4  HEENT: FLOYD, Moist mucosa   Lungs: CTA b/l, decreased at the bases, no wheeze/rhonchi   Heart: S1S2+, RRR, no murmur  Abdo: Soft, NT, ND, +BS   : No herbert cath   Exts: Right great toe swelling, diffuse right foot erythema and erythema, minimal drainage from right great toe   Skin: No pressure ulcers or andre     Data Review:       Recent Days:  Recent Labs     22  0433   WBC 11.6* 12.9*   HGB 13.6 11.7*   HCT 40.5 34.6*    237     Recent Labs     22   BUN 9 8   CREA 0.64* 0.54*       Lab Results   Component Value Date/Time    C-Reactive protein 2.79 (H) 2022 06:09 AM        Microbiology     Results     Procedure Component Value Units Date/Time    CULTURE, BLOOD #1 [[id number]] Collected: 22    Order Status: Completed Specimen: Blood Updated: 22     Special Requests: No Special Requests        Culture result: No growth 4 days       CULTURE, BLOOD #2 [[id number]] Collected: 22    Order Status: Completed Specimen: Blood Updated: 22     Special Requests: No Special Requests        Culture result: No growth 4 days       CULTURE, Wilfrid Bruenr STAIN [[id number]] Collected: 22 2259    Order Status: Canceled Specimen: Wound from Ulcer          Diagnostics   CXR Results  (Last 48 hours) None         Assessment/Plan     1. Right great toe infection, Findings on MRI consistent w gouty arthritis, no reports of osteomyelitis       Stable ulceration involving base of right great toe, no sig drainage from ulcer      Afebrile, leukocytosis trending down on routine labs       Still w sig right foot erythema/edema, no warmth       Continue on Unasyn, may d/c on Augmentin 875 my x 14 days when ready      No indication for IV antibiotics since no reports of osteomyelitis on MRI       Will need close f/u upon d/c     2. H/o gouty arthritis, Continue on Colchicine     3. PAD, Abnormal ANTIONETTE, S/p arteriogram by Dr Carmina Hall, on 07/03       4. H/o DM, BS controlled per pt, A1C of 7.7, BS mg per primary     5.  Right foot pain, multifactorial etiology, continue current pain meds, gabapentin added for neuropathic pain     Cleared for d/c in AM from ID stand point       Krysta Anderson MD    7/6/2022

## 2022-07-06 NOTE — PROGRESS NOTES
Spiritual Care Assessment/Progress Note  [facility]      NAME: Zara Epstein      MRN: [id number]  AGE: 65 y.o. SEX: male  Evangelical Affiliation: Other   Language: English     7/6/2022     Total Time (in minutes): 28     Spiritual Assessment begun in [address] through conversation with:         [x]Patient        [x] Family    [] Friend(s)        Reason for Consult: Initial/Spiritual assessment, patient floor     Spiritual beliefs: (Please include comment if needed)     [x] Identifies with a rene tradition:  Eliud       [x] Supported by a rene community:            [] Claims no spiritual orientation:           [] Seeking spiritual identity:                [] Adheres to an individual form of spirituality:           [] Not able to assess:                           Identified resources for coping:      [x] Prayer                               [] Music                  [] Guided Imagery     [x] Family/friends                 [] Pet visits     [] Devotional reading                         [] Unknown     [x] Other: Many hobbies                                          Interventions offered during this visit: (See comments for more details)    Patient Interventions: Affirmation of emotions/emotional suffering,Affirmation of rene,Bridging,Catharsis/review of pertinent events in supportive environment,Coping skills reviewed/reinforced,Iconic (affirming the presence of God/Higher Power),Life review/legacy,Initial/Spiritual assessment, patient floor,Normalization of emotional/spiritual concerns,Prayer (actual)     Family/Friend(s):  Affirmation of emotions/emotional suffering,Bridging,Catharsis/review of pertinent events in supportive environment,Coping skills reviewed/reinforced,Affirmation of rene,Initial Assessment,Life review/legacy,Normalization of emotional/spiritual concerns,Prayer (actual)     Plan of Care:     [] Support spiritual and/or cultural needs    [] Support AMD and/or advance care planning process      [] Support grieving process   [] Coordinate Rites and/or Rituals    [] Coordination with community clergy   [] No spiritual needs identified at this time   [] Detailed Plan of Care below (See Comments)  [] Make referral to Music Therapy  [] Make referral to Pet Therapy     [] Make referral to Addiction services  [] Make referral to [facility]  [] Make referral to Spiritual Care Partner  [] No future visits requested        [x] Contact Spiritual Care for further referrals     Comments:  Visited patient in [address] [facility] for initial assessment. Patient and wife were present during the visit. Patient shared about his medical situation and future care plans for which he sounded hopeful. Stated they have good support of Worship and the  who has visited already. Shared about his numberous interests and hobbies which he hoped to return to. Also shared about his Worship members who is hospitalized here and requested to visit him if able. Provided chaplaincy education and supportive presence while listening with empathy and exploring their needs. Normalized their emotions as well as affirmed health care plans, familial support, spiritual resources and self-care needs. Offered and provided prayer per request and advised of  availability. Contact chaplains for further referrals. Chaplain Edwige Stewart M.Div.    can be reached by calling the  at [facility]  [phone]

## 2022-07-06 NOTE — PROGRESS NOTES
Bedside shift change report given to [facility] (oncoming nurse) by Sonia Condon (offgoing nurse). Report included the following information SBAR.

## 2022-07-06 NOTE — PROGRESS NOTES
General Daily Progress Note          Patient Name:   Dennis Tejada       YOB: 1960       Age:  65 y.o. Admit Date: 6/30/2022      Subjective:       Dennis Tejada is a 65 y.o. male with a significant PMH of HTN, lifelong 1.5ppd smoker, and Diabetes  KHUSHI and Nydia Stout presents to the ED today with a R great toe wound. Started 2 weeks ago treated with oral antibiotics the patient states that the wound appeared 5 days ago when it \"burst\" from swelling with light yellow discharge. He has taken Rocefin and Clindamycin for the wound with subjective improvement. The wound is not acutely painful but he has a small amount of pain in the MTP joint. Patient denies other symptoms associated with the wound. Patient also treated for gout        Patient alert awake pain is much better control      Postprocedure diagnosis:  1. 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 2.  Right below-knee runoff with significantly diseased right posterior tibial artery and anterior tibial artery. s/p    1. Left common femoral access using ultrasound guidance with a micropuncture kit. 2.  Left common femoral artery access sheath placement using 6 [place] sheath followed by 7 Moz[place]ican 45 cm destination sheath placement. 3.  Bilateral iliac angiogram supervision and interpretation. 4.  Right common femoral artery first-order angiogram supervision and interpretation. 5.  Right popliteal artery second-order angiogram supervision and interpretation. 6. right superficial femoral artery angioplasty using directional atherectomy with a Hawk 1 LX device. 6.  Right superficial femoral artery long segment balloon PTA angioplasty using various sizes including predilation with a 4 mm balloon, followed by 5 x 150 mm chocolate balloon, 6 x 250 mm balloon, 6 x 120 mm chocolate balloon.   7.  Right superficial femoral artery long segment angioplasty with bare-metal stent placement using ever flex, 6 x 120, 6 x 150, and 6 x 40 mm stent. 8.  Closure of the left common femoral access using Vascade closure system. Blood pressure better now  Denies any chest pain or shortness of breath no nausea no vomiting      Objective:     Visit Vitals  BP (!) 146/81 (BP 1 Location: Right upper arm, BP Patient Position: Sitting)   Pulse 69   Temp 97.8 °F (36.6 °C)   Resp 16   Ht 6' 1\" (1.854 m)   Wt 115.7 kg (255 lb)   SpO2 95%   BMI 33.64 kg/m²        Recent Results (from the past 24 hour(s))   GLUCOSE, POC    Collection Time: 07/05/22 11:54 AM   Result Value Ref Range    Glucose (POC) 161 (H) 65 - 117 mg/dL    Performed by Jiles Lennox    PTT    Collection Time: 07/05/22 12:39 PM   Result Value Ref Range    aPTT 72.2 (H) 21.2 - 34.1 sec    aPTT, therapeutic range   82 - 109 sec   GLUCOSE, POC    Collection Time: 07/05/22  4:42 PM   Result Value Ref Range    Glucose (POC) 125 (H) 65 - 117 mg/dL    Performed by Reina Blanco    GLUCOSE, POC    Collection Time: 07/05/22  7:13 PM   Result Value Ref Range    Glucose (POC) 150 (H) 65 - 117 mg/dL    Performed by Luisa Cerda    PTT    Collection Time: 07/05/22  9:17 PM   Result Value Ref Range    aPTT 68.0 (H) 21.2 - 34.1 sec    aPTT, therapeutic range   82 - 109 sec   PTT    Collection Time: 07/06/22  5:29 AM   Result Value Ref Range    aPTT 32.1 21.2 - 34.1 sec    aPTT, therapeutic range   82 - 109 sec   GLUCOSE, POC    Collection Time: 07/06/22  7:42 AM   Result Value Ref Range    Glucose (POC) 129 (H) 65 - 117 mg/dL    Performed by Guanaco Augustine      [unfilled]      Review of Systems    Constitutional: Negative for chills and fever. HENT: Negative. Eyes: Negative. Respiratory: Negative. Cardiovascular: Negative. Gastrointestinal: Negative for abdominal pain and nausea. Skin: Negative. Neurological: Negative. Physical Exam:      Constitutional: pt is oriented to person, place, and time. HENT:   Head: Normocephalic and atraumatic.    Eyes: Pupils are equal, round, and reactive to light. EOM are normal.   Cardiovascular: Normal rate, regular rhythm and normal heart sounds. Pulmonary/Chest: Breath sounds normal. No wheezes. No rales. Exhibits no tenderness. Abdominal: Soft. Bowel sounds are normal. There is no abdominal tenderness. There is no rebound and no guarding. Musculoskeletal: Normal range of motion. Neurological: pt is alert and oriented to person, place, and time. XR FLUOROSCOPY UNDER 60 MINUTES   Final Result   Status post right lower extremity angiogram and stenting. INTERPRETATION PROVIDED FOR COMPLIANCE ONLY AT NO CHARGE        MRI FOOT RT WO CONT   Final Result   1. Marginal erosions are seen surrounding the first MTP joint with a minimal   first MTP joint effusion. Small erosions are seen in the second metatarsal head   and fifth metatarsal head as well. The overall appearance is most consistent   with gout. Inflammatory and infectious arthritides however would be within the   differential.   2. Significant subcutaneous edema         ANKLE BRACHIAL INDEX   Final Result      XR FOOT RT MIN 3 V   Final Result   Cortical cystic erosive changes involving the distal aspects of the   first and fifth metatarsals and base of the proximal phalanx of the great toe   compatible with chronic degenerative/osteoarthritic changes. Consider gout.            Recent Results (from the past 24 hour(s))   GLUCOSE, POC    Collection Time: 07/05/22 11:54 AM   Result Value Ref Range    Glucose (POC) 161 (H) 65 - 117 mg/dL    Performed by Tiara Mccarty    PTT    Collection Time: 07/05/22 12:39 PM   Result Value Ref Range    aPTT 72.2 (H) 21.2 - 34.1 sec    aPTT, therapeutic range   82 - 109 sec   GLUCOSE, POC    Collection Time: 07/05/22  4:42 PM   Result Value Ref Range    Glucose (POC) 125 (H) 65 - 117 mg/dL    Performed by Silverio Patel    GLUCOSE, POC    Collection Time: 07/05/22  7:13 PM   Result Value Ref Range    Glucose (POC) 150 (H) 65 - 117 mg/dL    Performed by Promise Barrientos    PTT    Collection Time: 07/05/22  9:17 PM   Result Value Ref Range    aPTT 68.0 (H) 21.2 - 34.1 sec    aPTT, therapeutic range   82 - 109 sec   PTT    Collection Time: 07/06/22  5:29 AM   Result Value Ref Range    aPTT 32.1 21.2 - 34.1 sec    aPTT, therapeutic range   82 - 109 sec   GLUCOSE, POC    Collection Time: 07/06/22  7:42 AM   Result Value Ref Range    Glucose (POC) 129 (H) 65 - 117 mg/dL    Performed by Kathryn Hines        Results     Procedure Component Value Units Date/Time    CULTURE, BLOOD #1 [[id number]] Collected: 06/30/22 1840    Order Status: Completed Specimen: Blood Updated: 07/05/22 0918     Special Requests: No Special Requests        Culture result: No growth 4 days       CULTURE, BLOOD #2 [[id number]] Collected: 06/30/22 1840    Order Status: Completed Specimen: Blood Updated: 07/05/22 0918     Special Requests: No Special Requests        Culture result: No growth 4 days       CULTURE, Navarro Kelch STAIN [[id number]] Collected: 06/30/22 1815    Order Status: Canceled Specimen: Wound from Ulcer            Labs:     Recent Labs     07/05/22 0317 07/04/22  0433   WBC 11.6* 12.9*   HGB 13.6 11.7*   HCT 40.5 34.6*    237     Recent Labs     07/05/22 0317 07/04/22  0433 07/03/22  1201    140 138   K 3.9 3.3* 4.4    109* 105   CO2 27 25 27   BUN 9 8 10   CREA 0.64* 0.54* 0.59*   * 108* 130*   CA 9.2 7.6* 8.8     Recent Labs     07/05/22 0317 07/04/22  0433 07/03/22  1201   ALT 33 22 25   AP 92 73 88   TBILI 0.4 0.4 0.6   TP 7.0 6.1* 6.3*   ALB 3.3* 2.6* 3.0*   GLOB 3.7 3.5 3.3     Recent Labs     07/06/22  0529 07/05/22 2117 07/05/22  1239   APTT 32.1 68.0* 72.2*      No results for input(s): FE, TIBC, PSAT, FERR in the last 72 hours. No results found for: FOL, RBCF   No results for input(s): PH, PCO2, PO2 in the last 72 hours. No results for input(s): CPK, CKNDX, TROIQ in the last 72 hours.     No lab exists for component: CPKMB  No results found for: CHOL, CHOLX, CHLST, CHOLV, HDL, HDLP, LDL, LDLC, DLDLP, TGLX, TRIGL, TRIGP, CHHD, CHHDX  Lab Results   Component Value Date/Time    Glucose (POC) 129 (H) 07/06/2022 07:42 AM    Glucose (POC) 150 (H) 07/05/2022 07:13 PM    Glucose (POC) 125 (H) 07/05/2022 04:42 PM    Glucose (POC) 161 (H) 07/05/2022 11:54 AM    Glucose (POC) 168 (H) 07/05/2022 09:07 AM     No results found for: COLOR, APPRN, SPGRU, REFSG, EPHRAIM, PROTU, GLUCU, KETU, BILU, UROU, VIANEY, LEUKU, GLUKE, EPSU, BACTU, WBCU, RBCU, CASTS, UCRY      Assessment:     Right great toe infection  Diabetic foot injury  Arterial Insufficiency  Diabetes Mellitus  Hypertension  GERD  Gout  RA  Smoking  Peripheral vascular disease  Postprocedure diagnosis:  1. 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 2.  Right below-knee runoff with significantly diseased right posterior tibial artery and anterior tibial artery. 1. Marginal erosions are seen surrounding the first MTP joint with a minimal  first MTP joint effusion. Small erosions are seen in the second metatarsal head  and fifth metatarsal head as well. The overall appearance is most consistent  with gout. Inflammatory and infectious arthritides however would be within the  differential.  2. Significant subcutaneous edema      S/p  1. Left common femoral access using ultrasound guidance with a micropuncture kit. 2.  Left common femoral artery access sheath placement using 6 [place] sheath followed by 7 [place] 45 cm destination sheath placement. 3.  Bilateral iliac angiogram supervision and interpretation. 4.  Right common femoral artery first-order angiogram supervision and interpretation. 5.  Right popliteal artery second-order angiogram supervision and interpretation. 6. right superficial femoral artery angioplasty using directional atherectomy with a [employer] 1 LX device.   6.  Right superficial femoral artery long segment balloon PTA angioplasty using various sizes including predilation with a 4 mm balloon, followed by 5 x 150 mm chocolate balloon, 6 x 250 mm balloon, 6 x 120 mm chocolate balloon. 7.  Right superficial femoral artery long segment angioplasty with bare-metal stent placement using ever flex, 6 x 120, 6 x 150, and 6 x 40 mm stent. 8.  Closure of the left common femoral access using Vascade closure system.       Plan:     Patient IV Unasyn 3 g IV every 6 hours  on heparin drip  Continue sliding scale insulin  Increase lisinopril to 20 mg daily amlodipine 10 mg daily  Metformin 500 twice a day  Protonix 40 daily      Repeat the labs  PT OT consult    Seen by the vascular surgeon recommend to continue antibiotic for few more days and switch to oral antibiotic on discharge and also switch to Eliquis on discharge    Possible discharge in next 24 hours      Current Facility-Administered Medications:     lisinopriL (PRINIVIL, ZESTRIL) tablet 20 mg, 20 mg, Oral, DAILY, 20 mg at 07/06/22 0846 **AND** amLODIPine (NORVASC) tablet 10 mg, 10 mg, Oral, DAILY, Satnam Ferrer MD, 10 mg at 07/06/22 0846    HYDROmorphone (DILAUDID) injection 1 mg, 1 mg, IntraVENous, Q4H PRN, Satnam Ferrer MD, 1 mg at 07/06/22 6370    hydrALAZINE (APRESOLINE) 20 mg/mL injection 20 mg, 20 mg, IntraVENous, Q4H PRN, Satnam Ferrer MD, 20 mg at 07/05/22 0010    sodium chloride (NS) flush 5-40 mL, 5-40 mL, IntraVENous, Q8H, Ermias Martínez MD, 10 mL at 07/06/22 0524    sodium chloride (NS) flush 5-40 mL, 5-40 mL, IntraVENous, PRN, Ermias Martínez MD, 10 mL at 07/05/22 1541    heparin 25,000 units in D5W 250 ml infusion, 12-25 Units/kg/hr (Adjusted), IntraVENous, TITRATE, Ermias Martínez MD, Last Rate: 35.8 mL/hr at 07/06/22 0719, 38 Units/kg/hr at 07/06/22 0719    heparin (porcine) 1,000 unit/mL injection 4,000 Units, 4,000 Units, IntraVENous, PRN, 4,000 Units at 07/06/22 0723 **OR** heparin (porcine) 1,000 unit/mL injection 2,000 Units, 2,000 Units, IntraVENous, PRN, Mauricio, Kate Cee MD, 2,000 Units at 07/05/22 2226    colchicine tablet 0.6 mg, 0.6 mg, Oral, DAILY, Vicky West MD, 0.6 mg at 07/06/22 0846    oxyCODONE-acetaminophen (PERCOCET) 5-325 mg per tablet 1 Tablet, 1 Tablet, Oral, Q6H PRN, Satnam Ferrer MD, 1 Tablet at 07/06/22 0850    insulin lispro (HUMALOG) injection, , SubCUTAneous, AC&HS, Oleg Loyola MD, 3 Units at 07/01/22 0909    glucose chewable tablet 16 g, 4 Tablet, Oral, PRN, Hilary Ferrer MD    glucagon (GLUCAGEN) injection 1 mg, 1 mg, IntraMUSCular, PRN, Hilary Ferrer MD    acetaminophen (TYLENOL) tablet 650 mg, 650 mg, Oral, Q6H PRN **OR** acetaminophen (TYLENOL) suppository 650 mg, 650 mg, Rectal, Q6H PRN, Hilary Ferrer MD    polyethylene glycol (MIRALAX) packet 17 g, 17 g, Oral, DAILY PRN, Hilary Ferrer MD    ondansetron (ZOFRAN ODT) tablet 4 mg, 4 mg, Oral, Q8H PRN **OR** ondansetron (ZOFRAN) injection 4 mg, 4 mg, IntraVENous, Q6H PRN, Satnam Ferrer MD    ampicillin-sulbactam (UNASYN) 3 g in 0.9% sodium chloride (MBP/ADV) 100 mL MBP, 3 g, IntraVENous, Q6H, Satnam Ferrer MD, Last Rate: 200 mL/hr at 07/06/22 0851, 3 g at 07/06/22 0851    metFORMIN (GLUCOPHAGE) tablet 500 mg, 500 mg, Oral, BID WITH MEALS, Satnam Ferrer MD, 500 mg at 07/06/22 0846    pantoprazole (PROTONIX) tablet 40 mg, 40 mg, Oral, ACB, Satnam Ferrer MD, 40 mg at 07/06/22 4084    traMADoL (ULTRAM) tablet 50 mg, 50 mg, Oral, Q6H PRN, Satnam Ferrer MD, 50 mg at 07/01/22 7647

## 2022-07-07 LAB
APTT PPP: 52.3 SEC (ref 21.2–34.1)
BACTERIA SPEC CULT: NORMAL
BACTERIA SPEC CULT: NORMAL
GLUCOSE BLD STRIP.AUTO-MCNC: 144 MG/DL (ref 65–117)
GLUCOSE BLD STRIP.AUTO-MCNC: 144 MG/DL (ref 65–117)
GLUCOSE BLD STRIP.AUTO-MCNC: 145 MG/DL (ref 65–117)
GLUCOSE BLD STRIP.AUTO-MCNC: 165 MG/DL (ref 65–117)
PERFORMED BY, TECHID: ABNORMAL
SPECIAL REQUESTS,SREQ: NORMAL
SPECIAL REQUESTS,SREQ: NORMAL
THERAPEUTIC RANGE,PTTT: ABNORMAL SEC (ref 82–109)

## 2022-07-07 PROCEDURE — 94762 N-INVAS EAR/PLS OXIMTRY CONT: CPT

## 2022-07-07 PROCEDURE — 36415 COLL VENOUS BLD VENIPUNCTURE: CPT

## 2022-07-07 PROCEDURE — 82962 GLUCOSE BLOOD TEST: CPT

## 2022-07-07 PROCEDURE — 74011250636 HC RX REV CODE- 250/636: Performed by: SURGERY

## 2022-07-07 PROCEDURE — 74011250637 HC RX REV CODE- 250/637: Performed by: INTERNAL MEDICINE

## 2022-07-07 PROCEDURE — 85730 THROMBOPLASTIN TIME PARTIAL: CPT

## 2022-07-07 PROCEDURE — 74011000250 HC RX REV CODE- 250: Performed by: SURGERY

## 2022-07-07 PROCEDURE — 99232 SBSQ HOSP IP/OBS MODERATE 35: CPT | Performed by: INTERNAL MEDICINE

## 2022-07-07 PROCEDURE — 99232 SBSQ HOSP IP/OBS MODERATE 35: CPT | Performed by: SURGERY

## 2022-07-07 PROCEDURE — 74011250636 HC RX REV CODE- 250/636: Performed by: INTERNAL MEDICINE

## 2022-07-07 PROCEDURE — 65270000029 HC RM PRIVATE

## 2022-07-07 PROCEDURE — 74011000258 HC RX REV CODE- 258: Performed by: FAMILY MEDICINE

## 2022-07-07 PROCEDURE — 74011250636 HC RX REV CODE- 250/636: Performed by: FAMILY MEDICINE

## 2022-07-07 PROCEDURE — 74011250637 HC RX REV CODE- 250/637: Performed by: FAMILY MEDICINE

## 2022-07-07 RX ORDER — HYDROMORPHONE HYDROCHLORIDE 1 MG/ML
1 INJECTION, SOLUTION INTRAMUSCULAR; INTRAVENOUS; SUBCUTANEOUS
Status: DISCONTINUED | OUTPATIENT
Start: 2022-07-07 | End: 2022-07-08 | Stop reason: HOSPADM

## 2022-07-07 RX ORDER — CLINDAMYCIN PHOSPHATE 900 MG/50ML
900 INJECTION INTRAVENOUS EVERY 8 HOURS
Status: DISCONTINUED | OUTPATIENT
Start: 2022-07-07 | End: 2022-07-08 | Stop reason: HOSPADM

## 2022-07-07 RX ADMIN — SODIUM CHLORIDE, PRESERVATIVE FREE 10 ML: 5 INJECTION INTRAVENOUS at 22:20

## 2022-07-07 RX ADMIN — METFORMIN HYDROCHLORIDE 500 MG: 500 TABLET ORAL at 08:15

## 2022-07-07 RX ADMIN — HYDROMORPHONE HYDROCHLORIDE 1 MG: 1 INJECTION, SOLUTION INTRAMUSCULAR; INTRAVENOUS; SUBCUTANEOUS at 12:27

## 2022-07-07 RX ADMIN — GABAPENTIN 200 MG: 100 CAPSULE ORAL at 08:15

## 2022-07-07 RX ADMIN — COLCHICINE 0.6 MG: 0.6 TABLET, FILM COATED ORAL at 08:15

## 2022-07-07 RX ADMIN — SODIUM CHLORIDE, PRESERVATIVE FREE 10 ML: 5 INJECTION INTRAVENOUS at 06:02

## 2022-07-07 RX ADMIN — HYDROMORPHONE HYDROCHLORIDE 1 MG: 1 INJECTION, SOLUTION INTRAMUSCULAR; INTRAVENOUS; SUBCUTANEOUS at 17:35

## 2022-07-07 RX ADMIN — SODIUM CHLORIDE, PRESERVATIVE FREE 10 ML: 5 INJECTION INTRAVENOUS at 14:25

## 2022-07-07 RX ADMIN — HYDROMORPHONE HYDROCHLORIDE 1 MG: 1 INJECTION, SOLUTION INTRAMUSCULAR; INTRAVENOUS; SUBCUTANEOUS at 02:10

## 2022-07-07 RX ADMIN — HEPARIN SODIUM AND DEXTROSE 38 UNITS/KG/HR: 10000; 5 INJECTION INTRAVENOUS at 03:19

## 2022-07-07 RX ADMIN — OXYCODONE AND ACETAMINOPHEN 1 TABLET: 5; 325 TABLET ORAL at 14:22

## 2022-07-07 RX ADMIN — PANTOPRAZOLE SODIUM 40 MG: 40 TABLET, DELAYED RELEASE ORAL at 08:15

## 2022-07-07 RX ADMIN — AMLODIPINE BESYLATE 10 MG: 5 TABLET ORAL at 08:15

## 2022-07-07 RX ADMIN — LISINOPRIL 20 MG: 20 TABLET ORAL at 08:15

## 2022-07-07 RX ADMIN — HEPARIN SODIUM AND DEXTROSE 38 UNITS/KG/HR: 10000; 5 INJECTION INTRAVENOUS at 20:30

## 2022-07-07 RX ADMIN — METFORMIN HYDROCHLORIDE 500 MG: 500 TABLET ORAL at 17:35

## 2022-07-07 RX ADMIN — HEPARIN SODIUM AND DEXTROSE 38 UNITS/KG/HR: 10000; 5 INJECTION INTRAVENOUS at 12:00

## 2022-07-07 RX ADMIN — AMPICILLIN SODIUM AND SULBACTAM SODIUM 3 G: 2; 1 INJECTION, POWDER, FOR SOLUTION INTRAMUSCULAR; INTRAVENOUS at 00:00

## 2022-07-07 RX ADMIN — HYDROMORPHONE HYDROCHLORIDE 1 MG: 1 INJECTION, SOLUTION INTRAMUSCULAR; INTRAVENOUS; SUBCUTANEOUS at 21:22

## 2022-07-07 RX ADMIN — CLINDAMYCIN PHOSPHATE 900 MG: 900 INJECTION, SOLUTION INTRAVENOUS at 17:35

## 2022-07-07 RX ADMIN — GABAPENTIN 200 MG: 100 CAPSULE ORAL at 21:23

## 2022-07-07 RX ADMIN — HYDROMORPHONE HYDROCHLORIDE 1 MG: 1 INJECTION, SOLUTION INTRAMUSCULAR; INTRAVENOUS; SUBCUTANEOUS at 08:17

## 2022-07-07 NOTE — PROGRESS NOTES
General Daily Progress Note          Patient Name:   Meg Genao       YOB: 1960       Age:  65 y.o. Admit Date: 6/30/2022      Subjective:       Meg Genao is a 65 y.o. male with a significant PMH of HTN, lifelong 1.5ppd smoker, and Diabetes  RA and Manoj Sylvia presents to the ED today with a R great toe wound. Started 2 weeks ago treated with oral antibiotics the patient states that the wound appeared 5 days ago when it \"burst\" from swelling with light yellow discharge. He has taken Rocefin and Clindamycin for the wound with subjective improvement. The wound is not acutely painful but he has a small amount of pain in the MTP joint. Patient denies other symptoms associated with the wound. Patient also treated for gout        Patient alert awake pain is much better control      Postprocedure diagnosis:  1. 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 2.  Right below-knee runoff with significantly diseased right posterior tibial artery and anterior tibial artery. s/p    1. Left common femoral access using ultrasound guidance with a micropuncture kit. 2.  Left common femoral artery access sheath placement using 6 [place] sheath followed by 7 [place] citizen 45 cm destination sheath placement. 3.  Bilateral iliac angiogram supervision and interpretation. 4.  Right common femoral artery first-order angiogram supervision and interpretation. 5.  Right popliteal artery second-order angiogram supervision and interpretation. 6. right superficial femoral artery angioplasty using directional atherectomy with a Hawk 1 LX device. 6.  Right superficial femoral artery long segment balloon PTA angioplasty using various sizes including predilation with a 4 mm balloon, followed by 5 x 150 mm chocolate balloon, 6 x 250 mm balloon, 6 x 120 mm chocolate balloon.   7.  Right superficial femoral artery long segment angioplasty with bare-metal stent placement using ever flex, 6 x 120, 6 x 150, and 6 x 40 mm stent. 8.  Closure of the left common femoral access using Vascade closure system. Blood pressure better now  Denies any chest pain or shortness of breath no nausea no vomiting    7/7  Patient awake, alert in bed, NAD. Currently reports no medical complaints other than some \"leg aches\" that are getting better. PT OT consulted and patient was fitted for walking shoe and said he was able to ambulate OK  Trauma surgery consulted- swelling is worse and cellulitis slightly worse so recommended keeping patient longer to monitor. Patient started on gabapentin 200mg BID      Objective:     Visit Vitals  BP (!) 157/77   Pulse 85   Temp 98 °F (36.7 °C)   Resp 16   Ht 6' 1\" (1.854 m)   Wt 255 lb (115.7 kg)   SpO2 91%   BMI 33.64 kg/m²        Recent Results (from the past 24 hour(s))   GLUCOSE, POC    Collection Time: 07/06/22 11:35 AM   Result Value Ref Range    Glucose (POC) 127 (H) 65 - 117 mg/dL    Performed by Jeaneth Casillas    GLUCOSE, POC    Collection Time: 07/06/22 12:01 PM   Result Value Ref Range    Glucose (POC) 124 (H) 65 - 117 mg/dL    Performed by Jordan Martin    PTT    Collection Time: 07/06/22  1:03 PM   Result Value Ref Range    aPTT 98.4 (H) 21.2 - 34.1 sec    aPTT, therapeutic range   82 - 109 sec   GLUCOSE, POC    Collection Time: 07/06/22  4:04 PM   Result Value Ref Range    Glucose (POC) 144 (H) 65 - 117 mg/dL    Performed by Alba Mercado    PTT    Collection Time: 07/06/22  6:48 PM   Result Value Ref Range    aPTT 88.5 (H) 21.2 - 34.1 sec    aPTT, therapeutic range   82 - 109 sec   GLUCOSE, POC    Collection Time: 07/06/22  7:18 PM   Result Value Ref Range    Glucose (POC) 187 (H) 65 - 117 mg/dL    Performed by Yas Casarez    GLUCOSE, POC    Collection Time: 07/07/22  7:35 AM   Result Value Ref Range    Glucose (POC) 144 (H) 65 - 117 mg/dL    Performed by Joe Bhatia      [unfilled]      Review of Systems    Constitutional: Negative for chills and fever. HENT: Negative. Eyes: Negative. Respiratory: Negative. Cardiovascular: Negative. Gastrointestinal: Negative for abdominal pain and nausea. Skin: Negative. Neurological: Negative. Physical Exam:      Constitutional: pt is oriented to person, place, and time. HENT:   Head: Normocephalic and atraumatic. Eyes: Pupils are equal, round, and reactive to light. EOM are normal.   Cardiovascular: Normal rate, regular rhythm and normal heart sounds. Pulmonary/Chest: Breath sounds normal. No wheezes. No rales. Exhibits no tenderness. Abdominal: Soft. Bowel sounds are normal. There is no abdominal tenderness. There is no rebound and no guarding. Musculoskeletal: Normal range of motion. Neurological: pt is alert and oriented to person, place, and time. XR FLUOROSCOPY UNDER 60 MINUTES   Final Result   Status post right lower extremity angiogram and stenting. INTERPRETATION PROVIDED FOR COMPLIANCE ONLY AT NO CHARGE        MRI FOOT RT WO CONT   Final Result   1. Marginal erosions are seen surrounding the first MTP joint with a minimal   first MTP joint effusion. Small erosions are seen in the second metatarsal head   and fifth metatarsal head as well. The overall appearance is most consistent   with gout. Inflammatory and infectious arthritides however would be within the   differential.   2. Significant subcutaneous edema         ANKLE BRACHIAL INDEX   Final Result      XR FOOT RT MIN 3 V   Final Result   Cortical cystic erosive changes involving the distal aspects of the   first and fifth metatarsals and base of the proximal phalanx of the great toe   compatible with chronic degenerative/osteoarthritic changes. Consider gout.            Recent Results (from the past 24 hour(s))   GLUCOSE, POC    Collection Time: 07/06/22 11:35 AM   Result Value Ref Range    Glucose (POC) 127 (H) 65 - 117 mg/dL    Performed by Garrick Phoenix, POC    Collection Time: 07/06/22 12:01 PM   Result Value Ref Range    Glucose (POC) 124 (H) 65 - 117 mg/dL    Performed by Keri Richardson    PTT    Collection Time: 07/06/22  1:03 PM   Result Value Ref Range    aPTT 98.4 (H) 21.2 - 34.1 sec    aPTT, therapeutic range   82 - 109 sec   GLUCOSE, POC    Collection Time: 07/06/22  4:04 PM   Result Value Ref Range    Glucose (POC) 144 (H) 65 - 117 mg/dL    Performed by Severo Roper    PTT    Collection Time: 07/06/22  6:48 PM   Result Value Ref Range    aPTT 88.5 (H) 21.2 - 34.1 sec    aPTT, therapeutic range   82 - 109 sec   GLUCOSE, POC    Collection Time: 07/06/22  7:18 PM   Result Value Ref Range    Glucose (POC) 187 (H) 65 - 117 mg/dL    Performed by Santiago Tellez    GLUCOSE, POC    Collection Time: 07/07/22  7:35 AM   Result Value Ref Range    Glucose (POC) 144 (H) 65 - 117 mg/dL    Performed by Fannie Ann        Results     Procedure Component Value Units Date/Time    CULTURE, BLOOD #1 [[id number]] Collected: 06/30/22 1840    Order Status: Completed Specimen: Blood Updated: 07/05/22 0918     Special Requests: No Special Requests        Culture result: No growth 4 days       CULTURE, BLOOD #2 [[id number]] Collected: 06/30/22 1840    Order Status: Completed Specimen: Blood Updated: 07/05/22 0918     Special Requests: No Special Requests        Culture result: No growth 4 days       CULTURE, Ana Coral STAIN [[id number]] Collected: 06/30/22 1815    Order Status: Canceled Specimen: Wound from Ulcer            Labs:     Recent Labs     07/05/22 0317   WBC 11.6*   HGB 13.6   HCT 40.5        Recent Labs     07/05/22 0317      K 3.9      CO2 27   BUN 9   CREA 0.64*   *   CA 9.2     Recent Labs     07/05/22 0317   ALT 33   AP 92   TBILI 0.4   TP 7.0   ALB 3.3*   GLOB 3.7     Recent Labs     07/06/22  1848 07/06/22  1303 07/06/22 0529   APTT 88.5* 98.4* 32.1      No results for input(s): FE, TIBC, PSAT, FERR in the last 72 hours.    No results found for: FOL, RBCF   No results for input(s): PH, PCO2, PO2 in the last 72 hours. No results for input(s): CPK, CKNDX, TROIQ in the last 72 hours. No lab exists for component: CPKMB  No results found for: CHOL, CHOLX, CHLST, CHOLV, HDL, HDLP, LDL, LDLC, DLDLP, TGLX, TRIGL, TRIGP, CHHD, CHHDX  Lab Results   Component Value Date/Time    Glucose (POC) 144 (H) 07/07/2022 07:35 AM    Glucose (POC) 187 (H) 07/06/2022 07:18 PM    Glucose (POC) 144 (H) 07/06/2022 04:04 PM    Glucose (POC) 124 (H) 07/06/2022 12:01 PM    Glucose (POC) 127 (H) 07/06/2022 11:35 AM     No results found for: COLOR, APPRN, SPGRU, REFSG, EPHRAIM, PROTU, GLUCU, KETU, BILU, UROU, VIANEY, LEUKU, GLUKE, EPSU, BACTU, WBCU, RBCU, CASTS, UCRY      Assessment:     Right great toe infection  Diabetic foot injury  Arterial Insufficiency  Diabetes Mellitus  Hypertension  GERD  Gout  RA  Smoking  PAD, Abnormal ANTIONETTE, S/p arteriogram by Dr Nicole De, on 07/03  Postprocedure diagnosis:  1. 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 2.  Right below-knee runoff with significantly diseased right posterior tibial artery and anterior tibial artery. 1. Marginal erosions are seen surrounding the first MTP joint with a minimal  first MTP joint effusion. Small erosions are seen in the second metatarsal head  and fifth metatarsal head as well. The overall appearance is most consistent  with gout. Inflammatory and infectious arthritides however would be within the  differential.  2. Significant subcutaneous edema      S/p  1. Left common femoral access using ultrasound guidance with a micropuncture kit. 2.  Left common femoral artery access sheath placement using 6 [place] sheath followed by 7 [place] 45 cm destination sheath placement. 3.  Bilateral iliac angiogram supervision and interpretation. 4.  Right common femoral artery first-order angiogram supervision and interpretation. 5.  Right popliteal artery second-order angiogram supervision and interpretation.   6. right superficial femoral artery angioplasty using directional atherectomy with a Hawk 1 LX device. 6.  Right superficial femoral artery long segment balloon PTA angioplasty using various sizes including predilation with a 4 mm balloon, followed by 5 x 150 mm chocolate balloon, 6 x 250 mm balloon, 6 x 120 mm chocolate balloon. 7.  Right superficial femoral artery long segment angioplasty with bare-metal stent placement using ever flex, 6 x 120, 6 x 150, and 6 x 40 mm stent. 8.  Closure of the left common femoral access using Vascade closure system. Plan:   Continue to monitor patient leg swelling and cellulitis, plan on discharge in 48 hours.     Patient IV Unasyn 3 g IV every 6 hours  on heparin drip  Continue sliding scale insulin  lisinopril to 20 mg daily amlodipine 10 mg daily  Metformin 500 twice a day  Protonix 40 daily  Gabapentin 200mg BID    Repeat the labs    Seen by the vascular surgeon recommend to continue antibiotic for few more days and switch to oral antibiotic on discharge and also switch to Eliquis on discharge        Current Facility-Administered Medications:     gabapentin (NEURONTIN) capsule 200 mg, 200 mg, Oral, BID, Vicky West MD, 200 mg at 07/07/22 0815    lisinopriL (PRINIVIL, ZESTRIL) tablet 20 mg, 20 mg, Oral, DAILY, 20 mg at 07/07/22 0815 **AND** amLODIPine (NORVASC) tablet 10 mg, 10 mg, Oral, DAILY, Satnam Ferrer MD, 10 mg at 07/07/22 0815    HYDROmorphone (DILAUDID) injection 1 mg, 1 mg, IntraVENous, Q4H PRN, Satnam Ferrer MD, 1 mg at 07/07/22 0817    hydrALAZINE (APRESOLINE) 20 mg/mL injection 20 mg, 20 mg, IntraVENous, Q4H PRN, Satnam Ferrer MD, 20 mg at 07/05/22 0010    sodium chloride (NS) flush 5-40 mL, 5-40 mL, IntraVENous, Q8H, Gia Martínez MD, 10 mL at 07/07/22 0602    sodium chloride (NS) flush 5-40 mL, 5-40 mL, IntraVENous, PRN, Gia Martínez MD, 10 mL at 07/05/22 1541    heparin 25,000 units in D5W 250 ml infusion, 12-25 Units/kg/hr (Adjusted), IntraVENous, TITRATE, Mauricio, Colleen Drake MD, Last Rate: 35.8 mL/hr at 07/07/22 0319, 38 Units/kg/hr at 07/07/22 0319    heparin (porcine) 1,000 unit/mL injection 4,000 Units, 4,000 Units, IntraVENous, PRN, 4,000 Units at 07/06/22 0723 **OR** heparin (porcine) 1,000 unit/mL injection 2,000 Units, 2,000 Units, IntraVENous, PRN, Mauricio, Colleen Drake MD, 2,000 Units at 07/05/22 2226    colchicine tablet 0.6 mg, 0.6 mg, Oral, DAILY, Vicky West MD, 0.6 mg at 07/07/22 0815    oxyCODONE-acetaminophen (PERCOCET) 5-325 mg per tablet 1 Tablet, 1 Tablet, Oral, Q6H PRN, Cierra Pineda MD, 1 Tablet at 07/06/22 2223    insulin lispro (HUMALOG) injection, , SubCUTAneous, AC&HS, Cierra Pineda MD, 3 Units at 07/01/22 0909    glucose chewable tablet 16 g, 4 Tablet, Oral, PRN, Mohiuddin, Romana Dallas, MD    glucagon (GLUCAGEN) injection 1 mg, 1 mg, IntraMUSCular, PRN, Mohiuddin, Romana Dallas, MD    acetaminophen (TYLENOL) tablet 650 mg, 650 mg, Oral, Q6H PRN **OR** acetaminophen (TYLENOL) suppository 650 mg, 650 mg, Rectal, Q6H PRN, Mohiuddin, Romana Dallas, MD    polyethylene glycol (MIRALAX) packet 17 g, 17 g, Oral, DAILY PRN, Mohiuddin, Romana Dallas, MD    ondansetron (ZOFRAN ODT) tablet 4 mg, 4 mg, Oral, Q8H PRN **OR** ondansetron (ZOFRAN) injection 4 mg, 4 mg, IntraVENous, Q6H PRN, Satnam Ferrer MD    ampicillin-sulbactam (UNASYN) 3 g in 0.9% sodium chloride (MBP/ADV) 100 mL MBP, 3 g, IntraVENous, Q6H, Satnam Ferrer MD, Stopped at 07/07/22 0038    metFORMIN (GLUCOPHAGE) tablet 500 mg, 500 mg, Oral, BID WITH MEALS, Satnam Ferrer MD, 500 mg at 07/07/22 0815    pantoprazole (PROTONIX) tablet 40 mg, 40 mg, Oral, ACB, Satnam Ferrer MD, 40 mg at 07/07/22 0815    traMADoL (ULTRAM) tablet 50 mg, 50 mg, Oral, Q6H PRN, Satnam Ferrer MD, 50 mg at 07/01/22 7980

## 2022-07-07 NOTE — PROGRESS NOTES
CM reviewed chart. Patient on IV heparin with plans to transition to eliquis at time of discharge. No insurance/self pay. Will not be able to setup any outpatient services. Gave hi coupon book for eliquis.      Dc plan home self

## 2022-07-07 NOTE — PROGRESS NOTES
PROGRESS NOTE      Chief Complaints:  Patient has no new complaints. HPI and  Objective:    Patient denies any pain on arrival.  Patient currently heparin drip and denies any chest pain shortness of breath or fever chills abdominal pain or nausea. Review of Systems:  Rest of review system negative, personally reviewed. EXAM:  Visit Vitals  BP (!) 156/81 (BP 1 Location: Right upper arm, BP Patient Position: Sitting)   Pulse 68   Temp 98.4 °F (36.9 °C)   Resp 17   Ht 6' 1\" (1.854 m)   Wt 255 lb (115.7 kg)   SpO2 92%   BMI 33.64 kg/m²       Patient is awake and alert  Head and neck atraumatic, normocephalic. ENT: No hoarse voice  Cardiac system regular rate rhythm. Pulmonary no audible wheeze  Chest wall excursion normal with respiration cycle  Abdomen is soft not particularly distended. Neurologically nonfocal.  Skin is warm and moist.  Psychosocial: Cooperative. Vascular examination as previously noted no changes.     Recent Results (from the past 24 hour(s))   PTT    Collection Time: 07/06/22  5:29 AM   Result Value Ref Range    aPTT 32.1 21.2 - 34.1 sec    aPTT, therapeutic range   82 - 109 sec   GLUCOSE, POC    Collection Time: 07/06/22  7:42 AM   Result Value Ref Range    Glucose (POC) 129 (H) 65 - 117 mg/dL    Performed by Rena Lopez, POC    Collection Time: 07/06/22 11:35 AM   Result Value Ref Range    Glucose (POC) 127 (H) 65 - 117 mg/dL    Performed by Marbella Craig    GLUCOSE, POC    Collection Time: 07/06/22 12:01 PM   Result Value Ref Range    Glucose (POC) 124 (H) 65 - 117 mg/dL    Performed by John Ward    PTT    Collection Time: 07/06/22  1:03 PM   Result Value Ref Range    aPTT 98.4 (H) 21.2 - 34.1 sec    aPTT, therapeutic range   82 - 109 sec   GLUCOSE, POC    Collection Time: 07/06/22  4:04 PM   Result Value Ref Range    Glucose (POC) 144 (H) 65 - 117 mg/dL    Performed by Lani Cedillo    PTT    Collection Time: 07/06/22  6:48 PM   Result Value Ref Range    aPTT 88.5 (H) 21.2 - 34.1 sec    aPTT, therapeutic range   82 - 109 sec   GLUCOSE, POC    Collection Time: 07/06/22  7:18 PM   Result Value Ref Range    Glucose (POC) 187 (H) 65 - 117 mg/dL    Performed by Karley April        ASSESSMENT:   Patient is 65 y.o. with diagnosis of : Active Problems:    Toe infection (6/30/2022)      Osteomyelitis (HCC) (6/30/2022)        PLAN:                 Continue heparin drip. Swelling has improved. Doppler interrogation shows strong Doppler signal.  Cellulitis appears to be somewhat improved. Hopefully discharge planning next 24 to 48 hours.

## 2022-07-07 NOTE — PROGRESS NOTES
Problem: Falls - Risk of  Goal: *Absence of Falls  Description: Document Jorge L Young Fall Risk and appropriate interventions in the flowsheet. Outcome: Progressing Towards Goal  Note: Fall Risk Interventions:  Mobility Interventions: Utilize walker, cane, or other assistive device         Medication Interventions: Teach patient to arise slowly    Elimination Interventions: Call light in reach              Problem: Diabetes Self-Management  Goal: *Disease process and treatment process  Description: Define diabetes and identify own type of diabetes; list 3 options for treating diabetes. Outcome: Progressing Towards Goal  Goal: *Incorporating nutritional management into lifestyle  Description: Describe effect of type, amount and timing of food on blood glucose; list 3 methods for planning meals. Outcome: Progressing Towards Goal  Goal: *Incorporating physical activity into lifestyle  Description: State effect of exercise on blood glucose levels. Outcome: Progressing Towards Goal  Goal: *Developing strategies to promote health/change behavior  Description: Define the ABC's of diabetes; identify appropriate screenings, schedule and personal plan for screenings. Outcome: Progressing Towards Goal  Goal: *Using medications safely  Description: State effect of diabetes medications on diabetes; name diabetes medication taking, action and side effects. Outcome: Progressing Towards Goal  Goal: *Monitoring blood glucose, interpreting and using results  Description: Identify recommended blood glucose targets  and personal targets. Outcome: Progressing Towards Goal  Goal: *Prevention, detection, treatment of acute complications  Description: List symptoms of hyper- and hypoglycemia; describe how to treat low blood sugar and actions for lowering  high blood glucose level.   Outcome: Progressing Towards Goal  Goal: *Prevention, detection and treatment of chronic complications  Description: Define the natural course of diabetes and describe the relationship of blood glucose levels to long term complications of diabetes.   Outcome: Progressing Towards Goal

## 2022-07-07 NOTE — PROGRESS NOTES
Infectious Disease Progress Note           Subjective:   Stable, denies new complaints, reports ongoing right foot pain and swelling, no acute events since last seen. Dr Patrick Potts concerned about worsening right foot cellulitis   Objective:   Physical Exam:     Visit Vitals  BP (!) 163/85 (BP 1 Location: Left upper arm, BP Patient Position: Sitting)   Pulse 66   Temp 97.6 °F (36.4 °C)   Resp 16   Ht 6' 1\" (1.854 m)   Wt 255 lb (115.7 kg)   SpO2 95%   BMI 33.64 kg/m²      O2 Device: None (Room air)    Temp (24hrs), Av.3 °F (36.8 °C), Min:97.6 °F (36.4 °C), Max:99.1 °F (37.3 °C)    No intake/output data recorded.    701 - 1900  In: 400 [P.O.:400]  Out: -     General: NAD, AAO x 4  HEENT: FLOYD, Moist mucosa   Lungs: CTA b/l, decreased at the bases, no wheeze/rhonchi   Heart: S1S2+, RRR, no murmur  Abdo: Soft, NT, ND, +BS   : No herbert cath   Exts: Right great toe swelling, ongoing right foot swelling and erythema   Skin: No pressure ulcers or andre     Data Review:       Recent Days:  Recent Labs     22   WBC 11.6*   HGB 13.6   HCT 40.5        Recent Labs     22   BUN 9   CREA 0.64*       Lab Results   Component Value Date/Time    C-Reactive protein 2.79 (H) 2022 06:09 AM        Microbiology     Results     Procedure Component Value Units Date/Time    CULTURE, BLOOD #1 [[id number]] Collected: 22    Order Status: Completed Specimen: Blood Updated: 22     Special Requests: No Special Requests        Culture result: No growth 6 days       CULTURE, BLOOD #2 [[id number]] Collected: 22    Order Status: Completed Specimen: Blood Updated: 22     Special Requests: No Special Requests        Culture result: No growth 6 days       CULTURE, Evan Bermudez STAIN [[id number]] Collected: 22    Order Status: Canceled Specimen: Wound from Ulcer          Diagnostics   CXR Results  (Last 48 hours)    None Assessment/Plan     1. Right great toe infection, Findings on MRI consistent w gouty arthritis, no reports of osteomyelitis       Still w sig right foot erythema/edema, no warmth       Afebrile, routine labs not done today       D/c Unasyn, start on clindamycin, if responsive, may d/c on Clindamycin instead of Augmentin       Routine labs ordered for AM     2. H/o gouty arthritis, Continue on Colchicine     3. PAD, Abnormal ANTIONETTE, S/p arteriogram by Dr Belinda Macedo, on 07/03       4. H/o DM, BS controlled per pt, A1C of 7.7, BS mg per primary     5.  Right foot pain, Continue symptomatic mgt     Vimal Zhong MD    7/7/2022

## 2022-07-07 NOTE — PROGRESS NOTES
PROGRESS NOTE      Chief Complaints:  Patient examined this morning  HPI and  Objective:    Denies any foot pain on the right foot. I examined patient right foot. Swelling is worse and cellulitis slightly worse. Patient has excellent Doppler signal DP and PT. Foot is warm. Patient denies fever chills chest pain shortness of breath. Review of Systems:  Rest of review of system negative, personally reviewed. EXAM:  Visit Vitals  BP (!) 157/77   Pulse 85   Temp 98 °F (36.7 °C)   Resp 16   Ht 6' 1\" (1.854 m)   Wt 255 lb (115.7 kg)   SpO2 91%   BMI 33.64 kg/m²       Patient is awake and alert  Head and neck atraumatic, normocephalic. ENT: No hoarse voice  Cardiac system regular rate rhythm. Pulmonary no audible wheeze  Chest wall excursion normal with respiration cycle  Abdomen is soft not particularly distended. Neurologically nonfocal.  Skin is warm and moist.  Psychosocial: Cooperative. Vascular examination as previously noted no changes.     Recent Results (from the past 24 hour(s))   GLUCOSE, POC    Collection Time: 07/06/22 11:35 AM   Result Value Ref Range    Glucose (POC) 127 (H) 65 - 117 mg/dL    Performed by Chikis Don, POC    Collection Time: 07/06/22 12:01 PM   Result Value Ref Range    Glucose (POC) 124 (H) 65 - 117 mg/dL    Performed by Ela Goins    PTT    Collection Time: 07/06/22  1:03 PM   Result Value Ref Range    aPTT 98.4 (H) 21.2 - 34.1 sec    aPTT, therapeutic range   82 - 109 sec   GLUCOSE, POC    Collection Time: 07/06/22  4:04 PM   Result Value Ref Range    Glucose (POC) 144 (H) 65 - 117 mg/dL    Performed by Fabienne Dejesus    PTT    Collection Time: 07/06/22  6:48 PM   Result Value Ref Range    aPTT 88.5 (H) 21.2 - 34.1 sec    aPTT, therapeutic range   82 - 109 sec   GLUCOSE, POC    Collection Time: 07/06/22  7:18 PM   Result Value Ref Range    Glucose (POC) 187 (H) 65 - 117 mg/dL    Performed by Autumn Seo    GLUCOSE, POC    Collection Time: 07/07/22  7:35 AM   Result Value Ref Range    Glucose (POC) 144 (H) 65 - 117 mg/dL    Performed by Belinda Styles        ASSESSMENT:   Patient is 65 y.o. with diagnosis of : Active Problems:    Toe infection (6/30/2022)      Osteomyelitis ([address] (6/30/2022)        PLAN:                 Patient was advised to keep the right foot elevated. Since swelling and the cellulitis looks much worse we should keep him additional more days in the hospital.  Continue heparin drip. Upon discharge I will switch to Eliquis therapy.

## 2022-07-07 NOTE — PROGRESS NOTES
General Daily Progress Note          Patient Name:   Ynes Alex       YOB: 1960       Age:  65 y.o. Admit Date: 6/30/2022      Subjective:       Ynes Alex is a 65 y.o. male with a significant PMH of HTN, lifelong 1.5ppd smoker, and Diabetes  RA and Errol Kaitlin presents to the ED today with a R great toe wound. Started 2 weeks ago treated with oral antibiotics the patient states that the wound appeared 5 days ago when it \"burst\" from swelling with light yellow discharge. He has taken Rocefin and Clindamycin for the wound with subjective improvement. The wound is not acutely painful but he has a small amount of pain in the MTP joint. Patient denies other symptoms associated with the wound. Patient also treated for gout        Patient alert awake pain is much better control      Postprocedure diagnosis:  1. 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 2.  Right below-knee runoff with significantly diseased right posterior tibial artery and anterior tibial artery. s/p    1. Left common femoral access using ultrasound guidance with a micropuncture kit. 2.  Left common femoral artery access sheath placement using 6 Burmese [place] sheath followed by 7 Burmese 45 cm destination sheath placement. 3.  Bilateral iliac angiogram supervision and interpretation. 4.  Right common femoral artery first-order angiogram supervision and interpretation. 5.  Right popliteal artery second-order angiogram supervision and interpretation. 6. right superficial femoral artery angioplasty using directional atherectomy with a Hawk 1 LX device. 6.  Right superficial femoral artery long segment balloon PTA angioplasty using various sizes including predilation with a 4 mm balloon, followed by 5 x 150 mm chocolate balloon, 6 x 250 mm balloon, 6 x 120 mm chocolate balloon.   7.  Right superficial femoral artery long segment angioplasty with bare-metal stent placement using ever flex, 6 x 120, 6 x 150, and 6 x 40 mm stent. 8.  Closure of the left common femoral access using Vascade closure system. Blood pressure better now  Denies any chest pain or shortness of breath no nausea no vomiting    7/7  Patient awake, alert in bed, NAD. Currently reports no medical complaints other than some \"leg aches\" that are getting better. PT OT consulted and patient was fitted for walking shoe and said he was able to ambulate OK  Trauma surgery consulted- swelling is worse and cellulitis slightly worse so recommended keeping patient longer to monitor. Patient started on gabapentin 200mg BID      Objective:     Visit Vitals  BP (!) 157/77   Pulse 85   Temp 98 °F (36.7 °C)   Resp 16   Ht 6' 1\" (1.854 m)   Wt 115.7 kg (255 lb)   SpO2 91%   BMI 33.64 kg/m²        Recent Results (from the past 24 hour(s))   PTT    Collection Time: 07/06/22  1:03 PM   Result Value Ref Range    aPTT 98.4 (H) 21.2 - 34.1 sec    aPTT, therapeutic range   82 - 109 sec   GLUCOSE, POC    Collection Time: 07/06/22  4:04 PM   Result Value Ref Range    Glucose (POC) 144 (H) 65 - 117 mg/dL    Performed by Andrew Dumont    PTT    Collection Time: 07/06/22  6:48 PM   Result Value Ref Range    aPTT 88.5 (H) 21.2 - 34.1 sec    aPTT, therapeutic range   82 - 109 sec   GLUCOSE, POC    Collection Time: 07/06/22  7:18 PM   Result Value Ref Range    Glucose (POC) 187 (H) 65 - 117 mg/dL    Performed by Carlos Medina    GLUCOSE, POC    Collection Time: 07/07/22  7:35 AM   Result Value Ref Range    Glucose (POC) 144 (H) 65 - 117 mg/dL    Performed by John Winston    GLUCOSE, POC    Collection Time: 07/07/22 11:49 AM   Result Value Ref Range    Glucose (POC) 165 (H) 65 - 117 mg/dL    Performed by John Winston      [unfilled]      Review of Systems    Constitutional: Negative for chills and fever. HENT: Negative. Eyes: Negative. Respiratory: Negative. Cardiovascular: Negative.     Gastrointestinal: Negative for abdominal pain and nausea. Skin: Negative. Neurological: Negative. Physical Exam:      Constitutional: pt is oriented to person, place, and time. HENT:   Head: Normocephalic and atraumatic. Eyes: Pupils are equal, round, and reactive to light. EOM are normal.   Cardiovascular: Normal rate, regular rhythm and normal heart sounds. Pulmonary/Chest: Breath sounds normal. No wheezes. No rales. Exhibits no tenderness. Abdominal: Soft. Bowel sounds are normal. There is no abdominal tenderness. There is no rebound and no guarding. Musculoskeletal: Normal range of motion. Neurological: pt is alert and oriented to person, place, and time. XR FLUOROSCOPY UNDER 60 MINUTES   Final Result   Status post right lower extremity angiogram and stenting. INTERPRETATION PROVIDED FOR COMPLIANCE ONLY AT NO CHARGE        MRI FOOT RT WO CONT   Final Result   1. Marginal erosions are seen surrounding the first MTP joint with a minimal   first MTP joint effusion. Small erosions are seen in the second metatarsal head   and fifth metatarsal head as well. The overall appearance is most consistent   with gout. Inflammatory and infectious arthritides however would be within the   differential.   2. Significant subcutaneous edema         ANKLE BRACHIAL INDEX   Final Result      XR FOOT RT MIN 3 V   Final Result   Cortical cystic erosive changes involving the distal aspects of the   first and fifth metatarsals and base of the proximal phalanx of the great toe   compatible with chronic degenerative/osteoarthritic changes. Consider gout.            Recent Results (from the past 24 hour(s))   PTT    Collection Time: 07/06/22  1:03 PM   Result Value Ref Range    aPTT 98.4 (H) 21.2 - 34.1 sec    aPTT, therapeutic range   82 - 109 sec   GLUCOSE, POC    Collection Time: 07/06/22  4:04 PM   Result Value Ref Range    Glucose (POC) 144 (H) 65 - 117 mg/dL    Performed by Boris Johnson    PTT    Collection Time: 07/06/22  6:48 PM   Result Value Ref Range    aPTT 88.5 (H) 21.2 - 34.1 sec    aPTT, therapeutic range   82 - 109 sec   GLUCOSE, POC    Collection Time: 07/06/22  7:18 PM   Result Value Ref Range    Glucose (POC) 187 (H) 65 - 117 mg/dL    Performed by Arnoldo Maravilla    GLUCOSE, POC    Collection Time: 07/07/22  7:35 AM   Result Value Ref Range    Glucose (POC) 144 (H) 65 - 117 mg/dL    Performed by Alex Hall    GLUCOSE, POC    Collection Time: 07/07/22 11:49 AM   Result Value Ref Range    Glucose (POC) 165 (H) 65 - 117 mg/dL    Performed by Alex Hall        Results     Procedure Component Value Units Date/Time    CULTURE, BLOOD #1 [[id number]] Collected: 06/30/22 1840    Order Status: Completed Specimen: Blood Updated: 07/07/22 1149     Special Requests: No Special Requests        Culture result: No growth 6 days       CULTURE, BLOOD #2 [[id number]] Collected: 06/30/22 1840    Order Status: Completed Specimen: Blood Updated: 07/07/22 1149     Special Requests: No Special Requests        Culture result: No growth 6 days       CULTURE, Yony Will STAIN [[id number]] Collected: 06/30/22 1815    Order Status: Canceled Specimen: Wound from Ulcer            Labs:     Recent Labs     07/05/22 0317   WBC 11.6*   HGB 13.6   HCT 40.5        Recent Labs     07/05/22 0317      K 3.9      CO2 27   BUN 9   CREA 0.64*   *   CA 9.2     Recent Labs     07/05/22 0317   ALT 33   AP 92   TBILI 0.4   TP 7.0   ALB 3.3*   GLOB 3.7     Recent Labs     07/06/22  1848 07/06/22  1303 07/06/22 0529   APTT 88.5* 98.4* 32.1      No results for input(s): FE, TIBC, PSAT, FERR in the last 72 hours. No results found for: FOL, RBCF   No results for input(s): PH, PCO2, PO2 in the last 72 hours. No results for input(s): CPK, CKNDX, TROIQ in the last 72 hours.     No lab exists for component: CPKMB  No results found for: CHOL, CHOLX, CHLST, CHOLV, HDL, HDLP, LDL, LDLC, DLDLP, TGLX, TRIGL, TRIGP, CHHD, CHHDX  Lab Results   Component Value Date/Time Glucose (POC) 165 (H) 07/07/2022 11:49 AM    Glucose (POC) 144 (H) 07/07/2022 07:35 AM    Glucose (POC) 187 (H) 07/06/2022 07:18 PM    Glucose (POC) 144 (H) 07/06/2022 04:04 PM    Glucose (POC) 124 (H) 07/06/2022 12:01 PM     No results found for: COLOR, APPRN, SPGRU, REFSG, EPHRAIM, PROTU, GLUCU, KETU, BILU, UROU, VIANEY, LEUKU, GLUKE, EPSU, BACTU, WBCU, RBCU, CASTS, UCRY      Assessment:     Right great toe infection  Diabetic foot injury  Arterial Insufficiency  Diabetes Mellitus  Hypertension  GERD  Gout  RA  Smoking  PAD, Abnormal ANTIONETTE, S/p arteriogram by Dr Anna Cruz, on 07/03  Postprocedure diagnosis:  1. 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 2.  Right below-knee runoff with significantly diseased right posterior tibial artery and anterior tibial artery. 1. Marginal erosions are seen surrounding the first MTP joint with a minimal  first MTP joint effusion. Small erosions are seen in the second metatarsal head  and fifth metatarsal head as well. The overall appearance is most consistent  with gout. Inflammatory and infectious arthritides however would be within the  differential.  2. Significant subcutaneous edema      S/p  1. Left common femoral access using ultrasound guidance with a micropuncture kit. 2.  Left common femoral artery access sheath placement using 6 Trin[place]adian [place] sheath followed by 7 Trinidadian 45 cm destination sheath placement. 3.  Bilateral iliac angiogram supervision and interpretation. 4.  Right common femoral artery first-order angiogram supervision and interpretation. 5.  Right popliteal artery second-order angiogram supervision and interpretation. 6. right superficial femoral artery angioplasty using directional atherectomy with a Hawk 1 LX device.   6.  Right superficial femoral artery long segment balloon PTA angioplasty using various sizes including predilation with a 4 mm balloon, followed by 5 x 150 mm chocolate balloon, 6 x 250 mm balloon, 6 x 120 mm chocolate balloon. 7.  Right superficial femoral artery long segment angioplasty with bare-metal stent placement using ever flex, 6 x 120, 6 x 150, and 6 x 40 mm stent. 8.  Closure of the left common femoral access using Vascade closure system. Plan:   Continue to monitor patient leg swelling and cellulitis, plan on discharge in 48 hours.     Patient IV Unasyn 3 g IV every 6 hours  on heparin drip  Continue sliding scale insulin  lisinopril to 20 mg daily amlodipine 10 mg daily  Metformin 500 twice a day  Protonix 40 daily  Gabapentin 200mg BID    Repeat the labs    Seen by the vascular surgeon recommend to continue antibiotic for few more days and switch to oral antibiotic on discharge and also switch to Eliquis on discharge        Current Facility-Administered Medications:     gabapentin (NEURONTIN) capsule 200 mg, 200 mg, Oral, BID, Vicky West MD, 200 mg at 07/07/22 0815    lisinopriL (PRINIVIL, ZESTRIL) tablet 20 mg, 20 mg, Oral, DAILY, 20 mg at 07/07/22 0815 **AND** amLODIPine (NORVASC) tablet 10 mg, 10 mg, Oral, DAILY, Satnam Ferrer MD, 10 mg at 07/07/22 0815    hydrALAZINE (APRESOLINE) 20 mg/mL injection 20 mg, 20 mg, IntraVENous, Q4H PRN, Satnam Ferrer MD, 20 mg at 07/05/22 0010    sodium chloride (NS) flush 5-40 mL, 5-40 mL, IntraVENous, Q8H, Ermias Martínez MD, 10 mL at 07/07/22 0602    sodium chloride (NS) flush 5-40 mL, 5-40 mL, IntraVENous, PRN, Ermias Martínez MD, 10 mL at 07/05/22 1541    heparin 25,000 units in D5W 250 ml infusion, 12-25 Units/kg/hr (Adjusted), IntraVENous, TITRATE, Ermias Martínez MD, Last Rate: 35.8 mL/hr at 07/07/22 1200, 38 Units/kg/hr at 07/07/22 1200    heparin (porcine) 1,000 unit/mL injection 4,000 Units, 4,000 Units, IntraVENous, PRN, 4,000 Units at 07/06/22 0723 **OR** heparin (porcine) 1,000 unit/mL injection 2,000 Units, 2,000 Units, IntraVENous, PRN, MauricioErmias MD, 2,000 Units at 07/05/22 2221    colchicine tablet 0.6 mg, 0.6 mg, Oral, DAILY, Vicky West MD, 0.6 mg at 07/07/22 0815    oxyCODONE-acetaminophen (PERCOCET) 5-325 mg per tablet 1 Tablet, 1 Tablet, Oral, Q6H PRN, Satnam Ferrer MD, 1 Tablet at 07/06/22 2223    insulin lispro (HUMALOG) injection, , SubCUTAneous, AC&HS, Satnam Ferrer MD, 3 Units at 07/01/22 0909    glucose chewable tablet 16 g, 4 Tablet, Oral, PRN, Anita Ferrer MD    glucagon (GLUCAGEN) injection 1 mg, 1 mg, IntraMUSCular, PRN, Anita Ferrer MD    acetaminophen (TYLENOL) tablet 650 mg, 650 mg, Oral, Q6H PRN **OR** acetaminophen (TYLENOL) suppository 650 mg, 650 mg, Rectal, Q6H PRN, Anita Ferrer MD    polyethylene glycol (MIRALAX) packet 17 g, 17 g, Oral, DAILY PRN, Anita Ferrer MD    ondansetron (ZOFRAN ODT) tablet 4 mg, 4 mg, Oral, Q8H PRN **OR** ondansetron (ZOFRAN) injection 4 mg, 4 mg, IntraVENous, Q6H PRN, Satnam Ferrer MD    ampicillin-sulbactam (UNASYN) 3 g in 0.9% sodium chloride (MBP/ADV) 100 mL MBP, 3 g, IntraVENous, Q6H, Satnam Ferrer MD, Stopped at 07/07/22 0038    metFORMIN (GLUCOPHAGE) tablet 500 mg, 500 mg, Oral, BID WITH MEALS, Satnam Ferrer MD, 500 mg at 07/07/22 0815    pantoprazole (PROTONIX) tablet 40 mg, 40 mg, Oral, ACB, Satnam Ferrer MD, 40 mg at 07/07/22 0815    traMADoL (ULTRAM) tablet 50 mg, 50 mg, Oral, Q6H PRN, Satnam Ferrer MD, 50 mg at 07/01/22 0972

## 2022-07-08 VITALS
BODY MASS INDEX: 33.8 KG/M2 | RESPIRATION RATE: 17 BRPM | HEART RATE: 63 BPM | HEIGHT: 73 IN | SYSTOLIC BLOOD PRESSURE: 150 MMHG | TEMPERATURE: 98 F | DIASTOLIC BLOOD PRESSURE: 77 MMHG | OXYGEN SATURATION: 100 % | WEIGHT: 255 LBS

## 2022-07-08 LAB
APTT PPP: 143.8 SEC (ref 21.2–34.1)
BASOPHILS # BLD: 0.1 K/UL (ref 0–0.1)
BASOPHILS NFR BLD: 1 % (ref 0–1)
DIFFERENTIAL METHOD BLD: ABNORMAL
EOSINOPHIL # BLD: 0.6 K/UL (ref 0–0.4)
EOSINOPHIL NFR BLD: 6 % (ref 0–7)
ERYTHROCYTE [DISTWIDTH] IN BLOOD BY AUTOMATED COUNT: 12.8 % (ref 11.5–14.5)
GLUCOSE BLD STRIP.AUTO-MCNC: 172 MG/DL (ref 65–117)
HCT VFR BLD AUTO: 41.3 % (ref 36.6–50.3)
HGB BLD-MCNC: 14 G/DL (ref 12.1–17)
IMM GRANULOCYTES # BLD AUTO: 0.1 K/UL (ref 0–0.04)
IMM GRANULOCYTES NFR BLD AUTO: 1 % (ref 0–0.5)
LYMPHOCYTES # BLD: 2 K/UL (ref 0.8–3.5)
LYMPHOCYTES NFR BLD: 17 % (ref 12–49)
MCH RBC QN AUTO: 30.2 PG (ref 26–34)
MCHC RBC AUTO-ENTMCNC: 33.9 G/DL (ref 30–36.5)
MCV RBC AUTO: 89.2 FL (ref 80–99)
MONOCYTES # BLD: 1 K/UL (ref 0–1)
MONOCYTES NFR BLD: 9 % (ref 5–13)
NEUTS SEG # BLD: 7.5 K/UL (ref 1.8–8)
NEUTS SEG NFR BLD: 66 % (ref 32–75)
NRBC # BLD: 0 K/UL (ref 0–0.01)
NRBC BLD-RTO: 0 PER 100 WBC
PERFORMED BY, TECHID: ABNORMAL
PLATELET # BLD AUTO: 324 K/UL (ref 150–400)
PMV BLD AUTO: 10.6 FL (ref 8.9–12.9)
RBC # BLD AUTO: 4.63 M/UL (ref 4.1–5.7)
THERAPEUTIC RANGE,PTTT: ABNORMAL SEC (ref 82–109)
WBC # BLD AUTO: 11.3 K/UL (ref 4.1–11.1)

## 2022-07-08 PROCEDURE — 85730 THROMBOPLASTIN TIME PARTIAL: CPT

## 2022-07-08 PROCEDURE — 74011250637 HC RX REV CODE- 250/637: Performed by: INTERNAL MEDICINE

## 2022-07-08 PROCEDURE — 85025 COMPLETE CBC W/AUTO DIFF WBC: CPT

## 2022-07-08 PROCEDURE — 99232 SBSQ HOSP IP/OBS MODERATE 35: CPT | Performed by: INTERNAL MEDICINE

## 2022-07-08 PROCEDURE — 36415 COLL VENOUS BLD VENIPUNCTURE: CPT

## 2022-07-08 PROCEDURE — 74011000250 HC RX REV CODE- 250: Performed by: SURGERY

## 2022-07-08 PROCEDURE — 94762 N-INVAS EAR/PLS OXIMTRY CONT: CPT

## 2022-07-08 PROCEDURE — 82962 GLUCOSE BLOOD TEST: CPT

## 2022-07-08 PROCEDURE — 74011250636 HC RX REV CODE- 250/636: Performed by: INTERNAL MEDICINE

## 2022-07-08 PROCEDURE — 74011250636 HC RX REV CODE- 250/636: Performed by: SURGERY

## 2022-07-08 PROCEDURE — 74011250637 HC RX REV CODE- 250/637: Performed by: FAMILY MEDICINE

## 2022-07-08 PROCEDURE — 99232 SBSQ HOSP IP/OBS MODERATE 35: CPT | Performed by: SURGERY

## 2022-07-08 RX ORDER — AMLODIPINE BESYLATE 10 MG/1
10 TABLET ORAL DAILY
Qty: 30 TABLET | Refills: 0 | Status: SHIPPED | OUTPATIENT
Start: 2022-07-09 | End: 2022-10-28 | Stop reason: ALTCHOICE

## 2022-07-08 RX ORDER — ONDANSETRON 4 MG/1
4 TABLET, ORALLY DISINTEGRATING ORAL
Qty: 10 TABLET | Refills: 0 | Status: SHIPPED | OUTPATIENT
Start: 2022-07-08 | End: 2022-07-19

## 2022-07-08 RX ORDER — GABAPENTIN 100 MG/1
200 CAPSULE ORAL 2 TIMES DAILY
Qty: 20 CAPSULE | Refills: 0 | Status: SHIPPED | OUTPATIENT
Start: 2022-07-08 | End: 2022-07-13

## 2022-07-08 RX ORDER — CLINDAMYCIN HYDROCHLORIDE 300 MG/1
300 CAPSULE ORAL 3 TIMES DAILY
Qty: 30 CAPSULE | Refills: 0 | Status: SHIPPED | OUTPATIENT
Start: 2022-07-08 | End: 2022-07-19

## 2022-07-08 RX ORDER — LISINOPRIL 20 MG/1
20 TABLET ORAL DAILY
Qty: 60 TABLET | Refills: 0 | Status: SHIPPED | OUTPATIENT
Start: 2022-07-09 | End: 2022-10-28 | Stop reason: ALTCHOICE

## 2022-07-08 RX ORDER — OXYCODONE AND ACETAMINOPHEN 5; 325 MG/1; MG/1
1 TABLET ORAL
Qty: 14 TABLET | Refills: 0 | Status: SHIPPED | OUTPATIENT
Start: 2022-07-08 | End: 2022-07-11

## 2022-07-08 RX ORDER — OXYCODONE AND ACETAMINOPHEN 5; 325 MG/1; MG/1
1 TABLET ORAL
Qty: 30 TABLET | Refills: 0 | OUTPATIENT
Start: 2022-07-08 | End: 2022-07-22

## 2022-07-08 RX ADMIN — HEPARIN SODIUM AND DEXTROSE 40 UNITS/KG/HR: 10000; 5 INJECTION INTRAVENOUS at 03:45

## 2022-07-08 RX ADMIN — HEPARIN SODIUM 2000 UNITS: 1000 INJECTION INTRAVENOUS; SUBCUTANEOUS at 00:50

## 2022-07-08 RX ADMIN — COLCHICINE 0.6 MG: 0.6 TABLET, FILM COATED ORAL at 09:49

## 2022-07-08 RX ADMIN — AMLODIPINE BESYLATE 10 MG: 5 TABLET ORAL at 09:49

## 2022-07-08 RX ADMIN — CLINDAMYCIN PHOSPHATE 900 MG: 900 INJECTION, SOLUTION INTRAVENOUS at 00:53

## 2022-07-08 RX ADMIN — OXYCODONE AND ACETAMINOPHEN 1 TABLET: 5; 325 TABLET ORAL at 00:59

## 2022-07-08 RX ADMIN — SODIUM CHLORIDE, PRESERVATIVE FREE 10 ML: 5 INJECTION INTRAVENOUS at 05:41

## 2022-07-08 RX ADMIN — GABAPENTIN 200 MG: 100 CAPSULE ORAL at 09:49

## 2022-07-08 RX ADMIN — HEPARIN SODIUM AND DEXTROSE 40 UNITS/KG/HR: 10000; 5 INJECTION INTRAVENOUS at 00:39

## 2022-07-08 RX ADMIN — PANTOPRAZOLE SODIUM 40 MG: 40 TABLET, DELAYED RELEASE ORAL at 09:49

## 2022-07-08 RX ADMIN — OXYCODONE AND ACETAMINOPHEN 1 TABLET: 5; 325 TABLET ORAL at 10:00

## 2022-07-08 RX ADMIN — CLINDAMYCIN PHOSPHATE 900 MG: 900 INJECTION, SOLUTION INTRAVENOUS at 09:49

## 2022-07-08 RX ADMIN — METFORMIN HYDROCHLORIDE 500 MG: 500 TABLET ORAL at 09:49

## 2022-07-08 RX ADMIN — LISINOPRIL 20 MG: 20 TABLET ORAL at 09:49

## 2022-07-08 NOTE — PROGRESS NOTES
PROGRESS NOTE      Chief Complaints: Patient has no new complaints. HPI and  Objective:    Patient has been elevating his right foot with 4 pillows most of the day yesterday. No fever chills chest pain shortness of breath. Review of Systems:  Rest of review of system negative, personally reviewed. EXAM:  Visit Vitals  BP (!) 150/77 (BP 1 Location: Left upper arm, BP Patient Position: Sitting)   Pulse 63   Temp 98 °F (36.7 °C)   Resp 17   Ht 6' 1\" (1.854 m)   Wt 255 lb (115.7 kg)   SpO2 98%   BMI 33.64 kg/m²       Patient is awake and alert  Head and neck atraumatic, normocephalic. ENT: No hoarse voice  Cardiac system regular rate rhythm. Pulmonary no audible wheeze  Chest wall excursion normal with respiration cycle  Abdomen is soft not particularly distended. Neurologically nonfocal.  Skin is warm and moist.  Psychosocial: Cooperative. Vascular examination as previously noted no changes. Recent Results (from the past 24 hour(s))   GLUCOSE, POC    Collection Time: 07/07/22 11:49 AM   Result Value Ref Range    Glucose (POC) 165 (H) 65 - 117 mg/dL    Performed by [address] [place] [address], POC    Collection Time: 07/07/22  4:41 PM   Result Value Ref Range    Glucose (POC) 144 (H) 65 - 117 mg/dL    Performed by Clearance Grain    PTT    Collection Time: 07/07/22  7:59 PM   Result Value Ref Range    aPTT 52.3 (H) 21.2 - 34.1 sec    aPTT, therapeutic range   82 - 109 sec   GLUCOSE, POC    Collection Time: 07/07/22  8:43 PM   Result Value Ref Range    Glucose (POC) 145 (H) 65 - 117 mg/dL    Performed by Estevan Malcolm    GLUCOSE, POC    Collection Time: 07/08/22  7:22 AM   Result Value Ref Range    Glucose (POC) 172 (H) 65 - 117 mg/dL    Performed by Kaci Leyva        ASSESSMENT:   Patient is 65 y.o. with diagnosis of : Active Problems:    Toe infection (6/30/2022)      Osteomyelitis ([address] (6/30/2022)        PLAN:                 Right foot is examined again.   Swelling is much improved and looks like the dorsum of the foot cellulitis almost gone. Plantar aspect of the right great toe darkened spot has not improved however dorsal area appears to be better. It is early to tell if patient will need great toe amputation or not. Patient can be discharged from vascular point of view and I will switch it to Eliquis 10 mg p.o. twice daily for next 7 days and switch to 5 mg p.o. twice daily afterwards. Patient will also need to take Plavix. Please have home health nurse arranged to help with wound care on the right great toe. Wound care instructions including iodine soaked with 4 x 4 gauze and Kerlix roll. Wound care needs to be done at least once daily if not 3 times weekly. Patient will need to also antibiotics per Dr. Lesley Pena     Please have this patient come see me in follow-up in 10 days. I sent Percocet and the Eliquis medication to his pharmacy.

## 2022-07-08 NOTE — PROGRESS NOTES
Comprehensive Nutrition Assessment    Type and Reason for Visit: Initial,RD nutrition re-screen/LOS (LOS x8)    Nutrition Recommendations/Plan:   1. Continue diet. 2. Monitor while IP. Malnutrition Assessment:  Malnutrition Status:  Mild malnutrition (07/08/22 1211)    Context:  Acute illness     Findings of the 6 clinical characteristics of malnutrition:   Energy Intake:  No significant decrease in energy intake  Weight Loss:  No significant weight loss     Body Fat Loss:  No significant body fat loss,     Muscle Mass Loss:  No significant muscle mass loss,    Fluid Accumulation:  Mild, Extremities   Strength:  Not performed     Nutrition Assessment:    Admitted for toe infection, osteomyelitis. Good appetite and PO intake reported- eats >75% of meals. No h/o wt loss nor poor intake PTA. Continue diet. Labs unremarkable. Meds: Heparin in D5W 250 mL, PPI, metformin, lisinopril, colchicine, cleocin, norvasc, percocet. Nutrition Related Findings:    NFPE w/o acute findings- nourished. No N/V/D/C nor c/s issues reported per Pt. Last BM 7/6. +1 RLE edema. Wound Type: Surgical incision (L. groin)    Current Nutrition Intake & Therapies:  Average Meal Intake: %  Average Supplement Intake: None ordered  ADULT DIET Regular    Anthropometric Measures:  Height: 6' 1\" (185.4 cm)  Ideal Body Weight (IBW): 184 lbs (84 kg)  Current Body Wt:  115.7 kg (255 lb 1.2 oz), 138.6 % IBW. Not specified  Current BMI (kg/m2): 33.7  BMI Category: Obese class 1 (BMI 30.0-34. 9)    Estimated Daily Nutrient Needs:  Energy Requirements Based On: Kcal/kg  Weight Used for Energy Requirements: Current  Energy (kcal/day): 2412 kcal/d  Weight Used for Protein Requirements: Current  Protein (g/day): 97 gm/d  Method Used for Fluid Requirements: 1 ml/kcal  Fluid (ml/day): 2400 mL/d    Nutrition Diagnosis:   · Overweight/obesity related to excessive energy intake,cardiac dysfunction as evidenced by localized or generalized fluid accumulation,BMI (33.6)    Nutrition Interventions:   Food and/or Nutrient Delivery: Continue current diet  Nutrition Education/Counseling: No recommendations at this time  Coordination of Nutrition Care: Continue to monitor while inpatient  Plan of Care discussed with: Pt    Goals:  Goals: PO intake 75% or greater,within 7 days,Meet at least 75% of estimated needs    Nutrition Monitoring and Evaluation:   Behavioral-Environmental Outcomes: None identified  Food/Nutrient Intake Outcomes: Diet advancement/tolerance,Food and nutrient intake  Physical Signs/Symptoms Outcomes: Biochemical data,Weight,Meal time behavior,Fluid status or edema    Discharge Planning:    No discharge needs at this time    Rustam Jessica RD  Contact: ext. [phone] or [employer].

## 2022-07-08 NOTE — DISCHARGE INSTRUCTIONS
Discharge Instructions       PATIENT ID: Alesia Anguiano  MRN: [id number]   YOB: 1960    DATE OF ADMISSION: 6/30/2022  8:23 AM    DATE OF DISCHARGE: 7/8/2022    PRIMARY CARE PROVIDER: Germán Luna MD     ATTENDING PHYSICIAN: Sasha Valenzuela MD  DISCHARGING PROVIDER: Kecia Diamond MD    To contact this individual call [phone] and ask the  to page. If unavailable ask to be transferred the Adult Hospitalist Department. DISCHARGE DIAGNOSES diabetic foot/PVD    CONSULTATIONS: IP CONSULT TO PODIATRY  IP CONSULT TO INFECTIOUS DISEASES  IP CONSULT TO VASCULAR SURGERY  IP CONSULT TO GENERAL SURGERY    PROCEDURES/SURGERIES: Procedure(s):  BALLOON ANGIOPLASTY LOWER EXTREMITY RIGHT WITH STENTING    PENDING TEST RESULTS:   At the time of discharge the following test results are still pending: None    FOLLOW UP APPOINTMENTS:   Follow-up Information     Follow up With Specialties Details Why Contact Info    Santiago Rueda MD Family Medicine   [place] [address]  [phone]             ADDITIONAL CARE RECOMMENDATIONS: Follow-up with infectious disease and vascular surgeon    DIET: Diabetic Diet      ACTIVITY: Activity as tolerated    Wound care: Wound Care Order: submitted to [facility] Please view [email or web address]    EQUIPMENT needed: ***      DISCHARGE MEDICATIONS:   See Medication Reconciliation Form    · It is important that you take the medication exactly as they are prescribed. · Keep your medication in the bottles provided by the pharmacist and keep a list of the medication names, dosages, and times to be taken in your wallet. · Do not take other medications without consulting your doctor. NOTIFY YOUR PHYSICIAN FOR ANY OF THE FOLLOWING:   Fever over 101 degrees for 24 hours. Chest pain, shortness of breath, fever, chills, nausea, vomiting, diarrhea, change in mentation, falling, weakness, bleeding.  Severe pain or pain not relieved by medications. Or, any other signs or symptoms that you may have questions about. DISPOSITION:    Home With:   OT  PT  HH  RN       SNF/Inpatient Rehab/LTAC    Independent/assisted living    Hospice    Other:         PROBLEM LIST Updated:  Yes ***       Signed:   Anisa Moreno MD  7/8/2022  10:48 AM     Discharge Instructions       PATIENT ID: Aminta Maria  MRN: [id number]   YOB: 1960    DATE OF ADMISSION: 6/30/2022  8:23 AM    DATE OF DISCHARGE: 7/8/2022    PRIMARY CARE PROVIDER: Terie Holstein, MD     ATTENDING PHYSICIAN: Nazia Schuster MD  DISCHARGING PROVIDER: Anisa Moreno MD    To contact this individual call [phone] and ask the  to page. If unavailable ask to be transferred the Adult Hospitalist Department. DISCHARGE DIAGNOSES ***    CONSULTATIONS: IP CONSULT TO PODIATRY  IP CONSULT TO INFECTIOUS DISEASES  IP CONSULT TO VASCULAR SURGERY  IP CONSULT TO GENERAL SURGERY    PROCEDURES/SURGERIES: Procedure(s):  BALLOON ANGIOPLASTY LOWER EXTREMITY RIGHT WITH STENTING    PENDING TEST RESULTS:   At the time of discharge the following test results are still pending: ***    FOLLOW UP APPOINTMENTS:   Follow-up Information     Follow up With Specialties Details Why Contact Info    Bobbi Rueda MD Family Medicine   [place] [address]  [phone]             ADDITIONAL CARE RECOMMENDATIONS: ***    DIET: {diet:66821}      ACTIVITY: {discharge activity:12479}    Wound care: {[facility] Wound Care Instructions:49157}    EQUIPMENT needed: ***      DISCHARGE MEDICATIONS:   See Medication Reconciliation Form    · It is important that you take the medication exactly as they are prescribed. · Keep your medication in the bottles provided by the pharmacist and keep a list of the medication names, dosages, and times to be taken in your wallet. · Do not take other medications without consulting your doctor.        NOTIFY [facility] ANY OF THE FOLLOWING:   Fever over 101 degrees for 24 hours. Chest pain, shortness of breath, fever, chills, nausea, vomiting, diarrhea, change in mentation, falling, weakness, bleeding. Severe pain or pain not relieved by medications. Or, any other signs or symptoms that you may have questions about.       DISPOSITION:    Home With:   OT  PT  HH  RN       SNF/Inpatient Rehab/LTAC    Independent/assisted living    Hospice    Other:         PROBLEM LIST Updated:  Yes ***       Signed:   Vanna Cheng MD  7/8/2022  10:48 AM

## 2022-07-08 NOTE — PROGRESS NOTES
General Daily Progress Note          Patient Name:   Ariela Norris       YOB: 1960       Age:  65 y.o. Admit Date: 6/30/2022      Subjective:       Ariela Norris is a 65 y.o. male with a significant PMH of HTN, lifelong 1.5ppd smoker, and Diabetes  RA and Frida Sorenson presents to the ED today with a R great toe wound. Started 2 weeks ago treated with oral antibiotics the patient states that the wound appeared 5 days ago when it \"burst\" from swelling with light yellow discharge. He has taken Rocefin and Clindamycin for the wound with subjective improvement. The wound is not acutely painful but he has a small amount of pain in the MTP joint. Patient denies other symptoms associated with the wound. Patient also treated for gout        Patient alert awake pain is much better control      Postprocedure diagnosis:  1. 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 2.  Right below-knee runoff with significantly diseased right posterior tibial artery and anterior tibial artery. s/p    1. Left common femoral access using ultrasound guidance with a micropuncture kit. 2.  Left common femoral artery access sheath placement using 6 American [place] sheath followed by 7 American 45 cm destination sheath placement. 3.  Bilateral iliac angiogram supervision and interpretation. 4.  Right common femoral artery first-order angiogram supervision and interpretation. 5.  Right popliteal artery second-order angiogram supervision and interpretation. 6. right superficial femoral artery angioplasty using directional atherectomy with a Hawk 1 LX device. 6.  Right superficial femoral artery long segment balloon PTA angioplasty using various sizes including predilation with a 4 mm balloon, followed by 5 x 150 mm chocolate balloon, 6 x 250 mm balloon, 6 x 120 mm chocolate balloon.   7.  Right superficial femoral artery long segment angioplasty with bare-metal stent placement using ever flex, 6 x 120, 6 x 150, and 6 x 40 mm stent. 8.  Closure of the left common femoral access using Vascade closure system. Blood pressure better now  Denies any chest pain or shortness of breath no nausea no vomiting    7/7  Patient awake, alert in bed, NAD. Currently reports no medical complaints other than some \"leg aches\" that are getting better. PT OT consulted and patient was fitted for walking shoe and said he was able to ambulate OK  Trauma surgery consulted- swelling is worse and cellulitis slightly worse so recommended keeping patient longer to monitor. Patient started on gabapentin 200mg BID    7/8  Patient awake, alert in bed, NAD. Currently no medical complaints.    Surgery consulted who cleared patient for discharge with outpatient follow up      Objective:     Visit Vitals  BP (!) 150/77 (BP 1 Location: Left upper arm, BP Patient Position: Sitting)   Pulse 63   Temp 98 °F (36.7 °C)   Resp 17   Ht 6' 1\" (1.854 m)   Wt 255 lb (115.7 kg)   SpO2 100%   BMI 33.64 kg/m²        Recent Results (from the past 24 hour(s))   GLUCOSE, POC    Collection Time: 07/07/22 11:49 AM   Result Value Ref Range    Glucose (POC) 165 (H) 65 - 117 mg/dL    Performed by Haseeb Mendez    GLUCOSE, POC    Collection Time: 07/07/22  4:41 PM   Result Value Ref Range    Glucose (POC) 144 (H) 65 - 117 mg/dL    Performed by Haseeb Mendez    PTT    Collection Time: 07/07/22  7:59 PM   Result Value Ref Range    aPTT 52.3 (H) 21.2 - 34.1 sec    aPTT, therapeutic range   82 - 109 sec   GLUCOSE, POC    Collection Time: 07/07/22  8:43 PM   Result Value Ref Range    Glucose (POC) 145 (H) 65 - 117 mg/dL    Performed by Judah Viera    GLUCOSE, POC    Collection Time: 07/08/22  7:22 AM   Result Value Ref Range    Glucose (POC) 172 (H) 65 - 117 mg/dL    Performed by Clemente Brar    CBC WITH AUTOMATED DIFF    Collection Time: 07/08/22  7:37 AM   Result Value Ref Range    WBC 11.3 (H) 4.1 - 11.1 K/uL    RBC 4.63 4.10 - 5.70 M/uL    HGB 14.0 12.1 - 17.0 g/dL    HCT 41.3 36.6 - 50.3 %    MCV 89.2 80.0 - 99.0 FL    MCH 30.2 26.0 - 34.0 PG    MCHC 33.9 30.0 - 36.5 g/dL    RDW 12.8 11.5 - 14.5 %    PLATELET 788 667 - 273 K/uL    MPV 10.6 8.9 - 12.9 FL    NRBC 0.0 0.0  WBC    ABSOLUTE NRBC 0.00 0.00 - 0.01 K/uL    NEUTROPHILS 66 32 - 75 %    LYMPHOCYTES 17 12 - 49 %    MONOCYTES 9 5 - 13 %    EOSINOPHILS 6 0 - 7 %    BASOPHILS 1 0 - 1 %    IMMATURE GRANULOCYTES 1 (H) 0 - 0.5 %    ABS. NEUTROPHILS 7.5 1.8 - 8.0 K/UL    ABS. LYMPHOCYTES 2.0 0.8 - 3.5 K/UL    ABS. MONOCYTES 1.0 0.0 - 1.0 K/UL    ABS. EOSINOPHILS 0.6 (H) 0.0 - 0.4 K/UL    ABS. BASOPHILS 0.1 0.0 - 0.1 K/UL    ABS. IMM. GRANS. 0.1 (H) 0.00 - 0.04 K/UL    DF AUTOMATED     PTT    Collection Time: 07/08/22  7:37 AM   Result Value Ref Range    aPTT 143.8 (HH) 21.2 - 34.1 sec    aPTT, therapeutic range   82 - 109 sec     [unfilled]      Review of Systems    Constitutional: Negative for chills and fever. HENT: Negative. Eyes: Negative. Respiratory: Negative. Cardiovascular: Negative. Gastrointestinal: Negative for abdominal pain and nausea. Skin: Negative. Neurological: Negative. Physical Exam:      Constitutional: pt is oriented to person, place, and time. HENT:   Head: Normocephalic and atraumatic. Eyes: Pupils are equal, round, and reactive to light. EOM are normal.   Cardiovascular: Normal rate, regular rhythm and normal heart sounds. Pulmonary/Chest: Breath sounds normal. No wheezes. No rales. Exhibits no tenderness. Abdominal: Soft. Bowel sounds are normal. There is no abdominal tenderness. There is no rebound and no guarding. Musculoskeletal: Normal range of motion. Neurological: pt is alert and oriented to person, place, and time. XR FLUOROSCOPY UNDER 60 MINUTES   Final Result   Status post right lower extremity angiogram and stenting. INTERPRETATION PROVIDED FOR COMPLIANCE ONLY AT NO CHARGE        MRI FOOT RT WO CONT   Final Result   1. Marginal erosions are seen surrounding the first MTP joint with a minimal   first MTP joint effusion. Small erosions are seen in the second metatarsal head   and fifth metatarsal head as well. The overall appearance is most consistent   with gout. Inflammatory and infectious arthritides however would be within the   differential.   2. Significant subcutaneous edema         ANKLE BRACHIAL INDEX   Final Result      XR FOOT RT MIN 3 V   Final Result   Cortical cystic erosive changes involving the distal aspects of the   first and fifth metatarsals and base of the proximal phalanx of the great toe   compatible with chronic degenerative/osteoarthritic changes. Consider gout.            Recent Results (from the past 24 hour(s))   GLUCOSE, POC    Collection Time: 07/07/22 11:49 AM   Result Value Ref Range    Glucose (POC) 165 (H) 65 - 117 mg/dL    Performed by [address] [place] [address], POC    Collection Time: 07/07/22  4:41 PM   Result Value Ref Range    Glucose (POC) 144 (H) 65 - 117 mg/dL    Performed by Belinda Styles    PTT    Collection Time: 07/07/22  7:59 PM   Result Value Ref Range    aPTT 52.3 (H) 21.2 - 34.1 sec    aPTT, therapeutic range   82 - 109 sec   GLUCOSE, POC    Collection Time: 07/07/22  8:43 PM   Result Value Ref Range    Glucose (POC) 145 (H) 65 - 117 mg/dL    Performed by Yordan Clinton    GLUCOSE, POC    Collection Time: 07/08/22  7:22 AM   Result Value Ref Range    Glucose (POC) 172 (H) 65 - 117 mg/dL    Performed by Iesha Li    CBC WITH AUTOMATED DIFF    Collection Time: 07/08/22  7:37 AM   Result Value Ref Range    WBC 11.3 (H) 4.1 - 11.1 K/uL    RBC 4.63 4.10 - 5.70 M/uL    HGB 14.0 12.1 - 17.0 g/dL    HCT 41.3 36.6 - 50.3 %    MCV 89.2 80.0 - 99.0 FL    MCH 30.2 26.0 - 34.0 PG    MCHC 33.9 30.0 - 36.5 g/dL    RDW 12.8 11.5 - 14.5 %    PLATELET 471 593 - 763 K/uL    MPV 10.6 8.9 - 12.9 FL    NRBC 0.0 0.0  WBC    ABSOLUTE NRBC 0.00 0.00 - 0.01 K/uL    NEUTROPHILS 66 32 - 75 % LYMPHOCYTES 17 12 - 49 %    MONOCYTES 9 5 - 13 %    EOSINOPHILS 6 0 - 7 %    BASOPHILS 1 0 - 1 %    IMMATURE GRANULOCYTES 1 (H) 0 - 0.5 %    ABS. NEUTROPHILS 7.5 1.8 - 8.0 K/UL    ABS. LYMPHOCYTES 2.0 0.8 - 3.5 K/UL    ABS. MONOCYTES 1.0 0.0 - 1.0 K/UL    ABS. EOSINOPHILS 0.6 (H) 0.0 - 0.4 K/UL    ABS. BASOPHILS 0.1 0.0 - 0.1 K/UL    ABS. IMM. GRANS. 0.1 (H) 0.00 - 0.04 K/UL    DF AUTOMATED     PTT    Collection Time: 07/08/22  7:37 AM   Result Value Ref Range    aPTT 143.8 (HH) 21.2 - 34.1 sec    aPTT, therapeutic range   82 - 109 sec       Results     Procedure Component Value Units Date/Time    CULTURE, BLOOD #1 [[id number]] Collected: 06/30/22 1840    Order Status: Completed Specimen: Blood Updated: 07/07/22 1149     Special Requests: No Special Requests        Culture result: No growth 6 days       CULTURE, BLOOD #2 [[id number]] Collected: 06/30/22 1840    Order Status: Completed Specimen: Blood Updated: 07/07/22 1149     Special Requests: No Special Requests        Culture result: No growth 6 days       CULTURE, [employer] STAIN [[id number]] Collected: 06/30/22 1815    Order Status: Canceled Specimen: Wound from Ulcer            Labs:     Recent Labs     07/08/22 0737   WBC 11.3*   HGB 14.0   HCT 41.3        No results for input(s): NA, K, CL, CO2, BUN, CREA, GLU, CA, MG, PHOS, URICA in the last 72 hours. No results for input(s): ALT, AP, TBIL, TBILI, TP, ALB, GLOB, GGT, AML, LPSE in the last 72 hours. No lab exists for component: SGOT, GPT, AMYP, HLPSE  Recent Labs     07/08/22 0737 07/07/22 1959 07/06/22 1848   APTT 143.8* 52.3* 88.5*      No results for input(s): FE, TIBC, PSAT, FERR in the last 72 hours. No results found for: FOL, RBCF   No results for input(s): PH, PCO2, PO2 in the last 72 hours. No results for input(s): CPK, CKNDX, TROIQ in the last 72 hours.     No lab exists for component: CPKMB  No results found for: CHOL, CHOLX, CHLST, CHOLV, HDL, HDLP, LDL, LDLC, DLDLP, TGLX, TRIGL, Teresita Me, [facility]  Lab Results   Component Value Date/Time    Glucose (POC) 172 (H) 07/08/2022 07:22 AM    Glucose (POC) 145 (H) 07/07/2022 08:43 PM    Glucose (POC) 144 (H) 07/07/2022 04:41 PM    Glucose (POC) 165 (H) 07/07/2022 11:49 AM    Glucose (POC) 144 (H) 07/07/2022 07:35 AM     No results found for: COLOR, APPRN, SPGRU, REFSG, EPHRAIM, PROTU, GLUCU, KETU, BILU, UROU, VIANEY, LEUKU, GLUKE, EPSU, BACTU, WBCU, RBCU, CASTS, UCRY      Assessment:     Right great toe infection  Diabetic foot injury  Arterial Insufficiency  Diabetes Mellitus  Hypertension  GERD  Gout  RA  Smoking  PAD, Abnormal ANTIONETTE, S/p arteriogram by Dr Shell Ramírez, on 07/03  Postprocedure diagnosis:  1. 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 2.  Right below-knee runoff with significantly diseased right posterior tibial artery and anterior tibial artery. 1. Marginal erosions are seen surrounding the first MTP joint with a minimal  first MTP joint effusion. Small erosions are seen in the second metatarsal head  and fifth metatarsal head as well. The overall appearance is most consistent  with gout. Inflammatory and infectious arthritides however would be within the  differential.  2. Significant subcutaneous edema      S/p  1. Left common femoral access using ultrasound guidance with a micropuncture kit. 2.  Left common femoral artery access sheath placement using 6 [place] sheath followed by 7 [place] 45 cm destination sheath placement. 3.  Bilateral iliac angiogram supervision and interpretation. 4.  Right common femoral artery first-order angiogram supervision and interpretation. 5.  Right popliteal artery second-order angiogram supervision and interpretation. 6. right superficial femoral artery angioplasty using directional atherectomy with a Truevisionk 1 LX device.   6.  Right superficial femoral artery long segment balloon PTA angioplasty using various sizes including predilation with a 4 mm balloon, followed by 5 x 150 mm chocolate balloon, 6 x 250 mm balloon, 6 x 120 mm chocolate balloon. 7.  Right superficial femoral artery long segment angioplasty with bare-metal stent placement using ever flex, 6 x 120, 6 x 150, and 6 x 40 mm stent. 8.  Closure of the left common femoral access using Vascade closure system. Plan:   Discharge patient. Clindamycin 300mg PO TID   Eliquis 10 mg p.o. twice daily for next 7 days and switch to 5 mg p.o. twice daily afterwards. Patient will also need to take Plavix.     Please have home health nurse arranged to help with wound care on the right great toe. Wound care instructions including iodine soaked with 4 x 4 gauze and Kerlix roll. Wound care needs to be done at least once daily if not 3 times weekly.       Current Facility-Administered Medications:     HYDROmorphone (DILAUDID) injection 1 mg, 1 mg, IntraVENous, Q4H PRN, Satnam Ferrer MD, 1 mg at 07/07/22 2122    clindamycin (CLEOCIN) 900mg D5W 50mL IVPB (premix), 900 mg, IntraVENous, Q8H, Vicky West MD, Last Rate: 100 mL/hr at 07/08/22 0949, 900 mg at 07/08/22 0949    gabapentin (NEURONTIN) capsule 200 mg, 200 mg, Oral, BID, Vicky West MD, 200 mg at 07/08/22 0949    lisinopriL (PRINIVIL, ZESTRIL) tablet 20 mg, 20 mg, Oral, DAILY, 20 mg at 07/08/22 0949 **AND** amLODIPine (NORVASC) tablet 10 mg, 10 mg, Oral, DAILY, Satnam Ferrer MD, 10 mg at 07/08/22 0949    hydrALAZINE (APRESOLINE) 20 mg/mL injection 20 mg, 20 mg, IntraVENous, Q4H PRN, Satnam Ferrer MD, 20 mg at 07/05/22 0010    sodium chloride (NS) flush 5-40 mL, 5-40 mL, IntraVENous, Q8H, MauricioChristian MD, 10 mL at 07/08/22 0541    sodium chloride (NS) flush 5-40 mL, 5-40 mL, IntraVENous, PRN, Christian Martínez MD, 10 mL at 07/05/22 1541    heparin 25,000 units in D5W 250 ml infusion, 12-25 Units/kg/hr (Adjusted), IntraVENous, TITRATE, Christian Martínez MD, Stopped at 07/08/22 0946    heparin (porcine) 1,000 unit/mL injection 4,000 Units, 4,000 Units, IntraVENous, PRN, 4,000 Units at 07/06/22 0723 **OR** heparin (porcine) 1,000 unit/mL injection 2,000 Units, 2,000 Units, IntraVENous, PRN, Mauricio, Wenda Lesch, MD, 2,000 Units at 07/08/22 0050    colchicine tablet 0.6 mg, 0.6 mg, Oral, DAILY, Vicky West MD, 0.6 mg at 07/08/22 0949    oxyCODONE-acetaminophen (PERCOCET) 5-325 mg per tablet 1 Tablet, 1 Tablet, Oral, Q6H PRN, Satnam Ferrer MD, 1 Tablet at 07/08/22 1000    insulin lispro (HUMALOG) injection, , SubCUTAneous, AC&HS, Hugo Phoenix, MD, 3 Units at 07/01/22 0909    glucose chewable tablet 16 g, 4 Tablet, Oral, PRN, Tarun Ferrer MD    glucagon (GLUCAGEN) injection 1 mg, 1 mg, IntraMUSCular, PRN, Tarun Ferrer MD    acetaminophen (TYLENOL) tablet 650 mg, 650 mg, Oral, Q6H PRN **OR** acetaminophen (TYLENOL) suppository 650 mg, 650 mg, Rectal, Q6H PRN, Tarun Ferrer MD    polyethylene glycol (MIRALAX) packet 17 g, 17 g, Oral, DAILY PRN, Tarun Ferrer MD    ondansetron (ZOFRAN ODT) tablet 4 mg, 4 mg, Oral, Q8H PRN **OR** ondansetron (ZOFRAN) injection 4 mg, 4 mg, IntraVENous, Q6H PRN, Satnam Ferrer MD    metFORMIN (GLUCOPHAGE) tablet 500 mg, 500 mg, Oral, BID WITH MEALS, Satnam Ferrer MD, 500 mg at 07/08/22 0949    pantoprazole (PROTONIX) tablet 40 mg, 40 mg, Oral, ACB, Satnam Ferrer MD, 40 mg at 07/08/22 0949    traMADoL (ULTRAM) tablet 50 mg, 50 mg, Oral, Q6H PRN, Hugo Phoenix, MD, 50 mg at 07/01/22 4876

## 2022-07-08 NOTE — PROGRESS NOTES
Patient does not have insurance. Will be unable to get home health due to no insurance. Patient will need to perform own wound care. Please have nurse go over wound care with patient. Eliquis coupon booklet was provided to patient yesterday, 07/07/2022 to help with cost of medication. Patient discharge plan is home self care due to lack of insurance.

## 2022-07-08 NOTE — DISCHARGE SUMMARY
Discharge Summary       PATIENT ID: Ynes Alex  MRN: [id number]   YOB: 1960    DATE OF ADMISSION: 6/30/2022  8:23 AM    DATE OF DISCHARGE:   PRIMARY CARE PROVIDER: Abhishek Cordero MD     ATTENDING PHYSICIAN: Pantera Ferrer  DISCHARGING PROVIDER: Pantera Ferrer      CONSULTATIONS: IP CONSULT TO PODIATRY  IP CONSULT TO INFECTIOUS DISEASES  IP CONSULT TO VASCULAR SURGERY  IP CONSULT TO GENERAL SURGERY    PROCEDURES/SURGERIES: Procedure(s):  BALLOON ANGIOPLASTY LOWER EXTREMITY RIGHT WITH STENTING    ADMITTING DIAGNOSES:    Patient Active Problem List    Diagnosis Date Noted    Toe infection 06/30/2022    Osteomyelitis ([address] 06/30/2022       DISCHARGE DIAGNOSES / PLAN:      Right great toe infection  Diabetic foot injury  Arterial Insufficiency  Diabetes Mellitus  Hypertension  GERD  Gout  RA  Smoking  PAD, Abnormal ANTIONETTE, S/p arteriogram by Dr Carmina Hall, on 07/03  Postprocedure diagnosis:  1.  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 2.  Right below-knee runoff with significantly diseased right posterior tibial artery and anterior tibial artery.     1. Marginal erosions are seen surrounding the first MTP joint with a minimal  first MTP joint effusion. Small erosions are seen in the second metatarsal head  and fifth metatarsal head as well. The overall appearance is most consistent  with gout. Inflammatory and infectious arthritides however would be within the  differential.  2. Significant subcutaneous edema        S/p  1.  Left common femoral access using ultrasound guidance with a micropuncture kit. 2.  Left common femoral artery access sheath placement using 6 [place] sheath followed by 7 Korean 45 cm destination sheath placement. 3.  Bilateral iliac angiogram supervision and interpretation. 4.  Right common femoral artery first-order angiogram supervision and interpretation.   5.  Right popliteal artery second-order angiogram supervision and interpretation. 6. right superficial femoral artery angioplasty using directional atherectomy with a Hawk 1 LX device. 6.  Right superficial femoral artery long segment balloon PTA angioplasty using various sizes including predilation with a 4 mm balloon, followed by 5 x 150 mm chocolate balloon, 6 x 250 mm balloon, 6 x 120 mm chocolate balloon. 7.  Right superficial femoral artery long segment angioplasty with bare-metal stent placement using ever flex, 6 x 120, 6 x 150, and 6 x 40 mm stent. 8.  Closure of the left common femoral access using Vascade closure system. DISCHARGE MEDICATIONS:  Current Discharge Medication List      START taking these medications    Details   gabapentin (NEURONTIN) 100 mg capsule Take 2 Capsules by mouth two (2) times a day for 10 doses. Max Daily Amount: 400 mg. Qty: 20 Capsule, Refills: 0  Start date: 7/8/2022, End date: 7/13/2022    Associated Diagnoses: Toe infection      lisinopriL (PRINIVIL, ZESTRIL) 20 mg tablet Take 1 Tablet by mouth daily. Qty: 60 Tablet, Refills: 0  Start date: 7/9/2022      amLODIPine (NORVASC) 10 mg tablet Take 1 Tablet by mouth daily. Qty: 30 Tablet, Refills: 0  Start date: 7/9/2022      ondansetron (ZOFRAN ODT) 4 mg disintegrating tablet Take 1 Tablet by mouth every eight (8) hours as needed for Nausea or Vomiting. Qty: 10 Tablet, Refills: 0  Start date: 7/8/2022      oxyCODONE-acetaminophen (PERCOCET) 5-325 mg per tablet Take 1 Tablet by mouth every six (6) hours as needed for Pain for up to 3 days. Max Daily Amount: 4 Tablets. Qty: 14 Tablet, Refills: 0  Start date: 7/8/2022, End date: 7/11/2022    Associated Diagnoses: Toe infection      clindamycin (CLEOCIN) 300 mg capsule Take 1 Capsule by mouth three (3) times daily. Qty: 30 Capsule, Refills: 0  Start date: 7/8/2022         CONTINUE these medications which have NOT CHANGED    Details   pantoprazole (PROTONIX) 40 mg tablet Take 40 mg by mouth daily.       metFORMIN (GLUCOPHAGE) 500 mg tablet Take 500 mg by mouth daily (with dinner). STOP taking these medications       amLODIPine-benazepril (LOTREL) 5-20 mg per capsule Comments:   Reason for Stopping:         naproxen (NAPROSYN) 250 mg tablet Comments:   Reason for Stopping:                 NOTIFY YOUR PHYSICIAN FOR ANY OF THE FOLLOWING:   Fever over 101 degrees for 24 hours. Chest pain, shortness of breath, fever, chills, nausea, vomiting, diarrhea, change in mentation, falling, weakness, bleeding. Severe pain or pain not relieved by medications. Or, any other signs or symptoms that you may have questions about. DISPOSITION:  x  Home With:   OT  PT  HH  RN       Long term SNF/Inpatient Rehab    Independent/assisted living    Hospice    Other:       PATIENT CONDITION AT DISCHARGE: Stable      PHYSICAL EXAMINATION AT DISCHARGE:  General:          Alert, cooperative, no distress, appears stated age. HEENT:           Atraumatic, anicteric sclerae, pink conjunctivae                          No oral ulcers, mucosa moist, throat clear, dentition fair  Neck:               Supple, symmetrical  Lungs:             Clear to auscultation bilaterally. No Wheezing or Rhonchi. No rales. Chest wall:      No tenderness  No Accessory muscle use. Heart:              Regular  rhythm,  No  murmur   No edema  Abdomen:        Soft, non-tender. Not distended. Bowel sounds normal  Extremities:     No cyanosis. No clubbing,                            Skin turgor normal, Capillary refill normal  Skin:                Not pale. Not Jaundiced  No rashes   Psych:             Not anxious or agitated. Neurologic:      Alert, moves all extremities, answers questions appropriately and responds to commands     XR FLUOROSCOPY UNDER 60 MINUTES   Final Result   Status post right lower extremity angiogram and stenting. INTERPRETATION PROVIDED FOR COMPLIANCE ONLY AT NO CHARGE        MRI FOOT RT WO CONT   Final Result   1.  Marginal erosions are seen surrounding the first MTP joint with a minimal   first MTP joint effusion. Small erosions are seen in the second metatarsal head   and fifth metatarsal head as well. The overall appearance is most consistent   with gout. Inflammatory and infectious arthritides however would be within the   differential.   2. Significant subcutaneous edema         ANKLE BRACHIAL INDEX   Final Result      XR FOOT RT MIN 3 V   Final Result   Cortical cystic erosive changes involving the distal aspects of the   first and fifth metatarsals and base of the proximal phalanx of the great toe   compatible with chronic degenerative/osteoarthritic changes. Consider gout.            Recent Results (from the past 24 hour(s))   GLUCOSE, POC    Collection Time: 07/07/22 11:49 AM   Result Value Ref Range    Glucose (POC) 165 (H) 65 - 117 mg/dL    Performed by [address] [place] [address], POC    Collection Time: 07/07/22  4:41 PM   Result Value Ref Range    Glucose (POC) 144 (H) 65 - 117 mg/dL    Performed by Opal Chavira    PTT    Collection Time: 07/07/22  7:59 PM   Result Value Ref Range    aPTT 52.3 (H) 21.2 - 34.1 sec    aPTT, therapeutic range   82 - 109 sec   GLUCOSE, POC    Collection Time: 07/07/22  8:43 PM   Result Value Ref Range    Glucose (POC) 145 (H) 65 - 117 mg/dL    Performed by Kj Isbell    GLUCOSE, POC    Collection Time: 07/08/22  7:22 AM   Result Value Ref Range    Glucose (POC) 172 (H) 65 - 117 mg/dL    Performed by Eileen Bahena    CBC WITH AUTOMATED DIFF    Collection Time: 07/08/22  7:37 AM   Result Value Ref Range    WBC 11.3 (H) 4.1 - 11.1 K/uL    RBC 4.63 4.10 - 5.70 M/uL    HGB 14.0 12.1 - 17.0 g/dL    HCT 41.3 36.6 - 50.3 %    MCV 89.2 80.0 - 99.0 FL    MCH 30.2 26.0 - 34.0 PG    MCHC 33.9 30.0 - 36.5 g/dL    RDW 12.8 11.5 - 14.5 %    PLATELET 185 663 - 918 K/uL    MPV 10.6 8.9 - 12.9 FL    NRBC 0.0 0.0  WBC    ABSOLUTE NRBC 0.00 0.00 - 0.01 K/uL    NEUTROPHILS 66 32 - 75 %    LYMPHOCYTES 17 12 - 49 % MONOCYTES 9 5 - 13 %    EOSINOPHILS 6 0 - 7 %    BASOPHILS 1 0 - 1 %    IMMATURE GRANULOCYTES 1 (H) 0 - 0.5 %    ABS. NEUTROPHILS 7.5 1.8 - 8.0 K/UL    ABS. LYMPHOCYTES 2.0 0.8 - 3.5 K/UL    ABS. MONOCYTES 1.0 0.0 - 1.0 K/UL    ABS. EOSINOPHILS 0.6 (H) 0.0 - 0.4 K/UL    ABS. BASOPHILS 0.1 0.0 - 0.1 K/UL    ABS. IMM. GRANS. 0.1 (H) 0.00 - 0.04 K/UL    DF AUTOMATED     PTT    Collection Time: 07/08/22  7:37 AM   Result Value Ref Range    aPTT 143.8 (HH) 21.2 - 34.1 sec    aPTT, therapeutic range   82 - 109 sec          HOSPITAL COURSE:    Minal Powers a 65 y. o. male with a significant PMH of HTN, lifelong 1.5ppd smoker, and Diabetes  RA and Gerd presents to the ED today with a R great toe wound.  Started 2 weeks ago treated with oral antibiotics the patient states that the wound appeared 5 days ago when it \"burst\" from swelling with light yellow discharge. He has taken Rocefin and Clindamycin for the wound with subjective improvement. The wound is not acutely painful but he has a small amount of pain in the MTP joint. Patient denies other symptoms associated with the wound. Patient also treated for gout           Patient alert awake pain is much better control        Postprocedure diagnosis:  1.  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 2.  Right below-knee runoff with significantly diseased right posterior tibial artery and anterior tibial artery.        s/p     1.  Left common femoral access using ultrasound guidance with a micropuncture kit. 2.  Left common femoral artery access sheath placement using 6 [place] sheath followed by 7 [place]ian 45 cm destination sheath placement. 3.  Bilateral iliac angiogram supervision and interpretation. 4.  Right common femoral artery first-order angiogram supervision and interpretation. 5.  Right popliteal artery second-order angiogram supervision and interpretation.   6. right superficial femoral artery angioplasty using directional atherectomy with a Hawk 1 LX device. 6.  Right superficial femoral artery long segment balloon PTA angioplasty using various sizes including predilation with a 4 mm balloon, followed by 5 x 150 mm chocolate balloon, 6 x 250 mm balloon, 6 x 120 mm chocolate balloon. 7.  Right superficial femoral artery long segment angioplasty with bare-metal stent placement using ever flex, 6 x 120, 6 x 150, and 6 x 40 mm stent. 8.  Closure of the left common femoral access using Vascade closure system.        Blood pressure better now  Denies any chest pain or shortness of breath no nausea no vomiting     7/7  Patient awake, alert in bed, NAD. Currently reports no medical complaints other than some \"leg aches\" that are getting better. PT OT consulted and patient was fitted for walking shoe and said he was able to ambulate OK  Trauma surgery consulted- swelling is worse and cellulitis slightly worse so recommended keeping patient longer to monitor. Patient started on gabapentin 200mg BID     7/8  Patient awake, alert in bed, NAD. Currently no medical complaints.    Surgery consulted who cleared patient for discharge with outpatient follow up      Discussed with infectious disease recommended discharge the patient on clindamycin 300 mg 3 times a day for 10 days    Medication reconciliation done time shopping 35 minutes 50% time spent counseling and coordination of care    Also discharged on Eliquis 10 mg twice a day for 7 days then 5 mg twice daily    Signed:   Vanna Cheng MD  7/8/2022  10:49 AM

## 2022-07-08 NOTE — PROGRESS NOTES
Patient discharging home today, orders for home health, patient does not have insurance, will be home self care. Lease provide patient with some supplies for wound care. Discharge plan of care/case management plan validated with provider discharge order.

## 2022-07-08 NOTE — PROGRESS NOTES
Infectious Disease Progress Note           Subjective:   Pt seen and examined at bedside. Stable, denies new complaints, no acute events since last seen   Objective:   Physical Exam:     Visit Vitals  BP (!) 150/77 (BP 1 Location: Left upper arm, BP Patient Position: Sitting)   Pulse 63   Temp 98 °F (36.7 °C)   Resp 17   Ht 6' 1\" (1.854 m)   Wt 255 lb (115.7 kg)   SpO2 100%   BMI 33.64 kg/m²      O2 Device: None (Room air)    Temp (24hrs), Av.1 °F (36.7 °C), Min:97.6 °F (36.4 °C), Max:98.9 °F (37.2 °C)    No intake/output data recorded. No intake/output data recorded. General: NAD, AAO x 4  HEENT: FLOYD, Moist mucosa   Lungs: CTA b/l, decreased at the bases, no wheeze/rhonchi   Heart: S1S2+, RRR, no murmur  Abdo: Soft, NT, ND, +BS   : No herbert cath   Exts: Right great toe swelling, ongoing right foot swelling and erythema   Skin: No pressure ulcers or andre     Data Review:       Recent Days:  Recent Labs     22  0737   WBC 11.3*   HGB 14.0   HCT 41.3        No results for input(s): BUN, CREA in the last 72 hours. Lab Results   Component Value Date/Time    C-Reactive protein 2.79 (H) 2022 06:09 AM        Microbiology     Results     Procedure Component Value Units Date/Time    CULTURE, BLOOD #1 [[id number]] Collected: 22    Order Status: Completed Specimen: Blood Updated: 22     Special Requests: No Special Requests        Culture result: No growth 6 days       CULTURE, BLOOD #2 [[id number]] Collected: 22    Order Status: Completed Specimen: Blood Updated: 22     Special Requests: No Special Requests        Culture result: No growth 6 days       CULTURE, Arzella Carrel STAIN [[id number]] Collected: 22    Order Status: Canceled Specimen: Wound from Ulcer          Diagnostics   CXR Results  (Last 48 hours)    None         Assessment/Plan     1.  Right great toe infection, MRI findings are moreconsistent w hailee arthritis      Resolving erythema and swelling. Sig decreased warmth w addition of Clindamycin       Afebrile, mild leukocytosis on routine labs       Continue on Clindamycin IV while hospitalized, may d/c on 300 mg po q 8 hours      Will follow in 2 wks post d/c     2. H/o gouty arthritis, Continue on Colchicine     3. PAD, Abnormal ANTIONETTE, S/p arteriogram by Dr Yazan Diaz, on 07/03       4. H/o DM, BS controlled per pt, A1C of 7.7, BS mg per primary     5.  Right foot pain, Continue symptomatic mgt     Vick Gentile MD    7/8/2022

## 2022-07-08 NOTE — PROGRESS NOTES
Problem: Falls - Risk of  Goal: *Absence of Falls  Description: Document Alyson Arian Fall Risk and appropriate interventions in the flowsheet.   Outcome: Progressing Towards Goal  Note: Fall Risk Interventions:  Mobility Interventions: Utilize walker, cane, or other assistive device         Medication Interventions: Patient to call before getting OOB,Teach patient to arise slowly    Elimination Interventions: Call light in reach

## 2022-07-18 ENCOUNTER — APPOINTMENT (OUTPATIENT)
Dept: GENERAL RADIOLOGY | Age: 62
DRG: 253 | End: 2022-07-18
Attending: STUDENT IN AN ORGANIZED HEALTH CARE EDUCATION/TRAINING PROGRAM

## 2022-07-18 ENCOUNTER — OFFICE VISIT (OUTPATIENT)
Dept: SURGERY | Age: 62
End: 2022-07-18

## 2022-07-18 ENCOUNTER — HOSPITAL ENCOUNTER (INPATIENT)
Age: 62
LOS: 10 days | Discharge: HOME | DRG: 253 | End: 2022-07-28
Attending: STUDENT IN AN ORGANIZED HEALTH CARE EDUCATION/TRAINING PROGRAM | Admitting: FAMILY MEDICINE

## 2022-07-18 ENCOUNTER — APPOINTMENT (OUTPATIENT)
Dept: CT IMAGING | Age: 62
DRG: 253 | End: 2022-07-18
Attending: SURGERY

## 2022-07-18 VITALS
DIASTOLIC BLOOD PRESSURE: 76 MMHG | WEIGHT: 234 LBS | OXYGEN SATURATION: 98 % | TEMPERATURE: 97.8 F | HEIGHT: 73 IN | BODY MASS INDEX: 31.01 KG/M2 | HEART RATE: 87 BPM | SYSTOLIC BLOOD PRESSURE: 134 MMHG

## 2022-07-18 DIAGNOSIS — M79.671 RIGHT FOOT PAIN: ICD-10-CM

## 2022-07-18 DIAGNOSIS — L08.9 TOE INFECTION: ICD-10-CM

## 2022-07-18 DIAGNOSIS — I82.90 OCCLUSIVE THROMBUS: ICD-10-CM

## 2022-07-18 DIAGNOSIS — I77.1 ARTERIAL INSUFFICIENCY (HCC): Primary | ICD-10-CM

## 2022-07-18 DIAGNOSIS — I96 TOE GANGRENE (HCC): ICD-10-CM

## 2022-07-18 DIAGNOSIS — I96 GANGRENE OF TOE (HCC): ICD-10-CM

## 2022-07-18 DIAGNOSIS — I96 GANGRENE OF TOE (HCC): Primary | ICD-10-CM

## 2022-07-18 DIAGNOSIS — I73.9 PVD (PERIPHERAL VASCULAR DISEASE) (HCC): ICD-10-CM

## 2022-07-18 DIAGNOSIS — I70.201 TIBIAL ARTERY OCCLUSION, RIGHT (HCC): ICD-10-CM

## 2022-07-18 DIAGNOSIS — M10.9 GOUTY ARTHRITIS: ICD-10-CM

## 2022-07-18 LAB
ABO + RH BLD: NORMAL
ALBUMIN SERPL-MCNC: 3.4 G/DL (ref 3.5–5)
ALBUMIN/GLOB SERPL: 0.7 {RATIO} (ref 1.1–2.2)
ALP SERPL-CCNC: 98 U/L (ref 45–117)
ALT SERPL-CCNC: 30 U/L (ref 12–78)
ANION GAP SERPL CALC-SCNC: 9 MMOL/L (ref 5–15)
APTT PPP: 33.6 SEC (ref 21.2–34.1)
APTT PPP: 50.3 SEC (ref 21.2–34.1)
AST SERPL W P-5'-P-CCNC: 23 U/L (ref 15–37)
BASOPHILS # BLD: 0.2 K/UL (ref 0–0.1)
BASOPHILS NFR BLD: 1 % (ref 0–1)
BILIRUB SERPL-MCNC: 1.1 MG/DL (ref 0.2–1)
BLOOD GROUP ANTIBODIES SERPL: NEGATIVE
BUN SERPL-MCNC: 14 MG/DL (ref 6–20)
BUN/CREAT SERPL: 16 (ref 12–20)
CA-I BLD-MCNC: 9.4 MG/DL (ref 8.5–10.1)
CHLORIDE SERPL-SCNC: 100 MMOL/L (ref 97–108)
CK SERPL-CCNC: 207 U/L (ref 39–308)
CO2 SERPL-SCNC: 25 MMOL/L (ref 21–32)
CREAT SERPL-MCNC: 0.85 MG/DL (ref 0.7–1.3)
CRP SERPL-MCNC: 16.2 MG/DL (ref 0–0.6)
DIFFERENTIAL METHOD BLD: ABNORMAL
EOSINOPHIL # BLD: 0.1 K/UL (ref 0–0.4)
EOSINOPHIL NFR BLD: 1 % (ref 0–7)
ERYTHROCYTE [DISTWIDTH] IN BLOOD BY AUTOMATED COUNT: 12.3 % (ref 11.5–14.5)
ERYTHROCYTE [SEDIMENTATION RATE] IN BLOOD: 82 MM/HR (ref 0–20)
GLOBULIN SER CALC-MCNC: 4.6 G/DL (ref 2–4)
GLUCOSE BLD STRIP.AUTO-MCNC: 128 MG/DL (ref 65–117)
GLUCOSE SERPL-MCNC: 159 MG/DL (ref 65–100)
HCT VFR BLD AUTO: 42.4 % (ref 36.6–50.3)
HGB BLD-MCNC: 14.4 G/DL (ref 12.1–17)
IMM GRANULOCYTES # BLD AUTO: 0.3 K/UL (ref 0–0.04)
IMM GRANULOCYTES NFR BLD AUTO: 1 % (ref 0–0.5)
INR PPP: 1.4 (ref 0.9–1.1)
INR PPP: 1.4 (ref 0.9–1.1)
LACTATE SERPL-SCNC: 1.4 MMOL/L (ref 0.4–2)
LYMPHOCYTES # BLD: 1.3 K/UL (ref 0.8–3.5)
LYMPHOCYTES NFR BLD: 5 % (ref 12–49)
MCH RBC QN AUTO: 29.8 PG (ref 26–34)
MCHC RBC AUTO-ENTMCNC: 34 G/DL (ref 30–36.5)
MCV RBC AUTO: 87.8 FL (ref 80–99)
MONOCYTES # BLD: 1.7 K/UL (ref 0–1)
MONOCYTES NFR BLD: 7 % (ref 5–13)
NEUTS SEG # BLD: 23.2 K/UL (ref 1.8–8)
NEUTS SEG NFR BLD: 85 % (ref 32–75)
NRBC # BLD: 0 K/UL (ref 0–0.01)
NRBC BLD-RTO: 0 PER 100 WBC
PERFORMED BY, TECHID: ABNORMAL
PLATELET # BLD AUTO: 544 K/UL (ref 150–400)
PMV BLD AUTO: 9.7 FL (ref 8.9–12.9)
POTASSIUM SERPL-SCNC: 4.2 MMOL/L (ref 3.5–5.1)
PROT SERPL-MCNC: 8 G/DL (ref 6.4–8.2)
PROTHROMBIN TIME: 17.7 SEC (ref 11.9–14.6)
PROTHROMBIN TIME: 17.7 SEC (ref 11.9–14.6)
RBC # BLD AUTO: 4.83 M/UL (ref 4.1–5.7)
SODIUM SERPL-SCNC: 134 MMOL/L (ref 136–145)
SPECIMEN EXP DATE BLD: NORMAL
THERAPEUTIC RANGE,PTTT: ABNORMAL SEC (ref 82–109)
THERAPEUTIC RANGE,PTTT: NORMAL SEC (ref 82–109)
WBC # BLD AUTO: 26.8 K/UL (ref 4.1–11.1)

## 2022-07-18 PROCEDURE — 96365 THER/PROPH/DIAG IV INF INIT: CPT

## 2022-07-18 PROCEDURE — 85610 PROTHROMBIN TIME: CPT

## 2022-07-18 PROCEDURE — 36415 COLL VENOUS BLD VENIPUNCTURE: CPT

## 2022-07-18 PROCEDURE — 82962 GLUCOSE BLOOD TEST: CPT

## 2022-07-18 PROCEDURE — 85652 RBC SED RATE AUTOMATED: CPT

## 2022-07-18 PROCEDURE — 80053 COMPREHEN METABOLIC PANEL: CPT

## 2022-07-18 PROCEDURE — 85730 THROMBOPLASTIN TIME PARTIAL: CPT

## 2022-07-18 PROCEDURE — 65270000029 HC RM PRIVATE

## 2022-07-18 PROCEDURE — 86900 BLOOD TYPING SEROLOGIC ABO: CPT

## 2022-07-18 PROCEDURE — 85025 COMPLETE CBC W/AUTO DIFF WBC: CPT

## 2022-07-18 PROCEDURE — 99213 OFFICE O/P EST LOW 20 MIN: CPT | Performed by: SURGERY

## 2022-07-18 PROCEDURE — 96367 TX/PROPH/DG ADDL SEQ IV INF: CPT

## 2022-07-18 PROCEDURE — 82550 ASSAY OF CK (CPK): CPT

## 2022-07-18 PROCEDURE — 87040 BLOOD CULTURE FOR BACTERIA: CPT

## 2022-07-18 PROCEDURE — 99222 1ST HOSP IP/OBS MODERATE 55: CPT | Performed by: SURGERY

## 2022-07-18 PROCEDURE — 83605 ASSAY OF LACTIC ACID: CPT

## 2022-07-18 PROCEDURE — 96376 TX/PRO/DX INJ SAME DRUG ADON: CPT

## 2022-07-18 PROCEDURE — 74011250636 HC RX REV CODE- 250/636: Performed by: STUDENT IN AN ORGANIZED HEALTH CARE EDUCATION/TRAINING PROGRAM

## 2022-07-18 PROCEDURE — 96375 TX/PRO/DX INJ NEW DRUG ADDON: CPT

## 2022-07-18 PROCEDURE — 96366 THER/PROPH/DIAG IV INF ADDON: CPT

## 2022-07-18 PROCEDURE — 99285 EMERGENCY DEPT VISIT HI MDM: CPT

## 2022-07-18 PROCEDURE — 86140 C-REACTIVE PROTEIN: CPT

## 2022-07-18 PROCEDURE — 73660 X-RAY EXAM OF TOE(S): CPT

## 2022-07-18 PROCEDURE — 74011250636 HC RX REV CODE- 250/636: Performed by: FAMILY MEDICINE

## 2022-07-18 PROCEDURE — 74011000258 HC RX REV CODE- 258: Performed by: STUDENT IN AN ORGANIZED HEALTH CARE EDUCATION/TRAINING PROGRAM

## 2022-07-18 PROCEDURE — 75635 CT ANGIO ABDOMINAL ARTERIES: CPT

## 2022-07-18 PROCEDURE — 96374 THER/PROPH/DIAG INJ IV PUSH: CPT

## 2022-07-18 PROCEDURE — 74011000636 HC RX REV CODE- 636: Performed by: STUDENT IN AN ORGANIZED HEALTH CARE EDUCATION/TRAINING PROGRAM

## 2022-07-18 RX ORDER — INSULIN LISPRO 100 [IU]/ML
INJECTION, SOLUTION INTRAVENOUS; SUBCUTANEOUS
Status: DISCONTINUED | OUTPATIENT
Start: 2022-07-19 | End: 2022-07-28 | Stop reason: HOSPADM

## 2022-07-18 RX ORDER — ONDANSETRON 4 MG/1
4 TABLET, ORALLY DISINTEGRATING ORAL
Status: DISCONTINUED | OUTPATIENT
Start: 2022-07-18 | End: 2022-07-23 | Stop reason: SDUPTHER

## 2022-07-18 RX ORDER — COLCHICINE 0.6 MG/1
0.6 TABLET ORAL DAILY
COMMUNITY
End: 2022-10-28 | Stop reason: ALTCHOICE

## 2022-07-18 RX ORDER — COLCHICINE 0.6 MG/1
0.6 TABLET ORAL DAILY
Status: DISCONTINUED | OUTPATIENT
Start: 2022-07-19 | End: 2022-07-28 | Stop reason: HOSPADM

## 2022-07-18 RX ORDER — SODIUM CHLORIDE 9 MG/ML
75 INJECTION, SOLUTION INTRAVENOUS CONTINUOUS
Status: DISCONTINUED | OUTPATIENT
Start: 2022-07-18 | End: 2022-07-26

## 2022-07-18 RX ORDER — PANTOPRAZOLE SODIUM 40 MG/1
40 TABLET, DELAYED RELEASE ORAL DAILY
Status: DISCONTINUED | OUTPATIENT
Start: 2022-07-19 | End: 2022-07-18

## 2022-07-18 RX ORDER — POLYETHYLENE GLYCOL 3350 17 G/17G
17 POWDER, FOR SOLUTION ORAL DAILY PRN
Status: DISCONTINUED | OUTPATIENT
Start: 2022-07-18 | End: 2022-07-28 | Stop reason: HOSPADM

## 2022-07-18 RX ORDER — PANTOPRAZOLE SODIUM 40 MG/1
40 TABLET, DELAYED RELEASE ORAL
Status: DISCONTINUED | OUTPATIENT
Start: 2022-07-19 | End: 2022-07-28 | Stop reason: HOSPADM

## 2022-07-18 RX ORDER — HEPARIN SODIUM 1000 [USP'U]/ML
80 INJECTION, SOLUTION INTRAVENOUS; SUBCUTANEOUS AS NEEDED
Status: DISCONTINUED | OUTPATIENT
Start: 2022-07-18 | End: 2022-07-20 | Stop reason: DRUGHIGH

## 2022-07-18 RX ORDER — MORPHINE SULFATE 4 MG/ML
4 INJECTION INTRAVENOUS ONCE
Status: COMPLETED | OUTPATIENT
Start: 2022-07-18 | End: 2022-07-18

## 2022-07-18 RX ORDER — HYDROMORPHONE HYDROCHLORIDE 1 MG/ML
1 INJECTION, SOLUTION INTRAMUSCULAR; INTRAVENOUS; SUBCUTANEOUS
Status: DISPENSED | OUTPATIENT
Start: 2022-07-18 | End: 2022-07-20

## 2022-07-18 RX ORDER — MAGNESIUM SULFATE 100 %
4 CRYSTALS MISCELLANEOUS AS NEEDED
Status: DISCONTINUED | OUTPATIENT
Start: 2022-07-18 | End: 2022-07-28 | Stop reason: HOSPADM

## 2022-07-18 RX ORDER — INDOMETHACIN 50 MG/1
50 CAPSULE ORAL DAILY
COMMUNITY
End: 2022-07-28

## 2022-07-18 RX ORDER — HEPARIN SODIUM 1000 [USP'U]/ML
80 INJECTION, SOLUTION INTRAVENOUS; SUBCUTANEOUS ONCE
Status: COMPLETED | OUTPATIENT
Start: 2022-07-18 | End: 2022-07-18

## 2022-07-18 RX ORDER — HEPARIN SODIUM 10000 [USP'U]/100ML
18-36 INJECTION, SOLUTION INTRAVENOUS
Status: DISCONTINUED | OUTPATIENT
Start: 2022-07-18 | End: 2022-07-20 | Stop reason: DRUGHIGH

## 2022-07-18 RX ORDER — HEPARIN SODIUM 1000 [USP'U]/ML
40 INJECTION, SOLUTION INTRAVENOUS; SUBCUTANEOUS AS NEEDED
Status: DISCONTINUED | OUTPATIENT
Start: 2022-07-18 | End: 2022-07-20 | Stop reason: DRUGHIGH

## 2022-07-18 RX ORDER — ACETAMINOPHEN 325 MG/1
650 TABLET ORAL
Status: DISCONTINUED | OUTPATIENT
Start: 2022-07-18 | End: 2022-07-28 | Stop reason: HOSPADM

## 2022-07-18 RX ORDER — ONDANSETRON 2 MG/ML
4 INJECTION INTRAMUSCULAR; INTRAVENOUS
Status: DISCONTINUED | OUTPATIENT
Start: 2022-07-18 | End: 2022-07-23 | Stop reason: SDUPTHER

## 2022-07-18 RX ORDER — ACETAMINOPHEN 650 MG/1
650 SUPPOSITORY RECTAL
Status: DISCONTINUED | OUTPATIENT
Start: 2022-07-18 | End: 2022-07-28 | Stop reason: HOSPADM

## 2022-07-18 RX ORDER — LISINOPRIL 20 MG/1
20 TABLET ORAL DAILY
Status: DISCONTINUED | OUTPATIENT
Start: 2022-07-19 | End: 2022-07-28 | Stop reason: HOSPADM

## 2022-07-18 RX ORDER — AMLODIPINE BESYLATE 5 MG/1
10 TABLET ORAL DAILY
Status: DISCONTINUED | OUTPATIENT
Start: 2022-07-19 | End: 2022-07-28 | Stop reason: HOSPADM

## 2022-07-18 RX ADMIN — HEPARIN SODIUM 18 UNITS/KG/HR: 10000 INJECTION, SOLUTION INTRAVENOUS at 18:01

## 2022-07-18 RX ADMIN — HYDROMORPHONE HYDROCHLORIDE 1 MG: 1 INJECTION, SOLUTION INTRAMUSCULAR; INTRAVENOUS; SUBCUTANEOUS at 21:53

## 2022-07-18 RX ADMIN — PIPERACILLIN AND TAZOBACTAM 3.38 G: 3; .375 INJECTION, POWDER, FOR SOLUTION INTRAVENOUS at 19:20

## 2022-07-18 RX ADMIN — HEPARIN SODIUM 7230 UNITS: 1000 INJECTION, SOLUTION INTRAVENOUS; SUBCUTANEOUS at 17:32

## 2022-07-18 RX ADMIN — SODIUM CHLORIDE 75 ML/HR: 9 INJECTION, SOLUTION INTRAVENOUS at 23:24

## 2022-07-18 RX ADMIN — HEPARIN SODIUM 20 UNITS/KG/HR: 10000 INJECTION, SOLUTION INTRAVENOUS at 23:57

## 2022-07-18 RX ADMIN — MORPHINE SULFATE 4 MG: 4 INJECTION INTRAVENOUS at 17:31

## 2022-07-18 RX ADMIN — SODIUM CHLORIDE 1000 ML: 9 INJECTION, SOLUTION INTRAVENOUS at 18:05

## 2022-07-18 RX ADMIN — VANCOMYCIN HYDROCHLORIDE 1000 MG: 1 INJECTION, POWDER, LYOPHILIZED, FOR SOLUTION INTRAVENOUS at 17:37

## 2022-07-18 RX ADMIN — IOPAMIDOL 100 ML: 755 INJECTION, SOLUTION INTRAVENOUS at 18:52

## 2022-07-18 RX ADMIN — HEPARIN SODIUM 3620 UNITS: 1000 INJECTION, SOLUTION INTRAVENOUS; SUBCUTANEOUS at 23:59

## 2022-07-18 NOTE — ACP (ADVANCE CARE PLANNING)
Advance Care Planning   Healthcare Decision Maker:       Primary Decision Maker: Yane Montano [facility] - [phone]

## 2022-07-18 NOTE — Clinical Note
Sheath #1: sheath exchanged for [facility] XCUT DESTINATION [id number] -- . Upon evaluation of the common femoral artery stick using fluoroscopy, the access site puncture was within the safe zone.

## 2022-07-18 NOTE — H&P
History and Physical    NAME: Jordon Trejo   :  1960   MRN:  [id number]     Date/Time:  2022 7:24 PM    Patient PCP: Sudhir Burciaga MD  ______________________________________________________________________             Subjective:     CHIEF COMPLAINT: Arterial Insufficiency        HISTORY OF PRESENT ILLNESS:       Patient is a 65y.o. year old male with a PMH of diabetes, diabetic foot injury, arterial insufficiency, hypertension, GERD, and gout presents to the ED complaining of  right great toe infection. The patient was recently hospitalized for this same problem and received balloon angioplasty of the right lower extremity which initially helped perfusion of the toe. He has since relapsed and complains of excruciating pain that is not well managed on percocet. The patient states that it greatly progress in the last 24 hours and there is now black and gangrenous lesion on the plantar aspect of the toe with swelling over the entire foot. Patient denies any chest pain, SOB, difficulty breathing, palpitations, dizziness, N/V/D, or fevers. WBC: 26.8  Platelet: 986  APTT: 33.6  CRP: 16.20    X-ray of R great toe:  IMPRESSION  No evidence of fracture or dislocation. Evidence of gout. No  significant change. Past Medical History:   Diagnosis Date    Diabetes ([address]         No past surgical history on file. Social History     Tobacco Use    Smoking status: Former Smoker     Packs/day: 1.50     Years: 15.00     Pack years: 22.50     Quit date: 2022     Years since quittin.0    Smokeless tobacco: Never Used   Substance Use Topics    Alcohol use: Not Currently        Family History   Problem Relation Age of Onset    Cancer Mother     Diabetes Mother     Cancer Father        No Known Allergies     Prior to Admission medications    Medication Sig Start Date End Date Taking?  Authorizing Provider   indomethacin (INDOCIN) 50 mg capsule indomethacin 50 mg capsule   Take 1 capsule by mouth three times daily as needed    Provider, Historical   colchicine 0.6 mg tablet colchicine 0.6 mg tablet   2 today then 1 daily x 5 days    Provider, Historical   lisinopriL (PRINIVIL, ZESTRIL) 20 mg tablet Take 1 Tablet by mouth daily. 7/9/22   Mohiuddin, Gretel Lundborg, MD   amLODIPine (NORVASC) 10 mg tablet Take 1 Tablet by mouth daily. 7/9/22   Mohiuddin, Gretel Lundborg, MD   ondansetron (ZOFRAN ODT) 4 mg disintegrating tablet Take 1 Tablet by mouth every eight (8) hours as needed for Nausea or Vomiting. 7/8/22   Mohiuddin, Gretel Lundborg, MD   clindamycin (CLEOCIN) 300 mg capsule Take 1 Capsule by mouth three (3) times daily. 7/8/22   Mohiuddin, Gretel Lundborg, MD   apixaban (Eliquis) 5 mg tablet Take 1 Tablet by mouth two (2) times a day. Eliquis 5 mg 2 tablets twice daily for 7 days then 1 tablet twice daily 7/8/22   Mohiuddin, Gretel Lundborg, MD   pantoprazole (PROTONIX) 40 mg tablet Take 40 mg by mouth daily. Provider, Historical   metFORMIN (GLUCOPHAGE) 500 mg tablet Take 500 mg by mouth daily (with dinner).     Provider, Historical         Current Facility-Administered Medications:     heparin 25,000 units in D5W 250 ml infusion, 18-36 Units/kg/hr (Adjusted), IntraVENous, TITRATE, Mario Herrmann MD, Last Rate: 16.3 mL/hr at 07/18/22 1801, 18 Units/kg/hr at 07/18/22 1801    piperacillin-tazobactam (ZOSYN) 3.375 g in 0.9% sodium chloride (MBP/ADV) 100 mL MBP, 3.375 g, IntraVENous, Mario Priest MD, Last Rate: 25 mL/hr at 07/18/22 1920, 3.375 g at 07/18/22 1920    heparin (porcine) 1,000 unit/mL injection 7,230 Units, 80 Units/kg (Adjusted), IntraVENous, PRN **OR** heparin (porcine) 1,000 unit/mL injection 3,620 Units, 40 Units/kg (Adjusted), IntraVENous, PRN, Mario Tinsley MD    Current Outpatient Medications:     indomethacin (INDOCIN) 50 mg capsule, indomethacin 50 mg capsule  Take 1 capsule by mouth three times daily as needed, Disp: , Rfl:     colchicine 0.6 mg tablet, colchicine 0.6 mg tablet  2 today then 1 daily x 5 days, Disp: , Rfl:     lisinopriL (PRINIVIL, ZESTRIL) 20 mg tablet, Take 1 Tablet by mouth daily. , Disp: 60 Tablet, Rfl: 0    amLODIPine (NORVASC) 10 mg tablet, Take 1 Tablet by mouth daily. , Disp: 30 Tablet, Rfl: 0    ondansetron (ZOFRAN ODT) 4 mg disintegrating tablet, Take 1 Tablet by mouth every eight (8) hours as needed for Nausea or Vomiting., Disp: 10 Tablet, Rfl: 0    clindamycin (CLEOCIN) 300 mg capsule, Take 1 Capsule by mouth three (3) times daily. , Disp: 30 Capsule, Rfl: 0    apixaban (Eliquis) 5 mg tablet, Take 1 Tablet by mouth two (2) times a day. Eliquis 5 mg 2 tablets twice daily for 7 days then 1 tablet twice daily, Disp: 100 Tablet, Rfl: 0    pantoprazole (PROTONIX) 40 mg tablet, Take 40 mg by mouth daily. , Disp: , Rfl:     metFORMIN (GLUCOPHAGE) 500 mg tablet, Take 500 mg by mouth daily (with dinner). , Disp: , Rfl:     LAB DATA REVIEWED:    Recent Results (from the past 24 hour(s))   CBC WITH AUTOMATED DIFF    Collection Time: 07/18/22  5:02 PM   Result Value Ref Range    WBC 26.8 (H) 4.1 - 11.1 K/uL    RBC 4.83 4.10 - 5.70 M/uL    HGB 14.4 12.1 - 17.0 g/dL    HCT 42.4 36.6 - 50.3 %    MCV 87.8 80.0 - 99.0 FL    MCH 29.8 26.0 - 34.0 PG    MCHC 34.0 30.0 - 36.5 g/dL    RDW 12.3 11.5 - 14.5 %    PLATELET 613 (H) 304 - 400 K/uL    MPV 9.7 8.9 - 12.9 FL    NRBC 0.0 0.0  WBC    ABSOLUTE NRBC 0.00 0.00 - 0.01 K/uL    NEUTROPHILS 85 (H) 32 - 75 %    LYMPHOCYTES 5 (L) 12 - 49 %    MONOCYTES 7 5 - 13 %    EOSINOPHILS 1 0 - 7 %    BASOPHILS 1 0 - 1 %    IMMATURE GRANULOCYTES 1 (H) 0 - 0.5 %    ABS. NEUTROPHILS 23.2 (H) 1.8 - 8.0 K/UL    ABS. LYMPHOCYTES 1.3 0.8 - 3.5 K/UL    ABS. MONOCYTES 1.7 (H) 0.0 - 1.0 K/UL    ABS. EOSINOPHILS 0.1 0.0 - 0.4 K/UL    ABS. BASOPHILS 0.2 (H) 0.0 - 0.1 K/UL    ABS. IMM.  GRANS. 0.3 (H) 0.00 - 0.04 K/UL    DF AUTOMATED     METABOLIC PANEL, COMPREHENSIVE    Collection Time: 07/18/22  5:02 PM   Result Value Ref Range    Sodium 134 (L) 136 - 145 mmol/L    Potassium 4.2 3.5 - 5.1 mmol/L    Chloride 100 97 - 108 mmol/L    CO2 25 21 - 32 mmol/L    Anion gap 9 5 - 15 mmol/L    Glucose 159 (H) 65 - 100 mg/dL    BUN 14 6 - 20 mg/dL    Creatinine 0.85 0.70 - 1.30 mg/dL    BUN/Creatinine ratio 16 12 - 20      GFR est AA >60 >60 ml/min/1.73m2    GFR est non-AA >60 >60 ml/min/1.73m2    Calcium 9.4 8.5 - 10.1 mg/dL    Bilirubin, total 1.1 (H) 0.2 - 1.0 mg/dL    AST (SGOT) 23 15 - 37 U/L    ALT (SGPT) 30 12 - 78 U/L    Alk. phosphatase 98 45 - 117 U/L    Protein, total 8.0 6.4 - 8.2 g/dL    Albumin 3.4 (L) 3.5 - 5.0 g/dL    Globulin 4.6 (H) 2.0 - 4.0 g/dL    A-G Ratio 0.7 (L) 1.1 - 2.2     C REACTIVE PROTEIN, QT    Collection Time: 07/18/22  5:02 PM   Result Value Ref Range    C-Reactive protein 16.20 (H) 0.00 - 0.60 mg/dL   CK    Collection Time: 07/18/22  5:02 PM   Result Value Ref Range     39 - 308 U/L   PROTHROMBIN TIME + INR    Collection Time: 07/18/22  5:02 PM   Result Value Ref Range    Prothrombin time 17.7 (H) 11.9 - 14.6 sec    INR 1.4 (H) 0.9 - 1.1     PTT    Collection Time: 07/18/22  5:02 PM   Result Value Ref Range    aPTT 33.6 21.2 - 34.1 sec    aPTT, therapeutic range   82 - 109 sec   LACTIC ACID    Collection Time: 07/18/22  5:08 PM   Result Value Ref Range    Lactic acid 1.4 0.4 - 2.0 mmol/L       XR Results (most recent):  Results from Hospital Encounter encounter on 07/18/22    XR GREAT TOE RT MIN 2 V    Narrative  INDICATION:  Wound of the great toe    COMPARISON: June 30    FINDINGS: 2 views of the right great toe demonstrate no evidence of acute  fracture or dislocation. There is a bandage, obscuring fine osseous detail. Marginal erosions are noted at the first MTP joint and IP joint, similar to the  prior study. This is consistent with gout. There is no plain film evidence of  acute osteomyelitis. Impression  No evidence of fracture or dislocation. Evidence of gout. No  significant change.          XR GREAT TOE RT MIN 2 V   Final Result   No evidence of fracture or dislocation. Evidence of gout. No   significant change. CTA ABD ART W RUNOFF W WO CONT    (Results Pending)        Review of Systems:  Constitutional: Negative for chills and fever. HENT: Negative. Eyes: Negative. Respiratory: Negative. Cardiovascular: Negative. Gastrointestinal: Negative for abdominal pain and nausea. Skin: Foot pain and swelling   Neurological: Negative. Objective:   VITALS:    Visit Vitals  /63   Pulse 77   Temp 98 °F (36.7 °C)   Resp 20   Ht 6' 1\" (1.854 m)   Wt 106.1 kg (234 lb)   SpO2 95%   BMI 30.87 kg/m²       Physical Exam:   Constitutional: pt is oriented to person, place, and time. HENT:   Head: Normocephalic and atraumatic. Eyes: Pupils are equal, round, and reactive to light. EOM are normal.   Cardiovascular: Normal rate, regular rhythm and normal heart sounds. Pulmonary/Chest: Breath sounds normal. No wheezes. No rales. Exhibits no tenderness. Abdominal: Soft. Bowel sounds are normal. There is no abdominal tenderness. There is no rebound and no guarding. Musculoskeletal: Black, necrotic right great toe. Significant violaceous edema in right foot. Neurological: pt is alert and oriented to person, place, and time. Alert. Normal strength. No cranial nerve deficit or sensory deficit. Displays a negative Romberg sign.         ASSESSMENT:  Arterial Insufficiency s/p balloon angioplasty  Right great toe infection  Diabetes Mellitus  Diabetic foot injury  Hypertension  GERD  Gout  RA  Smoking    PLAN:    Consult general surgery  Consult vascular surgery    Zosyn 3.375g Q 8h  Amlodipine 10mg PO every day  Colchicine 0.6mg PO every day  Lisinopril 20mg PO every day  Zofran 4mg PO Q8h  Eliquis 5mg PO BID  Protonix 40mg PO every day  Metformin 500mg PO every day  Morphine 10mg IV Q4h PRN    ________________________________________________________________________    Signed: Madison Shines

## 2022-07-18 NOTE — Clinical Note
Vessel: right iliac, CFA, PFA, SFA, popliteal, PTA, peroneal and TALON. Hand injection to the artery. Multiple views taken.

## 2022-07-18 NOTE — Clinical Note
Sheath: left in place. Site secured by Tegaderm and suture. Sheath connected to heparin pressure bag. Upon evaluation of the common femoral artery stick using fluoroscopy, the access site puncture was within the safe zone.

## 2022-07-18 NOTE — ED PROVIDER NOTES
[facility]  EMERGENCY DEPARTMENT ENCOUNTER NOTE    Date: 7/18/2022  Patient Name: [facility]    History of Presenting Illness     Chief Complaint   Patient presents with    Wound Check     HPI: [facility], 65 y.o. male with a past medical history and outpatient medications as listed and reviewed below  presents for toe gangrene. The patient has no nausea with insufficiency and underwent stenting by vascular surgery. He reports pain in the right toe. He went to surgery today and was noted to have a toe infection that has not been improving. There was concern for developing gangrene. The patient was referred to the ER for admission for antibiotics, heparin, and admission for further management. Besides the pain, the patient does not report any other symptoms including any fevers, chills, abdominal pain, chest pain, shortness of breath, nausea, or vomiting    Medical History   I reviewed the medical, surgical, family, and social history, as well as allergies:    PCP: William Kennedy MD    Past Medical History:  Past Medical History:   Diagnosis Date    Diabetes [facility])      Past Surgical History:  No past surgical history on file.   Current Outpatient Medications:  Current Outpatient Medications   Medication Instructions    amLODIPine (NORVASC) 10 mg, Oral, DAILY    apixaban (ELIQUIS) 5 mg, Oral, 2 TIMES DAILY, Eliquis 5 mg 2 tablets twice daily for 7 days then 1 tablet twice daily    clindamycin (CLEOCIN) 300 mg, Oral, 3 TIMES DAILY    colchicine 0.6 mg tablet colchicine 0.6 mg tablet   2 today then 1 daily x 5 days    indomethacin (INDOCIN) 50 mg capsule indomethacin 50 mg capsule   Take 1 capsule by mouth three times daily as needed    lisinopriL (PRINIVIL, ZESTRIL) 20 mg, Oral, DAILY    metFORMIN (GLUCOPHAGE) 500 mg, Oral, DAILY WITH DINNER    ondansetron (ZOFRAN ODT) 4 mg, Oral, EVERY 8 HOURS AS NEEDED    pantoprazole (PROTONIX) 40 mg, Oral, DAILY Family History:  Family History   Problem Relation Age of Onset    Cancer Mother     Diabetes Mother     Cancer Father      Social History:  Social History     Tobacco Use    Smoking status: Former Smoker     Packs/day: 1.50     Years: 15.00     Pack years: 22.50     Quit date: 2022     Years since quittin.0    Smokeless tobacco: Never Used   Vaping Use    Vaping Use: Never used   Substance Use Topics    Alcohol use: Not Currently    Drug use: Never     Allergies:  No Known Allergies    Review of Systems     Review of Systems  Negative: All other systems negative. Physical Exam and Vital Signs   Vital Signs - Reviewed the patient's vital signs. Patient Vitals for the past 12 hrs:   Temp Pulse Resp BP SpO2   22 1702 -- 85 16 133/64 97 %   22 1434 98 °F (36.7 °C) 90 16 133/81 97 %     Physical Exam:    GENERAL: not in apparent distress  HEENT:  * EOMI  * Head atraumatic  CV:  * warm extremities  * no cyanosis  PULMONARY: no respiratory distress, non labored breathing, no audible wheezing or stridor, no accessory muscle use  ABDOMEN: soft, moving in bed and pulls to seated position without grimace or pain  EXTREMITIES/BACK: moving four extremities without limitation. Warm extremity noted. The gangrenous toe as well dressed and treated with iodine in clinic prior to presentation. SKIN: no rashes or signs of trauma  NEURO:  * Speech clear  * GCS 15    Medical Decision Making   - I am the first and primary provider for this patient and am the primary provider of record. - I reviewed the vital signs, available nursing notes, past medical history, past surgical history, family history and social history. - Initial assessment performed. The patients presenting problems have been discussed, and the staff are in agreement with the care plan formulated and outlined with them. I have encouraged them to ask questions as they arise throughout their visit.   - Available medical records, nursing notes, old EKGs, and EMS run sheets (if patient was EMS transported) were reviewed    MDM:   Patient is a 65 y.o. male presenting for toe gangrene. Vitals reveal no significant abnormalities and physical exam reveals gangrene as above. Based on the history, physical exam, risk factors, and vital signs, differential includes: Arterial insufficiency, cellulitis, gangrene. See ED Course and Reassessment for evaluation and discussion. Results     Labs:  Recent Results (from the past 12 hour(s))   CBC WITH AUTOMATED DIFF    Collection Time: 07/18/22  5:02 PM   Result Value Ref Range    WBC 26.8 (H) 4.1 - 11.1 K/uL    RBC 4.83 4.10 - 5.70 M/uL    HGB 14.4 12.1 - 17.0 g/dL    HCT 42.4 36.6 - 50.3 %    MCV 87.8 80.0 - 99.0 FL    MCH 29.8 26.0 - 34.0 PG    MCHC 34.0 30.0 - 36.5 g/dL    RDW 12.3 11.5 - 14.5 %    PLATELET 392 (H) 985 - 400 K/uL    MPV 9.7 8.9 - 12.9 FL    NRBC 0.0 0.0  WBC    ABSOLUTE NRBC 0.00 0.00 - 0.01 K/uL    NEUTROPHILS 85 (H) 32 - 75 %    LYMPHOCYTES 5 (L) 12 - 49 %    MONOCYTES 7 5 - 13 %    EOSINOPHILS 1 0 - 7 %    BASOPHILS 1 0 - 1 %    IMMATURE GRANULOCYTES 1 (H) 0 - 0.5 %    ABS. NEUTROPHILS 23.2 (H) 1.8 - 8.0 K/UL    ABS. LYMPHOCYTES 1.3 0.8 - 3.5 K/UL    ABS. MONOCYTES 1.7 (H) 0.0 - 1.0 K/UL    ABS. EOSINOPHILS 0.1 0.0 - 0.4 K/UL    ABS. BASOPHILS 0.2 (H) 0.0 - 0.1 K/UL    ABS. IMM.  GRANS. 0.3 (H) 0.00 - 0.04 K/UL    DF AUTOMATED     METABOLIC PANEL, COMPREHENSIVE    Collection Time: 07/18/22  5:02 PM   Result Value Ref Range    Sodium 134 (L) 136 - 145 mmol/L    Potassium 4.2 3.5 - 5.1 mmol/L    Chloride 100 97 - 108 mmol/L    CO2 25 21 - 32 mmol/L    Anion gap 9 5 - 15 mmol/L    Glucose 159 (H) 65 - 100 mg/dL    BUN 14 6 - 20 mg/dL    Creatinine 0.85 0.70 - 1.30 mg/dL    BUN/Creatinine ratio 16 12 - 20      GFR est AA >60 >60 ml/min/1.73m2    GFR est non-AA >60 >60 ml/min/1.73m2    Calcium 9.4 8.5 - 10.1 mg/dL    Bilirubin, total 1.1 (H) 0.2 - 1.0 mg/dL    AST (SGOT) PENDING U/L    ALT (SGPT) 30 12 - 78 U/L    Alk. phosphatase 98 45 - 117 U/L    Protein, total 8.0 6.4 - 8.2 g/dL    Albumin 3.4 (L) 3.5 - 5.0 g/dL    Globulin 4.6 (H) 2.0 - 4.0 g/dL    A-G Ratio 0.7 (L) 1.1 - 2.2     C REACTIVE PROTEIN, QT    Collection Time: 07/18/22  5:02 PM   Result Value Ref Range    C-Reactive protein 16.20 (H) 0.00 - 0.60 mg/dL   CK    Collection Time: 07/18/22  5:02 PM   Result Value Ref Range     39 - 308 U/L   PTT    Collection Time: 07/18/22  5:02 PM   Result Value Ref Range    aPTT 33.6 21.2 - 34.1 sec    aPTT, therapeutic range   82 - 109 sec   LACTIC ACID    Collection Time: 07/18/22  5:08 PM   Result Value Ref Range    Lactic acid 1.4 0.4 - 2.0 mmol/L     Radiologic Studies:  CT Results  (Last 48 hours)    None        CXR Results  (Last 48 hours)    None        Medications ordered:  Medications   heparin 25,000 units in D5W 250 ml infusion (has no administration in time range)   vancomycin (VANCOCIN) 1,000 mg in 0.9% sodium chloride 250 mL ([id number]) (1,000 mg IntraVENous New Bag 7/18/22 1737)   piperacillin-tazobactam (ZOSYN) 3.375 g in 0.9% sodium chloride (MBP/ADV) 100 mL MBP (has no administration in time range)   heparin (porcine) 1,000 unit/mL injection 7,230 Units (has no administration in time range)     Or   heparin (porcine) 1,000 unit/mL injection 3,620 Units (has no administration in time range)   sodium chloride 0.9 % bolus infusion 1,000 mL (has no administration in time range)   heparin (porcine) 1,000 unit/mL injection 7,230 Units (7,230 Units IntraVENous Given 7/18/22 1732)   morphine injection 4 mg (4 mg IntraVENous Given 7/18/22 1731)     ED Course and Reassessment     ED Course:     ED Course as of 07/18/22 1803   Mon Jul 18, 2022 1739 Leukocytosis noted. Hemoglobin not suggestive of acute anemia. [SS]   2101 SIRSx2 (HR 90 and WBC 26). Sent cultures and ordered atbx. No severe criteria met thus far. [SS]   6169 No significant electrolyte derangements. Creatinine is not elevated more than baseline range making TANIA unlikely. No significant transaminitis noted. Normal bilirubin. CPK not suggestive of significant rhabdomyolysis. Lactate is within normal limits. No concern for severe sepsis, septic shock, or ischemia. CRP elevated. [SS]      ED Course User Index  [SS] Melinda Mendoza MD       Reassessment:    Will admit. Final Disposition     Admission: [address]    After completion of ED workup and discussion of results and diagnoses, patient was admitted to the hospital. Case was discussed with admitting provider. Diagnosis     Clinical Impression:   1. Arterial insufficiency ([address]    2. Toe gangrene [facility])      Attestations:    Niko Aragon MD    Please note that this dictation was completed with [employer], the computer voice recognition software. Quite often unanticipated grammatical, syntax, homophones, and other interpretive errors are inadvertently transcribed by the computer software. Please disregard these errors. Please excuse any errors that have escaped final proofreading. Thank you.

## 2022-07-18 NOTE — Clinical Note
Status[de-identified] INPATIENT [101]   Type of Bed: Remote Telemetry [29]   Cardiac Monitoring Required?: No   Inpatient Hospitalization Certified Necessary for the Following Reasons: 3.  Patient receiving treatment that can only be provided in an inpatient setting (further clarification in H&P documentation)   Admitting Diagnosis: Gangrene of toe [facility]) [[id number]]   Admitting Physician: Radha Carrillo [[id number]]   Attending Physician: Radha Carrillo [[id number]]   Estimated Length of Stay: 2 Midnights   Discharge Plan[de-identified] Home with Office Follow-up

## 2022-07-18 NOTE — Clinical Note
Left femoral artery. Accessed successfully. Micropuncture needle used. Using fluoro and ultrasound guidance.

## 2022-07-18 NOTE — PROGRESS NOTES
Chief Complaint   Patient presents with    Post OP Follow Up     post op balloon angiography lower ext right       Visit Vitals  /76 (BP 1 Location: Left upper arm, BP Patient Position: Sitting, BP Cuff Size: Adult)   Pulse 87   Temp 97.8 °F (36.6 °C) (Temporal)   Ht 6' 1\" (1.854 m)   Wt 234 lb (106.1 kg)   SpO2 98%   BMI 30.87 kg/m²       1. Have you been to the ER, urgent care clinic since your last visit? Hospitalized since your last visit? No    2. Have you seen or consulted any other health care providers outside of the [address] [place] [address] since your last visit? Include any pap smears or colon screening.  No

## 2022-07-19 ENCOUNTER — ANESTHESIA (OUTPATIENT)
Dept: SURGERY | Age: 62
DRG: 253 | End: 2022-07-19

## 2022-07-19 ENCOUNTER — ANESTHESIA EVENT (OUTPATIENT)
Dept: SURGERY | Age: 62
DRG: 253 | End: 2022-07-19

## 2022-07-19 LAB
ALBUMIN SERPL-MCNC: 2.7 G/DL (ref 3.5–5)
ALBUMIN/GLOB SERPL: 0.5 {RATIO} (ref 1.1–2.2)
ALP SERPL-CCNC: 84 U/L (ref 45–117)
ALT SERPL-CCNC: 27 U/L (ref 12–78)
ANION GAP SERPL CALC-SCNC: 7 MMOL/L (ref 5–15)
APTT PPP: 45.1 SEC (ref 21.2–34.1)
AST SERPL W P-5'-P-CCNC: 17 U/L (ref 15–37)
BASOPHILS # BLD: 0.2 K/UL (ref 0–0.1)
BASOPHILS NFR BLD: 1 % (ref 0–1)
BILIRUB SERPL-MCNC: 0.6 MG/DL (ref 0.2–1)
BUN SERPL-MCNC: 12 MG/DL (ref 6–20)
BUN/CREAT SERPL: 18 (ref 12–20)
CA-I BLD-MCNC: 9.1 MG/DL (ref 8.5–10.1)
CHLORIDE SERPL-SCNC: 103 MMOL/L (ref 97–108)
CO2 SERPL-SCNC: 24 MMOL/L (ref 21–32)
CREAT SERPL-MCNC: 0.65 MG/DL (ref 0.7–1.3)
DIFFERENTIAL METHOD BLD: ABNORMAL
EOSINOPHIL # BLD: 0.3 K/UL (ref 0–0.4)
EOSINOPHIL NFR BLD: 2 % (ref 0–7)
ERYTHROCYTE [DISTWIDTH] IN BLOOD BY AUTOMATED COUNT: 12.4 % (ref 11.5–14.5)
EST. AVERAGE GLUCOSE BLD GHB EST-MCNC: 160 MG/DL
GLOBULIN SER CALC-MCNC: 5 G/DL (ref 2–4)
GLUCOSE BLD STRIP.AUTO-MCNC: 131 MG/DL (ref 65–117)
GLUCOSE BLD STRIP.AUTO-MCNC: 145 MG/DL (ref 65–117)
GLUCOSE BLD STRIP.AUTO-MCNC: 145 MG/DL (ref 65–117)
GLUCOSE SERPL-MCNC: 148 MG/DL (ref 65–100)
HBA1C MFR BLD: 7.2 % (ref 4–5.6)
HCT VFR BLD AUTO: 36.8 % (ref 36.6–50.3)
HGB BLD-MCNC: 12.2 G/DL (ref 12.1–17)
IMM GRANULOCYTES # BLD AUTO: 0.1 K/UL (ref 0–0.04)
IMM GRANULOCYTES NFR BLD AUTO: 1 % (ref 0–0.5)
LYMPHOCYTES # BLD: 2.3 K/UL (ref 0.8–3.5)
LYMPHOCYTES NFR BLD: 13 % (ref 12–49)
MCH RBC QN AUTO: 29.2 PG (ref 26–34)
MCHC RBC AUTO-ENTMCNC: 33.2 G/DL (ref 30–36.5)
MCV RBC AUTO: 88 FL (ref 80–99)
MONOCYTES # BLD: 1.6 K/UL (ref 0–1)
MONOCYTES NFR BLD: 9 % (ref 5–13)
NEUTS SEG # BLD: 13.1 K/UL (ref 1.8–8)
NEUTS SEG NFR BLD: 74 % (ref 32–75)
NRBC # BLD: 0 K/UL (ref 0–0.01)
NRBC BLD-RTO: 0 PER 100 WBC
PERFORMED BY, TECHID: ABNORMAL
PLATELET # BLD AUTO: 453 K/UL (ref 150–400)
PMV BLD AUTO: 9.5 FL (ref 8.9–12.9)
POTASSIUM SERPL-SCNC: 4.1 MMOL/L (ref 3.5–5.1)
PROT SERPL-MCNC: 7.7 G/DL (ref 6.4–8.2)
RBC # BLD AUTO: 4.18 M/UL (ref 4.1–5.7)
SODIUM SERPL-SCNC: 134 MMOL/L (ref 136–145)
THERAPEUTIC RANGE,PTTT: ABNORMAL SEC (ref 82–109)
WBC # BLD AUTO: 17.7 K/UL (ref 4.1–11.1)

## 2022-07-19 PROCEDURE — 74011250636 HC RX REV CODE- 250/636: Performed by: SURGERY

## 2022-07-19 PROCEDURE — 77030040361 HC SLV COMPR DVT MDII -B: Performed by: SURGERY

## 2022-07-19 PROCEDURE — 77030002987 HC SUT PROL J&J -B: Performed by: SURGERY

## 2022-07-19 PROCEDURE — 76060000037 HC ANESTHESIA 3 TO 3.5 HR: Performed by: SURGERY

## 2022-07-19 PROCEDURE — 74011000258 HC RX REV CODE- 258: Performed by: FAMILY MEDICINE

## 2022-07-19 PROCEDURE — 77030002966 HC SUT PDS J&J -A: Performed by: SURGERY

## 2022-07-19 PROCEDURE — 77030002933 HC SUT MCRYL J&J -A: Performed by: SURGERY

## 2022-07-19 PROCEDURE — 74011250636 HC RX REV CODE- 250/636: Performed by: NURSE PRACTITIONER

## 2022-07-19 PROCEDURE — 65270000029 HC RM PRIVATE

## 2022-07-19 PROCEDURE — 77030010512 HC APPL CLP LIG J&J -C: Performed by: SURGERY

## 2022-07-19 PROCEDURE — 96376 TX/PRO/DX INJ SAME DRUG ADON: CPT

## 2022-07-19 PROCEDURE — 74011250637 HC RX REV CODE- 250/637: Performed by: SURGERY

## 2022-07-19 PROCEDURE — 74011250636 HC RX REV CODE- 250/636: Performed by: FAMILY MEDICINE

## 2022-07-19 PROCEDURE — 74011000258 HC RX REV CODE- 258: Performed by: NURSE PRACTITIONER

## 2022-07-19 PROCEDURE — 36415 COLL VENOUS BLD VENIPUNCTURE: CPT

## 2022-07-19 PROCEDURE — 77030002996 HC SUT SLK J&J -A: Performed by: SURGERY

## 2022-07-19 PROCEDURE — 77030031139 HC SUT VCRL2 J&J -A: Performed by: SURGERY

## 2022-07-19 PROCEDURE — 74011250636 HC RX REV CODE- 250/636: Performed by: NURSE ANESTHETIST, CERTIFIED REGISTERED

## 2022-07-19 PROCEDURE — 041K4JL BYPASS RIGHT FEMORAL ARTERY TO POPLITEAL ARTERY WITH SYNTHETIC SUBSTITUTE, PERCUTANEOUS ENDOSCOPIC APPROACH: ICD-10-PCS | Performed by: SURGERY

## 2022-07-19 PROCEDURE — 85025 COMPLETE CBC W/AUTO DIFF WBC: CPT

## 2022-07-19 PROCEDURE — 87086 URINE CULTURE/COLONY COUNT: CPT

## 2022-07-19 PROCEDURE — 77030014007 HC SPNG HEMSTAT J&J -B: Performed by: SURGERY

## 2022-07-19 PROCEDURE — 77030005513 HC CATH URETH FOL11 MDII -B: Performed by: SURGERY

## 2022-07-19 PROCEDURE — 83036 HEMOGLOBIN GLYCOSYLATED A1C: CPT

## 2022-07-19 PROCEDURE — 85730 THROMBOPLASTIN TIME PARTIAL: CPT

## 2022-07-19 PROCEDURE — 76210000063 HC OR PH I REC FIRST 0.5 HR: Performed by: SURGERY

## 2022-07-19 PROCEDURE — 74011000250 HC RX REV CODE- 250: Performed by: NURSE PRACTITIONER

## 2022-07-19 PROCEDURE — 74011000250 HC RX REV CODE- 250: Performed by: SURGERY

## 2022-07-19 PROCEDURE — 2709999900 HC NON-CHARGEABLE SUPPLY: Performed by: SURGERY

## 2022-07-19 PROCEDURE — 76010000173 HC OR TIME 3 TO 3.5 HR INTENSV-TIER 1: Performed by: SURGERY

## 2022-07-19 PROCEDURE — 35656 BPG FEMORAL-POPLITEAL: CPT | Performed by: SURGERY

## 2022-07-19 PROCEDURE — 80053 COMPREHEN METABOLIC PANEL: CPT

## 2022-07-19 PROCEDURE — 74011250637 HC RX REV CODE- 250/637: Performed by: FAMILY MEDICINE

## 2022-07-19 PROCEDURE — C1768 GRAFT, VASCULAR: HCPCS | Performed by: SURGERY

## 2022-07-19 PROCEDURE — 82962 GLUCOSE BLOOD TEST: CPT

## 2022-07-19 PROCEDURE — 99222 1ST HOSP IP/OBS MODERATE 55: CPT | Performed by: INTERNAL MEDICINE

## 2022-07-19 DEVICE — FUSION BIOLINE 6MM-80CM SUPP PERIPHERAL
Type: IMPLANTABLE DEVICE | Site: ARTERIAL | Status: FUNCTIONAL
Brand: FUSION BIOLINE SUPPORTED VASCULAR GRAFTS

## 2022-07-19 RX ORDER — SODIUM CHLORIDE 0.9 % (FLUSH) 0.9 %
5-40 SYRINGE (ML) INJECTION EVERY 8 HOURS
Status: DISCONTINUED | OUTPATIENT
Start: 2022-07-19 | End: 2022-07-19 | Stop reason: HOSPADM

## 2022-07-19 RX ORDER — SODIUM CHLORIDE 0.9 % (FLUSH) 0.9 %
5-40 SYRINGE (ML) INJECTION AS NEEDED
Status: DISCONTINUED | OUTPATIENT
Start: 2022-07-19 | End: 2022-07-19 | Stop reason: HOSPADM

## 2022-07-19 RX ORDER — SODIUM CHLORIDE 0.9 % (FLUSH) 0.9 %
5-40 SYRINGE (ML) INJECTION EVERY 8 HOURS
Status: DISCONTINUED | OUTPATIENT
Start: 2022-07-19 | End: 2022-07-28 | Stop reason: SDUPTHER

## 2022-07-19 RX ORDER — HEPARIN SODIUM 5000 [USP'U]/ML
INJECTION, SOLUTION INTRAVENOUS; SUBCUTANEOUS AS NEEDED
Status: DISCONTINUED | OUTPATIENT
Start: 2022-07-19 | End: 2022-07-19 | Stop reason: HOSPADM

## 2022-07-19 RX ORDER — MIDAZOLAM HYDROCHLORIDE 1 MG/ML
INJECTION, SOLUTION INTRAMUSCULAR; INTRAVENOUS AS NEEDED
Status: DISCONTINUED | OUTPATIENT
Start: 2022-07-19 | End: 2022-07-19 | Stop reason: HOSPADM

## 2022-07-19 RX ORDER — ENOXAPARIN SODIUM 100 MG/ML
40 INJECTION SUBCUTANEOUS DAILY
Status: DISCONTINUED | OUTPATIENT
Start: 2022-07-20 | End: 2022-07-26

## 2022-07-19 RX ORDER — OXYCODONE AND ACETAMINOPHEN 10; 325 MG/1; MG/1
1 TABLET ORAL
Status: DISCONTINUED | OUTPATIENT
Start: 2022-07-19 | End: 2022-07-28 | Stop reason: HOSPADM

## 2022-07-19 RX ORDER — MIDAZOLAM HYDROCHLORIDE 1 MG/ML
0.5 INJECTION, SOLUTION INTRAMUSCULAR; INTRAVENOUS
Status: DISCONTINUED | OUTPATIENT
Start: 2022-07-19 | End: 2022-07-19 | Stop reason: HOSPADM

## 2022-07-19 RX ORDER — FENTANYL CITRATE 50 UG/ML
50 INJECTION, SOLUTION INTRAMUSCULAR; INTRAVENOUS
Status: DISCONTINUED | OUTPATIENT
Start: 2022-07-19 | End: 2022-07-19 | Stop reason: HOSPADM

## 2022-07-19 RX ORDER — ONDANSETRON 2 MG/ML
INJECTION INTRAMUSCULAR; INTRAVENOUS AS NEEDED
Status: DISCONTINUED | OUTPATIENT
Start: 2022-07-19 | End: 2022-07-19 | Stop reason: HOSPADM

## 2022-07-19 RX ORDER — HEPARIN SODIUM 1000 [USP'U]/ML
INJECTION, SOLUTION INTRAVENOUS; SUBCUTANEOUS AS NEEDED
Status: DISCONTINUED | OUTPATIENT
Start: 2022-07-19 | End: 2022-07-19 | Stop reason: HOSPADM

## 2022-07-19 RX ORDER — FENTANYL CITRATE 50 UG/ML
INJECTION, SOLUTION INTRAMUSCULAR; INTRAVENOUS AS NEEDED
Status: DISCONTINUED | OUTPATIENT
Start: 2022-07-19 | End: 2022-07-19 | Stop reason: HOSPADM

## 2022-07-19 RX ORDER — CLOPIDOGREL BISULFATE 75 MG/1
75 TABLET ORAL DAILY
COMMUNITY
End: 2022-07-28

## 2022-07-19 RX ORDER — CLINDAMYCIN PHOSPHATE 600 MG/50ML
600 INJECTION INTRAVENOUS EVERY 6 HOURS
Status: DISCONTINUED | OUTPATIENT
Start: 2022-07-19 | End: 2022-07-21

## 2022-07-19 RX ORDER — LIDOCAINE HYDROCHLORIDE 20 MG/ML
INJECTION, SOLUTION EPIDURAL; INFILTRATION; INTRACAUDAL; PERINEURAL AS NEEDED
Status: DISCONTINUED | OUTPATIENT
Start: 2022-07-19 | End: 2022-07-19 | Stop reason: HOSPADM

## 2022-07-19 RX ORDER — CLINDAMYCIN PHOSPHATE 600 MG/50ML
600 INJECTION INTRAVENOUS EVERY 6 HOURS
Status: DISCONTINUED | OUTPATIENT
Start: 2022-07-19 | End: 2022-07-19

## 2022-07-19 RX ORDER — LIDOCAINE HYDROCHLORIDE 10 MG/ML
INJECTION INFILTRATION; PERINEURAL AS NEEDED
Status: DISCONTINUED | OUTPATIENT
Start: 2022-07-19 | End: 2022-07-19 | Stop reason: HOSPADM

## 2022-07-19 RX ORDER — DIPHENHYDRAMINE HYDROCHLORIDE 50 MG/ML
12.5 INJECTION, SOLUTION INTRAMUSCULAR; INTRAVENOUS AS NEEDED
Status: DISCONTINUED | OUTPATIENT
Start: 2022-07-19 | End: 2022-07-19 | Stop reason: HOSPADM

## 2022-07-19 RX ORDER — ALBUTEROL SULFATE 0.83 MG/ML
2.5 SOLUTION RESPIRATORY (INHALATION) AS NEEDED
Status: DISCONTINUED | OUTPATIENT
Start: 2022-07-19 | End: 2022-07-19 | Stop reason: HOSPADM

## 2022-07-19 RX ORDER — PROPOFOL 10 MG/ML
INJECTION, EMULSION INTRAVENOUS AS NEEDED
Status: DISCONTINUED | OUTPATIENT
Start: 2022-07-19 | End: 2022-07-19 | Stop reason: HOSPADM

## 2022-07-19 RX ORDER — SODIUM CHLORIDE, SODIUM LACTATE, POTASSIUM CHLORIDE, CALCIUM CHLORIDE 600; 310; 30; 20 MG/100ML; MG/100ML; MG/100ML; MG/100ML
INJECTION, SOLUTION INTRAVENOUS
Status: DISCONTINUED | OUTPATIENT
Start: 2022-07-19 | End: 2022-07-19 | Stop reason: HOSPADM

## 2022-07-19 RX ORDER — ROCURONIUM BROMIDE 10 MG/ML
INJECTION, SOLUTION INTRAVENOUS AS NEEDED
Status: DISCONTINUED | OUTPATIENT
Start: 2022-07-19 | End: 2022-07-19 | Stop reason: HOSPADM

## 2022-07-19 RX ORDER — HYDROMORPHONE HYDROCHLORIDE 1 MG/ML
2 INJECTION, SOLUTION INTRAMUSCULAR; INTRAVENOUS; SUBCUTANEOUS ONCE
Status: COMPLETED | OUTPATIENT
Start: 2022-07-19 | End: 2022-07-19

## 2022-07-19 RX ORDER — KETOROLAC TROMETHAMINE 30 MG/ML
30 INJECTION, SOLUTION INTRAMUSCULAR; INTRAVENOUS EVERY 6 HOURS
Status: DISCONTINUED | OUTPATIENT
Start: 2022-07-20 | End: 2022-07-19

## 2022-07-19 RX ORDER — SODIUM CHLORIDE 0.9 % (FLUSH) 0.9 %
5-40 SYRINGE (ML) INJECTION AS NEEDED
Status: DISCONTINUED | OUTPATIENT
Start: 2022-07-19 | End: 2022-07-26

## 2022-07-19 RX ORDER — KETOROLAC TROMETHAMINE 30 MG/ML
30 INJECTION, SOLUTION INTRAMUSCULAR; INTRAVENOUS
Status: DISPENSED | OUTPATIENT
Start: 2022-07-19 | End: 2022-07-23

## 2022-07-19 RX ORDER — DEXAMETHASONE SODIUM PHOSPHATE 4 MG/ML
INJECTION, SOLUTION INTRA-ARTICULAR; INTRALESIONAL; INTRAMUSCULAR; INTRAVENOUS; SOFT TISSUE AS NEEDED
Status: DISCONTINUED | OUTPATIENT
Start: 2022-07-19 | End: 2022-07-19

## 2022-07-19 RX ORDER — OXYCODONE AND ACETAMINOPHEN 5; 325 MG/1; MG/1
2 TABLET ORAL AS NEEDED
Status: DISCONTINUED | OUTPATIENT
Start: 2022-07-19 | End: 2022-07-19 | Stop reason: HOSPADM

## 2022-07-19 RX ORDER — ONDANSETRON 4 MG/1
4 TABLET, ORALLY DISINTEGRATING ORAL
Status: DISCONTINUED | OUTPATIENT
Start: 2022-07-19 | End: 2022-07-28 | Stop reason: HOSPADM

## 2022-07-19 RX ORDER — SODIUM CHLORIDE 9 MG/ML
125 INJECTION, SOLUTION INTRAVENOUS CONTINUOUS
Status: DISCONTINUED | OUTPATIENT
Start: 2022-07-19 | End: 2022-07-26

## 2022-07-19 RX ORDER — HYDROMORPHONE HYDROCHLORIDE 1 MG/ML
1 INJECTION, SOLUTION INTRAMUSCULAR; INTRAVENOUS; SUBCUTANEOUS
Status: DISCONTINUED | OUTPATIENT
Start: 2022-07-19 | End: 2022-07-21 | Stop reason: CLARIF

## 2022-07-19 RX ORDER — ONDANSETRON 2 MG/ML
4 INJECTION INTRAMUSCULAR; INTRAVENOUS AS NEEDED
Status: DISCONTINUED | OUTPATIENT
Start: 2022-07-19 | End: 2022-07-19 | Stop reason: HOSPADM

## 2022-07-19 RX ORDER — HYDROMORPHONE HYDROCHLORIDE 1 MG/ML
0.5 INJECTION, SOLUTION INTRAMUSCULAR; INTRAVENOUS; SUBCUTANEOUS
Status: DISCONTINUED | OUTPATIENT
Start: 2022-07-19 | End: 2022-07-19 | Stop reason: HOSPADM

## 2022-07-19 RX ORDER — ONDANSETRON 2 MG/ML
4 INJECTION INTRAMUSCULAR; INTRAVENOUS
Status: DISCONTINUED | OUTPATIENT
Start: 2022-07-19 | End: 2022-07-28 | Stop reason: HOSPADM

## 2022-07-19 RX ADMIN — PROPOFOL 50 MG: 10 INJECTION, EMULSION INTRAVENOUS at 18:58

## 2022-07-19 RX ADMIN — FENTANYL CITRATE 50 MCG: 50 INJECTION, SOLUTION INTRAMUSCULAR; INTRAVENOUS at 15:55

## 2022-07-19 RX ADMIN — HEPARIN SODIUM 6000 UNITS: 1000 INJECTION, SOLUTION INTRAVENOUS; SUBCUTANEOUS at 17:08

## 2022-07-19 RX ADMIN — SODIUM CHLORIDE 125 ML/HR: 9 INJECTION, SOLUTION INTRAVENOUS at 21:47

## 2022-07-19 RX ADMIN — LIDOCAINE HYDROCHLORIDE 100 MG: 20 INJECTION, SOLUTION EPIDURAL; INFILTRATION; INTRACAUDAL; PERINEURAL at 15:55

## 2022-07-19 RX ADMIN — PROPOFOL 150 MG: 10 INJECTION, EMULSION INTRAVENOUS at 15:55

## 2022-07-19 RX ADMIN — ROCURONIUM BROMIDE 10 MG: 10 INJECTION, SOLUTION INTRAVENOUS at 17:56

## 2022-07-19 RX ADMIN — ROCURONIUM BROMIDE 50 MG: 10 INJECTION, SOLUTION INTRAVENOUS at 15:55

## 2022-07-19 RX ADMIN — HYDROMORPHONE HYDROCHLORIDE 1 MG: 1 INJECTION, SOLUTION INTRAMUSCULAR; INTRAVENOUS; SUBCUTANEOUS at 02:23

## 2022-07-19 RX ADMIN — HYDROMORPHONE HYDROCHLORIDE 1 MG: 1 INJECTION, SOLUTION INTRAMUSCULAR; INTRAVENOUS; SUBCUTANEOUS at 23:44

## 2022-07-19 RX ADMIN — ONDANSETRON 4 MG: 2 INJECTION INTRAMUSCULAR; INTRAVENOUS at 16:03

## 2022-07-19 RX ADMIN — SODIUM CHLORIDE, POTASSIUM CHLORIDE, SODIUM LACTATE AND CALCIUM CHLORIDE: 600; 310; 30; 20 INJECTION, SOLUTION INTRAVENOUS at 18:12

## 2022-07-19 RX ADMIN — HEPARIN SODIUM 3620 UNITS: 1000 INJECTION, SOLUTION INTRAVENOUS; SUBCUTANEOUS at 08:13

## 2022-07-19 RX ADMIN — HEPARIN SODIUM 22 UNITS/KG/HR: 10000 INJECTION, SOLUTION INTRAVENOUS at 08:15

## 2022-07-19 RX ADMIN — AMLODIPINE BESYLATE 10 MG: 5 TABLET ORAL at 09:43

## 2022-07-19 RX ADMIN — HYDROMORPHONE HYDROCHLORIDE 2 MG: 1 INJECTION, SOLUTION INTRAMUSCULAR; INTRAVENOUS; SUBCUTANEOUS at 10:58

## 2022-07-19 RX ADMIN — PANTOPRAZOLE SODIUM 40 MG: 40 TABLET, DELAYED RELEASE ORAL at 08:16

## 2022-07-19 RX ADMIN — KETOROLAC TROMETHAMINE 30 MG: 30 INJECTION, SOLUTION INTRAMUSCULAR; INTRAVENOUS at 21:44

## 2022-07-19 RX ADMIN — FENTANYL CITRATE 50 MCG: 50 INJECTION, SOLUTION INTRAMUSCULAR; INTRAVENOUS at 17:19

## 2022-07-19 RX ADMIN — CLINDAMYCIN IN 5 PERCENT DEXTROSE 600 MG: 12 INJECTION, SOLUTION INTRAVENOUS at 06:02

## 2022-07-19 RX ADMIN — FENTANYL CITRATE 25 MCG: 50 INJECTION, SOLUTION INTRAMUSCULAR; INTRAVENOUS at 18:56

## 2022-07-19 RX ADMIN — SODIUM CHLORIDE, PRESERVATIVE FREE 10 ML: 5 INJECTION INTRAVENOUS at 21:47

## 2022-07-19 RX ADMIN — HYDROMORPHONE HYDROCHLORIDE 1 MG: 1 INJECTION, SOLUTION INTRAMUSCULAR; INTRAVENOUS; SUBCUTANEOUS at 19:56

## 2022-07-19 RX ADMIN — CLINDAMYCIN IN 5 PERCENT DEXTROSE 600 MG: 12 INJECTION, SOLUTION INTRAVENOUS at 15:35

## 2022-07-19 RX ADMIN — OXYCODONE HYDROCHLORIDE AND ACETAMINOPHEN 1 TABLET: 10; 325 TABLET ORAL at 20:41

## 2022-07-19 RX ADMIN — ROCURONIUM BROMIDE 10 MG: 10 INJECTION, SOLUTION INTRAVENOUS at 18:14

## 2022-07-19 RX ADMIN — CLINDAMYCIN IN 5 PERCENT DEXTROSE 600 MG: 12 INJECTION, SOLUTION INTRAVENOUS at 20:42

## 2022-07-19 RX ADMIN — PHENYLEPHRINE HYDROCHLORIDE 10 MCG/MIN: 10 INJECTION INTRAVENOUS at 16:31

## 2022-07-19 RX ADMIN — PIPERACILLIN SODIUM AND TAZOBACTAM SODIUM 3.38 G: 3; .375 INJECTION, POWDER, LYOPHILIZED, FOR SOLUTION INTRAVENOUS at 11:01

## 2022-07-19 RX ADMIN — SODIUM CHLORIDE, POTASSIUM CHLORIDE, SODIUM LACTATE AND CALCIUM CHLORIDE: 600; 310; 30; 20 INJECTION, SOLUTION INTRAVENOUS at 15:49

## 2022-07-19 RX ADMIN — HYDROMORPHONE HYDROCHLORIDE 1 MG: 1 INJECTION, SOLUTION INTRAMUSCULAR; INTRAVENOUS; SUBCUTANEOUS at 15:28

## 2022-07-19 RX ADMIN — MIDAZOLAM HYDROCHLORIDE 2 MG: 2 INJECTION, SOLUTION INTRAMUSCULAR; INTRAVENOUS at 15:49

## 2022-07-19 RX ADMIN — HYDROMORPHONE HYDROCHLORIDE 1 MG: 1 INJECTION, SOLUTION INTRAMUSCULAR; INTRAVENOUS; SUBCUTANEOUS at 06:06

## 2022-07-19 RX ADMIN — LISINOPRIL 20 MG: 20 TABLET ORAL at 09:43

## 2022-07-19 RX ADMIN — PIPERACILLIN SODIUM AND TAZOBACTAM SODIUM 3.38 G: 3; .375 INJECTION, POWDER, LYOPHILIZED, FOR SOLUTION INTRAVENOUS at 02:23

## 2022-07-19 RX ADMIN — FENTANYL CITRATE 25 MCG: 50 INJECTION, SOLUTION INTRAMUSCULAR; INTRAVENOUS at 18:48

## 2022-07-19 RX ADMIN — FENTANYL CITRATE 50 MCG: 50 INJECTION, SOLUTION INTRAMUSCULAR; INTRAVENOUS at 18:14

## 2022-07-19 NOTE — ANESTHESIA POSTPROCEDURE EVALUATION
Procedure(s): FEMORAL-POPLITEAL BYPASS RIGHT.     general    Anesthesia Post Evaluation      Multimodal analgesia: multimodal analgesia used between 6 hours prior to anesthesia start to PACU discharge  Patient location during evaluation: PACU  Patient participation: complete - patient participated  Level of consciousness: awake  Pain score: 0  Pain management: adequate  Airway patency: patent  Anesthetic complications: no  Cardiovascular status: acceptable  Respiratory status: acceptable  Hydration status: acceptable  Post anesthesia nausea and vomiting:  controlled  Final Post Anesthesia Temperature Assessment:  Normothermia (36.0-37.5 degrees C)      INITIAL Post-op Vital signs:   Vitals Value Taken Time   /57 07/19/22 1922   Temp 37.3 °C (99.1 °F) 07/19/22 1922   Pulse 78 07/19/22 1922   Resp 18 07/19/22 1922   SpO2 97 % 07/19/22 1922

## 2022-07-19 NOTE — CONSULTS
History and Physical    Chief complaints: Right great toe gangrene. History of Presenting Illness:  Kota Hicks is a 65 y.o. very pleasant man recently hospitalized for ischemic right foot with a toe gangrene. Patient underwent right leg angioplasty with a multiple stent placement. Postprocedure patient has significant proved vascular status on the right leg wound appears to be getting better. Patient was discharged with Eliquis and Plavix therapy. Apparently patient did not take Eliquis for 3 days. Patient was seen at office for vascular assessment patient was directed hospital admission due to worsening ischemic changes on the right great toe. Past Medical History:   Diagnosis Date    Diabetes ([facility] [address]       No past surgical history on file. Family History   Problem Relation Age of Onset    Cancer Mother     Diabetes Mother     Cancer Father       Social History     Tobacco Use    Smoking status: Former Smoker     Packs/day: 1.50     Years: 15.00     Pack years: 22.50     Quit date: 2022     Years since quittin.0    Smokeless tobacco: Never Used   Substance Use Topics    Alcohol use: Not Currently       Prior to Admission medications    Medication Sig Start Date End Date Taking? Authorizing Provider   indomethacin (INDOCIN) 50 mg capsule indomethacin 50 mg capsule   Take 1 capsule by mouth three times daily as needed    Provider, Historical   colchicine 0.6 mg tablet colchicine 0.6 mg tablet   2 today then 1 daily x 5 days    Provider, Historical   lisinopriL (PRINIVIL, ZESTRIL) 20 mg tablet Take 1 Tablet by mouth daily. 22   Gladis Ferrer MD   amLODIPine (NORVASC) 10 mg tablet Take 1 Tablet by mouth daily. 22   Gladis Ferrer MD   ondansetron (ZOFRAN ODT) 4 mg disintegrating tablet Take 1 Tablet by mouth every eight (8) hours as needed for Nausea or Vomiting.  22   Gladis Ferrer MD   clindamycin (CLEOCIN) 300 mg capsule Take 1 Capsule by mouth three (3) times daily. 7/8/22   Carlyn Ferrer MD   apixaban (Eliquis) 5 mg tablet Take 1 Tablet by mouth two (2) times a day. Eliquis 5 mg 2 tablets twice daily for 7 days then 1 tablet twice daily 7/8/22   Carlyn Ferrer MD   pantoprazole (PROTONIX) 40 mg tablet Take 40 mg by mouth daily. Provider, Historical   metFORMIN (GLUCOPHAGE) 500 mg tablet Take 500 mg by mouth daily (with dinner). Provider, Historical     No Known Allergies     Review of Systems:  Pertinent review of systems discussed in HPI, and rest of organ systems personally reviewed and they are negative. Objective:   Vital signs reviewed:      Visit Vitals  /65   Pulse 65   Temp 97.7 °F (36.5 °C)   Resp 21   Ht 6' 1\" (1.854 m)   Wt 234 lb (106.1 kg)   SpO2 97%   BMI 30.87 kg/m²       Physical Exam:   General appearance: Alert  Patient is awake and alert, not in particular distress. Head and neck atraumatic normocephalic. ENT shows normal oral mucosa, no jaundice no hoarse voice. Eyes: Pupil equal gaze appropriate. Cardiac system regular rate rhythm. Pulmonary: No audible wheeze. Chest wall: Chest wall excursion normal with respiration cycle, there is no deformity or chest trauma. Abdomen: Soft not tender or distended, bowel sounds active. There is no obvious palpable mass, or hernia. Neurologic: Nonfocal.  Cranial nerves intact, no new focal findings. Musculoskeletal system: Motor function normal limits, motor function 5 out of 5, range of motion normal in all 4 extremity  Skin: Warm and moist.  Hematologic system: No obvious bruising. Psychosocial: Appropriate and cooperative. Vascular examination: Right foot examination shows right foot appears to be cool to touch, great toe gangrene has gotten worse. There is also mild cellulitis noted. Patient does have 1+ monophasic right PT, no discernible signal on the DP. Data Review: Labs are reviewed.  Discussed  Recent Results (from the past 24 hour(s))   CBC WITH AUTOMATED DIFF    Collection Time: 07/18/22  5:02 PM   Result Value Ref Range    WBC 26.8 (H) 4.1 - 11.1 K/uL    RBC 4.83 4.10 - 5.70 M/uL    HGB 14.4 12.1 - 17.0 g/dL    HCT 42.4 36.6 - 50.3 %    MCV 87.8 80.0 - 99.0 FL    MCH 29.8 26.0 - 34.0 PG    MCHC 34.0 30.0 - 36.5 g/dL    RDW 12.3 11.5 - 14.5 %    PLATELET 972 (H) 770 - 400 K/uL    MPV 9.7 8.9 - 12.9 FL    NRBC 0.0 0.0  WBC    ABSOLUTE NRBC 0.00 0.00 - 0.01 K/uL    NEUTROPHILS 85 (H) 32 - 75 %    LYMPHOCYTES 5 (L) 12 - 49 %    MONOCYTES 7 5 - 13 %    EOSINOPHILS 1 0 - 7 %    BASOPHILS 1 0 - 1 %    IMMATURE GRANULOCYTES 1 (H) 0 - 0.5 %    ABS. NEUTROPHILS 23.2 (H) 1.8 - 8.0 K/UL    ABS. LYMPHOCYTES 1.3 0.8 - 3.5 K/UL    ABS. MONOCYTES 1.7 (H) 0.0 - 1.0 K/UL    ABS. EOSINOPHILS 0.1 0.0 - 0.4 K/UL    ABS. BASOPHILS 0.2 (H) 0.0 - 0.1 K/UL    ABS. IMM. GRANS. 0.3 (H) 0.00 - 0.04 K/UL    DF AUTOMATED     METABOLIC PANEL, COMPREHENSIVE    Collection Time: 07/18/22  5:02 PM   Result Value Ref Range    Sodium 134 (L) 136 - 145 mmol/L    Potassium 4.2 3.5 - 5.1 mmol/L    Chloride 100 97 - 108 mmol/L    CO2 25 21 - 32 mmol/L    Anion gap 9 5 - 15 mmol/L    Glucose 159 (H) 65 - 100 mg/dL    BUN 14 6 - 20 mg/dL    Creatinine 0.85 0.70 - 1.30 mg/dL    BUN/Creatinine ratio 16 12 - 20      GFR est AA >60 >60 ml/min/1.73m2    GFR est non-AA >60 >60 ml/min/1.73m2    Calcium 9.4 8.5 - 10.1 mg/dL    Bilirubin, total 1.1 (H) 0.2 - 1.0 mg/dL    AST (SGOT) 23 15 - 37 U/L    ALT (SGPT) 30 12 - 78 U/L    Alk.  phosphatase 98 45 - 117 U/L    Protein, total 8.0 6.4 - 8.2 g/dL    Albumin 3.4 (L) 3.5 - 5.0 g/dL    Globulin 4.6 (H) 2.0 - 4.0 g/dL    A-G Ratio 0.7 (L) 1.1 - 2.2     C REACTIVE PROTEIN, QT    Collection Time: 07/18/22  5:02 PM   Result Value Ref Range    C-Reactive protein 16.20 (H) 0.00 - 0.60 mg/dL   CK    Collection Time: 07/18/22  5:02 PM   Result Value Ref Range     39 - 308 U/L   SED RATE (ESR)    Collection Time: 07/18/22  5:02 PM   Result Value Ref Range    Sed rate, automated 82 (H) 0 - 20 mm/hr   PROTHROMBIN TIME + INR    Collection Time: 07/18/22  5:02 PM   Result Value Ref Range    Prothrombin time 17.7 (H) 11.9 - 14.6 sec    INR 1.4 (H) 0.9 - 1.1     TYPE & SCREEN    Collection Time: 07/18/22  5:02 PM   Result Value Ref Range    Crossmatch Expiration 07/21/2022,2359     ABO/Rh(D) A Positive     Antibody screen Negative    PTT    Collection Time: 07/18/22  5:02 PM   Result Value Ref Range    aPTT 33.6 21.2 - 34.1 sec    aPTT, therapeutic range   82 - 109 sec   LACTIC ACID    Collection Time: 07/18/22  5:08 PM   Result Value Ref Range    Lactic acid 1.4 0.4 - 2.0 mmol/L   PROTHROMBIN TIME + INR    Collection Time: 07/18/22 11:20 PM   Result Value Ref Range    Prothrombin time 17.7 (H) 11.9 - 14.6 sec    INR 1.4 (H) 0.9 - 1.1     PTT    Collection Time: 07/18/22 11:20 PM   Result Value Ref Range    aPTT 50.3 (H) 21.2 - 34.1 sec    aPTT, therapeutic range   82 - 109 sec   GLUCOSE, POC    Collection Time: 07/18/22 11:23 PM   Result Value Ref Range    Glucose (POC) 128 (H) 65 - 117 mg/dL    Performed by Aby Palacios COMPREHENSIVE    Collection Time: 07/19/22  3:54 AM   Result Value Ref Range    Sodium 134 (L) 136 - 145 mmol/L    Potassium 4.1 3.5 - 5.1 mmol/L    Chloride 103 97 - 108 mmol/L    CO2 24 21 - 32 mmol/L    Anion gap 7 5 - 15 mmol/L    Glucose 148 (H) 65 - 100 mg/dL    BUN 12 6 - 20 mg/dL    Creatinine 0.65 (L) 0.70 - 1.30 mg/dL    BUN/Creatinine ratio 18 12 - 20      GFR est AA >60 >60 ml/min/1.73m2    GFR est non-AA >60 >60 ml/min/1.73m2    Calcium 9.1 8.5 - 10.1 mg/dL    Bilirubin, total 0.6 0.2 - 1.0 mg/dL    AST (SGOT) 17 15 - 37 U/L    ALT (SGPT) 27 12 - 78 U/L    Alk.  phosphatase 84 45 - 117 U/L    Protein, total 7.7 6.4 - 8.2 g/dL    Albumin 2.7 (L) 3.5 - 5.0 g/dL    Globulin 5.0 (H) 2.0 - 4.0 g/dL    A-G Ratio 0.5 (L) 1.1 - 2.2     CBC WITH AUTOMATED DIFF    Collection Time: 07/19/22  3:54 AM   Result Value Ref Range    WBC 17.7 (H) 4.1 - 11.1 K/uL    RBC 4.18 4.10 - 5.70 M/uL    HGB 12.2 12.1 - 17.0 g/dL    HCT 36.8 36.6 - 50.3 %    MCV 88.0 80.0 - 99.0 FL    MCH 29.2 26.0 - 34.0 PG    MCHC 33.2 30.0 - 36.5 g/dL    RDW 12.4 11.5 - 14.5 %    PLATELET 450 (H) 857 - 400 K/uL    MPV 9.5 8.9 - 12.9 FL    NRBC 0.0 0.0  WBC    ABSOLUTE NRBC 0.00 0.00 - 0.01 K/uL    NEUTROPHILS 74 32 - 75 %    LYMPHOCYTES 13 12 - 49 %    MONOCYTES 9 5 - 13 %    EOSINOPHILS 2 0 - 7 %    BASOPHILS 1 0 - 1 %    IMMATURE GRANULOCYTES 1 (H) 0 - 0.5 %    ABS. NEUTROPHILS 13.1 (H) 1.8 - 8.0 K/UL    ABS. LYMPHOCYTES 2.3 0.8 - 3.5 K/UL    ABS. MONOCYTES 1.6 (H) 0.0 - 1.0 K/UL    ABS. EOSINOPHILS 0.3 0.0 - 0.4 K/UL    ABS. BASOPHILS 0.2 (H) 0.0 - 0.1 K/UL    ABS. IMM. GRANS. 0.1 (H) 0.00 - 0.04 K/UL    DF AUTOMATED               Imagings reviewed: discussed as below. No name on file. 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 Patient currently getting heparin drip. Patient will need right common femoral artery above-knee popliteal bypass. Other options including tPA infusion therapy for thrombolytic treatment for SFA occlusion most likely this option will have further complication will be occlusion in the future. Therefore I informed the patient and recommended right leg bypass surgery. Patient is agreeable to this procedure. I discussed with the patient risks benefits complication about the procedure. Patient is agreeable.

## 2022-07-19 NOTE — H&P
History and Physical    NAME: Mikayla Garza   :  1960   MRN:  [id number]     Date/Time:  2022 7:24 PM    Patient PCP: Lauri Hdz MD  ______________________________________________________________________             Subjective:     CHIEF COMPLAINT: Arterial Insufficiency        HISTORY OF PRESENT ILLNESS:       Patient is a 65y.o. year old male with a PMH of diabetes, diabetic foot injury, arterial insufficiency, hypertension, GERD, and gout presents to the ED complaining of  right great toe infection. The patient was recently hospitalized for this same problem and received balloon angioplasty of the right lower extremity which initially helped perfusion of the toe. He has since relapsed and complains of excruciating pain that is not well managed on percocet. The patient states that it greatly progress in the last 24 hours and there is now black and gangrenous lesion on the plantar aspect of the toe with swelling over the entire foot. Patient denies any chest pain, SOB, difficulty breathing, palpitations, dizziness, N/V/D, or fevers. WBC: 26.8  Platelet: 294  APTT: 33.6  CRP: 16.20    X-ray of R great toe:  IMPRESSION  No evidence of fracture or dislocation. Evidence of gout. No  significant change. Past Medical History:   Diagnosis Date    Diabetes ([address]         No past surgical history on file. Social History     Tobacco Use    Smoking status: Former Smoker     Packs/day: 1.50     Years: 15.00     Pack years: 22.50     Quit date: 2022     Years since quittin.0    Smokeless tobacco: Never Used   Substance Use Topics    Alcohol use: Not Currently        Family History   Problem Relation Age of Onset    Cancer Mother     Diabetes Mother     Cancer Father        No Known Allergies     Prior to Admission medications    Medication Sig Start Date End Date Taking?  Authorizing Provider   indomethacin (INDOCIN) 50 mg capsule indomethacin 50 mg capsule   Take 1 capsule by mouth three times daily as needed    Provider, Historical   colchicine 0.6 mg tablet colchicine 0.6 mg tablet   2 today then 1 daily x 5 days    Provider, Historical   lisinopriL (PRINIVIL, ZESTRIL) 20 mg tablet Take 1 Tablet by mouth daily. 7/9/22   Cinthia Ferrer MD   amLODIPine (NORVASC) 10 mg tablet Take 1 Tablet by mouth daily. 7/9/22   Cinthia Ferrer MD   ondansetron (ZOFRAN ODT) 4 mg disintegrating tablet Take 1 Tablet by mouth every eight (8) hours as needed for Nausea or Vomiting. 7/8/22   Cinthia Ferrer MD   clindamycin (CLEOCIN) 300 mg capsule Take 1 Capsule by mouth three (3) times daily. 7/8/22   Cinthia Ferrer MD   apixaban (Eliquis) 5 mg tablet Take 1 Tablet by mouth two (2) times a day. Eliquis 5 mg 2 tablets twice daily for 7 days then 1 tablet twice daily 7/8/22   Cinthia Ferrer MD   pantoprazole (PROTONIX) 40 mg tablet Take 40 mg by mouth daily. Provider, Historical   metFORMIN (GLUCOPHAGE) 500 mg tablet Take 500 mg by mouth daily (with dinner).     Provider, Historical         Current Facility-Administered Medications:     heparin 25,000 units in D5W 250 ml infusion, 18-36 Units/kg/hr (Adjusted), IntraVENous, TITRATE, Satnam Ferrer MD, Last Rate: 16.3 mL/hr at 07/18/22 1801, 18 Units/kg/hr at 07/18/22 1801    piperacillin-tazobactam (ZOSYN) 3.375 g in 0.9% sodium chloride (MBP/ADV) 100 mL MBP, 3.375 g, IntraVENous, ONCE, Mario Herrmann MD, Last Rate: 25 mL/hr at 07/18/22 1920, 3.375 g at 07/18/22 1920    heparin (porcine) 1,000 unit/mL injection 7,230 Units, 80 Units/kg (Adjusted), IntraVENous, PRN **OR** heparin (porcine) 1,000 unit/mL injection 3,620 Units, 40 Units/kg (Adjusted), IntraVENous, PRN, Satnam Ferrer MD    HYDROmorphone (DILAUDID) injection 1 mg, 1 mg, IntraVENous, Q4H PRN, Satnam Ferrer MD, 1 mg at 07/18/22 2153    [START ON 7/19/2022] amLODIPine (NORVASC) tablet 10 mg, 10 mg, Oral, DAILY, Cinthia Ferrer MD    [START ON 7/19/2022] lisinopriL (PRINIVIL, ZESTRIL) tablet 20 mg, 20 mg, Oral, DAILY, Shana Ferrer MD    [START ON 7/19/2022] colchicine tablet 0.6 mg, 0.6 mg, Oral, DAILY, Shana Ferrer MD  [facility]  [START ON 7/19/2022] pantoprazole (PROTONIX) tablet 40 mg, 40 mg, Oral, DAILY, Shana Ferrer MD  [facility]  [START ON 7/19/2022] insulin lispro (HUMALOG) injection, , SubCUTAneous, AC&HS, Shana Ferrer MD    glucose chewable tablet 16 g, 4 Tablet, Oral, PRN, Shana Ferrer MD    glucagon (GLUCAGEN) injection 1 mg, 1 mg, IntraMUSCular, PRN, Shana Ferrer MD    0.9% sodium chloride infusion, 75 mL/hr, IntraVENous, CONTINUOUS, Shana Ferrer MD    acetaminophen (TYLENOL) tablet 650 mg, 650 mg, Oral, Q6H PRN **OR** acetaminophen (TYLENOL) suppository 650 mg, 650 mg, Rectal, Q6H PRN, Shana Ferrer MD    polyethylene glycol (MIRALAX) packet 17 g, 17 g, Oral, DAILY PRN, Shana Ferrer MD    ondansetron (ZOFRAN ODT) tablet 4 mg, 4 mg, Oral, Q8H PRN **OR** ondansetron (ZOFRAN) injection 4 mg, 4 mg, IntraVENous, Q6H PRN, Satnam Ferrer MD    Current Outpatient Medications:     indomethacin (INDOCIN) 50 mg capsule, indomethacin 50 mg capsule  Take 1 capsule by mouth three times daily as needed, Disp: , Rfl:     colchicine 0.6 mg tablet, colchicine 0.6 mg tablet  2 today then 1 daily x 5 days, Disp: , Rfl:     lisinopriL (PRINIVIL, ZESTRIL) 20 mg tablet, Take 1 Tablet by mouth daily. , Disp: 60 Tablet, Rfl: 0    amLODIPine (NORVASC) 10 mg tablet, Take 1 Tablet by mouth daily. , Disp: 30 Tablet, Rfl: 0    ondansetron (ZOFRAN ODT) 4 mg disintegrating tablet, Take 1 Tablet by mouth every eight (8) hours as needed for Nausea or Vomiting., Disp: 10 Tablet, Rfl: 0    clindamycin (CLEOCIN) 300 mg capsule, Take 1 Capsule by mouth three (3) times daily. , Disp: 30 Capsule, Rfl: 0    apixaban (Eliquis) 5 mg tablet, Take 1 Tablet by mouth two (2) times a day.  Eliquis 5 mg 2 tablets twice daily for 7 days then 1 tablet twice daily, Disp: 100 Tablet, Rfl: 0    pantoprazole (PROTONIX) 40 mg tablet, Take 40 mg by mouth daily. , Disp: , Rfl:     metFORMIN (GLUCOPHAGE) 500 mg tablet, Take 500 mg by mouth daily (with dinner). , Disp: , Rfl:     LAB DATA REVIEWED:    Recent Results (from the past 24 hour(s))   CBC WITH AUTOMATED DIFF    Collection Time: 07/18/22  5:02 PM   Result Value Ref Range    WBC 26.8 (H) 4.1 - 11.1 K/uL    RBC 4.83 4.10 - 5.70 M/uL    HGB 14.4 12.1 - 17.0 g/dL    HCT 42.4 36.6 - 50.3 %    MCV 87.8 80.0 - 99.0 FL    MCH 29.8 26.0 - 34.0 PG    MCHC 34.0 30.0 - 36.5 g/dL    RDW 12.3 11.5 - 14.5 %    PLATELET 191 (H) 342 - 400 K/uL    MPV 9.7 8.9 - 12.9 FL    NRBC 0.0 0.0  WBC    ABSOLUTE NRBC 0.00 0.00 - 0.01 K/uL    NEUTROPHILS 85 (H) 32 - 75 %    LYMPHOCYTES 5 (L) 12 - 49 %    MONOCYTES 7 5 - 13 %    EOSINOPHILS 1 0 - 7 %    BASOPHILS 1 0 - 1 %    IMMATURE GRANULOCYTES 1 (H) 0 - 0.5 %    ABS. NEUTROPHILS 23.2 (H) 1.8 - 8.0 K/UL    ABS. LYMPHOCYTES 1.3 0.8 - 3.5 K/UL    ABS. MONOCYTES 1.7 (H) 0.0 - 1.0 K/UL    ABS. EOSINOPHILS 0.1 0.0 - 0.4 K/UL    ABS. BASOPHILS 0.2 (H) 0.0 - 0.1 K/UL    ABS. IMM. GRANS. 0.3 (H) 0.00 - 0.04 K/UL    DF AUTOMATED     METABOLIC PANEL, COMPREHENSIVE    Collection Time: 07/18/22  5:02 PM   Result Value Ref Range    Sodium 134 (L) 136 - 145 mmol/L    Potassium 4.2 3.5 - 5.1 mmol/L    Chloride 100 97 - 108 mmol/L    CO2 25 21 - 32 mmol/L    Anion gap 9 5 - 15 mmol/L    Glucose 159 (H) 65 - 100 mg/dL    BUN 14 6 - 20 mg/dL    Creatinine 0.85 0.70 - 1.30 mg/dL    BUN/Creatinine ratio 16 12 - 20      GFR est AA >60 >60 ml/min/1.73m2    GFR est non-AA >60 >60 ml/min/1.73m2    Calcium 9.4 8.5 - 10.1 mg/dL    Bilirubin, total 1.1 (H) 0.2 - 1.0 mg/dL    AST (SGOT) 23 15 - 37 U/L    ALT (SGPT) 30 12 - 78 U/L    Alk.  phosphatase 98 45 - 117 U/L    Protein, total 8.0 6.4 - 8.2 g/dL    Albumin 3.4 (L) 3.5 - 5.0 g/dL    Globulin 4.6 (H) 2.0 - 4.0 g/dL    A-G Ratio 0.7 (L) 1.1 - 2.2     C REACTIVE PROTEIN, QT Collection Time: 07/18/22  5:02 PM   Result Value Ref Range    C-Reactive protein 16.20 (H) 0.00 - 0.60 mg/dL   CK    Collection Time: 07/18/22  5:02 PM   Result Value Ref Range     39 - 308 U/L   PROTHROMBIN TIME + INR    Collection Time: 07/18/22  5:02 PM   Result Value Ref Range    Prothrombin time 17.7 (H) 11.9 - 14.6 sec    INR 1.4 (H) 0.9 - 1.1     TYPE & SCREEN    Collection Time: 07/18/22  5:02 PM   Result Value Ref Range    Crossmatch Expiration 07/21/2022,2359     ABO/Rh(D) A Positive     Antibody screen Negative    PTT    Collection Time: 07/18/22  5:02 PM   Result Value Ref Range    aPTT 33.6 21.2 - 34.1 sec    aPTT, therapeutic range   82 - 109 sec   LACTIC ACID    Collection Time: 07/18/22  5:08 PM   Result Value Ref Range    Lactic acid 1.4 0.4 - 2.0 mmol/L       XR Results (most recent):  Results from Hospital Encounter encounter on 07/18/22    XR GREAT TOE RT MIN 2 V    Narrative  INDICATION:  Wound of the great toe    COMPARISON: June 30    FINDINGS: 2 views of the right great toe demonstrate no evidence of acute  fracture or dislocation. There is a bandage, obscuring fine osseous detail. Marginal erosions are noted at the first MTP joint and IP joint, similar to the  prior study. This is consistent with gout. There is no plain film evidence of  acute osteomyelitis. Impression  No evidence of fracture or dislocation. Evidence of gout. No  significant change. CTA ABD ART W RUNOFF W WO CONT   Final Result      Occluded long segment right superficial femoral artery stent with reconstitution   in the popliteal artery which demonstrates mild to moderate atherosclerotic   changes. Questionable patency of the distal tibial and peroneal runoff with   limited evaluation due to poor flow-related opacification and arterial   calcifications. Mild to moderate atherosclerotic changes in the left lower extremity with   probable single-vessel runoff to the left foot.       Indeterminate bilateral adrenal nodules measuring up to 2 cm. Consider   nonemergent adrenal CT protocol. Bullous/cystic changes in the lung bases. Please refer to above findings for complete details. XR GREAT TOE RT MIN 2 V   Final Result   No evidence of fracture or dislocation. Evidence of gout. No   significant change. Review of Systems:  Constitutional: Negative for chills and fever. HENT: Negative. Eyes: Negative. Respiratory: Negative. Cardiovascular: Negative. Gastrointestinal: Negative for abdominal pain and nausea. Skin: Foot pain and swelling   Neurological: Negative. Objective:   VITALS:    Visit Vitals  BP (!) 150/77   Pulse 74   Temp 98 °F (36.7 °C)   Resp 15   Ht 6' 1\" (1.854 m)   Wt 106.1 kg (234 lb)   SpO2 97%   BMI 30.87 kg/m²       Physical Exam:   Constitutional: pt is oriented to person, place, and time. HENT:   Head: Normocephalic and atraumatic. Eyes: Pupils are equal, round, and reactive to light. EOM are normal.   Cardiovascular: Normal rate, regular rhythm and normal heart sounds. Pulmonary/Chest: Breath sounds normal. No wheezes. No rales. Exhibits no tenderness. Abdominal: Soft. Bowel sounds are normal. There is no abdominal tenderness. There is no rebound and no guarding. Musculoskeletal: Black, necrotic right great toe. Significant violaceous edema in right foot. Neurological: pt is alert and oriented to person, place, and time. Alert. Normal strength. No cranial nerve deficit or sensory deficit. Displays a negative Romberg sign.         ASSESSMENT:  Arterial Insufficiency s/p balloon angioplasty  Right great toe infection  Diabetes Mellitus  Diabetic foot injury  Hypertension  GERD  Gout  RA  Smoking    PLAN:    Consult general surgery  Consult vascular surgery    Keep n.p.o. start on IV Zosyn ID consult and vascular surgical consult      Current Facility-Administered Medications:     heparin 25,000 units in D5W 250 ml infusion, 18-36 Units/kg/hr (Adjusted), IntraVENous, TITRATE, Satnam Ferrer MD, Last Rate: 16.3 mL/hr at 07/18/22 1801, 18 Units/kg/hr at 07/18/22 1801    piperacillin-tazobactam (ZOSYN) 3.375 g in 0.9% sodium chloride (MBP/ADV) 100 mL MBP, 3.375 g, IntraVENous, ONCE, Mario Herrmann MD, Last Rate: 25 mL/hr at 07/18/22 1920, 3.375 g at 07/18/22 1920    heparin (porcine) 1,000 unit/mL injection 7,230 Units, 80 Units/kg (Adjusted), IntraVENous, PRN **OR** heparin (porcine) 1,000 unit/mL injection 3,620 Units, 40 Units/kg (Adjusted), IntraVENous, PRN, Kelsy Ferrer MD    HYDROmorphone (DILAUDID) injection 1 mg, 1 mg, IntraVENous, Q4H PRN, Satnam Ferrer MD, 1 mg at 07/18/22 2153    [START ON 7/19/2022] amLODIPine (NORVASC) tablet 10 mg, 10 mg, Oral, DAILY, Kelsy Ferrer MD [facility]  [START ON 7/19/2022] lisinopriL (PRINIVIL, ZESTRIL) tablet 20 mg, 20 mg, Oral, DAILY, Kelsy Ferrer MD [facility]  [START ON 7/19/2022] colchicine tablet 0.6 mg, 0.6 mg, Oral, DAILY, Kelsy Ferrer MD [facility]  [START ON 7/19/2022] pantoprazole (PROTONIX) tablet 40 mg, 40 mg, Oral, DAILY, Kelsy Ferrer MD [facility]  [START ON 7/19/2022] insulin lispro (HUMALOG) injection, , SubCUTAneous, AC&HS, Kelsy Ferrer MD    glucose chewable tablet 16 g, 4 Tablet, Oral, PRN, Kelsy Ferrer MD    glucagon (GLUCAGEN) injection 1 mg, 1 mg, IntraMUSCular, PRN, Kelsy Ferrer MD    0.9% sodium chloride infusion, 75 mL/hr, IntraVENous, CONTINUOUS, Kelsy Ferrer MD    acetaminophen (TYLENOL) tablet 650 mg, 650 mg, Oral, Q6H PRN **OR** acetaminophen (TYLENOL) suppository 650 mg, 650 mg, Rectal, Q6H PRN, Kelsy Ferrer MD    polyethylene glycol (MIRALAX) packet 17 g, 17 g, Oral, DAILY PRN, Kelsy Ferrer MD    ondansetron (ZOFRAN ODT) tablet 4 mg, 4 mg, Oral, Q8H PRN **OR** ondansetron (ZOFRAN) injection 4 mg, 4 mg, IntraVENous, Q6H PRN, Satnam Ferrer MD    Current Outpatient Medications:     indomethacin (INDOCIN) 50 mg capsule, indomethacin 50 mg capsule  Take 1 capsule by mouth three times daily as needed, Disp: , Rfl:     colchicine 0.6 mg tablet, colchicine 0.6 mg tablet  2 today then 1 daily x 5 days, Disp: , Rfl:     lisinopriL (PRINIVIL, ZESTRIL) 20 mg tablet, Take 1 Tablet by mouth daily. , Disp: 60 Tablet, Rfl: 0    amLODIPine (NORVASC) 10 mg tablet, Take 1 Tablet by mouth daily. , Disp: 30 Tablet, Rfl: 0    ondansetron (ZOFRAN ODT) 4 mg disintegrating tablet, Take 1 Tablet by mouth every eight (8) hours as needed for Nausea or Vomiting., Disp: 10 Tablet, Rfl: 0    clindamycin (CLEOCIN) 300 mg capsule, Take 1 Capsule by mouth three (3) times daily. , Disp: 30 Capsule, Rfl: 0    apixaban (Eliquis) 5 mg tablet, Take 1 Tablet by mouth two (2) times a day. Eliquis 5 mg 2 tablets twice daily for 7 days then 1 tablet twice daily, Disp: 100 Tablet, Rfl: 0    pantoprazole (PROTONIX) 40 mg tablet, Take 40 mg by mouth daily. , Disp: , Rfl:     metFORMIN (GLUCOPHAGE) 500 mg tablet, Take 500 mg by mouth daily (with dinner). , Disp: , Rfl:     ________________________________________________________________________    Signed: Roger Cristina MD

## 2022-07-19 NOTE — PROGRESS NOTES
Reason for Admission:  Arterial Insufficiency/Gangrene Toe                     RUR Score:  9%                   Plan for utilizing home health:    None @ this time/uses crutch. PCP: First and Last name:  Doc Arrington MD     Name of Practice:    Are you a current patient: Yes/No: Yes   Approximate date of last visit: 6/30/22   Can you participate in a virtual visit with your PCP: Yes/Call/Has cell phone. Current Advanced Directive/Advance Care Plan: Full Code      Healthcare Decision Maker:              Primary Decision MakeYuli Morales - [phone]                  Transition of Care Plan:    D/C Plan is home & wife to transport. Call R[facility] into [address] [place] [address] in [place].

## 2022-07-19 NOTE — PROGRESS NOTES
Admission Medication Reconciliation:    Information obtained from:  Patient's wife    Comments/Recommendations: Reviewed PTA medications and patient's allergies. Allergies:  Patient has no known allergies. Significant PMH/Disease States:   Past Medical History:   Diagnosis Date    Diabetes [facility])      Chief Complaint for this Admission:    Chief Complaint   Patient presents with    Wound Check     Prior to Admission Medications:   Prior to Admission Medications   Prescriptions Last Dose Informant Patient Reported? Taking? amLODIPine (NORVASC) 10 mg tablet  Significant Other No Yes   Sig: Take 1 Tablet by mouth daily. apixaban (Eliquis) 5 mg tablet  Significant Other No Yes   Sig: Take 1 Tablet by mouth two (2) times a day. Eliquis 5 mg 2 tablets twice daily for 7 days then 1 tablet twice daily   clopidogreL (PLAVIX) 75 mg tab  Significant Other Yes Yes   Sig: Take 75 mg by mouth daily. colchicine 0.6 mg tablet  Significant Other Yes Yes   Sig: Take 0.6 mg by mouth daily. indomethacin (INDOCIN) 50 mg capsule  Significant Other Yes Yes   Sig: Take 50 mg by mouth daily. lisinopriL (PRINIVIL, ZESTRIL) 20 mg tablet  Significant Other No Yes   Sig: Take 1 Tablet by mouth daily. metFORMIN (GLUCOPHAGE) 500 mg tablet  Significant Other Yes Yes   Sig: Take 500 mg by mouth daily (with dinner). pantoprazole (PROTONIX) 40 mg tablet  Significant Other Yes Yes   Sig: Take 40 mg by mouth daily.       Facility-Administered Medications: None       Amanda Solis

## 2022-07-19 NOTE — PROGRESS NOTES
Skin Assessment performed by Emily Rahman and Adamaris Finn. No skin breakdown. Family notified while in PACU,.

## 2022-07-19 NOTE — ANESTHESIA PREPROCEDURE EVALUATION
Relevant Problems   HEMATOLOGY   (+) Osteomyelitis (HCC)       Anesthetic History   No history of anesthetic complications            Review of Systems / Medical History  Patient summary reviewed, nursing notes reviewed and pertinent labs reviewed    Pulmonary          Smoker      Comments: SNORING. Neuro/Psych              Cardiovascular    Hypertension          PAD         GI/Hepatic/Renal     GERD           Endo/Other    Diabetes: type 2    Obesity and arthritis (Gouty Arthritis)     Other Findings   Comments:     Procedure Information    Case: [id number] Date/Time: 07/19/22 1500  Procedure: FEMORAL-POPLITEAL BYPASS RIGHT (Right )  Anesthesia type: General  Pre-op diagnosis: Right Leg Ischemia  Location: [facility] MAIN OR 02 / [facility] MAIN OR  Surgeons: Debo Farrell MD             Physical Exam    Airway  Mallampati: III  TM Distance: < 4 cm  Neck ROM: short neck   Mouth opening: Normal     Cardiovascular    Rhythm: regular  Rate: normal         Dental      Comments: DENTURES.     Pulmonary  Breath sounds clear to auscultation               Abdominal  GI exam deferred       Other Findings            Anesthetic Plan    ASA: 4, emergent  Anesthesia type: general          Induction: Intravenous  Anesthetic plan and risks discussed with: Patient

## 2022-07-19 NOTE — PROGRESS NOTES
General Daily Progress Note          Patient Name:   Yesika Mariano       YOB: 1960       Age:  65 y.o. Admit Date: 7/18/2022      Subjective:   Patient is a 65y.o. year old male with a PMH of diabetes, diabetic foot injury, arterial insufficiency, hypertension, GERD, and gout presents to the ED complaining of  right great toe infection. The patient was recently hospitalized for this same problem and received balloon angioplasty of the right lower extremity which initially helped perfusion of the toe. He has since relapsed and complains of excruciating pain that is not well managed on percocet. The patient states that it greatly progress in the last 24 hours and there is now black and gangrenous lesion on the plantar aspect of the toe with swelling over the entire foot. Patient denies any chest pain, SOB, difficulty breathing, palpitations, dizziness, N/V/D, or fevers.       X-ray of R great toe:  IMPRESSION  No evidence of fracture or dislocation. Evidence of gout.  No  significant change      Seen by the vascular surgeon this morning patient need right common femoral artery above-knee popliteal bypass               Objective:     Visit Vitals  BP (!) 142/73   Pulse 62   Temp 97.7 °F (36.5 °C)   Resp 19   Ht 6' 1\" (1.854 m)   Wt 106.1 kg (234 lb)   SpO2 96%   BMI 30.87 kg/m²        Recent Results (from the past 24 hour(s))   CBC WITH AUTOMATED DIFF    Collection Time: 07/18/22  5:02 PM   Result Value Ref Range    WBC 26.8 (H) 4.1 - 11.1 K/uL    RBC 4.83 4.10 - 5.70 M/uL    HGB 14.4 12.1 - 17.0 g/dL    HCT 42.4 36.6 - 50.3 %    MCV 87.8 80.0 - 99.0 FL    MCH 29.8 26.0 - 34.0 PG    MCHC 34.0 30.0 - 36.5 g/dL    RDW 12.3 11.5 - 14.5 %    PLATELET 104 (H) 415 - 400 K/uL    MPV 9.7 8.9 - 12.9 FL    NRBC 0.0 0.0  WBC    ABSOLUTE NRBC 0.00 0.00 - 0.01 K/uL    NEUTROPHILS 85 (H) 32 - 75 %    LYMPHOCYTES 5 (L) 12 - 49 %    MONOCYTES 7 5 - 13 %    EOSINOPHILS 1 0 - 7 %    BASOPHILS 1 0 - 1 % IMMATURE GRANULOCYTES 1 (H) 0 - 0.5 %    ABS. NEUTROPHILS 23.2 (H) 1.8 - 8.0 K/UL    ABS. LYMPHOCYTES 1.3 0.8 - 3.5 K/UL    ABS. MONOCYTES 1.7 (H) 0.0 - 1.0 K/UL    ABS. EOSINOPHILS 0.1 0.0 - 0.4 K/UL    ABS. BASOPHILS 0.2 (H) 0.0 - 0.1 K/UL    ABS. IMM. GRANS. 0.3 (H) 0.00 - 0.04 K/UL    DF AUTOMATED     METABOLIC PANEL, COMPREHENSIVE    Collection Time: 07/18/22  5:02 PM   Result Value Ref Range    Sodium 134 (L) 136 - 145 mmol/L    Potassium 4.2 3.5 - 5.1 mmol/L    Chloride 100 97 - 108 mmol/L    CO2 25 21 - 32 mmol/L    Anion gap 9 5 - 15 mmol/L    Glucose 159 (H) 65 - 100 mg/dL    BUN 14 6 - 20 mg/dL    Creatinine 0.85 0.70 - 1.30 mg/dL    BUN/Creatinine ratio 16 12 - 20      GFR est AA >60 >60 ml/min/1.73m2    GFR est non-AA >60 >60 ml/min/1.73m2    Calcium 9.4 8.5 - 10.1 mg/dL    Bilirubin, total 1.1 (H) 0.2 - 1.0 mg/dL    AST (SGOT) 23 15 - 37 U/L    ALT (SGPT) 30 12 - 78 U/L    Alk.  phosphatase 98 45 - 117 U/L    Protein, total 8.0 6.4 - 8.2 g/dL    Albumin 3.4 (L) 3.5 - 5.0 g/dL    Globulin 4.6 (H) 2.0 - 4.0 g/dL    A-G Ratio 0.7 (L) 1.1 - 2.2     C REACTIVE PROTEIN, QT    Collection Time: 07/18/22  5:02 PM   Result Value Ref Range    C-Reactive protein 16.20 (H) 0.00 - 0.60 mg/dL   CK    Collection Time: 07/18/22  5:02 PM   Result Value Ref Range     39 - 308 U/L   SED RATE (ESR)    Collection Time: 07/18/22  5:02 PM   Result Value Ref Range    Sed rate, automated 82 (H) 0 - 20 mm/hr   PROTHROMBIN TIME + INR    Collection Time: 07/18/22  5:02 PM   Result Value Ref Range    Prothrombin time 17.7 (H) 11.9 - 14.6 sec    INR 1.4 (H) 0.9 - 1.1     TYPE & SCREEN    Collection Time: 07/18/22  5:02 PM   Result Value Ref Range    Crossmatch Expiration 07/21/2022,2359     ABO/Rh(D) A Positive     Antibody screen Negative    PTT    Collection Time: 07/18/22  5:02 PM   Result Value Ref Range    aPTT 33.6 21.2 - 34.1 sec    aPTT, therapeutic range   82 - 109 sec   CULTURE, BLOOD, PAIRED    Collection Time: 07/18/22  5:08 PM    Specimen: Blood   Result Value Ref Range    Special Requests: No Special Requests      Culture result: No growth after 1 hour     LACTIC ACID    Collection Time: 07/18/22  5:08 PM   Result Value Ref Range    Lactic acid 1.4 0.4 - 2.0 mmol/L   PROTHROMBIN TIME + INR    Collection Time: 07/18/22 11:20 PM   Result Value Ref Range    Prothrombin time 17.7 (H) 11.9 - 14.6 sec    INR 1.4 (H) 0.9 - 1.1     PTT    Collection Time: 07/18/22 11:20 PM   Result Value Ref Range    aPTT 50.3 (H) 21.2 - 34.1 sec    aPTT, therapeutic range   82 - 109 sec   GLUCOSE, POC    Collection Time: 07/18/22 11:23 PM   Result Value Ref Range    Glucose (POC) 128 (H) 65 - 117 mg/dL    Performed by Aby Palacios COMPREHENSIVE    Collection Time: 07/19/22  3:54 AM   Result Value Ref Range    Sodium 134 (L) 136 - 145 mmol/L    Potassium 4.1 3.5 - 5.1 mmol/L    Chloride 103 97 - 108 mmol/L    CO2 24 21 - 32 mmol/L    Anion gap 7 5 - 15 mmol/L    Glucose 148 (H) 65 - 100 mg/dL    BUN 12 6 - 20 mg/dL    Creatinine 0.65 (L) 0.70 - 1.30 mg/dL    BUN/Creatinine ratio 18 12 - 20      GFR est AA >60 >60 ml/min/1.73m2    GFR est non-AA >60 >60 ml/min/1.73m2    Calcium 9.1 8.5 - 10.1 mg/dL    Bilirubin, total 0.6 0.2 - 1.0 mg/dL    AST (SGOT) 17 15 - 37 U/L    ALT (SGPT) 27 12 - 78 U/L    Alk.  phosphatase 84 45 - 117 U/L    Protein, total 7.7 6.4 - 8.2 g/dL    Albumin 2.7 (L) 3.5 - 5.0 g/dL    Globulin 5.0 (H) 2.0 - 4.0 g/dL    A-G Ratio 0.5 (L) 1.1 - 2.2     CBC WITH AUTOMATED DIFF    Collection Time: 07/19/22  3:54 AM   Result Value Ref Range    WBC 17.7 (H) 4.1 - 11.1 K/uL    RBC 4.18 4.10 - 5.70 M/uL    HGB 12.2 12.1 - 17.0 g/dL    HCT 36.8 36.6 - 50.3 %    MCV 88.0 80.0 - 99.0 FL    MCH 29.2 26.0 - 34.0 PG    MCHC 33.2 30.0 - 36.5 g/dL    RDW 12.4 11.5 - 14.5 %    PLATELET 012 (H) 836 - 400 K/uL    MPV 9.5 8.9 - 12.9 FL    NRBC 0.0 0.0  WBC    ABSOLUTE NRBC 0.00 0.00 - 0.01 K/uL    NEUTROPHILS 74 32 - 75 %    LYMPHOCYTES 13 12 - 49 %    MONOCYTES 9 5 - 13 %    EOSINOPHILS 2 0 - 7 %    BASOPHILS 1 0 - 1 %    IMMATURE GRANULOCYTES 1 (H) 0 - 0.5 %    ABS. NEUTROPHILS 13.1 (H) 1.8 - 8.0 K/UL    ABS. LYMPHOCYTES 2.3 0.8 - 3.5 K/UL    ABS. MONOCYTES 1.6 (H) 0.0 - 1.0 K/UL    ABS. EOSINOPHILS 0.3 0.0 - 0.4 K/UL    ABS. BASOPHILS 0.2 (H) 0.0 - 0.1 K/UL    ABS. IMM. GRANS. 0.1 (H) 0.00 - 0.04 K/UL    DF AUTOMATED     PTT    Collection Time: 07/19/22  5:52 AM   Result Value Ref Range    aPTT 45.1 (H) 21.2 - 34.1 sec    aPTT, therapeutic range   82 - 109 sec   GLUCOSE, POC    Collection Time: 07/19/22  7:51 AM   Result Value Ref Range    Glucose (POC) 145 (H) 65 - 117 mg/dL    Performed by Jero Painter      [unfilled]      Review of Systems    Constitutional: Negative for chills and fever. HENT: Negative. Eyes: Negative. Respiratory: Negative. Cardiovascular: Negative. Gastrointestinal: Negative for abdominal pain and nausea. Skin: Negative. Neurological: Negative. Physical Exam:      Constitutional: pt is oriented to person, place, and time. HENT:   Head: Normocephalic and atraumatic. Eyes: Pupils are equal, round, and reactive to light. EOM are normal.   Cardiovascular: Normal rate, regular rhythm and normal heart sounds. Pulmonary/Chest: Breath sounds normal. No wheezes. No rales. Exhibits no tenderness. Abdominal: Soft. Bowel sounds are normal. There is no abdominal tenderness. There is no rebound and no guarding. Musculoskeletal: Normal range of motion. Neurological: pt is alert and oriented to person, place, and time. CTA ABD ART W RUNOFF W WO CONT   Final Result      Occluded long segment right superficial femoral artery stent with reconstitution   in the popliteal artery which demonstrates mild to moderate atherosclerotic   changes.  Questionable patency of the distal tibial and peroneal runoff with   limited evaluation due to poor flow-related opacification and arterial calcifications. Mild to moderate atherosclerotic changes in the left lower extremity with   probable single-vessel runoff to the left foot. Indeterminate bilateral adrenal nodules measuring up to 2 cm. Consider   nonemergent adrenal CT protocol. Bullous/cystic changes in the lung bases. Please refer to above findings for complete details. XR GREAT TOE RT MIN 2 V   Final Result   No evidence of fracture or dislocation. Evidence of gout. No   significant change. Recent Results (from the past 24 hour(s))   CBC WITH AUTOMATED DIFF    Collection Time: 07/18/22  5:02 PM   Result Value Ref Range    WBC 26.8 (H) 4.1 - 11.1 K/uL    RBC 4.83 4.10 - 5.70 M/uL    HGB 14.4 12.1 - 17.0 g/dL    HCT 42.4 36.6 - 50.3 %    MCV 87.8 80.0 - 99.0 FL    MCH 29.8 26.0 - 34.0 PG    MCHC 34.0 30.0 - 36.5 g/dL    RDW 12.3 11.5 - 14.5 %    PLATELET 083 (H) 566 - 400 K/uL    MPV 9.7 8.9 - 12.9 FL    NRBC 0.0 0.0  WBC    ABSOLUTE NRBC 0.00 0.00 - 0.01 K/uL    NEUTROPHILS 85 (H) 32 - 75 %    LYMPHOCYTES 5 (L) 12 - 49 %    MONOCYTES 7 5 - 13 %    EOSINOPHILS 1 0 - 7 %    BASOPHILS 1 0 - 1 %    IMMATURE GRANULOCYTES 1 (H) 0 - 0.5 %    ABS. NEUTROPHILS 23.2 (H) 1.8 - 8.0 K/UL    ABS. LYMPHOCYTES 1.3 0.8 - 3.5 K/UL    ABS. MONOCYTES 1.7 (H) 0.0 - 1.0 K/UL    ABS. EOSINOPHILS 0.1 0.0 - 0.4 K/UL    ABS. BASOPHILS 0.2 (H) 0.0 - 0.1 K/UL    ABS. IMM.  GRANS. 0.3 (H) 0.00 - 0.04 K/UL    DF AUTOMATED     METABOLIC PANEL, COMPREHENSIVE    Collection Time: 07/18/22  5:02 PM   Result Value Ref Range    Sodium 134 (L) 136 - 145 mmol/L    Potassium 4.2 3.5 - 5.1 mmol/L    Chloride 100 97 - 108 mmol/L    CO2 25 21 - 32 mmol/L    Anion gap 9 5 - 15 mmol/L    Glucose 159 (H) 65 - 100 mg/dL    BUN 14 6 - 20 mg/dL    Creatinine 0.85 0.70 - 1.30 mg/dL    BUN/Creatinine ratio 16 12 - 20      GFR est AA >60 >60 ml/min/1.73m2    GFR est non-AA >60 >60 ml/min/1.73m2    Calcium 9.4 8.5 - 10.1 mg/dL    Bilirubin, total 1.1 (H) 0.2 - 1.0 mg/dL    AST (SGOT) 23 15 - 37 U/L    ALT (SGPT) 30 12 - 78 U/L    Alk.  phosphatase 98 45 - 117 U/L    Protein, total 8.0 6.4 - 8.2 g/dL    Albumin 3.4 (L) 3.5 - 5.0 g/dL    Globulin 4.6 (H) 2.0 - 4.0 g/dL    A-G Ratio 0.7 (L) 1.1 - 2.2     C REACTIVE PROTEIN, QT    Collection Time: 07/18/22  5:02 PM   Result Value Ref Range    C-Reactive protein 16.20 (H) 0.00 - 0.60 mg/dL   CK    Collection Time: 07/18/22  5:02 PM   Result Value Ref Range     39 - 308 U/L   SED RATE (ESR)    Collection Time: 07/18/22  5:02 PM   Result Value Ref Range    Sed rate, automated 82 (H) 0 - 20 mm/hr   PROTHROMBIN TIME + INR    Collection Time: 07/18/22  5:02 PM   Result Value Ref Range    Prothrombin time 17.7 (H) 11.9 - 14.6 sec    INR 1.4 (H) 0.9 - 1.1     TYPE & SCREEN    Collection Time: 07/18/22  5:02 PM   Result Value Ref Range    Crossmatch Expiration 07/21/2022,2359     ABO/Rh(D) A Positive     Antibody screen Negative    PTT    Collection Time: 07/18/22  5:02 PM   Result Value Ref Range    aPTT 33.6 21.2 - 34.1 sec    aPTT, therapeutic range   82 - 109 sec   CULTURE, BLOOD, PAIRED    Collection Time: 07/18/22  5:08 PM    Specimen: Blood   Result Value Ref Range    Special Requests: No Special Requests      Culture result: No growth after 1 hour     LACTIC ACID    Collection Time: 07/18/22  5:08 PM   Result Value Ref Range    Lactic acid 1.4 0.4 - 2.0 mmol/L   PROTHROMBIN TIME + INR    Collection Time: 07/18/22 11:20 PM   Result Value Ref Range    Prothrombin time 17.7 (H) 11.9 - 14.6 sec    INR 1.4 (H) 0.9 - 1.1     PTT    Collection Time: 07/18/22 11:20 PM   Result Value Ref Range    aPTT 50.3 (H) 21.2 - 34.1 sec    aPTT, therapeutic range   82 - 109 sec   GLUCOSE, POC    Collection Time: 07/18/22 11:23 PM   Result Value Ref Range    Glucose (POC) 128 (H) 65 - 117 mg/dL    Performed by Aby Palacios, COMPREHENSIVE    Collection Time: 07/19/22  3:54 AM   Result Value Ref Range    Sodium 134 (L) 136 - 145 mmol/L    Potassium 4.1 3.5 - 5.1 mmol/L    Chloride 103 97 - 108 mmol/L    CO2 24 21 - 32 mmol/L    Anion gap 7 5 - 15 mmol/L    Glucose 148 (H) 65 - 100 mg/dL    BUN 12 6 - 20 mg/dL    Creatinine 0.65 (L) 0.70 - 1.30 mg/dL    BUN/Creatinine ratio 18 12 - 20      GFR est AA >60 >60 ml/min/1.73m2    GFR est non-AA >60 >60 ml/min/1.73m2    Calcium 9.1 8.5 - 10.1 mg/dL    Bilirubin, total 0.6 0.2 - 1.0 mg/dL    AST (SGOT) 17 15 - 37 U/L    ALT (SGPT) 27 12 - 78 U/L    Alk. phosphatase 84 45 - 117 U/L    Protein, total 7.7 6.4 - 8.2 g/dL    Albumin 2.7 (L) 3.5 - 5.0 g/dL    Globulin 5.0 (H) 2.0 - 4.0 g/dL    A-G Ratio 0.5 (L) 1.1 - 2.2     CBC WITH AUTOMATED DIFF    Collection Time: 07/19/22  3:54 AM   Result Value Ref Range    WBC 17.7 (H) 4.1 - 11.1 K/uL    RBC 4.18 4.10 - 5.70 M/uL    HGB 12.2 12.1 - 17.0 g/dL    HCT 36.8 36.6 - 50.3 %    MCV 88.0 80.0 - 99.0 FL    MCH 29.2 26.0 - 34.0 PG    MCHC 33.2 30.0 - 36.5 g/dL    RDW 12.4 11.5 - 14.5 %    PLATELET 588 (H) 758 - 400 K/uL    MPV 9.5 8.9 - 12.9 FL    NRBC 0.0 0.0  WBC    ABSOLUTE NRBC 0.00 0.00 - 0.01 K/uL    NEUTROPHILS 74 32 - 75 %    LYMPHOCYTES 13 12 - 49 %    MONOCYTES 9 5 - 13 %    EOSINOPHILS 2 0 - 7 %    BASOPHILS 1 0 - 1 %    IMMATURE GRANULOCYTES 1 (H) 0 - 0.5 %    ABS. NEUTROPHILS 13.1 (H) 1.8 - 8.0 K/UL    ABS. LYMPHOCYTES 2.3 0.8 - 3.5 K/UL    ABS. MONOCYTES 1.6 (H) 0.0 - 1.0 K/UL    ABS. EOSINOPHILS 0.3 0.0 - 0.4 K/UL    ABS. BASOPHILS 0.2 (H) 0.0 - 0.1 K/UL    ABS. IMM.  GRANS. 0.1 (H) 0.00 - 0.04 K/UL    DF AUTOMATED     PTT    Collection Time: 07/19/22  5:52 AM   Result Value Ref Range    aPTT 45.1 (H) 21.2 - 34.1 sec    aPTT, therapeutic range   82 - 109 sec   GLUCOSE, POC    Collection Time: 07/19/22  7:51 AM   Result Value Ref Range    Glucose (POC) 145 (H) 65 - 117 mg/dL    Performed by Julia Kingsley        Results     Procedure Component Value Units Date/Time    CULTURE, BLOOD, PAIRED [[id number]] Collected: 07/18/22 1708    Order Status: Completed Specimen: Blood Updated: 07/19/22 0838     Special Requests: No Special Requests        Culture result: No growth after 1 hour              Labs:     Recent Labs     07/19/22  0354 07/18/22  1702   WBC 17.7* 26.8*   HGB 12.2 14.4   HCT 36.8 42.4   * 544*     Recent Labs     07/19/22  0354 07/18/22 1702   * 134*   K 4.1 4.2    100   CO2 24 25   BUN 12 14   CREA 0.65* 0.85   * 159*   CA 9.1 9.4     Recent Labs     07/19/22  0354 07/18/22 1702   ALT 27 30   AP 84 98   TBILI 0.6 1.1*   TP 7.7 8.0   ALB 2.7* 3.4*   GLOB 5.0* 4.6*     Recent Labs     07/19/22  0552 07/18/22  2320 07/18/22 1702   INR  --  1.4* 1.4*   PTP  --  17.7* 17.7*   APTT 45.1* 50.3* 33.6      No results for input(s): FE, TIBC, PSAT, FERR in the last 72 hours. No results found for: FOL, RBCF   No results for input(s): PH, PCO2, PO2 in the last 72 hours. Recent Labs     07/18/22 1702        No results found for: CHOL, CHOLX, CHLST, CHOLV, HDL, HDLP, LDL, LDLC, DLDLP, TGLX, TRIGL, TRIGP, CHHD, CHHDX  Lab Results   Component Value Date/Time    Glucose (POC) 145 (H) 07/19/2022 07:51 AM    Glucose (POC) 128 (H) 07/18/2022 11:23 PM    Glucose (POC) 172 (H) 07/08/2022 07:22 AM    Glucose (POC) 145 (H) 07/07/2022 08:43 PM    Glucose (POC) 144 (H) 07/07/2022 04:41 PM     No results found for: COLOR, APPRN, SPGRU, REFSG, EPHRAIM, PROTU, GLUCU, KETU, BILU, UROU, VIANEY, LEUKU, GLUKE, EPSU, BACTU, WBCU, RBCU, CASTS, UCRY      Assessment:     Arterial Insufficiency s/p balloon angioplasty  Right great toe infection  Diabetes Mellitus  Diabetic foot injury  Hypertension  GERD  Gout  RA  Smoking      Plan:     Vascular surgery planned  Patient will need right common femoral artery above-knee popliteal bypass. Other options including tPA infusion therapy for thrombolytic treatment for SFA occlusion most likely this option will have further complication will be occlusion in the future.   Therefore I informed the patient and recommended right leg bypass surgery.     Continue clindamycin 600 mg every 6 hours  Zosyn 3.375 IV every 8 hours  Sliding-scale insulin  Lisinopril 20 mg daily  Protonix 40 mg daily  Amlodipine 10 mg daily    Patient on high-dose heparin protocol    Plan for surgery today    Discussed with the patient and patient wife        Current Facility-Administered Medications:     clindamycin (CLEOCIN) 600mg D5W 50mL IVPB (premix), 600 mg, IntraVENous, Q6H, Mauricio, Odin Lang MD, IV Completed at 07/19/22 3461    heparin 25,000 units in D5W 250 ml infusion, 18-36 Units/kg/hr (Adjusted), IntraVENous, TITRATE, Satnam Ferrer MD, Last Rate: 19.9 mL/hr at 07/19/22 0815, 22 Units/kg/hr at 07/19/22 0815    heparin (porcine) 1,000 unit/mL injection 7,230 Units, 80 Units/kg (Adjusted), IntraVENous, PRN **OR** heparin (porcine) 1,000 unit/mL injection 3,620 Units, 40 Units/kg (Adjusted), IntraVENous, PRN, Shana Ferrer MD, 3,620 Units at 07/19/22 0813    HYDROmorphone (DILAUDID) injection 1 mg, 1 mg, IntraVENous, Q4H PRN, Areli Osuna MD, 1 mg at 07/19/22 0606    amLODIPine (NORVASC) tablet 10 mg, 10 mg, Oral, DAILY, Areli Osuna MD, 10 mg at 07/19/22 0943    lisinopriL (PRINIVIL, ZESTRIL) tablet 20 mg, 20 mg, Oral, DAILY, Satnam Ferrer MD, 20 mg at 07/19/22 0414    colchicine tablet 0.6 mg, 0.6 mg, Oral, DAILY, Shana Ferrer MD    insulin lispro (HUMALOG) injection, , SubCUTAneous, AC&HS, Shana Ferrer MD    glucose chewable tablet 16 g, 4 Tablet, Oral, PRN, Satnam Ferrer MD    glucagon (GLUCAGEN) injection 1 mg, 1 mg, IntraMUSCular, PRN, Areli Osuna MD    0.9% sodium chloride infusion, 75 mL/hr, IntraVENous, CONTINUOUS, Satnam Ferrer MD, Last Rate: 75 mL/hr at 07/18/22 2324, 75 mL/hr at 07/18/22 2324    acetaminophen (TYLENOL) tablet 650 mg, 650 mg, Oral, Q6H PRN **OR** acetaminophen (TYLENOL) suppository 650 mg, 650 mg, Rectal, Q6H PRN, Shana Ferrer, MD    polyethylene glycol (MIRALAX) packet 17 g, 17 g, Oral, DAILY PRN, Yon Ferrer MD    ondansetron (ZOFRAN ODT) tablet 4 mg, 4 mg, Oral, Q8H PRN **OR** ondansetron (ZOFRAN) injection 4 mg, 4 mg, IntraVENous, Q6H PRN, Yon Ferrer MD    piperacillin-tazobactam (ZOSYN) 3.375 g in 0.9% sodium chloride (MBP/ADV) 100 mL MBP, 3.375 g, IntraVENous, Q8H, Satnam Ferrer MD, IV Completed at 07/19/22 6159    pantoprazole (PROTONIX) tablet 40 mg, 40 mg, Oral, ACB, Satnam Ferrer MD, 40 mg at 07/19/22 3919    Current Outpatient Medications:     indomethacin (INDOCIN) 50 mg capsule, indomethacin 50 mg capsule  Take 1 capsule by mouth three times daily as needed, Disp: , Rfl:     colchicine 0.6 mg tablet, colchicine 0.6 mg tablet  2 today then 1 daily x 5 days, Disp: , Rfl:     lisinopriL (PRINIVIL, ZESTRIL) 20 mg tablet, Take 1 Tablet by mouth daily. , Disp: 60 Tablet, Rfl: 0    amLODIPine (NORVASC) 10 mg tablet, Take 1 Tablet by mouth daily. , Disp: 30 Tablet, Rfl: 0    ondansetron (ZOFRAN ODT) 4 mg disintegrating tablet, Take 1 Tablet by mouth every eight (8) hours as needed for Nausea or Vomiting., Disp: 10 Tablet, Rfl: 0    clindamycin (CLEOCIN) 300 mg capsule, Take 1 Capsule by mouth three (3) times daily. , Disp: 30 Capsule, Rfl: 0    apixaban (Eliquis) 5 mg tablet, Take 1 Tablet by mouth two (2) times a day. Eliquis 5 mg 2 tablets twice daily for 7 days then 1 tablet twice daily, Disp: 100 Tablet, Rfl: 0    pantoprazole (PROTONIX) 40 mg tablet, Take 40 mg by mouth daily. , Disp: , Rfl:     metFORMIN (GLUCOPHAGE) 500 mg tablet, Take 500 mg by mouth daily (with dinner). , Disp: , Rfl:

## 2022-07-19 NOTE — CONSULTS
Infectious Disease Consult Note    Reason for Consult: Right great ulcer, gangrene   Date of Consultation: July 19, 2022  Date of Admission: 7/18/2022  Referring Physician: Dr Julio Flowers     HPI: Pleasant 65 y.o WF admitted w worsening gangrenous changes involving right great toe for which ID has been consulted. He is familiar to me from a recent admission to [facility] a couple of wks ago w right great toe cellulitis and ischemic changes. He ANTIONETTE was abnormal and he underwent arteriogram w balloon angioplasty by Dr Esperanza Chapin on 07/03. There was some concern for osteomyelitis but MRI of his right foot on 07/01 wasnts suggestive of gouty arthritis, revealed changes consistent w gouty arthritis. He received Unasyn while ospitalized, changed to Clindamycin upon d/c. Pt states he noticed color change involving his right foot w worsening gangrenous changes involving his right great on 07/18, he followed up w Dr Esperanza Chapin and was referred to the ED, I have been asked to see pt continuity of care. He is afebrile, w a sig leukocytosis of 26.8 on initial labs, trended down to 17.7. He is on Zosyn monotherapy. CTA abdo/pel revealed occluded long segment right superficial femoral artery stent with reconstitution in the popliteal artery which demonstrates mild to moderate atherosclerotic  changes. Seen by Dr Esperanza Chapin, RLE bypass surgery planned. Pt was seen in the ED awaiting transfer to the floors.      Review of Systems:     Gen: Negative for chills, fevers, weight loss, weight gain   CV:  Negative for chest pain, dyspnea on exertion, leg edema   Lungs: Negative for shortness of breath, cough, wheezing   Abdomen: Negative for abdominal pain, nausea, vomiting, diarrhea, constipation   Genitourinary: Negative for genital pain or genital discharge     Neuro: Negative for headache, numbness, tingling, extremity weakness   Skin: Negative for rash, sores/open wounds   Musculoskeletal: Right foot pain    Psych: Negative for manic behavior     Past Medical History:  Past Medical History:   Diagnosis Date    Diabetes [facility])        Past surgical history   No past surgical history on file. Social History   Social History     Tobacco Use    Smoking status: Former Smoker     Packs/day: 1.50     Years: 15.00     Pack years: 22.50     Quit date: 2022     Years since quittin.0    Smokeless tobacco: Never Used   Vaping Use    Vaping Use: Never used   Substance Use Topics    Alcohol use: Not Currently    Drug use: Never        Family history   Family History   Problem Relation Age of Onset    Cancer Mother     Diabetes Mother     Cancer Father         Allergies:  No Known Allergies      Medications:  No current facility-administered medications on file prior to encounter. Current Outpatient Medications on File Prior to Encounter   Medication Sig Dispense Refill    clopidogreL (PLAVIX) 75 mg tab Take 75 mg by mouth daily.  indomethacin (INDOCIN) 50 mg capsule Take 50 mg by mouth daily.  colchicine 0.6 mg tablet Take 0.6 mg by mouth daily.  lisinopriL (PRINIVIL, ZESTRIL) 20 mg tablet Take 1 Tablet by mouth daily. 60 Tablet 0    amLODIPine (NORVASC) 10 mg tablet Take 1 Tablet by mouth daily. 30 Tablet 0    apixaban (Eliquis) 5 mg tablet Take 1 Tablet by mouth two (2) times a day. Eliquis 5 mg 2 tablets twice daily for 7 days then 1 tablet twice daily 100 Tablet 0    pantoprazole (PROTONIX) 40 mg tablet Take 40 mg by mouth daily.  metFORMIN (GLUCOPHAGE) 500 mg tablet Take 500 mg by mouth daily (with dinner).        Physical Exam:    Vitals:   Patient Vitals for the past 24 hrs:   Temp Pulse Resp BP SpO2   22 1202 -- 73 14 133/65 98 %   22 1132 -- 73 13 131/69 96 %   22 1025 -- 78 19 -- 98 %   22 0958 -- 62 19 -- 96 %   22 0943 -- 64 20 (!) 142/73 97 %   22 0900 -- 67 13 (!) 142/73 99 %   22 0800 -- 62 13 (!) 151/63 97 %   22 0700 -- 62 19 137/65 96 %   22 0615 -- 63 12 121/62 97 %   07/19/22 0230 97.7 °F (36.5 °C) 65 21 131/65 97 %   07/18/22 2130 -- 74 15 (!) 150/77 97 %   07/18/22 1823 -- 77 20 -- 95 %   07/18/22 1818 -- 70 22 -- 93 %   07/18/22 1813 -- 81 19 134/63 96 %   07/18/22 1802 -- 79 21 126/60 96 %   07/18/22 1702 -- 85 16 133/64 97 %   07/18/22 1434 98 °F (36.7 °C) 90 16 133/81 97 %   ·   · GEN: NAD, AAO x 4  · HEENT: NCAT, PERRLA  · HEART: S1, S2+, RRR, No murmur   · Lungs: CTA B/l, Decreased at the base   · Abdomen: soft, ND, NT, +BS   · Genitourinary: no genital discharge, no herbert  · Extremities: gangrenous right great toe, some drainage, erythematous changes on dorsum of right foot   · Skin:right great toe ulcer     Labs:   Recent Labs     07/19/22  0354 07/18/22  1702   WBC 17.7* 26.8*   HGB 12.2 14.4   HCT 36.8 42.4   * 544*     Recent Labs     07/19/22  0354 07/18/22  1702   BUN 12 14   CREA 0.65* 0.85       Lab Results   Component Value Date/Time    C-Reactive protein 16.20 (H) 07/18/2022 05:02 PM        Microbiology Data:  - Blood Cx 07/18: Pending     Imaging:     CTA abdo 07/18: Occluded long segment right superficial femoral artery stent with reconstitution  in the popliteal artery which demonstrates mild to moderate atherosclerotic  changes. Assessment / Plan:     1. Right great toe ulcer/dry gangrenous changes, underlying PAD, recent MRI neg for osteomyelitis       Afebrile, hemodynamically stable, sig leukocytosis on routine labs       Blood Cx is pending, no wound Cx , minimal drainage on exam      Leukocytosis tending down on Zosyn and clindamycin, will continue       Routine labs in AM     2. PAD, S/p recent arteriogram w stent placement, and subsequent occlusion per CTA abdo      Seen by Dr Kay Martin, Bypass surgery planned    3. H/o gouty arthritis, continue on Colchicine     4. Right foot pain due to above: Continue symptomatic mgt     5.  DM: BS mgt per primary     Celia Jackson MD     7/19/2022

## 2022-07-19 NOTE — PROGRESS NOTES
PROGRESS NOTE    Patient: Tammy Carlos MRN: [id number]  SSN: [id number]    YOB: 1960  Age: 65 y.o. Sex: male      Admit Date: 7/18/2022    LOS: 1 day       Subjective     Chief Complaint   Patient presents with    Wound Check       HPI: Patient is a 65y.o. year old male with a PMH of diabetes, diabetic foot injury, arterial insufficiency, hypertension, GERD, and gout presents to the ED complaining of  right great toe infection. The patient was recently hospitalized for this same problem and received balloon angioplasty of the right lower extremity which initially helped perfusion of the toe. He has since relapsed and complains of excruciating pain that is not well managed on percocet. The patient states that it greatly progress in the last 24 hours and there is now black and gangrenous lesion on the plantar aspect of the toe with swelling over the entire foot. Patient denies any chest pain, SOB, difficulty breathing, palpitations, dizziness, N/V/D, or fevers.     WBC: 26.8  Platelet: 904  APTT: 33.6  CRP: 16.20     X-ray of R great toe:  IMPRESSION  No evidence of fracture or dislocation. Evidence of gout. No  significant change. 7/19  Pain well managed. Laying in bed preparing for surgery. NAD  Surgery today at 1300 d/c heparin  WBC trending down  APTT 45.1  Per trauma surgery pt will need right common femoral artery above-knee popliteal bypass. Review of Systems:  Pertinent review of systems discussed in HPI, and rest of organ systems personally reviewed and they are negative.       Objective     Visit Vitals  /65   Pulse 62   Temp 97.7 °F (36.5 °C)   Resp 19   Ht 6' 1\" (1.854 m)   Wt 106.1 kg (234 lb)   SpO2 96%   BMI 30.87 kg/m²      O2 Device: None (Room air)    Physical Exam:   General appearance: Alert  Patient is awake and alert, not in particular distress. Head and neck atraumatic normocephalic. ENT shows normal oral mucosa, no jaundice no hoarse voice.   Eyes: Pupil equal gaze appropriate. Cardiac system regular rate rhythm. Pulmonary: No audible wheeze. Chest wall: Chest wall excursion normal with respiration cycle, there is no deformity or chest trauma. Abdomen: Soft not tender or distended, bowel sounds active. There is no obvious palpable mass, or hernia. Neurologic: Nonfocal.  Cranial nerves intact, no new focal findings. Musculoskeletal system: Motor function normal limits, motor function 5 out of 5, range of motion normal in all 4 extremity  Skin: Warm and moist.  Hematologic system: No obvious bruising. Psychosocial: Appropriate and cooperative. Vascular examination: Right foot examination shows right foot appears to be cool to touch, great toe gangrene has gotten worse. There is also mild cellulitis noted. Patient does have 1+ monophasic right PT, no discernible signal on the DP. Intake & Output:  Current Shift: No intake/output data recorded. Last three shifts: 07/17 1901 - 07/19 0700  In: 1500 [I.V.:1500]  Out: -     Lab/Data Review:  Lab results reviewed. For significant abnormal values and values requiring intervention, see assessment and plan. 24 Hour Results:    Recent Results (from the past 24 hour(s))   CBC WITH AUTOMATED DIFF    Collection Time: 07/18/22  5:02 PM   Result Value Ref Range    WBC 26.8 (H) 4.1 - 11.1 K/uL    RBC 4.83 4.10 - 5.70 M/uL    HGB 14.4 12.1 - 17.0 g/dL    HCT 42.4 36.6 - 50.3 %    MCV 87.8 80.0 - 99.0 FL    MCH 29.8 26.0 - 34.0 PG    MCHC 34.0 30.0 - 36.5 g/dL    RDW 12.3 11.5 - 14.5 %    PLATELET 311 (H) 024 - 400 K/uL    MPV 9.7 8.9 - 12.9 FL    NRBC 0.0 0.0  WBC    ABSOLUTE NRBC 0.00 0.00 - 0.01 K/uL    NEUTROPHILS 85 (H) 32 - 75 %    LYMPHOCYTES 5 (L) 12 - 49 %    MONOCYTES 7 5 - 13 %    EOSINOPHILS 1 0 - 7 %    BASOPHILS 1 0 - 1 %    IMMATURE GRANULOCYTES 1 (H) 0 - 0.5 %    ABS. NEUTROPHILS 23.2 (H) 1.8 - 8.0 K/UL    ABS. LYMPHOCYTES 1.3 0.8 - 3.5 K/UL    ABS. MONOCYTES 1.7 (H) 0.0 - 1.0 K/UL    ABS. EOSINOPHILS 0.1 0.0 - 0.4 K/UL    ABS. BASOPHILS 0.2 (H) 0.0 - 0.1 K/UL    ABS. IMM. GRANS. 0.3 (H) 0.00 - 0.04 K/UL    DF AUTOMATED     METABOLIC PANEL, COMPREHENSIVE    Collection Time: 07/18/22  5:02 PM   Result Value Ref Range    Sodium 134 (L) 136 - 145 mmol/L    Potassium 4.2 3.5 - 5.1 mmol/L    Chloride 100 97 - 108 mmol/L    CO2 25 21 - 32 mmol/L    Anion gap 9 5 - 15 mmol/L    Glucose 159 (H) 65 - 100 mg/dL    BUN 14 6 - 20 mg/dL    Creatinine 0.85 0.70 - 1.30 mg/dL    BUN/Creatinine ratio 16 12 - 20      GFR est AA >60 >60 ml/min/1.73m2    GFR est non-AA >60 >60 ml/min/1.73m2    Calcium 9.4 8.5 - 10.1 mg/dL    Bilirubin, total 1.1 (H) 0.2 - 1.0 mg/dL    AST (SGOT) 23 15 - 37 U/L    ALT (SGPT) 30 12 - 78 U/L    Alk.  phosphatase 98 45 - 117 U/L    Protein, total 8.0 6.4 - 8.2 g/dL    Albumin 3.4 (L) 3.5 - 5.0 g/dL    Globulin 4.6 (H) 2.0 - 4.0 g/dL    A-G Ratio 0.7 (L) 1.1 - 2.2     C REACTIVE PROTEIN, QT    Collection Time: 07/18/22  5:02 PM   Result Value Ref Range    C-Reactive protein 16.20 (H) 0.00 - 0.60 mg/dL   CK    Collection Time: 07/18/22  5:02 PM   Result Value Ref Range     39 - 308 U/L   SED RATE (ESR)    Collection Time: 07/18/22  5:02 PM   Result Value Ref Range    Sed rate, automated 82 (H) 0 - 20 mm/hr   PROTHROMBIN TIME + INR    Collection Time: 07/18/22  5:02 PM   Result Value Ref Range    Prothrombin time 17.7 (H) 11.9 - 14.6 sec    INR 1.4 (H) 0.9 - 1.1     TYPE & SCREEN    Collection Time: 07/18/22  5:02 PM   Result Value Ref Range    Crossmatch Expiration 07/21/2022,2353     ABO/Rh(D) A Positive     Antibody screen Negative    PTT    Collection Time: 07/18/22  5:02 PM   Result Value Ref Range    aPTT 33.6 21.2 - 34.1 sec    aPTT, therapeutic range   82 - 109 sec   LACTIC ACID    Collection Time: 07/18/22  5:08 PM   Result Value Ref Range    Lactic acid 1.4 0.4 - 2.0 mmol/L   PROTHROMBIN TIME + INR    Collection Time: 07/18/22 11:20 PM   Result Value Ref Range    Prothrombin time 17.7 (H) 11.9 - 14.6 sec    INR 1.4 (H) 0.9 - 1.1     PTT    Collection Time: 07/18/22 11:20 PM   Result Value Ref Range    aPTT 50.3 (H) 21.2 - 34.1 sec    aPTT, therapeutic range   82 - 109 sec   GLUCOSE, POC    Collection Time: 07/18/22 11:23 PM   Result Value Ref Range    Glucose (POC) 128 (H) 65 - 117 mg/dL    Performed by Aby Palacios, COMPREHENSIVE    Collection Time: 07/19/22  3:54 AM   Result Value Ref Range    Sodium 134 (L) 136 - 145 mmol/L    Potassium 4.1 3.5 - 5.1 mmol/L    Chloride 103 97 - 108 mmol/L    CO2 24 21 - 32 mmol/L    Anion gap 7 5 - 15 mmol/L    Glucose 148 (H) 65 - 100 mg/dL    BUN 12 6 - 20 mg/dL    Creatinine 0.65 (L) 0.70 - 1.30 mg/dL    BUN/Creatinine ratio 18 12 - 20      GFR est AA >60 >60 ml/min/1.73m2    GFR est non-AA >60 >60 ml/min/1.73m2    Calcium 9.1 8.5 - 10.1 mg/dL    Bilirubin, total 0.6 0.2 - 1.0 mg/dL    AST (SGOT) 17 15 - 37 U/L    ALT (SGPT) 27 12 - 78 U/L    Alk. phosphatase 84 45 - 117 U/L    Protein, total 7.7 6.4 - 8.2 g/dL    Albumin 2.7 (L) 3.5 - 5.0 g/dL    Globulin 5.0 (H) 2.0 - 4.0 g/dL    A-G Ratio 0.5 (L) 1.1 - 2.2     CBC WITH AUTOMATED DIFF    Collection Time: 07/19/22  3:54 AM   Result Value Ref Range    WBC 17.7 (H) 4.1 - 11.1 K/uL    RBC 4.18 4.10 - 5.70 M/uL    HGB 12.2 12.1 - 17.0 g/dL    HCT 36.8 36.6 - 50.3 %    MCV 88.0 80.0 - 99.0 FL    MCH 29.2 26.0 - 34.0 PG    MCHC 33.2 30.0 - 36.5 g/dL    RDW 12.4 11.5 - 14.5 %    PLATELET 719 (H) 750 - 400 K/uL    MPV 9.5 8.9 - 12.9 FL    NRBC 0.0 0.0  WBC    ABSOLUTE NRBC 0.00 0.00 - 0.01 K/uL    NEUTROPHILS 74 32 - 75 %    LYMPHOCYTES 13 12 - 49 %    MONOCYTES 9 5 - 13 %    EOSINOPHILS 2 0 - 7 %    BASOPHILS 1 0 - 1 %    IMMATURE GRANULOCYTES 1 (H) 0 - 0.5 %    ABS. NEUTROPHILS 13.1 (H) 1.8 - 8.0 K/UL    ABS. LYMPHOCYTES 2.3 0.8 - 3.5 K/UL    ABS. MONOCYTES 1.6 (H) 0.0 - 1.0 K/UL    ABS. EOSINOPHILS 0.3 0.0 - 0.4 K/UL    ABS. BASOPHILS 0.2 (H) 0.0 - 0.1 K/UL    ABS. IMM. GRANS. 0.1 (H) 0.00 - 0.04 K/UL    DF AUTOMATED     PTT    Collection Time: 07/19/22  5:52 AM   Result Value Ref Range    aPTT 45.1 (H) 21.2 - 34.1 sec    aPTT, therapeutic range   82 - 109 sec   GLUCOSE, POC    Collection Time: 07/19/22  7:51 AM   Result Value Ref Range    Glucose (POC) 145 (H) 65 - 117 mg/dL    Performed by Jacquie Andrade          Imaging:    CTA ABD ART W RUNOFF W WO CONT   Final Result      Occluded long segment right superficial femoral artery stent with reconstitution   in the popliteal artery which demonstrates mild to moderate atherosclerotic   changes. Questionable patency of the distal tibial and peroneal runoff with   limited evaluation due to poor flow-related opacification and arterial   calcifications. Mild to moderate atherosclerotic changes in the left lower extremity with   probable single-vessel runoff to the left foot. Indeterminate bilateral adrenal nodules measuring up to 2 cm. Consider   nonemergent adrenal CT protocol. Bullous/cystic changes in the lung bases. Please refer to above findings for complete details. XR GREAT TOE RT MIN 2 V   Final Result   No evidence of fracture or dislocation. Evidence of gout. No   significant change. Assessment     Gangrene of toe  Arterial Insufficiency s/p balloon angioplasty  Right great toe infection  Diabetes Mellitus  Diabetic foot injury  Hypertension  GERD  Gout  RA  Smoking    Plan     Keep n.p.o. start on IV Zosyn ID consult and vascular surgical consult    Hold heparin    Discussing with patient anticoagulation medications on discharge. Plan to perform right common femoral artery above-knee popliteal bypass.   Continue:  Amlodipine 10mg PO every day  Clindamycin 600mg IV every day  Colchicine 0.6mg PO every day  Sliding scale insulin  Lisinopril 20mg PO every day  Protonix 40mg PO every day  Zosyn 3.375g IV Q8h      Current Facility-Administered Medications:     clindamycin (CLEOCIN) 600mg D5W 50mL IVPB (premix), 600 mg, IntraVENous, Q6H, Mauricio, Hernando Alicea MD, IV Completed at 07/19/22 0595    heparin 25,000 units in D5W 250 ml infusion, 18-36 Units/kg/hr (Adjusted), IntraVENous, TITRATE, Lincoln Ferrer MD, Last Rate: 18.1 mL/hr at 07/18/22 2357, 20 Units/kg/hr at 07/18/22 2357    heparin (porcine) 1,000 unit/mL injection 7,230 Units, 80 Units/kg (Adjusted), IntraVENous, PRN **OR** heparin (porcine) 1,000 unit/mL injection 3,620 Units, 40 Units/kg (Adjusted), IntraVENous, PRN, Lincoln Ferrer MD, 3,620 Units at 07/18/22 2359    HYDROmorphone (DILAUDID) injection 1 mg, 1 mg, IntraVENous, Q4H PRN, Satnam Ferrer MD, 1 mg at 07/19/22 0606    amLODIPine (NORVASC) tablet 10 mg, 10 mg, Oral, DAILY, Lincoln Ferrer MD    lisinopriL (PRINIVIL, ZESTRIL) tablet 20 mg, 20 mg, Oral, DAILY, Lincoln Ferrer MD    colchicine tablet 0.6 mg, 0.6 mg, Oral, DAILY, Lincoln Ferrer MD    insulin lispro (HUMALOG) injection, , SubCUTAneous, AC&HS, Lincoln Ferrer MD    glucose chewable tablet 16 g, 4 Tablet, Oral, PRN, Lincoln Ferrer MD    glucagon (GLUCAGEN) injection 1 mg, 1 mg, IntraMUSCular, PRN, Lincoln Ferrer MD    0.9% sodium chloride infusion, 75 mL/hr, IntraVENous, CONTINUOUS, Satnam Ferrer MD, Last Rate: 75 mL/hr at 07/18/22 2324, 75 mL/hr at 07/18/22 2324    acetaminophen (TYLENOL) tablet 650 mg, 650 mg, Oral, Q6H PRN **OR** acetaminophen (TYLENOL) suppository 650 mg, 650 mg, Rectal, Q6H PRN, Lincoln Ferrer MD    polyethylene glycol (MIRALAX) packet 17 g, 17 g, Oral, DAILY PRN, Lincoln Ferrer MD    ondansetron (ZOFRAN ODT) tablet 4 mg, 4 mg, Oral, Q8H PRN **OR** ondansetron (ZOFRAN) injection 4 mg, 4 mg, IntraVENous, Q6H PRN, Satnam Ferrer MD    piperacillin-tazobactam (ZOSYN) 3.375 g in 0.9% sodium chloride (MBP/ADV) 100 mL MBP, 3.375 g, IntraVENous, Q8H, Satnam Ferrer MD, IV Completed at 07/19/22 0623    pantoprazole (PROTONIX) tablet 40 mg, 40 mg, Oral, ACB, Meagan Marcos MD    Current Outpatient Medications:     indomethacin (INDOCIN) 50 mg capsule, indomethacin 50 mg capsule  Take 1 capsule by mouth three times daily as needed, Disp: , Rfl:     colchicine 0.6 mg tablet, colchicine 0.6 mg tablet  2 today then 1 daily x 5 days, Disp: , Rfl:     lisinopriL (PRINIVIL, ZESTRIL) 20 mg tablet, Take 1 Tablet by mouth daily. , Disp: 60 Tablet, Rfl: 0    amLODIPine (NORVASC) 10 mg tablet, Take 1 Tablet by mouth daily. , Disp: 30 Tablet, Rfl: 0    ondansetron (ZOFRAN ODT) 4 mg disintegrating tablet, Take 1 Tablet by mouth every eight (8) hours as needed for Nausea or Vomiting., Disp: 10 Tablet, Rfl: 0    clindamycin (CLEOCIN) 300 mg capsule, Take 1 Capsule by mouth three (3) times daily. , Disp: 30 Capsule, Rfl: 0    apixaban (Eliquis) 5 mg tablet, Take 1 Tablet by mouth two (2) times a day. Eliquis 5 mg 2 tablets twice daily for 7 days then 1 tablet twice daily, Disp: 100 Tablet, Rfl: 0    pantoprazole (PROTONIX) 40 mg tablet, Take 40 mg by mouth daily. , Disp: , Rfl:     metFORMIN (GLUCOPHAGE) 500 mg tablet, Take 500 mg by mouth daily (with dinner). , Disp: , Rfl:     Current Outpatient Medications   Medication Instructions    amLODIPine (NORVASC) 10 mg, Oral, DAILY    apixaban (ELIQUIS) 5 mg, Oral, 2 TIMES DAILY, Eliquis 5 mg 2 tablets twice daily for 7 days then 1 tablet twice daily    clindamycin (CLEOCIN) 300 mg, Oral, 3 TIMES DAILY    colchicine 0.6 mg tablet colchicine 0.6 mg tablet   2 today then 1 daily x 5 days    indomethacin (INDOCIN) 50 mg capsule indomethacin 50 mg capsule   Take 1 capsule by mouth three times daily as needed    lisinopriL (PRINIVIL, ZESTRIL) 20 mg, Oral, DAILY    metFORMIN (GLUCOPHAGE) 500 mg, Oral, DAILY WITH DINNER    ondansetron (ZOFRAN ODT) 4 mg, Oral, EVERY 8 HOURS AS NEEDED    pantoprazole (PROTONIX) 40 mg, Oral, DAILY         Signed By: Micheal Noriega     July 19, 2022

## 2022-07-20 ENCOUNTER — CLINICAL DOCUMENTATION (OUTPATIENT)
Dept: INPATIENT UNIT | Facility: HOSPITAL | Age: 62
End: 2022-07-20

## 2022-07-20 LAB
ALBUMIN SERPL-MCNC: 2.4 G/DL (ref 3.5–5)
ALBUMIN/GLOB SERPL: 0.6 {RATIO} (ref 1.1–2.2)
ALP SERPL-CCNC: 75 U/L (ref 45–117)
ALT SERPL-CCNC: 22 U/L (ref 12–78)
ANION GAP SERPL CALC-SCNC: 8 MMOL/L (ref 5–15)
APTT PPP: 33.9 SEC (ref 21.2–34.1)
APTT PPP: 34.4 SEC (ref 21.2–34.1)
AST SERPL W P-5'-P-CCNC: 18 U/L (ref 15–37)
BILIRUB SERPL-MCNC: 1 MG/DL (ref 0.2–1)
BUN SERPL-MCNC: 9 MG/DL (ref 6–20)
BUN/CREAT SERPL: 14 (ref 12–20)
CA-I BLD-MCNC: 8.4 MG/DL (ref 8.5–10.1)
CHLORIDE SERPL-SCNC: 102 MMOL/L (ref 97–108)
CO2 SERPL-SCNC: 23 MMOL/L (ref 21–32)
CREAT SERPL-MCNC: 0.66 MG/DL (ref 0.7–1.3)
ERYTHROCYTE [DISTWIDTH] IN BLOOD BY AUTOMATED COUNT: 12.7 % (ref 11.5–14.5)
GLOBULIN SER CALC-MCNC: 3.7 G/DL (ref 2–4)
GLUCOSE BLD STRIP.AUTO-MCNC: 118 MG/DL (ref 65–117)
GLUCOSE BLD STRIP.AUTO-MCNC: 130 MG/DL (ref 65–117)
GLUCOSE BLD STRIP.AUTO-MCNC: 134 MG/DL (ref 65–117)
GLUCOSE BLD STRIP.AUTO-MCNC: 152 MG/DL (ref 65–117)
GLUCOSE SERPL-MCNC: 102 MG/DL (ref 65–100)
HCT VFR BLD AUTO: 32.5 % (ref 36.6–50.3)
HGB BLD-MCNC: 10.9 G/DL (ref 12.1–17)
MCH RBC QN AUTO: 29.9 PG (ref 26–34)
MCHC RBC AUTO-ENTMCNC: 33.5 G/DL (ref 30–36.5)
MCV RBC AUTO: 89 FL (ref 80–99)
NRBC # BLD: 0 K/UL (ref 0–0.01)
NRBC BLD-RTO: 0 PER 100 WBC
PERFORMED BY, TECHID: ABNORMAL
PLATELET # BLD AUTO: 428 K/UL (ref 150–400)
PMV BLD AUTO: 10 FL (ref 8.9–12.9)
POTASSIUM SERPL-SCNC: 4.2 MMOL/L (ref 3.5–5.1)
PROT SERPL-MCNC: 6.1 G/DL (ref 6.4–8.2)
RBC # BLD AUTO: 3.65 M/UL (ref 4.1–5.7)
SODIUM SERPL-SCNC: 133 MMOL/L (ref 136–145)
THERAPEUTIC RANGE,PTTT: ABNORMAL SEC (ref 82–109)
THERAPEUTIC RANGE,PTTT: NORMAL SEC (ref 82–109)
WBC # BLD AUTO: 19.8 K/UL (ref 4.1–11.1)

## 2022-07-20 PROCEDURE — 74011250636 HC RX REV CODE- 250/636: Performed by: SURGERY

## 2022-07-20 PROCEDURE — 74011000258 HC RX REV CODE- 258: Performed by: SURGERY

## 2022-07-20 PROCEDURE — 80053 COMPREHEN METABOLIC PANEL: CPT

## 2022-07-20 PROCEDURE — 74011250637 HC RX REV CODE- 250/637: Performed by: SURGERY

## 2022-07-20 PROCEDURE — 85730 THROMBOPLASTIN TIME PARTIAL: CPT

## 2022-07-20 PROCEDURE — 85027 COMPLETE CBC AUTOMATED: CPT

## 2022-07-20 PROCEDURE — 99024 POSTOP FOLLOW-UP VISIT: CPT | Performed by: SURGERY

## 2022-07-20 PROCEDURE — 36415 COLL VENOUS BLD VENIPUNCTURE: CPT

## 2022-07-20 PROCEDURE — 74011000250 HC RX REV CODE- 250: Performed by: SURGERY

## 2022-07-20 PROCEDURE — 94762 N-INVAS EAR/PLS OXIMTRY CONT: CPT

## 2022-07-20 PROCEDURE — 99232 SBSQ HOSP IP/OBS MODERATE 35: CPT | Performed by: INTERNAL MEDICINE

## 2022-07-20 PROCEDURE — 82962 GLUCOSE BLOOD TEST: CPT

## 2022-07-20 PROCEDURE — 65270000029 HC RM PRIVATE

## 2022-07-20 RX ORDER — HEPARIN SODIUM 1000 [USP'U]/ML
2000 INJECTION, SOLUTION INTRAVENOUS; SUBCUTANEOUS AS NEEDED
Status: DISCONTINUED | OUTPATIENT
Start: 2022-07-20 | End: 2022-07-28 | Stop reason: HOSPADM

## 2022-07-20 RX ORDER — HEPARIN SODIUM 10000 [USP'U]/100ML
11-25 INJECTION, SOLUTION INTRAVENOUS
Status: DISCONTINUED | OUTPATIENT
Start: 2022-07-20 | End: 2022-07-28 | Stop reason: HOSPADM

## 2022-07-20 RX ORDER — HEPARIN SODIUM 10000 [USP'U]/100ML
12-25 INJECTION, SOLUTION INTRAVENOUS
Status: DISCONTINUED | OUTPATIENT
Start: 2022-07-20 | End: 2022-07-20 | Stop reason: DRUGHIGH

## 2022-07-20 RX ORDER — HEPARIN SODIUM 1000 [USP'U]/ML
4000 INJECTION, SOLUTION INTRAVENOUS; SUBCUTANEOUS AS NEEDED
Status: DISCONTINUED | OUTPATIENT
Start: 2022-07-20 | End: 2022-07-28 | Stop reason: HOSPADM

## 2022-07-20 RX ADMIN — PIPERACILLIN SODIUM AND TAZOBACTAM SODIUM 3.38 G: 3; .375 INJECTION, POWDER, LYOPHILIZED, FOR SOLUTION INTRAVENOUS at 19:16

## 2022-07-20 RX ADMIN — SODIUM CHLORIDE, PRESERVATIVE FREE 10 ML: 5 INJECTION INTRAVENOUS at 21:35

## 2022-07-20 RX ADMIN — LISINOPRIL 20 MG: 20 TABLET ORAL at 08:50

## 2022-07-20 RX ADMIN — SODIUM CHLORIDE 125 ML/HR: 9 INJECTION, SOLUTION INTRAVENOUS at 17:38

## 2022-07-20 RX ADMIN — OXYCODONE HYDROCHLORIDE AND ACETAMINOPHEN 1 TABLET: 10; 325 TABLET ORAL at 05:53

## 2022-07-20 RX ADMIN — HYDROMORPHONE HYDROCHLORIDE 1 MG: 1 INJECTION, SOLUTION INTRAMUSCULAR; INTRAVENOUS; SUBCUTANEOUS at 03:53

## 2022-07-20 RX ADMIN — COLCHICINE 0.6 MG: 0.6 TABLET, FILM COATED ORAL at 08:50

## 2022-07-20 RX ADMIN — AMLODIPINE BESYLATE 10 MG: 5 TABLET ORAL at 08:50

## 2022-07-20 RX ADMIN — KETOROLAC TROMETHAMINE 30 MG: 30 INJECTION, SOLUTION INTRAMUSCULAR; INTRAVENOUS at 04:31

## 2022-07-20 RX ADMIN — CLINDAMYCIN IN 5 PERCENT DEXTROSE 600 MG: 12 INJECTION, SOLUTION INTRAVENOUS at 15:11

## 2022-07-20 RX ADMIN — PIPERACILLIN SODIUM AND TAZOBACTAM SODIUM 3.38 G: 3; .375 INJECTION, POWDER, LYOPHILIZED, FOR SOLUTION INTRAVENOUS at 10:40

## 2022-07-20 RX ADMIN — HYDROMORPHONE HYDROCHLORIDE 1 MG: 1 INJECTION, SOLUTION INTRAMUSCULAR; INTRAVENOUS; SUBCUTANEOUS at 13:11

## 2022-07-20 RX ADMIN — SODIUM CHLORIDE, PRESERVATIVE FREE 10 ML: 5 INJECTION INTRAVENOUS at 06:38

## 2022-07-20 RX ADMIN — CLINDAMYCIN IN 5 PERCENT DEXTROSE 600 MG: 12 INJECTION, SOLUTION INTRAVENOUS at 08:54

## 2022-07-20 RX ADMIN — SODIUM CHLORIDE, PRESERVATIVE FREE 10 ML: 5 INJECTION INTRAVENOUS at 13:16

## 2022-07-20 RX ADMIN — PANTOPRAZOLE SODIUM 40 MG: 40 TABLET, DELAYED RELEASE ORAL at 06:15

## 2022-07-20 RX ADMIN — KETOROLAC TROMETHAMINE 30 MG: 30 INJECTION, SOLUTION INTRAMUSCULAR; INTRAVENOUS at 12:05

## 2022-07-20 RX ADMIN — OXYCODONE HYDROCHLORIDE AND ACETAMINOPHEN 1 TABLET: 10; 325 TABLET ORAL at 10:39

## 2022-07-20 RX ADMIN — OXYCODONE HYDROCHLORIDE AND ACETAMINOPHEN 1 TABLET: 10; 325 TABLET ORAL at 21:35

## 2022-07-20 RX ADMIN — ENOXAPARIN SODIUM 40 MG: 100 INJECTION SUBCUTANEOUS at 08:50

## 2022-07-20 RX ADMIN — HEPARIN SODIUM 4000 UNITS: 1000 INJECTION, SOLUTION INTRAVENOUS; SUBCUTANEOUS at 19:10

## 2022-07-20 RX ADMIN — HEPARIN SODIUM 11 UNITS/KG/HR: 10000 INJECTION, SOLUTION INTRAVENOUS at 12:10

## 2022-07-20 RX ADMIN — OXYCODONE HYDROCHLORIDE AND ACETAMINOPHEN 1 TABLET: 10; 325 TABLET ORAL at 15:10

## 2022-07-20 RX ADMIN — HYDROMORPHONE HYDROCHLORIDE 1 MG: 1 INJECTION, SOLUTION INTRAMUSCULAR; INTRAVENOUS; SUBCUTANEOUS at 17:35

## 2022-07-20 RX ADMIN — CLINDAMYCIN IN 5 PERCENT DEXTROSE 600 MG: 12 INJECTION, SOLUTION INTRAVENOUS at 21:35

## 2022-07-20 RX ADMIN — OXYCODONE HYDROCHLORIDE AND ACETAMINOPHEN 1 TABLET: 10; 325 TABLET ORAL at 00:57

## 2022-07-20 RX ADMIN — HYDROMORPHONE HYDROCHLORIDE 1 MG: 1 INJECTION, SOLUTION INTRAMUSCULAR; INTRAVENOUS; SUBCUTANEOUS at 08:50

## 2022-07-20 RX ADMIN — KETOROLAC TROMETHAMINE 30 MG: 30 INJECTION, SOLUTION INTRAMUSCULAR; INTRAVENOUS at 19:10

## 2022-07-20 RX ADMIN — HYDROMORPHONE HYDROCHLORIDE 1 MG: 1 INJECTION, SOLUTION INTRAMUSCULAR; INTRAVENOUS; SUBCUTANEOUS at 23:29

## 2022-07-20 RX ADMIN — PIPERACILLIN SODIUM AND TAZOBACTAM SODIUM 3.38 G: 3; .375 INJECTION, POWDER, LYOPHILIZED, FOR SOLUTION INTRAVENOUS at 03:05

## 2022-07-20 RX ADMIN — CLINDAMYCIN IN 5 PERCENT DEXTROSE 600 MG: 12 INJECTION, SOLUTION INTRAVENOUS at 02:35

## 2022-07-20 NOTE — ROUTINE PROCESS
Patient arrived from PACU, patient is awake and alert and oriented x4. Post-op vitals completed. Complains of pain 10/10 in groin area down to right heel. Pain medication given appropriately. Patient has an incision by right groin, dressing is clean, dry, and intact. Patient also has an incision on the medial side of right leg, dressing is clean, dry, and intact. Will continue to monitor.

## 2022-07-20 NOTE — ROUTINE PROCESS
Heparin drip ordered for the patient, patient arrived with no heparin drip. Dr. Staples notified, Dr. Staples stated will start heparin drip in the morning. PTT order put in to be done with morning labs.

## 2022-07-20 NOTE — OP NOTES
This is operative report on [facility], patient's YOB: 1960. Date of surgery: July 19, 2022. Surgeon: Dr. Rivera Loo diagnosis:  Ischemic right leg. Right great toe gangrene  Right superficial femoral artery occlusion including long segment stent. Post diagnosis: Same as above    Procedure:  Right common femoral artery above-knee popliteal bypass with 6 mm fusion PTFE graft. Anesthesia: General  Anesthesiologist: Dr. Ally Terrell. Assistant: Mr. Delbert Solo. EBL: 350 cc. Complication: None  Implants: As above. Specimen: None    Indication for surgery: Mr. Mariluz Gomez is currently hospitalized with worsening right leg vascular status. Patient had right superficial femoral artery stent placed a few weeks ago. Patient supposed to take Eliquis as outpatient. However the treatment got delayed. Anyway patient has significant worsening right great toe gangrene and cold foot. CT angiogram shows right superficial femoral artery occluded including stents. Patient had a patent popliteal vessels and below-knee outflow with significant diseased two-vessel runoff. Patient was discussed options of revascularization procedure right leg including tPA infusion therapy with angioplasty versus bypass surgery on the right leg. We talked about the these 2 options after discussion we decided to have bypass surgery. We talked about types of conduits and their patency rates. Patient was explained about rational for the procedures, surgical risks and benefits and complication details patient signed surgical consent. Procedure: Patient was brought to operating table comfortable supine position. Followed by general anesthesia was initiated. Followed by patient's right leg was prepped usual sterile technique using Betadine solution. Followed by sterile drapes applied usual fashion. At this time timeout was called after confirmation was made procedure commenced.   Initial right groin incision was made using #10 blade, followed by subcutaneous tissue was divided using electrocautery. Femoral vessels and and distal external area was exposed. Once adequate exposure was done and another incision was made lower medial thigh above the knee level on the right side. 2 compartment fascia was opened and distal popliteal artery was exposed in usual fashion. Followed by 6 mm PTFE graft was tunneled at the subfascial level with the appropriate length and complication. Distal graft was appropriate fashion. Patient was provided at this time with 6 tablets heparin. Followed by proximal popliteal artery was controlled usual fashion and arteriotomy was made. Popliteal artery has mild to moderate plaque noted and they are very soft plaque. Followed by graft was sewn into proximal popliteal artery using 6-0 Prolene sutures end-to-side fashion. At the distal anastomosis complete, proximal anastomosis performed after the proximal graft was spatulated and fashioned, femoral vessels were controlled in usual fashion. And the proximal graft was sewn into common femoral artery end-to-side fashion using 5-0 Prolene sutures. After proximal anastomosis complete blood flow was established to the graft system usual fashion. Followed by both groin and thigh incision was closed with 3-0 Vicryl sutures in layers and skin was closed with 4 Monocryl sutures. Sterile dressings applied. Patient has excellent strong Doppler signal noted anterior tibial artery. Patient was awakened and extubated in the OR transferred recovery in stable condition.

## 2022-07-20 NOTE — PROGRESS NOTES
Infectious Disease Progress Note           Subjective:   Remains stable, reports intermittent right leg pain. Denies new complaints, No acute events since last seen   Objective:   Physical Exam:     Visit Vitals  /64 (BP 1 Location: Left upper arm, BP Patient Position: Semi fowlers)   Pulse 77   Temp 98.9 °F (37.2 °C)   Resp 16   Ht 6' 1\" (1.854 m)   Wt 234 lb (106.1 kg)   SpO2 94%   BMI 30.87 kg/m²      O2 Device: None (Room air)    Temp (24hrs), Av.7 °F (37.1 °C), Min:97.4 °F (36.3 °C), Max:99.3 °F (37.4 °C)    No intake/output data recorded.  1901 -  0700  In: 5259.6 [I.V.:5259.6]  Out: 560 [Urine:210]    General: NAD, alert, AAO x 4  HEENT: FLOYD, Moist mucosa   Lungs: CTA b/l, decreased at the bases   Heart: S1S2+, RRR, no murmur  Abdo: Soft, NT, ND, +BS   : No herbert cath   Exts: Gangrenous changes involving left great toe, minimal drainage. Palpable DP pulse, some erythema on dorsum of right foot. Skin: Right great toe ulcer       Data Review:       Recent Days:  Recent Labs     22  0434 22  0354 22  1702   WBC 19.8* 17.7* 26.8*   HGB 10.9* 12.2 14.4   HCT 32.5* 36.8 42.4   * 453* 544*     Recent Labs     22  0434 22  0354 22  1702   BUN 9 12 14   CREA 0.66* 0.65* 0.85       Lab Results   Component Value Date/Time    C-Reactive protein 16.20 (H) 2022 05:02 PM          Microbiology     Results       Procedure Component Value Units Date/Time    CULTURE, URINE [[id number]] Collected: 22 1645    Order Status: Sent Specimen: Urine Updated: 22 0751    CULTURE, BLOOD, PAIRED [[id number]] Collected: 22    Order Status: Completed Specimen: Blood Updated: 22 5045     Special Requests: No Special Requests        Culture result: No growth 1 day                  Diagnostics   CXR Results  (Last 48 hours)      None               Assessment/Plan     1.  Right great toe ulcer/dry gangrenous changes, underlying PAD, recent MRI neg for osteomyelitis      Mild rise in WBC on todays labs, afebrile       Blood Cx neg, no pending wound Cx       Continue on Zosyn, will d/c Clindamycin since Zosyn should provide adequate anaerobic coverage      Routine wound care per Dr Beth Harada, routine labs in the morning, including CRP and ESR      2. PAD, S/p recent arteriogram w stent placement, and subsequent occlusion per CTA abdo      S/p right common femoral to popliteal bypass surgery w graft placement on 07/19      Plans for d/c on coumadin, but wife prefers Eliquis, will inform Dr Beth Harada, currently on heparin drip      3. H/o gouty arthritis, no acute flare, continue on Colchicine      4. Right foot pain due to above: Continue symptomatic mgt     5.  DM: BS mgt per primary     Jakub Garcia MD    7/20/2022

## 2022-07-20 NOTE — PROGRESS NOTES
General Daily Progress Note          Patient Name:   Brittny Kennedy       YOB: 1960       Age:  65 y.o. Admit Date: 7/18/2022      Subjective:   Patient is a 65y.o. year old male with a PMH of diabetes, diabetic foot injury, arterial insufficiency, hypertension, GERD, and gout presents to the ED complaining of  right great toe infection. The patient was recently hospitalized for this same problem and received balloon angioplasty of the right lower extremity which initially helped perfusion of the toe. He has since relapsed and complains of excruciating pain that is not well managed on percocet. The patient states that it greatly progress in the last 24 hours and there is now black and gangrenous lesion on the plantar aspect of the toe with swelling over the entire foot. Patient denies any chest pain, SOB, difficulty breathing, palpitations, dizziness, N/V/D, or fevers. X-ray of R great toe:  IMPRESSION  No evidence of fracture or dislocation. Evidence of gout. No  significant change      Seen by the vascular surgeon this morning patient need right common femoral artery above-knee popliteal bypass       7/20  Patient resting in bed comfortably stating pain is well managed. Dressing show iodinated bandaged around affected toe without obvious bleeding.    Day 1 s/p bypass of right popliteal artery  Resuming lovenox      Objective:     Visit Vitals  /66 (BP 1 Location: Left upper arm, BP Patient Position: At rest)   Pulse 80   Temp 98.8 °F (37.1 °C)   Resp 18   Ht 6' 1\" (1.854 m)   Wt 106.1 kg (234 lb)   SpO2 96%   BMI 30.87 kg/m²        Recent Results (from the past 24 hour(s))   GLUCOSE, POC    Collection Time: 07/19/22  3:18 PM   Result Value Ref Range    Glucose (POC) 131 (H) 65 - 117 mg/dL    Performed by Armani Moreno    GLUCOSE, POC    Collection Time: 07/19/22  9:37 PM   Result Value Ref Range    Glucose (POC) 145 (H) 65 - 117 mg/dL    Performed by MARCIANO DE LEON W/O DIFF Collection Time: 07/20/22  4:34 AM   Result Value Ref Range    WBC 19.8 (H) 4.1 - 11.1 K/uL    RBC 3.65 (L) 4.10 - 5.70 M/uL    HGB 10.9 (L) 12.1 - 17.0 g/dL    HCT 32.5 (L) 36.6 - 50.3 %    MCV 89.0 80.0 - 99.0 FL    MCH 29.9 26.0 - 34.0 PG    MCHC 33.5 30.0 - 36.5 g/dL    RDW 12.7 11.5 - 14.5 %    PLATELET 125 (H) 320 - 400 K/uL    MPV 10.0 8.9 - 12.9 FL    NRBC 0.0 0.0  WBC    ABSOLUTE NRBC 0.00 0.00 - 3.82 K/uL   METABOLIC PANEL, COMPREHENSIVE    Collection Time: 07/20/22  4:34 AM   Result Value Ref Range    Sodium 133 (L) 136 - 145 mmol/L    Potassium 4.2 3.5 - 5.1 mmol/L    Chloride 102 97 - 108 mmol/L    CO2 23 21 - 32 mmol/L    Anion gap 8 5 - 15 mmol/L    Glucose 102 (H) 65 - 100 mg/dL    BUN 9 6 - 20 mg/dL    Creatinine 0.66 (L) 0.70 - 1.30 mg/dL    BUN/Creatinine ratio 14 12 - 20      GFR est AA >60 >60 ml/min/1.73m2    GFR est non-AA >60 >60 ml/min/1.73m2    Calcium 8.4 (L) 8.5 - 10.1 mg/dL    Bilirubin, total 1.0 0.2 - 1.0 mg/dL    AST (SGOT) 18 15 - 37 U/L    ALT (SGPT) 22 12 - 78 U/L    Alk. phosphatase 75 45 - 117 U/L    Protein, total 6.1 (L) 6.4 - 8.2 g/dL    Albumin 2.4 (L) 3.5 - 5.0 g/dL    Globulin 3.7 2.0 - 4.0 g/dL    A-G Ratio 0.6 (L) 1.1 - 2.2     PTT    Collection Time: 07/20/22  4:34 AM   Result Value Ref Range    aPTT 33.9 21.2 - 34.1 sec    aPTT, therapeutic range   82 - 109 sec   GLUCOSE, POC    Collection Time: 07/20/22  8:57 AM   Result Value Ref Range    Glucose (POC) 118 (H) 65 - 117 mg/dL    Performed by Ernesto Reid    GLUCOSE, POC    Collection Time: 07/20/22 11:37 AM   Result Value Ref Range    Glucose (POC) 130 (H) 65 - 117 mg/dL    Performed by Dulce Irby      [unfilled]      Review of Systems    Constitutional: Negative for chills and fever. HENT: Negative. Eyes: Negative. Respiratory: Negative. Cardiovascular: Negative. Gastrointestinal: Negative for abdominal pain and nausea. Skin: Negative. Neurological: Negative.         Physical Exam: Constitutional: pt is oriented to person, place, and time. HENT:   Head: Normocephalic and atraumatic. Eyes: Pupils are equal, round, and reactive to light. EOM are normal.   Cardiovascular: Normal rate, regular rhythm and normal heart sounds. Pulmonary/Chest: Breath sounds normal. No wheezes. No rales. Exhibits no tenderness. Abdominal: Soft. Bowel sounds are normal. There is no abdominal tenderness. There is no rebound and no guarding. Musculoskeletal: Normal range of motion. Neurological: pt is alert and oriented to person, place, and time. CTA ABD ART W RUNOFF W WO CONT   Final Result      Occluded long segment right superficial femoral artery stent with reconstitution   in the popliteal artery which demonstrates mild to moderate atherosclerotic   changes. Questionable patency of the distal tibial and peroneal runoff with   limited evaluation due to poor flow-related opacification and arterial   calcifications. Mild to moderate atherosclerotic changes in the left lower extremity with   probable single-vessel runoff to the left foot. Indeterminate bilateral adrenal nodules measuring up to 2 cm. Consider   nonemergent adrenal CT protocol. Bullous/cystic changes in the lung bases. Please refer to above findings for complete details. XR GREAT TOE RT MIN 2 V   Final Result   No evidence of fracture or dislocation. Evidence of gout. No   significant change.               Recent Results (from the past 24 hour(s))   GLUCOSE, POC    Collection Time: 07/19/22  3:18 PM   Result Value Ref Range    Glucose (POC) 131 (H) 65 - 117 mg/dL    Performed by Domenica Saldivar    GLUCOSE, POC    Collection Time: 07/19/22  9:37 PM   Result Value Ref Range    Glucose (POC) 145 (H) 65 - 117 mg/dL    Performed by Caitlin Black    CBC W/O DIFF    Collection Time: 07/20/22  4:34 AM   Result Value Ref Range    WBC 19.8 (H) 4.1 - 11.1 K/uL    RBC 3.65 (L) 4.10 - 5.70 M/uL    HGB 10.9 (L) 12.1 - 17.0 g/dL    HCT 32.5 (L) 36.6 - 50.3 %    MCV 89.0 80.0 - 99.0 FL    MCH 29.9 26.0 - 34.0 PG    MCHC 33.5 30.0 - 36.5 g/dL    RDW 12.7 11.5 - 14.5 %    PLATELET 366 (H) 224 - 400 K/uL    MPV 10.0 8.9 - 12.9 FL    NRBC 0.0 0.0  WBC    ABSOLUTE NRBC 0.00 0.00 - 4.81 K/uL   METABOLIC PANEL, COMPREHENSIVE    Collection Time: 07/20/22  4:34 AM   Result Value Ref Range    Sodium 133 (L) 136 - 145 mmol/L    Potassium 4.2 3.5 - 5.1 mmol/L    Chloride 102 97 - 108 mmol/L    CO2 23 21 - 32 mmol/L    Anion gap 8 5 - 15 mmol/L    Glucose 102 (H) 65 - 100 mg/dL    BUN 9 6 - 20 mg/dL    Creatinine 0.66 (L) 0.70 - 1.30 mg/dL    BUN/Creatinine ratio 14 12 - 20      GFR est AA >60 >60 ml/min/1.73m2    GFR est non-AA >60 >60 ml/min/1.73m2    Calcium 8.4 (L) 8.5 - 10.1 mg/dL    Bilirubin, total 1.0 0.2 - 1.0 mg/dL    AST (SGOT) 18 15 - 37 U/L    ALT (SGPT) 22 12 - 78 U/L    Alk.  phosphatase 75 45 - 117 U/L    Protein, total 6.1 (L) 6.4 - 8.2 g/dL    Albumin 2.4 (L) 3.5 - 5.0 g/dL    Globulin 3.7 2.0 - 4.0 g/dL    A-G Ratio 0.6 (L) 1.1 - 2.2     PTT    Collection Time: 07/20/22  4:34 AM   Result Value Ref Range    aPTT 33.9 21.2 - 34.1 sec    aPTT, therapeutic range   82 - 109 sec   GLUCOSE, POC    Collection Time: 07/20/22  8:57 AM   Result Value Ref Range    Glucose (POC) 118 (H) 65 - 117 mg/dL    Performed by Shelby Dumont    GLUCOSE, POC    Collection Time: 07/20/22 11:37 AM   Result Value Ref Range    Glucose (POC) 130 (H) 65 - 117 mg/dL    Performed by Abraham Boss        Results       Procedure Component Value Units Date/Time    CULTURE, URINE [[id number]] Collected: 07/19/22 1645    Order Status: Sent Specimen: Urine Updated: 07/20/22 0751    CULTURE, BLOOD, PAIRED [[id number]] Collected: 07/18/22 1708    Order Status: Completed Specimen: Blood Updated: 07/20/22 0728     Special Requests: No Special Requests        Culture result: No growth 1 day                Labs:     Recent Labs     07/20/22  0434 07/19/22  0354   WBC 19.8* 17.7*   HGB 10.9* 12.2   HCT 32.5* 36.8   * 453*       Recent Labs     07/20/22 0434 07/19/22  0354 07/18/22 1702   * 134* 134*   K 4.2 4.1 4.2    103 100   CO2 23 24 25   BUN 9 12 14   CREA 0.66* 0.65* 0.85   * 148* 159*   CA 8.4* 9.1 9.4       Recent Labs     07/20/22 0434 07/19/22 0354 07/18/22 1702   ALT 22 27 30   AP 75 84 98   TBILI 1.0 0.6 1.1*   TP 6.1* 7.7 8.0   ALB 2.4* 2.7* 3.4*   GLOB 3.7 5.0* 4.6*       Recent Labs     07/20/22 0434 07/19/22  0552 07/18/22  2320 07/18/22 1702   INR  --   --  1.4* 1.4*   PTP  --   --  17.7* 17.7*   APTT 33.9 45.1* 50.3* 33.6        No results for input(s): FE, TIBC, PSAT, FERR in the last 72 hours. No results found for: FOL, RBCF   No results for input(s): PH, PCO2, PO2 in the last 72 hours.   Recent Labs     07/18/22 1702          No results found for: CHOL, CHOLX, CHLST, CHOLV, HDL, HDLP, LDL, LDLC, DLDLP, TGLX, TRIGL, TRIGP, CHHD, CHHDX  Lab Results   Component Value Date/Time    Glucose (POC) 130 (H) 07/20/2022 11:37 AM    Glucose (POC) 118 (H) 07/20/2022 08:57 AM    Glucose (POC) 145 (H) 07/19/2022 09:37 PM    Glucose (POC) 131 (H) 07/19/2022 03:18 PM    Glucose (POC) 145 (H) 07/19/2022 07:51 AM     No results found for: COLOR, APPRN, SPGRU, REFSG, EPHRAIM, PROTU, GLUCU, KETU, BILU, UROU, VIANEY, LEUKU, GLUKE, EPSU, BACTU, WBCU, RBCU, CASTS, UCRY      Assessment:     Arterial Insufficiency s/p balloon angioplasty  Right great toe infection  Diabetes Mellitus  Diabetic foot injury  Hypertension  GERD  Gout  RA  Smoking      Plan:   Day 1 s/p bypass of right popliteal artery  Resume lovenox    Continue clindamycin 600 mg every 6 hours  Zosyn 3.375 IV every 8 hours  Sliding-scale insulin  Lisinopril 20 mg daily  Protonix 40 mg daily  Amlodipine 10 mg daily      Discussed with the patient and patient wife        Current Facility-Administered Medications:     heparin (porcine) 1,000 unit/mL injection 4,000 Units, 4,000 Units, IntraVENous, PRN **OR** heparin (porcine) 1,000 unit/mL injection 2,000 Units, 2,000 Units, IntraVENous, PRN, Morris Martínez MD    heparin 25,000 units in D5W 250 ml infusion, 11-25 Units/kg/hr (Adjusted), IntraVENous, TITRATE, Morris Martínez MD, Last Rate: 9.9 mL/hr at 07/20/22 1210, 11 Units/kg/hr at 07/20/22 1210    clindamycin (CLEOCIN) 600mg D5W 50mL IVPB (premix), 600 mg, IntraVENous, Q6H, Morris Martínez MD, Last Rate: 100 mL/hr at 07/20/22 0854, 600 mg at 07/20/22 0854    sodium chloride (NS) flush 5-40 mL, 5-40 mL, IntraVENous, Q8H, Morris Martínez MD, 10 mL at 07/20/22 7512    sodium chloride (NS) flush 5-40 mL, 5-40 mL, IntraVENous, PRN, Morris Martínez MD    ondansetron (ZOFRAN ODT) tablet 4 mg, 4 mg, Oral, Q8H PRN **OR** ondansetron (ZOFRAN) injection 4 mg, 4 mg, IntraVENous, Q6H PRN, Morris Martínez MD    enoxaparin (LOVENOX) injection 40 mg, 40 mg, SubCUTAneous, DAILY, MauricioMorris MD, 40 mg at 07/20/22 0850    0.9% sodium chloride infusion, 125 mL/hr, IntraVENous, CONTINUOUS, Morris Martínez MD, Last Rate: 125 mL/hr at 07/19/22 2147, 125 mL/hr at 07/19/22 2147    HYDROmorphone (DILAUDID) injection 1 mg, 1 mg, IntraVENous, Q4H PRN, Morris Martínez MD, 1 mg at 07/20/22 1545    oxyCODONE-acetaminophen (PERCOCET 10)  mg per tablet 1 Tablet, 1 Tablet, Oral, Q4H PRN, Morris Martínez MD, 1 Tablet at 07/20/22 1039    ketorolac (TORADOL) injection 30 mg, 30 mg, IntraVENous, Q6H PRN, Morris Martínez MD, 30 mg at 07/20/22 0431    amLODIPine (NORVASC) tablet 10 mg, 10 mg, Oral, DAILY, Morris Martínez MD, 10 mg at 07/20/22 0850    lisinopriL (PRINIVIL, ZESTRIL) tablet 20 mg, 20 mg, Oral, DAILY, Morris Martínez MD, 20 mg at 07/20/22 3870    colchicine tablet 0.6 mg, 0.6 mg, Oral, DAILY, Morris Martínez MD, 0.6 mg at 07/20/22 0850    insulin lispro (HUMALOG) injection, , SubCUTAneous, AC&HS, Morris Martínez MD    glucose chewable tablet 16 g, 4 Tablet, Oral, PRN, Morris Martínez MD    glucagon [facility]) injection 1 mg, 1 mg, IntraMUSCular, PRN, Eddie Martínez MD    0.9% sodium chloride infusion, 75 mL/hr, IntraVENous, CONTINUOUS, Eddie Martínez MD, IV Completed at 07/19/22 1225    acetaminophen (TYLENOL) tablet 650 mg, 650 mg, Oral, Q6H PRN **OR** acetaminophen (TYLENOL) suppository 650 mg, 650 mg, Rectal, Q6H PRN, Eddie Martínez MD    polyethylene glycol (MIRALAX) packet 17 g, 17 g, Oral, DAILY PRN, Eddie Martínez MD    ondansetron (ZOFRAN ODT) tablet 4 mg, 4 mg, Oral, Q8H PRN **OR** ondansetron (ZOFRAN) injection 4 mg, 4 mg, IntraVENous, Q6H PRN, Eddie Martínez MD    piperacillin-tazobactam (ZOSYN) 3.375 g in 0.9% sodium chloride (MBP/ADV) 100 mL MBP, 3.375 g, IntraVENous, Q8H, Eddie Martínez MD, Last Rate: 25 mL/hr at 07/20/22 1040, 3.375 g at 07/20/22 1040    pantoprazole (PROTONIX) tablet 40 mg, 40 mg, Oral, ACB, Eddie Martínez MD, 40 mg at 07/20/22 0927

## 2022-07-20 NOTE — PROGRESS NOTES
General Daily Progress Note          Patient Name:   Jhoana Macedo       YOB: 1960       Age:  65 y.o. Admit Date: 7/18/2022      Subjective:   Patient is a 65y.o. year old male with a PMH of diabetes, diabetic foot injury, arterial insufficiency, hypertension, GERD, and gout presents to the ED complaining of  right great toe infection. The patient was recently hospitalized for this same problem and received balloon angioplasty of the right lower extremity which initially helped perfusion of the toe. He has since relapsed and complains of excruciating pain that is not well managed on percocet. The patient states that it greatly progress in the last 24 hours and there is now black and gangrenous lesion on the plantar aspect of the toe with swelling over the entire foot. Patient denies any chest pain, SOB, difficulty breathing, palpitations, dizziness, N/V/D, or fevers. X-ray of R great toe:  IMPRESSION  No evidence of fracture or dislocation. Evidence of gout. No  significant change      Seen by the vascular surgeon this morning patient need right common femoral artery above-knee popliteal bypass       7/20  Patient resting in bed comfortably stating pain is well managed. Dressing show iodinated bandaged around affected toe without obvious bleeding.    Day 1 s/p bypass of right popliteal artery  Resuming lovenox      Objective:     Visit Vitals  /68   Pulse 68   Temp 97.4 °F (36.3 °C)   Resp 20   Ht 6' 1\" (1.854 m)   Wt 106.1 kg (234 lb)   SpO2 94%   BMI 30.87 kg/m²        Recent Results (from the past 24 hour(s))   GLUCOSE, POC    Collection Time: 07/19/22  3:18 PM   Result Value Ref Range    Glucose (POC) 131 (H) 65 - 117 mg/dL    Performed by Domenica Saldivar    GLUCOSE, POC    Collection Time: 07/19/22  9:37 PM   Result Value Ref Range    Glucose (POC) 145 (H) 65 - 117 mg/dL    Performed by Caitlin Black    CBC W/O DIFF    Collection Time: 07/20/22  4:34 AM   Result Value Ref Range WBC 19.8 (H) 4.1 - 11.1 K/uL    RBC 3.65 (L) 4.10 - 5.70 M/uL    HGB 10.9 (L) 12.1 - 17.0 g/dL    HCT 32.5 (L) 36.6 - 50.3 %    MCV 89.0 80.0 - 99.0 FL    MCH 29.9 26.0 - 34.0 PG    MCHC 33.5 30.0 - 36.5 g/dL    RDW 12.7 11.5 - 14.5 %    PLATELET 665 (H) 983 - 400 K/uL    MPV 10.0 8.9 - 12.9 FL    NRBC 0.0 0.0  WBC    ABSOLUTE NRBC 0.00 0.00 - 6.52 K/uL   METABOLIC PANEL, COMPREHENSIVE    Collection Time: 07/20/22  4:34 AM   Result Value Ref Range    Sodium 133 (L) 136 - 145 mmol/L    Potassium 4.2 3.5 - 5.1 mmol/L    Chloride 102 97 - 108 mmol/L    CO2 23 21 - 32 mmol/L    Anion gap 8 5 - 15 mmol/L    Glucose 102 (H) 65 - 100 mg/dL    BUN 9 6 - 20 mg/dL    Creatinine 0.66 (L) 0.70 - 1.30 mg/dL    BUN/Creatinine ratio 14 12 - 20      GFR est AA >60 >60 ml/min/1.73m2    GFR est non-AA >60 >60 ml/min/1.73m2    Calcium 8.4 (L) 8.5 - 10.1 mg/dL    Bilirubin, total 1.0 0.2 - 1.0 mg/dL    AST (SGOT) 18 15 - 37 U/L    ALT (SGPT) 22 12 - 78 U/L    Alk. phosphatase 75 45 - 117 U/L    Protein, total 6.1 (L) 6.4 - 8.2 g/dL    Albumin 2.4 (L) 3.5 - 5.0 g/dL    Globulin 3.7 2.0 - 4.0 g/dL    A-G Ratio 0.6 (L) 1.1 - 2.2     PTT    Collection Time: 07/20/22  4:34 AM   Result Value Ref Range    aPTT 33.9 21.2 - 34.1 sec    aPTT, therapeutic range   82 - 109 sec     [unfilled]      Review of Systems    Constitutional: Negative for chills and fever. HENT: Negative. Eyes: Negative. Respiratory: Negative. Cardiovascular: Negative. Gastrointestinal: Negative for abdominal pain and nausea. Skin: Negative. Neurological: Negative. Physical Exam:      Constitutional: pt is oriented to person, place, and time. HENT:   Head: Normocephalic and atraumatic. Eyes: Pupils are equal, round, and reactive to light. EOM are normal.   Cardiovascular: Normal rate, regular rhythm and normal heart sounds. Pulmonary/Chest: Breath sounds normal. No wheezes. No rales. Exhibits no tenderness. Abdominal: Soft. Bowel sounds are normal. There is no abdominal tenderness. There is no rebound and no guarding. Musculoskeletal: Normal range of motion. Neurological: pt is alert and oriented to person, place, and time. CTA ABD ART W RUNOFF W WO CONT   Final Result      Occluded long segment right superficial femoral artery stent with reconstitution   in the popliteal artery which demonstrates mild to moderate atherosclerotic   changes. Questionable patency of the distal tibial and peroneal runoff with   limited evaluation due to poor flow-related opacification and arterial   calcifications. Mild to moderate atherosclerotic changes in the left lower extremity with   probable single-vessel runoff to the left foot. Indeterminate bilateral adrenal nodules measuring up to 2 cm. Consider   nonemergent adrenal CT protocol. Bullous/cystic changes in the lung bases. Please refer to above findings for complete details. XR GREAT TOE RT MIN 2 V   Final Result   No evidence of fracture or dislocation. Evidence of gout. No   significant change.               Recent Results (from the past 24 hour(s))   GLUCOSE, POC    Collection Time: 07/19/22  3:18 PM   Result Value Ref Range    Glucose (POC) 131 (H) 65 - 117 mg/dL    Performed by Yan Juana    GLUCOSE, POC    Collection Time: 07/19/22  9:37 PM   Result Value Ref Range    Glucose (POC) 145 (H) 65 - 117 mg/dL    Performed by Kaela Forrester    CBC W/O DIFF    Collection Time: 07/20/22  4:34 AM   Result Value Ref Range    WBC 19.8 (H) 4.1 - 11.1 K/uL    RBC 3.65 (L) 4.10 - 5.70 M/uL    HGB 10.9 (L) 12.1 - 17.0 g/dL    HCT 32.5 (L) 36.6 - 50.3 %    MCV 89.0 80.0 - 99.0 FL    MCH 29.9 26.0 - 34.0 PG    MCHC 33.5 30.0 - 36.5 g/dL    RDW 12.7 11.5 - 14.5 %    PLATELET 893 (H) 975 - 400 K/uL    MPV 10.0 8.9 - 12.9 FL    NRBC 0.0 0.0  WBC    ABSOLUTE NRBC 0.00 0.00 - 2.69 K/uL   METABOLIC PANEL, COMPREHENSIVE    Collection Time: 07/20/22  4:34 AM   Result Value Ref Range    Sodium 133 (L) 136 - 145 mmol/L    Potassium 4.2 3.5 - 5.1 mmol/L    Chloride 102 97 - 108 mmol/L    CO2 23 21 - 32 mmol/L    Anion gap 8 5 - 15 mmol/L    Glucose 102 (H) 65 - 100 mg/dL    BUN 9 6 - 20 mg/dL    Creatinine 0.66 (L) 0.70 - 1.30 mg/dL    BUN/Creatinine ratio 14 12 - 20      GFR est AA >60 >60 ml/min/1.73m2    GFR est non-AA >60 >60 ml/min/1.73m2    Calcium 8.4 (L) 8.5 - 10.1 mg/dL    Bilirubin, total 1.0 0.2 - 1.0 mg/dL    AST (SGOT) 18 15 - 37 U/L    ALT (SGPT) 22 12 - 78 U/L    Alk.  phosphatase 75 45 - 117 U/L    Protein, total 6.1 (L) 6.4 - 8.2 g/dL    Albumin 2.4 (L) 3.5 - 5.0 g/dL    Globulin 3.7 2.0 - 4.0 g/dL    A-G Ratio 0.6 (L) 1.1 - 2.2     PTT    Collection Time: 07/20/22  4:34 AM   Result Value Ref Range    aPTT 33.9 21.2 - 34.1 sec    aPTT, therapeutic range   82 - 109 sec       Results       Procedure Component Value Units Date/Time    CULTURE, URINE [[id number]] Collected: 07/19/22 1645    Order Status: Sent Specimen: Urine Updated: 07/20/22 0751    CULTURE, BLOOD, PAIRED [[id number]] Collected: 07/18/22 1708    Order Status: Completed Specimen: Blood Updated: 07/20/22 0728     Special Requests: No Special Requests        Culture result: No growth 1 day                Labs:     Recent Labs     07/20/22 0434 07/19/22 0354   WBC 19.8* 17.7*   HGB 10.9* 12.2   HCT 32.5* 36.8   * 453*       Recent Labs     07/20/22 0434 07/19/22 0354 07/18/22 1702   * 134* 134*   K 4.2 4.1 4.2    103 100   CO2 23 24 25   BUN 9 12 14   CREA 0.66* 0.65* 0.85   * 148* 159*   CA 8.4* 9.1 9.4       Recent Labs     07/20/22  0434 07/19/22  0354 07/18/22  1702   ALT 22 27 30   AP 75 84 98   TBILI 1.0 0.6 1.1*   TP 6.1* 7.7 8.0   ALB 2.4* 2.7* 3.4*   GLOB 3.7 5.0* 4.6*       Recent Labs     07/20/22  0434 07/19/22  0552 07/18/22  2320 07/18/22 1702   INR  --   --  1.4* 1.4*   PTP  --   --  17.7* 17.7*   APTT 33.9 45.1* 50.3* 33.6        No results for input(s): FE, TIBC, PSAT, FERR in the last 72 hours. No results found for: FOL, RBCF   No results for input(s): PH, PCO2, PO2 in the last 72 hours.   Recent Labs     07/18/22  1702          No results found for: CHOL, CHOLX, CHLST, CHOLV, HDL, HDLP, LDL, LDLC, DLDLP, TGLX, TRIGL, TRIGP, CHHD, CHHDX  Lab Results   Component Value Date/Time    Glucose (POC) 145 (H) 07/19/2022 09:37 PM    Glucose (POC) 131 (H) 07/19/2022 03:18 PM    Glucose (POC) 145 (H) 07/19/2022 07:51 AM    Glucose (POC) 128 (H) 07/18/2022 11:23 PM    Glucose (POC) 172 (H) 07/08/2022 07:22 AM     No results found for: COLOR, APPRN, SPGRU, REFSG, EPHRAIM, PROTU, GLUCU, KETU, BILU, UROU, VIANEY, LEUKU, GLUKE, EPSU, BACTU, WBCU, RBCU, CASTS, UCRY      Assessment:     Arterial Insufficiency s/p balloon angioplasty  Right great toe infection  Diabetes Mellitus  Diabetic foot injury  Hypertension  GERD  Gout  RA  Smoking      Plan:   Day 1 s/p bypass of right popliteal artery  Resume lovenox    Continue clindamycin 600 mg every 6 hours  Zosyn 3.375 IV every 8 hours  Sliding-scale insulin  Lisinopril 20 mg daily  Protonix 40 mg daily  Amlodipine 10 mg daily      Discussed with the patient and patient wife        Current Facility-Administered Medications:     clindamycin (CLEOCIN) 600mg D5W 50mL IVPB (premix), 600 mg, IntraVENous, Q6H, Cheo Martínez MD, Last Rate: 100 mL/hr at 07/20/22 0235, 600 mg at 07/20/22 0235    sodium chloride (NS) flush 5-40 mL, 5-40 mL, IntraVENous, Q8H, Cheo Martínez MD, 10 mL at 07/20/22 2994    sodium chloride (NS) flush 5-40 mL, 5-40 mL, IntraVENous, PRN, Cheo Martínez MD    ondansetron (ZOFRAN ODT) tablet 4 mg, 4 mg, Oral, Q8H PRN **OR** ondansetron (ZOFRAN) injection 4 mg, 4 mg, IntraVENous, Q6H PRN, Cheo Martínez MD    enoxaparin (LOVENOX) injection 40 mg, 40 mg, SubCUTAneous, DAILY, Cheo Martínez MD    0.9% sodium chloride infusion, 125 mL/hr, IntraVENous, CONTINUOUS, Cheo Martínez MD, Last Rate: 125 mL/hr at 07/19/22 2147, 125 mL/hr at 07/19/22 2147    HYDROmorphone (DILAUDID) injection 1 mg, 1 mg, IntraVENous, Q4H PRN, Irma Martínez MD, 1 mg at 07/20/22 0353    oxyCODONE-acetaminophen (PERCOCET 10)  mg per tablet 1 Tablet, 1 Tablet, Oral, Q4H PRN, Irma Martínez MD, 1 Tablet at 07/20/22 0553    ketorolac (TORADOL) injection 30 mg, 30 mg, IntraVENous, Q6H PRN, Irma Martínez MD, 30 mg at 07/20/22 0431    heparin 25,000 units in D5W 250 ml infusion, 18-36 Units/kg/hr (Adjusted), IntraVENous, TITRATE, Irma Martínez MD, Held at 07/19/22 1029    heparin (porcine) 1,000 unit/mL injection 7,230 Units, 80 Units/kg (Adjusted), IntraVENous, PRN **OR** heparin (porcine) 1,000 unit/mL injection 3,620 Units, 40 Units/kg (Adjusted), IntraVENous, PRN, Irma Martínez MD, 3,620 Units at 07/19/22 0813    HYDROmorphone (DILAUDID) injection 1 mg, 1 mg, IntraVENous, Q4H PRN, Irma Martínez MD, 1 mg at 07/19/22 2344    amLODIPine (NORVASC) tablet 10 mg, 10 mg, Oral, DAILY, Irma Martínez MD, 10 mg at 07/19/22 0943    lisinopriL (PRINIVIL, ZESTRIL) tablet 20 mg, 20 mg, Oral, DAILY, Irma Martínez MD, 20 mg at 07/19/22 0943    colchicine tablet 0.6 mg, 0.6 mg, Oral, DAILY, Irma Martínez MD    insulin lispro (HUMALOG) injection, , SubCUTAneous, AC&HS, Irma Martínez MD    glucose chewable tablet 16 g, 4 Tablet, Oral, PRN, Irma Martínez MD    glucagon [facility]) injection 1 mg, 1 mg, IntraMUSCular, PRN, Irma Martínez MD    0.9% sodium chloride infusion, 75 mL/hr, IntraVENous, CONTINUOUS, Irma Martínez MD, IV Completed at 07/19/22 1225    acetaminophen (TYLENOL) tablet 650 mg, 650 mg, Oral, Q6H PRN **OR** acetaminophen (TYLENOL) suppository 650 mg, 650 mg, Rectal, Q6H PRN, Irma Martínez MD    polyethylene glycol (MIRALAX) packet 17 g, 17 g, Oral, DAILY PRN, Irma Martínez MD    ondansetron (ZOFRAN ODT) tablet 4 mg, 4 mg, Oral, Q8H PRN **OR** ondansetron (ZOFRAN) injection 4 mg, 4 mg, IntraVENous, Q6H PRN, Irma Martínez MD piperacillin-tazobactam (ZOSYN) 3.375 g in 0.9% sodium chloride (MBP/ADV) 100 mL MBP, 3.375 g, IntraVENous, Q8H, Phoebe Martínez MD, Last Rate: 25 mL/hr at 07/20/22 0305, 3.375 g at 07/20/22 0305    pantoprazole (PROTONIX) tablet 40 mg, 40 mg, Oral, ACB, Phoebe Martínez MD, 40 mg at 07/20/22 7962

## 2022-07-20 NOTE — PROGRESS NOTES
Patient examined this morning. He looks comfortable. Vital signs stable    Right foot is warm. Right foot has a strong dorsalis pedis Doppler signal.    We will start heparin drip today. We will transition Coumadin starting tomorrow. Bedrest today and start mobilizing him tomorrow. Patient is postop day #1 right common femoral artery above-knee popliteal artery bypass with a 6 mm PTFE graft.

## 2022-07-21 LAB
ALBUMIN SERPL-MCNC: 2.4 G/DL (ref 3.5–5)
ALBUMIN/GLOB SERPL: 0.6 {RATIO} (ref 1.1–2.2)
ALP SERPL-CCNC: 84 U/L (ref 45–117)
ALT SERPL-CCNC: 27 U/L (ref 12–78)
ANION GAP SERPL CALC-SCNC: 7 MMOL/L (ref 5–15)
APTT PPP: 33.2 SEC (ref 21.2–34.1)
APTT PPP: 37.3 SEC (ref 21.2–34.1)
APTT PPP: 38.9 SEC (ref 21.2–34.1)
AST SERPL W P-5'-P-CCNC: 19 U/L (ref 15–37)
BACTERIA SPEC CULT: NORMAL
BILIRUB SERPL-MCNC: 0.8 MG/DL (ref 0.2–1)
BUN SERPL-MCNC: 7 MG/DL (ref 6–20)
BUN/CREAT SERPL: 13 (ref 12–20)
CA-I BLD-MCNC: 8.4 MG/DL (ref 8.5–10.1)
CHLORIDE SERPL-SCNC: 104 MMOL/L (ref 97–108)
CO2 SERPL-SCNC: 24 MMOL/L (ref 21–32)
CREAT SERPL-MCNC: 0.54 MG/DL (ref 0.7–1.3)
ERYTHROCYTE [DISTWIDTH] IN BLOOD BY AUTOMATED COUNT: 12.5 % (ref 11.5–14.5)
GLOBULIN SER CALC-MCNC: 3.9 G/DL (ref 2–4)
GLUCOSE BLD STRIP.AUTO-MCNC: 147 MG/DL (ref 65–117)
GLUCOSE BLD STRIP.AUTO-MCNC: 150 MG/DL (ref 65–117)
GLUCOSE BLD STRIP.AUTO-MCNC: 157 MG/DL (ref 65–117)
GLUCOSE BLD STRIP.AUTO-MCNC: 245 MG/DL (ref 65–117)
GLUCOSE SERPL-MCNC: 122 MG/DL (ref 65–100)
HCT VFR BLD AUTO: 32.4 % (ref 36.6–50.3)
HGB BLD-MCNC: 10.9 G/DL (ref 12.1–17)
MCH RBC QN AUTO: 29.4 PG (ref 26–34)
MCHC RBC AUTO-ENTMCNC: 33.6 G/DL (ref 30–36.5)
MCV RBC AUTO: 87.3 FL (ref 80–99)
NRBC # BLD: 0 K/UL (ref 0–0.01)
NRBC BLD-RTO: 0 PER 100 WBC
PERFORMED BY, TECHID: ABNORMAL
PLATELET # BLD AUTO: 391 K/UL (ref 150–400)
PMV BLD AUTO: 9.8 FL (ref 8.9–12.9)
POTASSIUM SERPL-SCNC: 3.8 MMOL/L (ref 3.5–5.1)
PROT SERPL-MCNC: 6.3 G/DL (ref 6.4–8.2)
RBC # BLD AUTO: 3.71 M/UL (ref 4.1–5.7)
SODIUM SERPL-SCNC: 135 MMOL/L (ref 136–145)
SPECIAL REQUESTS,SREQ: NORMAL
THERAPEUTIC RANGE,PTTT: ABNORMAL SEC (ref 82–109)
THERAPEUTIC RANGE,PTTT: ABNORMAL SEC (ref 82–109)
THERAPEUTIC RANGE,PTTT: NORMAL SEC (ref 82–109)
WBC # BLD AUTO: 15.4 K/UL (ref 4.1–11.1)

## 2022-07-21 PROCEDURE — 74011000258 HC RX REV CODE- 258: Performed by: SURGERY

## 2022-07-21 PROCEDURE — 74011250636 HC RX REV CODE- 250/636: Performed by: SURGERY

## 2022-07-21 PROCEDURE — 85027 COMPLETE CBC AUTOMATED: CPT

## 2022-07-21 PROCEDURE — 65270000029 HC RM PRIVATE

## 2022-07-21 PROCEDURE — 99232 SBSQ HOSP IP/OBS MODERATE 35: CPT | Performed by: INTERNAL MEDICINE

## 2022-07-21 PROCEDURE — 74011250637 HC RX REV CODE- 250/637: Performed by: INTERNAL MEDICINE

## 2022-07-21 PROCEDURE — 82962 GLUCOSE BLOOD TEST: CPT

## 2022-07-21 PROCEDURE — 74011250636 HC RX REV CODE- 250/636: Performed by: FAMILY MEDICINE

## 2022-07-21 PROCEDURE — 74011250637 HC RX REV CODE- 250/637: Performed by: SURGERY

## 2022-07-21 PROCEDURE — 85730 THROMBOPLASTIN TIME PARTIAL: CPT

## 2022-07-21 PROCEDURE — 74011000250 HC RX REV CODE- 250: Performed by: SURGERY

## 2022-07-21 PROCEDURE — 99024 POSTOP FOLLOW-UP VISIT: CPT | Performed by: SURGERY

## 2022-07-21 PROCEDURE — 36415 COLL VENOUS BLD VENIPUNCTURE: CPT

## 2022-07-21 PROCEDURE — 80053 COMPREHEN METABOLIC PANEL: CPT

## 2022-07-21 RX ORDER — METFORMIN HYDROCHLORIDE 500 MG/1
500 TABLET ORAL 2 TIMES DAILY WITH MEALS
Status: DISCONTINUED | OUTPATIENT
Start: 2022-07-21 | End: 2022-07-28 | Stop reason: HOSPADM

## 2022-07-21 RX ORDER — HYDROMORPHONE HYDROCHLORIDE 1 MG/ML
1 INJECTION, SOLUTION INTRAMUSCULAR; INTRAVENOUS; SUBCUTANEOUS
Status: DISCONTINUED | OUTPATIENT
Start: 2022-07-21 | End: 2022-07-21 | Stop reason: CLARIF

## 2022-07-21 RX ORDER — HYDROMORPHONE HYDROCHLORIDE 1 MG/ML
1 INJECTION, SOLUTION INTRAMUSCULAR; INTRAVENOUS; SUBCUTANEOUS
Status: DISCONTINUED | OUTPATIENT
Start: 2022-07-21 | End: 2022-07-21

## 2022-07-21 RX ORDER — HYDROMORPHONE HYDROCHLORIDE 1 MG/ML
1 INJECTION, SOLUTION INTRAMUSCULAR; INTRAVENOUS; SUBCUTANEOUS
Status: ACTIVE | OUTPATIENT
Start: 2022-07-21 | End: 2022-07-21

## 2022-07-21 RX ORDER — HYDROMORPHONE HYDROCHLORIDE 1 MG/ML
1 INJECTION, SOLUTION INTRAMUSCULAR; INTRAVENOUS; SUBCUTANEOUS
Status: DISPENSED | OUTPATIENT
Start: 2022-07-21 | End: 2022-07-24

## 2022-07-21 RX ORDER — HYDROMORPHONE HYDROCHLORIDE 2 MG/ML
1 INJECTION, SOLUTION INTRAMUSCULAR; INTRAVENOUS; SUBCUTANEOUS
Status: DISCONTINUED | OUTPATIENT
Start: 2022-07-21 | End: 2022-07-21 | Stop reason: CLARIF

## 2022-07-21 RX ORDER — MORPHINE SULFATE 2 MG/ML
1 INJECTION, SOLUTION INTRAMUSCULAR; INTRAVENOUS
Status: DISCONTINUED | OUTPATIENT
Start: 2022-07-21 | End: 2022-07-21

## 2022-07-21 RX ORDER — OXYCODONE HCL 10 MG/1
20 TABLET, FILM COATED, EXTENDED RELEASE ORAL 2 TIMES DAILY
Status: DISCONTINUED | OUTPATIENT
Start: 2022-07-21 | End: 2022-07-28 | Stop reason: HOSPADM

## 2022-07-21 RX ORDER — GABAPENTIN 300 MG/1
300 CAPSULE ORAL 2 TIMES DAILY
Status: DISCONTINUED | OUTPATIENT
Start: 2022-07-21 | End: 2022-07-27

## 2022-07-21 RX ADMIN — HEPARIN SODIUM 17 UNITS/KG/HR: 10000 INJECTION, SOLUTION INTRAVENOUS at 14:31

## 2022-07-21 RX ADMIN — HYDROMORPHONE HYDROCHLORIDE 1 MG: 1 INJECTION, SOLUTION INTRAMUSCULAR; INTRAVENOUS; SUBCUTANEOUS at 15:54

## 2022-07-21 RX ADMIN — OXYCODONE HYDROCHLORIDE AND ACETAMINOPHEN 1 TABLET: 10; 325 TABLET ORAL at 09:32

## 2022-07-21 RX ADMIN — KETOROLAC TROMETHAMINE 30 MG: 30 INJECTION, SOLUTION INTRAMUSCULAR; INTRAVENOUS at 07:22

## 2022-07-21 RX ADMIN — OXYCODONE HYDROCHLORIDE AND ACETAMINOPHEN 1 TABLET: 10; 325 TABLET ORAL at 13:28

## 2022-07-21 RX ADMIN — LISINOPRIL 20 MG: 20 TABLET ORAL at 09:31

## 2022-07-21 RX ADMIN — SODIUM CHLORIDE 125 ML/HR: 9 INJECTION, SOLUTION INTRAVENOUS at 01:39

## 2022-07-21 RX ADMIN — GABAPENTIN 300 MG: 300 CAPSULE ORAL at 14:27

## 2022-07-21 RX ADMIN — COLCHICINE 0.6 MG: 0.6 TABLET, FILM COATED ORAL at 09:32

## 2022-07-21 RX ADMIN — PIPERACILLIN SODIUM AND TAZOBACTAM SODIUM 3.38 G: 3; .375 INJECTION, POWDER, LYOPHILIZED, FOR SOLUTION INTRAVENOUS at 04:37

## 2022-07-21 RX ADMIN — HEPARIN SODIUM 2000 UNITS: 1000 INJECTION, SOLUTION INTRAVENOUS; SUBCUTANEOUS at 01:45

## 2022-07-21 RX ADMIN — HYDROMORPHONE HYDROCHLORIDE 1 MG: 1 INJECTION, SOLUTION INTRAMUSCULAR; INTRAVENOUS; SUBCUTANEOUS at 22:31

## 2022-07-21 RX ADMIN — SODIUM CHLORIDE, PRESERVATIVE FREE 10 ML: 5 INJECTION INTRAVENOUS at 16:16

## 2022-07-21 RX ADMIN — HEPARIN SODIUM 2000 UNITS: 1000 INJECTION, SOLUTION INTRAVENOUS; SUBCUTANEOUS at 19:56

## 2022-07-21 RX ADMIN — AMLODIPINE BESYLATE 10 MG: 5 TABLET ORAL at 09:31

## 2022-07-21 RX ADMIN — GABAPENTIN 300 MG: 300 CAPSULE ORAL at 21:00

## 2022-07-21 RX ADMIN — KETOROLAC TROMETHAMINE 30 MG: 30 INJECTION, SOLUTION INTRAMUSCULAR; INTRAVENOUS at 14:26

## 2022-07-21 RX ADMIN — CLINDAMYCIN IN 5 PERCENT DEXTROSE 600 MG: 12 INJECTION, SOLUTION INTRAVENOUS at 04:37

## 2022-07-21 RX ADMIN — SODIUM CHLORIDE 125 ML/HR: 9 INJECTION, SOLUTION INTRAVENOUS at 13:28

## 2022-07-21 RX ADMIN — SODIUM CHLORIDE, PRESERVATIVE FREE 10 ML: 5 INJECTION INTRAVENOUS at 04:41

## 2022-07-21 RX ADMIN — KETOROLAC TROMETHAMINE 30 MG: 30 INJECTION, SOLUTION INTRAMUSCULAR; INTRAVENOUS at 20:56

## 2022-07-21 RX ADMIN — METFORMIN HYDROCHLORIDE 500 MG: 500 TABLET ORAL at 16:08

## 2022-07-21 RX ADMIN — OXYCODONE HYDROCHLORIDE AND ACETAMINOPHEN 1 TABLET: 10; 325 TABLET ORAL at 18:06

## 2022-07-21 RX ADMIN — PIPERACILLIN SODIUM AND TAZOBACTAM SODIUM 3.38 G: 3; .375 INJECTION, POWDER, LYOPHILIZED, FOR SOLUTION INTRAVENOUS at 18:08

## 2022-07-21 RX ADMIN — PIPERACILLIN SODIUM AND TAZOBACTAM SODIUM 3.38 G: 3; .375 INJECTION, POWDER, LYOPHILIZED, FOR SOLUTION INTRAVENOUS at 10:43

## 2022-07-21 RX ADMIN — OXYCODONE HYDROCHLORIDE 20 MG: 20 TABLET, FILM COATED, EXTENDED RELEASE ORAL at 21:00

## 2022-07-21 RX ADMIN — PANTOPRAZOLE SODIUM 40 MG: 40 TABLET, DELAYED RELEASE ORAL at 09:39

## 2022-07-21 RX ADMIN — SODIUM CHLORIDE 125 ML/HR: 9 INJECTION, SOLUTION INTRAVENOUS at 21:01

## 2022-07-21 RX ADMIN — KETOROLAC TROMETHAMINE 30 MG: 30 INJECTION, SOLUTION INTRAMUSCULAR; INTRAVENOUS at 01:38

## 2022-07-21 RX ADMIN — CLINDAMYCIN IN 5 PERCENT DEXTROSE 600 MG: 12 INJECTION, SOLUTION INTRAVENOUS at 09:32

## 2022-07-21 RX ADMIN — OXYCODONE HYDROCHLORIDE AND ACETAMINOPHEN 1 TABLET: 10; 325 TABLET ORAL at 04:36

## 2022-07-21 RX ADMIN — OXYCODONE HYDROCHLORIDE 20 MG: 20 TABLET, FILM COATED, EXTENDED RELEASE ORAL at 10:42

## 2022-07-21 NOTE — PROGRESS NOTES
Infectious Disease Progress Note           Subjective:   Stable, reports right foot pain, exacerbated by movements, Dilaudid controls pain but hospital out of Dilaudid. Denies new complaints. Objective:   Physical Exam:     Visit Vitals  BP (!) 176/83 (BP 1 Location: Left upper arm, BP Patient Position: At rest)   Pulse 82   Temp 98.2 °F (36.8 °C)   Resp 18   Ht 6' 1\" (1.854 m)   Wt 234 lb (106.1 kg)   SpO2 98%   BMI 30.87 kg/m²      O2 Device: None (Room air)    Temp (24hrs), Av.9 °F (37.2 °C), Min:98.2 °F (36.8 °C), Max:99.7 °F (37.6 °C)    701 - 1900  In: 1500 [I.V.:1500]  Out: 800 [Urine:800]   1901 -  0700  In: 1283.3 [I.V.:1283.3]  Out: 950 [Urine:600]    General: NAD, alert, AAO x 4  HEENT: FLOYD, Moist mucosa   Lungs: CTA b/l, decreased at the bases   Heart: S1S2+, RRR, no murmur  Abdo: Soft, NT, ND, +BS   : No herbert cath   Exts: Stable gangrenous changes involving right great toe .    Skin: Right great toe ulcer       Data Review:       Recent Days:  Recent Labs     22  0759 22  0434 22  0354   WBC 15.4* 19.8* 17.7*   HGB 10.9* 10.9* 12.2   HCT 32.4* 32.5* 36.8    428* 453*       Recent Labs     22  0759 22  0434 22  0354   BUN 7 9 12   CREA 0.54* 0.66* 0.65*         Lab Results   Component Value Date/Time    C-Reactive protein 16.20 (H) 2022 05:02 PM            Microbiology     Results       Procedure Component Value Units Date/Time    CULTURE, URINE [[id number]] Collected: 22 1645    Order Status: Completed Specimen: Urine Updated: 2244     Special Requests: No Special Requests        Culture result: No Growth (<1000 cfu/mL)       CULTURE, BLOOD, PAIRED [[id number]] Collected: 22 1708    Order Status: Completed Specimen: Blood Updated: 22     Special Requests: No Special Requests        Culture result: No growth 2 days                  Diagnostics   CXR Results  (Last 48 hours) None               Assessment/Plan     1. Right great toe ulcer/dry gangrenous changes, underlying PAD, recent MRI neg for osteomyelitis      WBC trending down on serial labs, afebrile, hemodynamically stable       Blood and urine Cxs on current admit are neg       Continue on empiric Zosyn, Clindamycin discontinued per recs by ASP       Will monitor right great toe for superinfection, current changes related to PAD       Continue local wound care per Dr Addie Duarte, not anticipating d/c on IV antibiotics      2. PAD, S/p recent arteriogram w stent placement, and subsequent occlusion per CTA abdo      S/p right common femoral to popliteal bypass surgery w graft placement on 07/19     3. H/o gouty arthritis, no acute flare, continue on Colchicine      4. Right foot pain due to above: Reports no improvement, pain localized in foot and great toe       Dilaudid helps, but hospital out of stock. Prn Morphine ordered in addition to Neurontin for neuropathic pain      5.  DM: A1C of 7.2 on 07/19, was on metformin as out pt, held due to contrast CT, will resume     Cyndi Howe MD    7/21/2022

## 2022-07-21 NOTE — PROGRESS NOTES
Problem: Diabetes Self-Management  Goal: *Disease process and treatment process  Description: Define diabetes and identify own type of diabetes; list 3 options for treating diabetes.   Outcome: Progressing Towards Goal     Problem: Pain  Goal: *Control of Pain  Outcome: Progressing Towards Goal

## 2022-07-21 NOTE — PROGRESS NOTES
General Daily Progress Note          Patient Name:   Zara Epstein       YOB: 1960       Age:  65 y.o. Admit Date: 7/18/2022      Subjective:   Patient is a 65y.o. year old male with a PMH of diabetes, diabetic foot injury, arterial insufficiency, hypertension, GERD, and gout presents to the ED complaining of  right great toe infection. The patient was recently hospitalized for this same problem and received balloon angioplasty of the right lower extremity which initially helped perfusion of the toe. He has since relapsed and complains of excruciating pain that is not well managed on percocet. The patient states that it greatly progress in the last 24 hours and there is now black and gangrenous lesion on the plantar aspect of the toe with swelling over the entire foot. Patient denies any chest pain, SOB, difficulty breathing, palpitations, dizziness, N/V/D, or fevers. X-ray of R great toe:  IMPRESSION  No evidence of fracture or dislocation. Evidence of gout. No  significant change      Seen by the vascular surgeon this morning patient need right common femoral artery above-knee popliteal bypass       7/20  Patient resting in bed comfortably stating pain is well managed. Dressing show iodinated bandaged around affected toe without obvious bleeding. Day 1 s/p bypass of right popliteal artery  Resuming lovenox    7/21  Patient laying in bed stating pain is worse than yesterday d/t discontinuing dilaudid. Pt now on Toradol.   Per trauma surgery - hold heparin for 6h d/t groin bleed 2/2 surgery  Albumin 2.4    Objective:     Visit Vitals  BP (!) 176/83 (BP 1 Location: Left upper arm, BP Patient Position: At rest)   Pulse 82   Temp 98.2 °F (36.8 °C)   Resp 18   Ht 6' 1\" (1.854 m)   Wt 106.1 kg (234 lb)   SpO2 98%   BMI 30.87 kg/m²        Recent Results (from the past 24 hour(s))   GLUCOSE, POC    Collection Time: 07/20/22  8:57 AM   Result Value Ref Range    Glucose (POC) 118 (H) 65 - 117 mg/dL    Performed by Sabrina Griffin    GLUCOSE, POC    Collection Time: 07/20/22 11:37 AM   Result Value Ref Range    Glucose (POC) 130 (H) 65 - 117 mg/dL    Performed by Abby Sosa, POC    Collection Time: 07/20/22  4:28 PM   Result Value Ref Range    Glucose (POC) 134 (H) 65 - 117 mg/dL    Performed by Venkat Méndez    PTT    Collection Time: 07/20/22  6:00 PM   Result Value Ref Range    aPTT 34.4 (H) 21.2 - 34.1 sec    aPTT, therapeutic range   82 - 109 sec   GLUCOSE, POC    Collection Time: 07/20/22  9:39 PM   Result Value Ref Range    Glucose (POC) 152 (H) 65 - 117 mg/dL    Performed by Shon Mendez    PTT    Collection Time: 07/20/22 11:39 PM   Result Value Ref Range    aPTT 38.9 (H) 21.2 - 34.1 sec    aPTT, therapeutic range   82 - 109 sec   GLUCOSE, POC    Collection Time: 07/21/22  8:17 AM   Result Value Ref Range    Glucose (POC) 147 (H) 65 - 117 mg/dL    Performed by Emiliano Oh      [unfilled]      Review of Systems    Constitutional: Negative for chills and fever. HENT: Negative. Eyes: Negative. Respiratory: Negative. Cardiovascular: Negative. Gastrointestinal: Negative for abdominal pain and nausea. Skin: Negative. Neurological: Negative. Physical Exam:      Constitutional: pt is oriented to person, place, and time. HENT:   Head: Normocephalic and atraumatic. Eyes: Pupils are equal, round, and reactive to light. EOM are normal.   Cardiovascular: Normal rate, regular rhythm and normal heart sounds. Pulmonary/Chest: Breath sounds normal. No wheezes. No rales. Exhibits no tenderness. Abdominal: Soft. Bowel sounds are normal. There is no abdominal tenderness. There is no rebound and no guarding. Musculoskeletal: Normal range of motion. Neurological: pt is alert and oriented to person, place, and time.      CTA ABD ART W RUNOFF W WO CONT   Final Result      Occluded long segment right superficial femoral artery stent with reconstitution   in the popliteal artery which demonstrates mild to moderate atherosclerotic   changes. Questionable patency of the distal tibial and peroneal runoff with   limited evaluation due to poor flow-related opacification and arterial   calcifications. Mild to moderate atherosclerotic changes in the left lower extremity with   probable single-vessel runoff to the left foot. Indeterminate bilateral adrenal nodules measuring up to 2 cm. Consider   nonemergent adrenal CT protocol. Bullous/cystic changes in the lung bases. Please refer to above findings for complete details. XR GREAT TOE RT MIN 2 V   Final Result   No evidence of fracture or dislocation. Evidence of gout. No   significant change.               Recent Results (from the past 24 hour(s))   GLUCOSE, POC    Collection Time: 07/20/22  8:57 AM   Result Value Ref Range    Glucose (POC) 118 (H) 65 - 117 mg/dL    Performed by Douglass Cooks    GLUCOSE, POC    Collection Time: 07/20/22 11:37 AM   Result Value Ref Range    Glucose (POC) 130 (H) 65 - 117 mg/dL    Performed by Deja Angel, POC    Collection Time: 07/20/22  4:28 PM   Result Value Ref Range    Glucose (POC) 134 (H) 65 - 117 mg/dL    Performed by Niurka Littlejohn    PTT    Collection Time: 07/20/22  6:00 PM   Result Value Ref Range    aPTT 34.4 (H) 21.2 - 34.1 sec    aPTT, therapeutic range   82 - 109 sec   GLUCOSE, POC    Collection Time: 07/20/22  9:39 PM   Result Value Ref Range    Glucose (POC) 152 (H) 65 - 117 mg/dL    Performed by Marcellus Card    PTT    Collection Time: 07/20/22 11:39 PM   Result Value Ref Range    aPTT 38.9 (H) 21.2 - 34.1 sec    aPTT, therapeutic range   82 - 109 sec   GLUCOSE, POC    Collection Time: 07/21/22  8:17 AM   Result Value Ref Range    Glucose (POC) 147 (H) 65 - 117 mg/dL    Performed by CamronQteros        Results       Procedure Component Value Units Date/Time    CULTURE, URINE [[id number]] Collected: 07/19/22 1645    Order Status: Completed Specimen: Urine Updated: 07/21/22 0844     Special Requests: No Special Requests        Culture result: No Growth (<1000 cfu/mL)       CULTURE, BLOOD, PAIRED [[id number]] Collected: 07/18/22 1708    Order Status: Completed Specimen: Blood Updated: 07/20/22 0728     Special Requests: No Special Requests        Culture result: No growth 1 day                Labs:     Recent Labs     07/20/22 0434 07/19/22 0354   WBC 19.8* 17.7*   HGB 10.9* 12.2   HCT 32.5* 36.8   * 453*       Recent Labs     07/20/22 0434 07/19/22  0354 07/18/22 1702   * 134* 134*   K 4.2 4.1 4.2    103 100   CO2 23 24 25   BUN 9 12 14   CREA 0.66* 0.65* 0.85   * 148* 159*   CA 8.4* 9.1 9.4       Recent Labs     07/20/22 0434 07/19/22 0354 07/18/22 1702   ALT 22 27 30   AP 75 84 98   TBILI 1.0 0.6 1.1*   TP 6.1* 7.7 8.0   ALB 2.4* 2.7* 3.4*   GLOB 3.7 5.0* 4.6*       Recent Labs     07/20/22  2339 07/20/22  1800 07/20/22  0434 07/19/22  0552 07/18/22  2320 07/18/22 1702   INR  --   --   --   --  1.4* 1.4*   PTP  --   --   --   --  17.7* 17.7*   APTT 38.9* 34.4* 33.9   < > 50.3* 33.6    < > = values in this interval not displayed. No results for input(s): FE, TIBC, PSAT, FERR in the last 72 hours. No results found for: FOL, RBCF   No results for input(s): PH, PCO2, PO2 in the last 72 hours.   Recent Labs     07/18/22 1702          No results found for: CHOL, CHOLX, CHLST, CHOLV, HDL, HDLP, LDL, LDLC, DLDLP, TGLX, TRIGL, TRIGP, CHHD, CHHDX  Lab Results   Component Value Date/Time    Glucose (POC) 147 (H) 07/21/2022 08:17 AM    Glucose (POC) 152 (H) 07/20/2022 09:39 PM    Glucose (POC) 134 (H) 07/20/2022 04:28 PM    Glucose (POC) 130 (H) 07/20/2022 11:37 AM    Glucose (POC) 118 (H) 07/20/2022 08:57 AM     No results found for: COLOR, APPRN, SPGRU, REFSG, EPHRAIM, PROTU, GLUCU, KETU, BILU, UROU, VIANEY, LEUKU, GLUKE, EPSU, BACTU, WBCU, RBCU, CASTS, UCRY      Assessment:     Arterial Insufficiency s/p balloon angioplasty  Right great toe infection  Diabetes Mellitus  Diabetic foot injury  Hypertension  GERD  Gout  RA  Smoking      Plan:   Day 2 s/p bypass  Hold heparin 6h per trauma surgery     Discontinue clindamycin per pharmacy    Zosyn 3.375 IV every 8 hours  Sliding-scale insulin  Lisinopril 20 mg daily  Protonix 40 mg daily  Amlodipine 10 mg daily      Discussed with the patient and patient wife        Current Facility-Administered Medications:     HYDROmorphone (DILAUDID) injection 1 mg, 1 mg, IntraVENous, Q4H PRN, Veronica Martínez MD    heparin (porcine) 1,000 unit/mL injection 4,000 Units, 4,000 Units, IntraVENous, PRN, 4,000 Units at 07/20/22 1910 **OR** heparin (porcine) 1,000 unit/mL injection 2,000 Units, 2,000 Units, IntraVENous, PRN, Veronica Martínez MD, 2,000 Units at 07/21/22 0145    heparin 25,000 units in D5W 250 ml infusion, 11-25 Units/kg/hr (Adjusted), IntraVENous, TITRATE, Veronica Martínez MD, Held at 07/21/22 0645    clindamycin (CLEOCIN) 600mg D5W 50mL IVPB (premix), 600 mg, IntraVENous, Q6H, Veronica Martínez MD, Last Rate: 100 mL/hr at 07/21/22 0437, 600 mg at 07/21/22 0437    sodium chloride (NS) flush 5-40 mL, 5-40 mL, IntraVENous, Q8H, Veronica Martínez MD, 10 mL at 07/21/22 0441    sodium chloride (NS) flush 5-40 mL, 5-40 mL, IntraVENous, PRN, Veronica Martínez MD    ondansetron (ZOFRAN ODT) tablet 4 mg, 4 mg, Oral, Q8H PRN **OR** ondansetron (ZOFRAN) injection 4 mg, 4 mg, IntraVENous, Q6H PRN, Veronica Martínez MD    enoxaparin (LOVENOX) injection 40 mg, 40 mg, SubCUTAneous, DAILY, Veronica Martínez MD, 40 mg at 07/20/22 0850    0.9% sodium chloride infusion, 125 mL/hr, IntraVENous, CONTINUOUS, Veronica Martínez MD, Last Rate: 125 mL/hr at 07/21/22 0139, 125 mL/hr at 07/21/22 0139    oxyCODONE-acetaminophen (PERCOCET 10)  mg per tablet 1 Tablet, 1 Tablet, Oral, Q4H PRN, MauricioVeronica MD, 1 Tablet at 07/21/22 0436    ketorolac (TORADOL) injection 30 mg, 30 mg, IntraVENous, Q6H PRN, Mauricio, Morris Alejandre MD, 30 mg at 07/21/22 6728    amLODIPine (NORVASC) tablet 10 mg, 10 mg, Oral, DAILY, Morris Martínez MD, 10 mg at 07/20/22 0850    lisinopriL (PRINIVIL, ZESTRIL) tablet 20 mg, 20 mg, Oral, DAILY, Morris Martínez MD, 20 mg at 07/20/22 0850    colchicine tablet 0.6 mg, 0.6 mg, Oral, DAILY, Morris Martínez MD, 0.6 mg at 07/20/22 0850    insulin lispro (HUMALOG) injection, , SubCUTAneous, AC&HS, Morris Martínez MD    glucose chewable tablet 16 g, 4 Tablet, Oral, PRN, Morris Martínez MD    glucagon [facility]) injection 1 mg, 1 mg, IntraMUSCular, PRN, Morris Martínez MD    0.9% sodium chloride infusion, 75 mL/hr, IntraVENous, CONTINUOUS, MauricioMorris MD, IV Completed at 07/19/22 1225    acetaminophen (TYLENOL) tablet 650 mg, 650 mg, Oral, Q6H PRN **OR** acetaminophen (TYLENOL) suppository 650 mg, 650 mg, Rectal, Q6H PRN, Morris Martínez MD    polyethylene glycol (MIRALAX) packet 17 g, 17 g, Oral, DAILY PRN, Morris Martínez MD    ondansetron (ZOFRAN ODT) tablet 4 mg, 4 mg, Oral, Q8H PRN **OR** ondansetron (ZOFRAN) injection 4 mg, 4 mg, IntraVENous, Q6H PRN, Morris Martínez MD    piperacillin-tazobactam (ZOSYN) 3.375 g in 0.9% sodium chloride (MBP/ADV) 100 mL MBP, 3.375 g, IntraVENous, Q8H, Morris Martínez MD, Last Rate: 25 mL/hr at 07/21/22 0437, 3.375 g at 07/21/22 0437    pantoprazole (PROTONIX) tablet 40 mg, 40 mg, Oral, ACB, Morris Martínez MD, 40 mg at 07/20/22 0057

## 2022-07-21 NOTE — PROGRESS NOTES
Clinical chart reviewed. At this time patient will return home self-care with family. He currently has no insurance and we will not be able to arrange Home Health. DC plan: home self-care.

## 2022-07-21 NOTE — ANTIMICROBIAL STEWARDSHIP
The Antimicrobial Stewardship Team has reviewed current therapy. Patient is on Zosyn and Cleocin for SSTI indication. Blood is showing no growth and no wound culture has been obtained. Consider d/c Cleocin, as it puts the patient at high risk for developing C.difficile.

## 2022-07-21 NOTE — ROUTINE PROCESS
Pt Heparin to be resumed after 6 hrs of holding. Pt Heparin resumed at previous rate 17u\kg/hr.  Ptt to be redrawn by lab at 6pm.

## 2022-07-21 NOTE — PROGRESS NOTES
General Daily Progress Note          Patient Name:   Meg Genao       YOB: 1960       Age:  65 y.o. Admit Date: 7/18/2022      Subjective:   Patient is a 65y.o. year old male with a PMH of diabetes, diabetic foot injury, arterial insufficiency, hypertension, GERD, and gout presents to the ED complaining of  right great toe infection. The patient was recently hospitalized for this same problem and received balloon angioplasty of the right lower extremity which initially helped perfusion of the toe. He has since relapsed and complains of excruciating pain that is not well managed on percocet. The patient states that it greatly progress in the last 24 hours and there is now black and gangrenous lesion on the plantar aspect of the toe with swelling over the entire foot. Patient denies any chest pain, SOB, difficulty breathing, palpitations, dizziness, N/V/D, or fevers. X-ray of R great toe:  IMPRESSION  No evidence of fracture or dislocation. Evidence of gout. No  significant change      Seen by the vascular surgeon this morning patient need right common femoral artery above-knee popliteal bypass       7/20  Patient resting in bed comfortably stating pain is well managed. Dressing show iodinated bandaged around affected toe without obvious bleeding. Day 1 s/p bypass of right popliteal artery  Resuming lovenox    7/21  Patient laying in bed stating pain is worse than yesterday d/t discontinuing dilaudid. Pt now on Toradol.   Per trauma surgery - hold heparin for 6h d/t groin bleed 2/2 surgery  Albumin 2.4    Objective:     Visit Vitals  BP (!) 176/83 (BP 1 Location: Left upper arm, BP Patient Position: At rest)   Pulse 82   Temp 98.2 °F (36.8 °C)   Resp 18   Ht 6' 1\" (1.854 m)   Wt 106.1 kg (234 lb)   SpO2 98%   BMI 30.87 kg/m²        Recent Results (from the past 24 hour(s))   GLUCOSE, POC    Collection Time: 07/20/22  4:28 PM   Result Value Ref Range    Glucose (POC) 134 (H) 65 - 117 mg/dL    Performed by Yazan Lawler    PTT    Collection Time: 07/20/22  6:00 PM   Result Value Ref Range    aPTT 34.4 (H) 21.2 - 34.1 sec    aPTT, therapeutic range   82 - 109 sec   GLUCOSE, POC    Collection Time: 07/20/22  9:39 PM   Result Value Ref Range    Glucose (POC) 152 (H) 65 - 117 mg/dL    Performed by Elia Méndez    PTT    Collection Time: 07/20/22 11:39 PM   Result Value Ref Range    aPTT 38.9 (H) 21.2 - 34.1 sec    aPTT, therapeutic range   82 - 109 sec   CBC W/O DIFF    Collection Time: 07/21/22  7:59 AM   Result Value Ref Range    WBC 15.4 (H) 4.1 - 11.1 K/uL    RBC 3.71 (L) 4.10 - 5.70 M/uL    HGB 10.9 (L) 12.1 - 17.0 g/dL    HCT 32.4 (L) 36.6 - 50.3 %    MCV 87.3 80.0 - 99.0 FL    MCH 29.4 26.0 - 34.0 PG    MCHC 33.6 30.0 - 36.5 g/dL    RDW 12.5 11.5 - 14.5 %    PLATELET 749 483 - 936 K/uL    MPV 9.8 8.9 - 12.9 FL    NRBC 0.0 0.0  WBC    ABSOLUTE NRBC 0.00 0.00 - 7.79 K/uL   METABOLIC PANEL, COMPREHENSIVE    Collection Time: 07/21/22  7:59 AM   Result Value Ref Range    Sodium 135 (L) 136 - 145 mmol/L    Potassium 3.8 3.5 - 5.1 mmol/L    Chloride 104 97 - 108 mmol/L    CO2 24 21 - 32 mmol/L    Anion gap 7 5 - 15 mmol/L    Glucose 122 (H) 65 - 100 mg/dL    BUN 7 6 - 20 mg/dL    Creatinine 0.54 (L) 0.70 - 1.30 mg/dL    BUN/Creatinine ratio 13 12 - 20      GFR est AA >60 >60 ml/min/1.73m2    GFR est non-AA >60 >60 ml/min/1.73m2    Calcium 8.4 (L) 8.5 - 10.1 mg/dL    Bilirubin, total 0.8 0.2 - 1.0 mg/dL    AST (SGOT) 19 15 - 37 U/L    ALT (SGPT) 27 12 - 78 U/L    Alk.  phosphatase 84 45 - 117 U/L    Protein, total 6.3 (L) 6.4 - 8.2 g/dL    Albumin 2.4 (L) 3.5 - 5.0 g/dL    Globulin 3.9 2.0 - 4.0 g/dL    A-G Ratio 0.6 (L) 1.1 - 2.2     PTT    Collection Time: 07/21/22  7:59 AM   Result Value Ref Range    aPTT 33.2 21.2 - 34.1 sec    aPTT, therapeutic range   82 - 109 sec   GLUCOSE, POC    Collection Time: 07/21/22  8:17 AM   Result Value Ref Range    Glucose (POC) 147 (H) 65 - 117 mg/dL Performed by Jagdeep Gil    GLUCOSE, POC    Collection Time: 07/21/22 11:33 AM   Result Value Ref Range    Glucose (POC) 245 (H) 65 - 117 mg/dL    Performed by Jagdeep Gil      [unfilled]      Review of Systems    Constitutional: Negative for chills and fever. HENT: Negative. Eyes: Negative. Respiratory: Negative. Cardiovascular: Negative. Gastrointestinal: Negative for abdominal pain and nausea. Skin: Negative. Neurological: Negative. Physical Exam:      Constitutional: pt is oriented to person, place, and time. HENT:   Head: Normocephalic and atraumatic. Eyes: Pupils are equal, round, and reactive to light. EOM are normal.   Cardiovascular: Normal rate, regular rhythm and normal heart sounds. Pulmonary/Chest: Breath sounds normal. No wheezes. No rales. Exhibits no tenderness. Abdominal: Soft. Bowel sounds are normal. There is no abdominal tenderness. There is no rebound and no guarding. Musculoskeletal: Normal range of motion. Neurological: pt is alert and oriented to person, place, and time. CTA ABD ART W RUNOFF W WO CONT   Final Result      Occluded long segment right superficial femoral artery stent with reconstitution   in the popliteal artery which demonstrates mild to moderate atherosclerotic   changes. Questionable patency of the distal tibial and peroneal runoff with   limited evaluation due to poor flow-related opacification and arterial   calcifications. Mild to moderate atherosclerotic changes in the left lower extremity with   probable single-vessel runoff to the left foot. Indeterminate bilateral adrenal nodules measuring up to 2 cm. Consider   nonemergent adrenal CT protocol. Bullous/cystic changes in the lung bases. Please refer to above findings for complete details. XR GREAT TOE RT MIN 2 V   Final Result   No evidence of fracture or dislocation. Evidence of gout. No   significant change.               Recent Results (from the past 24 hour(s))   GLUCOSE, POC    Collection Time: 07/20/22  4:28 PM   Result Value Ref Range    Glucose (POC) 134 (H) 65 - 117 mg/dL    Performed by Rebecca Solano    PTT    Collection Time: 07/20/22  6:00 PM   Result Value Ref Range    aPTT 34.4 (H) 21.2 - 34.1 sec    aPTT, therapeutic range   82 - 109 sec   GLUCOSE, POC    Collection Time: 07/20/22  9:39 PM   Result Value Ref Range    Glucose (POC) 152 (H) 65 - 117 mg/dL    Performed by Arlene Rae    PTT    Collection Time: 07/20/22 11:39 PM   Result Value Ref Range    aPTT 38.9 (H) 21.2 - 34.1 sec    aPTT, therapeutic range   82 - 109 sec   CBC W/O DIFF    Collection Time: 07/21/22  7:59 AM   Result Value Ref Range    WBC 15.4 (H) 4.1 - 11.1 K/uL    RBC 3.71 (L) 4.10 - 5.70 M/uL    HGB 10.9 (L) 12.1 - 17.0 g/dL    HCT 32.4 (L) 36.6 - 50.3 %    MCV 87.3 80.0 - 99.0 FL    MCH 29.4 26.0 - 34.0 PG    MCHC 33.6 30.0 - 36.5 g/dL    RDW 12.5 11.5 - 14.5 %    PLATELET 860 334 - 906 K/uL    MPV 9.8 8.9 - 12.9 FL    NRBC 0.0 0.0  WBC    ABSOLUTE NRBC 0.00 0.00 - 7.18 K/uL   METABOLIC PANEL, COMPREHENSIVE    Collection Time: 07/21/22  7:59 AM   Result Value Ref Range    Sodium 135 (L) 136 - 145 mmol/L    Potassium 3.8 3.5 - 5.1 mmol/L    Chloride 104 97 - 108 mmol/L    CO2 24 21 - 32 mmol/L    Anion gap 7 5 - 15 mmol/L    Glucose 122 (H) 65 - 100 mg/dL    BUN 7 6 - 20 mg/dL    Creatinine 0.54 (L) 0.70 - 1.30 mg/dL    BUN/Creatinine ratio 13 12 - 20      GFR est AA >60 >60 ml/min/1.73m2    GFR est non-AA >60 >60 ml/min/1.73m2    Calcium 8.4 (L) 8.5 - 10.1 mg/dL    Bilirubin, total 0.8 0.2 - 1.0 mg/dL    AST (SGOT) 19 15 - 37 U/L    ALT (SGPT) 27 12 - 78 U/L    Alk.  phosphatase 84 45 - 117 U/L    Protein, total 6.3 (L) 6.4 - 8.2 g/dL    Albumin 2.4 (L) 3.5 - 5.0 g/dL    Globulin 3.9 2.0 - 4.0 g/dL    A-G Ratio 0.6 (L) 1.1 - 2.2     PTT    Collection Time: 07/21/22  7:59 AM   Result Value Ref Range    aPTT 33.2 21.2 - 34.1 sec    aPTT, therapeutic range 82 - 109 sec   GLUCOSE, POC    Collection Time: 07/21/22  8:17 AM   Result Value Ref Range    Glucose (POC) 147 (H) 65 - 117 mg/dL    Performed by Emiliano Oh    GLUCOSE, POC    Collection Time: 07/21/22 11:33 AM   Result Value Ref Range    Glucose (POC) 245 (H) 65 - 117 mg/dL    Performed by Emiliano Oh        Results       Procedure Component Value Units Date/Time    CULTURE, URINE [[id number]] Collected: 07/19/22 1645    Order Status: Completed Specimen: Urine Updated: 07/21/22 0844     Special Requests: No Special Requests        Culture result: No Growth (<1000 cfu/mL)       CULTURE, BLOOD, PAIRED [[id number]] Collected: 07/18/22 1708    Order Status: Completed Specimen: Blood Updated: 07/21/22 0915     Special Requests: No Special Requests        Culture result: No growth 2 days                Labs:     Recent Labs     07/21/22 0759 07/20/22  0434   WBC 15.4* 19.8*   HGB 10.9* 10.9*   HCT 32.4* 32.5*    428*       Recent Labs     07/21/22 0759 07/20/22  0434 07/19/22  0354   * 133* 134*   K 3.8 4.2 4.1    102 103   CO2 24 23 24   BUN 7 9 12   CREA 0.54* 0.66* 0.65*   * 102* 148*   CA 8.4* 8.4* 9.1       Recent Labs     07/21/22  0759 07/20/22  0434 07/19/22  0354   ALT 27 22 27   AP 84 75 84   TBILI 0.8 1.0 0.6   TP 6.3* 6.1* 7.7   ALB 2.4* 2.4* 2.7*   GLOB 3.9 3.7 5.0*       Recent Labs     07/21/22  0759 07/20/22  2339 07/20/22  1800 07/19/22  0552 07/18/22  2320 07/18/22  1702   INR  --   --   --   --  1.4* 1.4*   PTP  --   --   --   --  17.7* 17.7*   APTT 33.2 38.9* 34.4*   < > 50.3* 33.6    < > = values in this interval not displayed. No results for input(s): FE, TIBC, PSAT, FERR in the last 72 hours. No results found for: FOL, RBCF   No results for input(s): PH, PCO2, PO2 in the last 72 hours.   Recent Labs     07/18/22  1702          No results found for: CHOL, CHOLX, CHLST, CHOLV, HDL, HDLP, LDL, LDLC, DLDLP, TGLX, TRIGL, TRIGP, CHHD, CHHDX  Lab Results Component Value Date/Time    Glucose (POC) 245 (H) 07/21/2022 11:33 AM    Glucose (POC) 147 (H) 07/21/2022 08:17 AM    Glucose (POC) 152 (H) 07/20/2022 09:39 PM    Glucose (POC) 134 (H) 07/20/2022 04:28 PM    Glucose (POC) 130 (H) 07/20/2022 11:37 AM     No results found for: COLOR, APPRN, SPGRU, REFSG, EPHRAIM, PROTU, GLUCU, KETU, BILU, UROU, VIANEY, LEUKU, GLUKE, EPSU, BACTU, WBCU, RBCU, CASTS, UCRY      Assessment:     Arterial Insufficiency s/p balloon angioplasty  Right great toe infection  Diabetes Mellitus  Diabetic foot injury  Hypertension  GERD  Gout  RA  Smoking      Plan:   Day 2 s/p bypass  Hold heparin 6h per trauma surgery     Discontinue clindamycin per pharmacy    Zosyn 3.375 IV every 8 hours  Sliding-scale insulin  Lisinopril 20 mg daily  Protonix 40 mg daily  Amlodipine 10 mg daily      Discussed with the patient and patient wife        Current Facility-Administered Medications:     oxyCODONE ER (OxyCONTIN) tablet 20 mg, 20 mg, Oral, BID, Odin Martínez MD, 20 mg at 07/21/22 1042    gabapentin (NEURONTIN) capsule 300 mg, 300 mg, Oral, BID, Vicky West MD    morphine injection 1 mg, 1 mg, IntraVENous, Q3H PRN, Vicky West MD    metFORMIN (GLUCOPHAGE) tablet 500 mg, 500 mg, Oral, BID WITH MEALS, Vicky West MD    heparin (porcine) 1,000 unit/mL injection 4,000 Units, 4,000 Units, IntraVENous, PRN, 4,000 Units at 07/20/22 1910 **OR** heparin (porcine) 1,000 unit/mL injection 2,000 Units, 2,000 Units, IntraVENous, PRN, Odin Martínez MD, 2,000 Units at 07/21/22 0145    heparin 25,000 units in D5W 250 ml infusion, 11-25 Units/kg/hr (Adjusted), IntraVENous, TITRATE, Odin Martínez MD, Held at 07/21/22 0645    sodium chloride (NS) flush 5-40 mL, 5-40 mL, IntraVENous, Q8H, Odin Martínez MD, 10 mL at 07/21/22 0441    sodium chloride (NS) flush 5-40 mL, 5-40 mL, IntraVENous, PRN, Odin Martínez MD    ondansetron (ZOFRAN ODT) tablet 4 mg, 4 mg, Oral, Q8H PRN **OR** ondansetron (ZOFRAN) injection 4 mg, 4 mg, IntraVENous, Q6H PRN, Maria De Jesus Martínez MD    enoxaparin (LOVENOX) injection 40 mg, 40 mg, SubCUTAneous, DAILY, Maria De Jesus Martínez MD, 40 mg at 07/20/22 0850    0.9% sodium chloride infusion, 125 mL/hr, IntraVENous, CONTINUOUS, Maria De Jesus Martínez MD, Last Rate: 125 mL/hr at 07/21/22 1328, 125 mL/hr at 07/21/22 1328    oxyCODONE-acetaminophen (PERCOCET 10)  mg per tablet 1 Tablet, 1 Tablet, Oral, Q4H PRN, Maria De Jesus Martínez MD, 1 Tablet at 07/21/22 1328    ketorolac (TORADOL) injection 30 mg, 30 mg, IntraVENous, Q6H PRN, Maria De Jesus Martínez MD, 30 mg at 07/21/22 1276    amLODIPine (NORVASC) tablet 10 mg, 10 mg, Oral, DAILY, Maria De Jesus Martínez MD, 10 mg at 07/21/22 0931    lisinopriL (PRINIVIL, ZESTRIL) tablet 20 mg, 20 mg, Oral, DAILY, Maria De Jesus Martínez MD, 20 mg at 07/21/22 0931    colchicine tablet 0.6 mg, 0.6 mg, Oral, DAILY, Maria De Jesus Martínez MD, 0.6 mg at 07/21/22 0932    insulin lispro (HUMALOG) injection, , SubCUTAneous, AC&HS, Maria De Jesus Martínez MD    glucose chewable tablet 16 g, 4 Tablet, Oral, PRN, Maria De Jesus Martínez MD    glucagon [facility]) injection 1 mg, 1 mg, IntraMUSCular, PRN, Maria De Jesus Martínez MD    0.9% sodium chloride infusion, 75 mL/hr, IntraVENous, CONTINUOUS, Maria De Jesus Martínez MD, IV Completed at 07/19/22 1225    acetaminophen (TYLENOL) tablet 650 mg, 650 mg, Oral, Q6H PRN **OR** acetaminophen (TYLENOL) suppository 650 mg, 650 mg, Rectal, Q6H PRN, Maria De Jesus Martínez MD    polyethylene glycol (MIRALAX) packet 17 g, 17 g, Oral, DAILY PRN, Maria De Jesus Martínez MD    ondansetron (ZOFRAN ODT) tablet 4 mg, 4 mg, Oral, Q8H PRN **OR** ondansetron (ZOFRAN) injection 4 mg, 4 mg, IntraVENous, Q6H PRN, Maria De Jesus Martínez MD    piperacillin-tazobactam (ZOSYN) 3.375 g in 0.9% sodium chloride (MBP/ADV) 100 mL MBP, 3.375 g, IntraVENous, Q8H, Maria De Jesus Martínez MD, Last Rate: 25 mL/hr at 07/21/22 1043, 3.375 g at 07/21/22 1043    pantoprazole (PROTONIX) tablet 40 mg, 40 mg, Oral, ACB, Maria De Jesus Martínez MD, 40 mg at 07/21/22 5273

## 2022-07-21 NOTE — PROGRESS NOTES
Bedside shift change report given to Abdiaziz Torres (oncoming nurse) by Garvey [facility] (offgoing nurse). Report included the following information SBAR.

## 2022-07-21 NOTE — PROGRESS NOTES
Was told by Dr Altaf Howell to hold Heparin drip due to bleeding from wounds. Heparin was held at 0645 and day shift RN was made aware. Also, No IV dilaudid was in either omnicells, a message was sent to pharmacy. Was told by pharmacy that there was no longer any dilaudid on hand. Dr. Altaf Howell made aware and was asked to change orders.

## 2022-07-21 NOTE — PROGRESS NOTES
Spiritual Care Assessment/Progress Note  [facility]      NAME: Aminta Maria      MRN: [id number]  AGE: 65 y.o.  SEX: male  Advent Affiliation: Other   Language: English     7/21/2022     Total Time (in minutes): 18     Spiritual Assessment begun in [facility] through conversation with:         [x]Patient        [] Family    [] Friend(s)        Reason for Consult: Initial/Spiritual assessment, patient floor     Spiritual beliefs: (Please include comment if needed)     [x] Identifies with a rene tradition:  Scientology       [x] Supported by a rene community:            [] Claims no spiritual orientation:           [] Seeking spiritual identity:                [] Adheres to an individual form of spirituality:           [] Not able to assess:                           Identified resources for coping:      [x] Prayer                               [] Music                  [] Guided Imagery     [x] Family/friends                 [] Pet visits     [] Devotional reading                         [] Unknown     [] Other:                                             Interventions offered during this visit: (See comments for more details)    Patient Interventions: Affirmation of emotions/emotional suffering, Affirmation of rene, Catharsis/review of pertinent events in supportive environment, Coping skills reviewed/reinforced, Initial/Spiritual assessment, patient floor, Normalization of emotional/spiritual concerns, Prayer (actual), Life review/legacy, Iconic (affirming the presence of God/Higher Power), Advent beliefs/image of God discussed, Bridging           Plan of Care:     [] Support spiritual and/or cultural needs    [] Support AMD and/or advance care planning process      [] Support grieving process   [] Coordinate Rites and/or Rituals    [] Coordination with community clergy   [] No spiritual needs identified at this time   [] Detailed Plan of Care below (See Comments)  [] Make referral to Music Therapy  [] Make referral to Pet Therapy     [] Make referral to Addiction services  [] Make referral to [facility]  [] Make referral to Spiritual Care Partner  [] No future visits requested        [x] Contact Spiritual Care for further referrals     Comments:  Visited patient in [address] [facility] for initial assessment. Patient was alone during the visit. Patient shared about his medical situation and future care needs. Stated he has good support of churches, pastors and family members, and furthered shared about his spiritual journey and significant relationships. Seemed to process his emotions verbally and tearfully. Provided chaplaincy education and supportive presence while exploring his needs, facilitating storytelling and listening with empathy. Affirmed his health care needs, spiritual connections and relational support. Offered and provided prayer per request and advised of  availability. Contact chaplains for further referrals. Chaplain Anderson Coughlin M.Div.    can be reached by calling the  at [facility]  [phone]

## 2022-07-21 NOTE — PROGRESS NOTES
VASCULAR FOLLOW UP      Subjective:   CHIEF COMPLAINTS:  Right leg ischemia  PRESENTATION OF ILLNESS:  Jonelle Kaye is a 65 y.o. very pleasant man is here today follow-up 2 weeks. Patient apparently did not take Eliquis as instructed. Now patient's right foot has become more dusky. Past Medical History:   Diagnosis Date    Diabetes ([address]       History reviewed. No pertinent surgical history. Family History   Problem Relation Age of Onset    Cancer Mother     Diabetes Mother     Cancer Father       Social History     Tobacco Use    Smoking status: Former     Packs/day: 1.50     Years: 15.00     Pack years: 22.50     Types: Cigarettes     Quit date: 2022     Years since quittin.0    Smokeless tobacco: Never   Substance Use Topics    Alcohol use: Not Currently       Prior to Admission medications    Medication Sig Start Date End Date Taking? Authorizing Provider   indomethacin (INDOCIN) 50 mg capsule Take 50 mg by mouth daily. Yes Provider, Historical   colchicine 0.6 mg tablet Take 0.6 mg by mouth daily. Yes Provider, Historical   lisinopriL (PRINIVIL, ZESTRIL) 20 mg tablet Take 1 Tablet by mouth daily. 22  Yes Leslie Ferrer MD   amLODIPine (NORVASC) 10 mg tablet Take 1 Tablet by mouth daily. 22  Yes Leslie Ferrer MD   apixaban (Eliquis) 5 mg tablet Take 1 Tablet by mouth two (2) times a day. Eliquis 5 mg 2 tablets twice daily for 7 days then 1 tablet twice daily 22  Yes Leslie Henderson MD   pantoprazole (PROTONIX) 40 mg tablet Take 40 mg by mouth daily. Yes Provider, Historical   metFORMIN (GLUCOPHAGE) 500 mg tablet Take 500 mg by mouth daily (with dinner). Yes Provider, Historical   clopidogreL (PLAVIX) 75 mg tab Take 75 mg by mouth daily.     Provider, Historical     No Known Allergies     Review of Systems:  I reviewed the rest of organ systems personally and they were negative signed by Dr. Rochelle Hawkins    Objective:   Visit Vitals  /76 (BP 1 Location: Left upper arm, BP Patient Position: Sitting, BP Cuff Size: Adult)   Pulse 87   Temp 97.8 °F (36.6 °C) (Temporal)   Ht 6' 1\" (1.854 m)   Wt 234 lb (106.1 kg)   SpO2 98%   BMI 30.87 kg/m²     VITAL SIGNS REVIEWED. Physical Exam:  Patient is well-nourished pleasant in conversation is appropriate. Head and neck examination atraumatic, normocephalic. Gaze appropriate. Conversation appropriate. Neck examination shows supple. No mass. No obvious carotid bruit. Chest examination shows lungs are clear bilaterally well-expanded, no crackles or wheezes. Cardiovascular system regular rate, no obvious murmur. Skin warm to touch  and moist, no skin lesions. Abdomen is soft ,not tender or distended bowel sounds present. No palpable mass. Neurological examinations, no focal neuro deficits moving all 4 extremities. Cranial nerves intact. Sensation is intact as well. Hematologic: No obvious bruise or swelling or obvious lymphadenopathy. Psychosocial: Appropriate. Has good effect. Musculoskeletal system: No muscle wasting, appropriate movements upper and lower extremity. Vascular examination: Very sluggish monophasic PT noted in right foot.         Data Review:   Admission on 07/18/2022   Component Date Value Ref Range Status    WBC 07/18/2022 26.8 (A) 4.1 - 11.1 K/uL Final    RBC 07/18/2022 4.83  4.10 - 5.70 M/uL Final    HGB 07/18/2022 14.4  12.1 - 17.0 g/dL Final    HCT 07/18/2022 42.4  36.6 - 50.3 % Final    MCV 07/18/2022 87.8  80.0 - 99.0 FL Final    MCH 07/18/2022 29.8  26.0 - 34.0 PG Final    MCHC 07/18/2022 34.0  30.0 - 36.5 g/dL Final    RDW 07/18/2022 12.3  11.5 - 14.5 % Final    PLATELET 73/64/1447 842 (A) 150 - 400 K/uL Final    MPV 07/18/2022 9.7  8.9 - 12.9 FL Final    NRBC 07/18/2022 0.0  0.0  WBC Final    ABSOLUTE NRBC 07/18/2022 0.00  0.00 - 0.01 K/uL Final    NEUTROPHILS 07/18/2022 85 (A) 32 - 75 % Final    LYMPHOCYTES 07/18/2022 5 (A) 12 - 49 % Final    MONOCYTES 07/18/2022 7  5 - 13 % Final EOSINOPHILS 07/18/2022 1  0 - 7 % Final    BASOPHILS 07/18/2022 1  0 - 1 % Final    IMMATURE GRANULOCYTES 07/18/2022 1 (A) 0 - 0.5 % Final    ABS. NEUTROPHILS 07/18/2022 23.2 (A) 1.8 - 8.0 K/UL Final    ABS. LYMPHOCYTES 07/18/2022 1.3  0.8 - 3.5 K/UL Final    ABS. MONOCYTES 07/18/2022 1.7 (A) 0.0 - 1.0 K/UL Final    ABS. EOSINOPHILS 07/18/2022 0.1  0.0 - 0.4 K/UL Final    ABS. BASOPHILS 07/18/2022 0.2 (A) 0.0 - 0.1 K/UL Final    ABS. IMM. GRANS. 07/18/2022 0.3 (A) 0.00 - 0.04 K/UL Final    DF 07/18/2022 AUTOMATED    Final    Sodium 07/18/2022 134 (A) 136 - 145 mmol/L Final    Potassium 07/18/2022 4.2  3.5 - 5.1 mmol/L Final    Chloride 07/18/2022 100  97 - 108 mmol/L Final    CO2 07/18/2022 25  21 - 32 mmol/L Final    Anion gap 07/18/2022 9  5 - 15 mmol/L Final    Glucose 07/18/2022 159 (A) 65 - 100 mg/dL Final    BUN 07/18/2022 14  6 - 20 mg/dL Final    Creatinine 07/18/2022 0.85  0.70 - 1.30 mg/dL Final    BUN/Creatinine ratio 07/18/2022 16  12 - 20   Final    GFR est AA 07/18/2022 >60  >60 ml/min/1.73m2 Final    GFR est non-AA 07/18/2022 >60  >60 ml/min/1.73m2 Final    Calcium 07/18/2022 9.4  8.5 - 10.1 mg/dL Final    Bilirubin, total 07/18/2022 1.1 (A) 0.2 - 1.0 mg/dL Final    AST (SGOT) 07/18/2022 23  15 - 37 U/L Final    ALT (SGPT) 07/18/2022 30  12 - 78 U/L Final    Alk.  phosphatase 07/18/2022 98  45 - 117 U/L Final    Protein, total 07/18/2022 8.0  6.4 - 8.2 g/dL Final    Albumin 07/18/2022 3.4 (A) 3.5 - 5.0 g/dL Final    Globulin 07/18/2022 4.6 (A) 2.0 - 4.0 g/dL Final    A-G Ratio 07/18/2022 0.7 (A) 1.1 - 2.2   Final    C-Reactive protein 07/18/2022 16.20 (A) 0.00 - 0.60 mg/dL Final    CK 07/18/2022 207  39 - 308 U/L Final    Sed rate, automated 07/18/2022 82 (A) 0 - 20 mm/hr Final    Prothrombin time 07/18/2022 17.7 (A) 11.9 - 14.6 sec Final    INR 07/18/2022 1.4 (A) 0.9 - 1.1   Final    Crossmatch Expiration 07/18/2022 07/21/2022,2359   Final    ABO/Rh(D) 07/18/2022 A Positive   Final    Antibody screen 07/18/2022 Negative   Final    aPTT 07/18/2022 33.6  21.2 - 34.1 sec Final    aPTT, therapeutic range 07/18/2022    82 - 109 sec Final    Special Requests: 07/18/2022 No Special Requests    Preliminary    Culture result: 07/18/2022 No growth 1 day    Preliminary    Lactic acid 07/18/2022 1.4  0.4 - 2.0 mmol/L Final    Sodium 07/19/2022 134 (A) 136 - 145 mmol/L Final    Potassium 07/19/2022 4.1  3.5 - 5.1 mmol/L Final    Chloride 07/19/2022 103  97 - 108 mmol/L Final    CO2 07/19/2022 24  21 - 32 mmol/L Final    Anion gap 07/19/2022 7  5 - 15 mmol/L Final    Glucose 07/19/2022 148 (A) 65 - 100 mg/dL Final    BUN 07/19/2022 12  6 - 20 mg/dL Final    Creatinine 07/19/2022 0.65 (A) 0.70 - 1.30 mg/dL Final    BUN/Creatinine ratio 07/19/2022 18  12 - 20   Final    GFR est AA 07/19/2022 >60  >60 ml/min/1.73m2 Final    GFR est non-AA 07/19/2022 >60  >60 ml/min/1.73m2 Final    Calcium 07/19/2022 9.1  8.5 - 10.1 mg/dL Final    Bilirubin, total 07/19/2022 0.6  0.2 - 1.0 mg/dL Final    AST (SGOT) 07/19/2022 17  15 - 37 U/L Final    ALT (SGPT) 07/19/2022 27  12 - 78 U/L Final    Alk.  phosphatase 07/19/2022 84  45 - 117 U/L Final    Protein, total 07/19/2022 7.7  6.4 - 8.2 g/dL Final    Albumin 07/19/2022 2.7 (A) 3.5 - 5.0 g/dL Final    Globulin 07/19/2022 5.0 (A) 2.0 - 4.0 g/dL Final    A-G Ratio 07/19/2022 0.5 (A) 1.1 - 2.2   Final    WBC 07/19/2022 17.7 (A) 4.1 - 11.1 K/uL Final    RBC 07/19/2022 4.18  4.10 - 5.70 M/uL Final    HGB 07/19/2022 12.2  12.1 - 17.0 g/dL Final    HCT 07/19/2022 36.8  36.6 - 50.3 % Final    MCV 07/19/2022 88.0  80.0 - 99.0 FL Final    MCH 07/19/2022 29.2  26.0 - 34.0 PG Final    MCHC 07/19/2022 33.2  30.0 - 36.5 g/dL Final    RDW 07/19/2022 12.4  11.5 - 14.5 % Final    PLATELET 08/43/9360 561 (A) 150 - 400 K/uL Final    MPV 07/19/2022 9.5  8.9 - 12.9 FL Final    NRBC 07/19/2022 0.0  0.0  WBC Final    ABSOLUTE NRBC 07/19/2022 0.00  0.00 - 0.01 K/uL Final    NEUTROPHILS 07/19/2022 74  32 - 75 % Final    LYMPHOCYTES 07/19/2022 13  12 - 49 % Final    MONOCYTES 07/19/2022 9  5 - 13 % Final    EOSINOPHILS 07/19/2022 2  0 - 7 % Final    BASOPHILS 07/19/2022 1  0 - 1 % Final    IMMATURE GRANULOCYTES 07/19/2022 1 (A) 0 - 0.5 % Final    ABS. NEUTROPHILS 07/19/2022 13.1 (A) 1.8 - 8.0 K/UL Final    ABS. LYMPHOCYTES 07/19/2022 2.3  0.8 - 3.5 K/UL Final    ABS. MONOCYTES 07/19/2022 1.6 (A) 0.0 - 1.0 K/UL Final    ABS. EOSINOPHILS 07/19/2022 0.3  0.0 - 0.4 K/UL Final    ABS. BASOPHILS 07/19/2022 0.2 (A) 0.0 - 0.1 K/UL Final    ABS. IMM.  GRANS. 07/19/2022 0.1 (A) 0.00 - 0.04 K/UL Final    DF 07/19/2022 AUTOMATED    Final    Prothrombin time 07/18/2022 17.7 (A) 11.9 - 14.6 sec Final    INR 07/18/2022 1.4 (A) 0.9 - 1.1   Final    aPTT 07/18/2022 50.3 (A) 21.2 - 34.1 sec Final    aPTT, therapeutic range 07/18/2022    82 - 109 sec Final    Hemoglobin A1c 07/19/2022 7.2 (A) 4.0 - 5.6 % Final    Est. average glucose 07/19/2022 160  mg/dL Final    Glucose (POC) 07/18/2022 128 (A) 65 - 117 mg/dL Final    Performed by 07/18/2022 Sanket Jackson   Final    aPTT 07/19/2022 45.1 (A) 21.2 - 34.1 sec Final    aPTT, therapeutic range 07/19/2022    82 - 109 sec Final    Glucose (POC) 07/19/2022 145 (A) 65 - 117 mg/dL Final    Performed by 07/19/2022 Toño Armenta   Final    Glucose (POC) 07/19/2022 131 (A) 65 - 117 mg/dL Final    Performed by 07/19/2022 ASPEN HUGGINS   Final    Glucose (POC) 07/19/2022 145 (A) 65 - 117 mg/dL Final    Performed by 07/19/2022 MARCIANO MANCUSO   Final    WBC 07/20/2022 19.8 (A) 4.1 - 11.1 K/uL Final    RBC 07/20/2022 3.65 (A) 4.10 - 5.70 M/uL Final    HGB 07/20/2022 10.9 (A) 12.1 - 17.0 g/dL Final    HCT 07/20/2022 32.5 (A) 36.6 - 50.3 % Final    MCV 07/20/2022 89.0  80.0 - 99.0 FL Final    MCH 07/20/2022 29.9  26.0 - 34.0 PG Final    MCHC 07/20/2022 33.5  30.0 - 36.5 g/dL Final    RDW 07/20/2022 12.7  11.5 - 14.5 % Final    PLATELET 35/45/5058 892 (A) 150 - 400 K/uL Final    MPV 07/20/2022 10.0  8.9 - 12.9 FL Final    NRBC 07/20/2022 0.0  0.0  WBC Final    ABSOLUTE NRBC 07/20/2022 0.00  0.00 - 0.01 K/uL Final    Sodium 07/20/2022 133 (A) 136 - 145 mmol/L Final    Potassium 07/20/2022 4.2  3.5 - 5.1 mmol/L Final    Chloride 07/20/2022 102  97 - 108 mmol/L Final    CO2 07/20/2022 23  21 - 32 mmol/L Final    Anion gap 07/20/2022 8  5 - 15 mmol/L Final    Glucose 07/20/2022 102 (A) 65 - 100 mg/dL Final    BUN 07/20/2022 9  6 - 20 mg/dL Final    Creatinine 07/20/2022 0.66 (A) 0.70 - 1.30 mg/dL Final    BUN/Creatinine ratio 07/20/2022 14  12 - 20   Final    GFR est AA 07/20/2022 >60  >60 ml/min/1.73m2 Final    GFR est non-AA 07/20/2022 >60  >60 ml/min/1.73m2 Final    Calcium 07/20/2022 8.4 (A) 8.5 - 10.1 mg/dL Final    Bilirubin, total 07/20/2022 1.0  0.2 - 1.0 mg/dL Final    AST (SGOT) 07/20/2022 18  15 - 37 U/L Final    ALT (SGPT) 07/20/2022 22  12 - 78 U/L Final    Alk. phosphatase 07/20/2022 75  45 - 117 U/L Final    Protein, total 07/20/2022 6.1 (A) 6.4 - 8.2 g/dL Final    Albumin 07/20/2022 2.4 (A) 3.5 - 5.0 g/dL Final    Globulin 07/20/2022 3.7  2.0 - 4.0 g/dL Final    A-G Ratio 07/20/2022 0.6 (A) 1.1 - 2.2   Final    aPTT 07/20/2022 33.9  21.2 - 34.1 sec Final    aPTT, therapeutic range 07/20/2022    82 - 109 sec Final    Glucose (POC) 07/20/2022 118 (A) 65 - 117 mg/dL Final    Performed by 07/20/2022 Regina Laguna   Final    Glucose (POC) 07/20/2022 130 (A) 65 - 117 mg/dL Final    Performed by 07/20/2022 Fuentese Hammdami   Final    aPTT 07/20/2022 34.4 (A) 21.2 - 34.1 sec Final    aPTT, therapeutic range 07/20/2022    82 - 109 sec Final    Glucose (POC) 07/20/2022 134 (A) 65 - 117 mg/dL Final    Performed by 07/20/2022 Maryelene Hammock   Final    Glucose (POC) 07/20/2022 152 (A) 65 - 117 mg/dL Final    Performed by 07/20/2022 Hannafredi Soulier   Final    aPTT 07/20/2022 38.9 (A) 21.2 - 34.1 sec Final    aPTT, therapeutic range 07/20/2022    82 - 109 sec Final   No results displayed because visit has over 200 results. Assessment:     Problem List Items Addressed This Visit          Other    Gangrene of toe ([facility] [address] - Primary           Plan:     Patient will be directed to hospital admission due to worsening ischemia on the right foot. Patient will require emergent revascularization procedure.         Bibi Moreno MD

## 2022-07-21 NOTE — PROGRESS NOTES
Patient examined this morning. Patient currently getting heparin drip. Right groin has some oozing with blood. So we will hold off heparin drip with neck 6 hours. Patient does have significant swelling secondary to reperfusion. Next  Continue IV antibiotics. Keep the right leg elevated. We will hold off physical therapy due to swelling on the right leg. Continue monitor his progress.

## 2022-07-22 ENCOUNTER — APPOINTMENT (OUTPATIENT)
Dept: CT IMAGING | Age: 62
DRG: 253 | End: 2022-07-22
Attending: SURGERY

## 2022-07-22 LAB
APTT PPP: 40.5 SEC (ref 21.2–34.1)
APTT PPP: 41.9 SEC (ref 21.2–34.1)
APTT PPP: 48.9 SEC (ref 21.2–34.1)
GLUCOSE BLD STRIP.AUTO-MCNC: 136 MG/DL (ref 65–117)
GLUCOSE BLD STRIP.AUTO-MCNC: 139 MG/DL (ref 65–117)
GLUCOSE BLD STRIP.AUTO-MCNC: 145 MG/DL (ref 65–117)
GLUCOSE BLD STRIP.AUTO-MCNC: 170 MG/DL (ref 65–117)
PERFORMED BY, TECHID: ABNORMAL
THERAPEUTIC RANGE,PTTT: ABNORMAL SEC (ref 82–109)

## 2022-07-22 PROCEDURE — 74011250637 HC RX REV CODE- 250/637: Performed by: INTERNAL MEDICINE

## 2022-07-22 PROCEDURE — 36415 COLL VENOUS BLD VENIPUNCTURE: CPT

## 2022-07-22 PROCEDURE — 99024 POSTOP FOLLOW-UP VISIT: CPT | Performed by: SURGERY

## 2022-07-22 PROCEDURE — 74011250636 HC RX REV CODE- 250/636: Performed by: SURGERY

## 2022-07-22 PROCEDURE — 75635 CT ANGIO ABDOMINAL ARTERIES: CPT

## 2022-07-22 PROCEDURE — 85730 THROMBOPLASTIN TIME PARTIAL: CPT

## 2022-07-22 PROCEDURE — 74011000636 HC RX REV CODE- 636: Performed by: FAMILY MEDICINE

## 2022-07-22 PROCEDURE — 74011250637 HC RX REV CODE- 250/637: Performed by: SURGERY

## 2022-07-22 PROCEDURE — 74011000258 HC RX REV CODE- 258: Performed by: SURGERY

## 2022-07-22 PROCEDURE — 82962 GLUCOSE BLOOD TEST: CPT

## 2022-07-22 PROCEDURE — 65270000029 HC RM PRIVATE

## 2022-07-22 PROCEDURE — 74011250636 HC RX REV CODE- 250/636: Performed by: FAMILY MEDICINE

## 2022-07-22 PROCEDURE — 74011000250 HC RX REV CODE- 250: Performed by: SURGERY

## 2022-07-22 RX ADMIN — HEPARIN SODIUM 23 UNITS/KG/HR: 10000 INJECTION, SOLUTION INTRAVENOUS at 17:01

## 2022-07-22 RX ADMIN — ENOXAPARIN SODIUM 40 MG: 100 INJECTION SUBCUTANEOUS at 09:25

## 2022-07-22 RX ADMIN — HYDROMORPHONE HYDROCHLORIDE 1 MG: 1 INJECTION, SOLUTION INTRAMUSCULAR; INTRAVENOUS; SUBCUTANEOUS at 19:32

## 2022-07-22 RX ADMIN — GABAPENTIN 300 MG: 300 CAPSULE ORAL at 21:29

## 2022-07-22 RX ADMIN — IOPAMIDOL 100 ML: 755 INJECTION, SOLUTION INTRAVENOUS at 08:49

## 2022-07-22 RX ADMIN — OXYCODONE HYDROCHLORIDE AND ACETAMINOPHEN 1 TABLET: 10; 325 TABLET ORAL at 02:56

## 2022-07-22 RX ADMIN — KETOROLAC TROMETHAMINE 30 MG: 30 INJECTION, SOLUTION INTRAMUSCULAR; INTRAVENOUS at 02:58

## 2022-07-22 RX ADMIN — AMLODIPINE BESYLATE 10 MG: 5 TABLET ORAL at 09:26

## 2022-07-22 RX ADMIN — HYDROMORPHONE HYDROCHLORIDE 1 MG: 1 INJECTION, SOLUTION INTRAMUSCULAR; INTRAVENOUS; SUBCUTANEOUS at 05:04

## 2022-07-22 RX ADMIN — SODIUM CHLORIDE, PRESERVATIVE FREE 10 ML: 5 INJECTION INTRAVENOUS at 15:31

## 2022-07-22 RX ADMIN — OXYCODONE HYDROCHLORIDE 20 MG: 20 TABLET, FILM COATED, EXTENDED RELEASE ORAL at 21:29

## 2022-07-22 RX ADMIN — HEPARIN SODIUM 2000 UNITS: 1000 INJECTION, SOLUTION INTRAVENOUS; SUBCUTANEOUS at 21:36

## 2022-07-22 RX ADMIN — PIPERACILLIN SODIUM AND TAZOBACTAM SODIUM 3.38 G: 3; .375 INJECTION, POWDER, LYOPHILIZED, FOR SOLUTION INTRAVENOUS at 12:38

## 2022-07-22 RX ADMIN — HEPARIN SODIUM 2000 UNITS: 1000 INJECTION, SOLUTION INTRAVENOUS; SUBCUTANEOUS at 11:57

## 2022-07-22 RX ADMIN — KETOROLAC TROMETHAMINE 30 MG: 30 INJECTION, SOLUTION INTRAMUSCULAR; INTRAVENOUS at 23:10

## 2022-07-22 RX ADMIN — PIPERACILLIN SODIUM AND TAZOBACTAM SODIUM 3.38 G: 3; .375 INJECTION, POWDER, LYOPHILIZED, FOR SOLUTION INTRAVENOUS at 02:56

## 2022-07-22 RX ADMIN — COLCHICINE 0.6 MG: 0.6 TABLET, FILM COATED ORAL at 09:26

## 2022-07-22 RX ADMIN — HYDROMORPHONE HYDROCHLORIDE 1 MG: 1 INJECTION, SOLUTION INTRAMUSCULAR; INTRAVENOUS; SUBCUTANEOUS at 10:45

## 2022-07-22 RX ADMIN — OXYCODONE HYDROCHLORIDE AND ACETAMINOPHEN 1 TABLET: 10; 325 TABLET ORAL at 17:00

## 2022-07-22 RX ADMIN — PIPERACILLIN SODIUM AND TAZOBACTAM SODIUM 3.38 G: 3; .375 INJECTION, POWDER, LYOPHILIZED, FOR SOLUTION INTRAVENOUS at 19:09

## 2022-07-22 RX ADMIN — SODIUM CHLORIDE 75 ML/HR: 9 INJECTION, SOLUTION INTRAVENOUS at 16:59

## 2022-07-22 RX ADMIN — PANTOPRAZOLE SODIUM 40 MG: 40 TABLET, DELAYED RELEASE ORAL at 05:58

## 2022-07-22 RX ADMIN — HEPARIN SODIUM 19 UNITS/KG/HR: 10000 INJECTION, SOLUTION INTRAVENOUS at 05:58

## 2022-07-22 RX ADMIN — SODIUM CHLORIDE, PRESERVATIVE FREE 10 ML: 5 INJECTION INTRAVENOUS at 09:27

## 2022-07-22 RX ADMIN — OXYCODONE HYDROCHLORIDE 20 MG: 20 TABLET, FILM COATED, EXTENDED RELEASE ORAL at 09:26

## 2022-07-22 RX ADMIN — GABAPENTIN 300 MG: 300 CAPSULE ORAL at 09:26

## 2022-07-22 RX ADMIN — KETOROLAC TROMETHAMINE 30 MG: 30 INJECTION, SOLUTION INTRAMUSCULAR; INTRAVENOUS at 09:25

## 2022-07-22 RX ADMIN — HEPARIN SODIUM 2000 UNITS: 1000 INJECTION, SOLUTION INTRAVENOUS; SUBCUTANEOUS at 04:56

## 2022-07-22 RX ADMIN — LISINOPRIL 20 MG: 20 TABLET ORAL at 09:26

## 2022-07-22 NOTE — PROGRESS NOTES
Bedside shift change report given to [address] (oncoming nurse) by Vivek Arias (offgoing nurse). Report included the following information SBAR.

## 2022-07-22 NOTE — PROGRESS NOTES
Patient examined this morning. Vital signs stable. Right leg examination shows a incision was clean and dry. Right foot is warm. However great toe is demarcating, most likely patient will need a great toe amputation. Patient does have dopplerable signal noted DP and PT. I expected more stronger pulses on the right DP and PT. Patient has a significant below-knee three-vessel disease as well. I will order a CT angiogram aorta distal runoff follow-up bypass. Continue heparin drip. Continue local wound care and great toe. Patient can ambulate today with a heel touch of the right foot. PT order is in place.

## 2022-07-22 NOTE — PROGRESS NOTES
Neuro/ mobility:  [id number]. 1 person assist - with walker, heel touch only mobiltiy. Needs steading head as he tends to lean backwards    Nursing report:  Dressing noted on right foot. Wound dressing changed by Md this morning. Dressing changed on right groin. Noted new drainage on the dressing. Placed pressure dressing to right groin.     Patient educated on pain management and need to allow one prn medication to work prior to asking for the next      Nursing Plan of Care:   Pain management + education, dressing changes per orders

## 2022-07-22 NOTE — PROGRESS NOTES
General Daily Progress Note          Patient Name:   Genevieve Hernandez       YOB: 1960       Age:  65 y.o. Admit Date: 7/18/2022      Subjective:   Patient is a 65y.o. year old male with a PMH of diabetes, diabetic foot injury, arterial insufficiency, hypertension, GERD, and gout presents to the ED complaining of  right great toe infection. The patient was recently hospitalized for this same problem and received balloon angioplasty of the right lower extremity which initially helped perfusion of the toe. He has since relapsed and complains of excruciating pain that is not well managed on percocet. The patient states that it greatly progress in the last 24 hours and there is now black and gangrenous lesion on the plantar aspect of the toe with swelling over the entire foot. Patient denies any chest pain, SOB, difficulty breathing, palpitations, dizziness, N/V/D, or fevers. X-ray of R great toe:  IMPRESSION  No evidence of fracture or dislocation. Evidence of gout. No  significant change      Seen by the vascular surgeon this morning patient need right common femoral artery above-knee popliteal bypass       7/20  Patient resting in bed comfortably stating pain is well managed. Dressing show iodinated bandaged around affected toe without obvious bleeding. Day 1 s/p bypass of right popliteal artery  Resuming lovenox    7/21  Patient laying in bed stating pain is worse than yesterday d/t discontinuing dilaudid. Pt now on Toradol. Per trauma surgery - hold heparin for 6h d/t groin bleed 2/2 surgery  Albumin 2.4    7/22  Patient laying in bed with pain well-managed. NAD  Likely toe amputation next week. Per trauma surgery:  Will need a CTA aorta distal runoff follow-up bypass    Objective:     Visit Vitals  BP (!) 163/84 (BP 1 Location: Left upper arm, BP Patient Position: Semi fowlers)   Pulse 75   Temp 98.3 °F (36.8 °C)   Resp 16   Ht 6' 1\" (1.854 m)   Wt 106.1 kg (234 lb)   SpO2 97%   BMI 30.87 kg/m²        Recent Results (from the past 24 hour(s))   GLUCOSE, POC    Collection Time: 07/21/22  4:35 PM   Result Value Ref Range    Glucose (POC) 157 (H) 65 - 117 mg/dL    Performed by Alysia Parmar    PTT    Collection Time: 07/21/22  6:17 PM   Result Value Ref Range    aPTT 37.3 (H) 21.2 - 34.1 sec    aPTT, therapeutic range   82 - 109 sec   GLUCOSE, POC    Collection Time: 07/21/22  7:26 PM   Result Value Ref Range    Glucose (POC) 150 (H) 65 - 117 mg/dL    Performed by Uli Gutierrez    PTT    Collection Time: 07/22/22  2:20 AM   Result Value Ref Range    aPTT 40.5 (H) 21.2 - 34.1 sec    aPTT, therapeutic range   82 - 109 sec   GLUCOSE, POC    Collection Time: 07/22/22  7:56 AM   Result Value Ref Range    Glucose (POC) 139 (H) 65 - 117 mg/dL    Performed by Melinda Almaguer    PTT    Collection Time: 07/22/22 10:29 AM   Result Value Ref Range    aPTT 41.9 (H) 21.2 - 34.1 sec    aPTT, therapeutic range   82 - 109 sec     [unfilled]      Review of Systems    Constitutional: Negative for chills and fever. HENT: Negative. Eyes: Negative. Respiratory: Negative. Cardiovascular: Negative. Gastrointestinal: Negative for abdominal pain and nausea. Skin: Negative. Neurological: Negative. Physical Exam:      Constitutional: pt is oriented to person, place, and time. HENT:   Head: Normocephalic and atraumatic. Eyes: Pupils are equal, round, and reactive to light. EOM are normal.   Cardiovascular: Normal rate, regular rhythm and normal heart sounds. Pulmonary/Chest: Breath sounds normal. No wheezes. No rales. Exhibits no tenderness. Abdominal: Soft. Bowel sounds are normal. There is no abdominal tenderness. There is no rebound and no guarding. Musculoskeletal: Normal range of motion. Neurological: pt is alert and oriented to person, place, and time. CTA ABD ART W RUNOFF W WO CONT   Final Result   1.   Status post right common femoral to popliteal arterial bypass graft without   abnormalities. 2.  No aortic aneurysm or dissection. Patent inflow vessels. 3.  Incomplete evaluation of right runoff vessels, though suspect three-vessel   runoff. 4.  Severe stenoses of the left SFA and mild stenoses of the left popliteal   artery. 5.  Single vessel runoff of left posterior tibial artery. 6.  Additional findings as above. CTA ABD ART W RUNOFF W WO CONT   Final Result      Occluded long segment right superficial femoral artery stent with reconstitution   in the popliteal artery which demonstrates mild to moderate atherosclerotic   changes. Questionable patency of the distal tibial and peroneal runoff with   limited evaluation due to poor flow-related opacification and arterial   calcifications. Mild to moderate atherosclerotic changes in the left lower extremity with   probable single-vessel runoff to the left foot. Indeterminate bilateral adrenal nodules measuring up to 2 cm. Consider   nonemergent adrenal CT protocol. Bullous/cystic changes in the lung bases. Please refer to above findings for complete details. XR GREAT TOE RT MIN 2 V   Final Result   No evidence of fracture or dislocation. Evidence of gout. No   significant change.               Recent Results (from the past 24 hour(s))   GLUCOSE, POC    Collection Time: 07/21/22  4:35 PM   Result Value Ref Range    Glucose (POC) 157 (H) 65 - 117 mg/dL    Performed by Lani Cedillo    PTT    Collection Time: 07/21/22  6:17 PM   Result Value Ref Range    aPTT 37.3 (H) 21.2 - 34.1 sec    aPTT, therapeutic range   82 - 109 sec   GLUCOSE, POC    Collection Time: 07/21/22  7:26 PM   Result Value Ref Range    Glucose (POC) 150 (H) 65 - 117 mg/dL    Performed by Nancy Portillo    PTT    Collection Time: 07/22/22  2:20 AM   Result Value Ref Range    aPTT 40.5 (H) 21.2 - 34.1 sec    aPTT, therapeutic range   82 - 109 sec   GLUCOSE, POC    Collection Time: 07/22/22  7:56 AM   Result Value Ref Range Glucose (POC) 139 (H) 65 - 117 mg/dL    Performed by Rani Alejandra    PTT    Collection Time: 07/22/22 10:29 AM   Result Value Ref Range    aPTT 41.9 (H) 21.2 - 34.1 sec    aPTT, therapeutic range   82 - 109 sec       Results       Procedure Component Value Units Date/Time    CULTURE, URINE [[id number]] Collected: 07/19/22 1645    Order Status: Completed Specimen: Urine Updated: 07/21/22 0844     Special Requests: No Special Requests        Culture result: No Growth (<1000 cfu/mL)       CULTURE, BLOOD, PAIRED [[id number]] Collected: 07/18/22 1708    Order Status: Completed Specimen: Blood Updated: 07/22/22 1130     Special Requests: No Special Requests        Culture result: No growth 3 days                Labs:     Recent Labs     07/21/22  0759 07/20/22  0434   WBC 15.4* 19.8*   HGB 10.9* 10.9*   HCT 32.4* 32.5*    428*       Recent Labs     07/21/22  0759 07/20/22  0434   * 133*   K 3.8 4.2    102   CO2 24 23   BUN 7 9   CREA 0.54* 0.66*   * 102*   CA 8.4* 8.4*       Recent Labs     07/21/22  0759 07/20/22  0434   ALT 27 22   AP 84 75   TBILI 0.8 1.0   TP 6.3* 6.1*   ALB 2.4* 2.4*   GLOB 3.9 3.7       Recent Labs     07/22/22  1029 07/22/22  0220 07/21/22  1817   APTT 41.9* 40.5* 37.3*        No results for input(s): FE, TIBC, PSAT, FERR in the last 72 hours. No results found for: FOL, RBCF   No results for input(s): PH, PCO2, PO2 in the last 72 hours. No results for input(s): CPK, CKNDX, TROIQ in the last 72 hours.     No lab exists for component: CPKMB    No results found for: CHOL, CHOLX, CHLST, CHOLV, HDL, HDLP, LDL, LDLC, DLDLP, TGLX, TRIGL, TRIGP, CHHD, CHHDX  Lab Results   Component Value Date/Time    Glucose (POC) 139 (H) 07/22/2022 07:56 AM    Glucose (POC) 150 (H) 07/21/2022 07:26 PM    Glucose (POC) 157 (H) 07/21/2022 04:35 PM    Glucose (POC) 245 (H) 07/21/2022 11:33 AM    Glucose (POC) 147 (H) 07/21/2022 08:17 AM     No results found for: COLOR, APPRN, SPGRU, REFSG, EPHRAIM, PROTU, GLUCU, KETU, BILU, UROU, VIANEY, LEUKU, GLUKE, EPSU, BACTU, WBCU, RBCU, CASTS, UCRY      Assessment:     Arterial Insufficiency s/p balloon angioplasty  Right great toe infection  Diabetes Mellitus  Diabetic foot injury  Hypertension  GERD  Gout  RA  Smoking      Plan:   Day 3 s/p bypass    Per trauma surgery: Will need a CTA aorta distal runoff follow-up bypass  Planned toe amputation next week.     Zosyn 3.375 IV every 8 hours  Sliding-scale insulin  Lisinopril 20 mg daily  Protonix 40 mg daily  Amlodipine 10 mg daily      Discussed with the patient and patient wife        Current Facility-Administered Medications:     oxyCODONE ER (OxyCONTIN) tablet 20 mg, 20 mg, Oral, BID, Susie Martínez MD, 20 mg at 07/22/22 5117    gabapentin (NEURONTIN) capsule 300 mg, 300 mg, Oral, BID, Vicky West MD, 300 mg at 07/22/22 7426    metFORMIN (GLUCOPHAGE) tablet 500 mg, 500 mg, Oral, BID WITH MEALS, Vicky West MD, 500 mg at 07/21/22 1608    HYDROmorphone (DILAUDID) injection 1 mg, 1 mg, IntraVENous, Q4H PRN, Elyssa Bruno MD, 1 mg at 07/22/22 1045    heparin (porcine) 1,000 unit/mL injection 4,000 Units, 4,000 Units, IntraVENous, PRN, 4,000 Units at 07/20/22 1910 **OR** heparin (porcine) 1,000 unit/mL injection 2,000 Units, 2,000 Units, IntraVENous, PRN, Susie Martínez MD, 2,000 Units at 07/22/22 1157    heparin 25,000 units in D5W 250 ml infusion, 11-25 Units/kg/hr (Adjusted), IntraVENous, TITRATE, Susie Martínez MD, Last Rate: 20.8 mL/hr at 07/22/22 1157, 23 Units/kg/hr at 07/22/22 1157    sodium chloride (NS) flush 5-40 mL, 5-40 mL, IntraVENous, Q8H, Susie Martínez MD, 10 mL at 07/22/22 1458    sodium chloride (NS) flush 5-40 mL, 5-40 mL, IntraVENous, PRN, Susie Martínez MD, 10 mL at 07/21/22 1616    ondansetron (ZOFRAN ODT) tablet 4 mg, 4 mg, Oral, Q8H PRN **OR** ondansetron (ZOFRAN) injection 4 mg, 4 mg, IntraVENous, Q6H PRN, Susie Martínez MD    enoxaparin (LOVENOX) injection 40 mg, 40 mg, SubCUTAneous, DAILY, Veronica Martínez MD, 40 mg at 07/22/22 0925    0.9% sodium chloride infusion, 125 mL/hr, IntraVENous, CONTINUOUS, Veronica Martínez MD, Last Rate: 125 mL/hr at 07/21/22 2101, 125 mL/hr at 07/21/22 2101    oxyCODONE-acetaminophen (PERCOCET 10)  mg per tablet 1 Tablet, 1 Tablet, Oral, Q4H PRN, Veronica Martínez MD, 1 Tablet at 07/22/22 0256    ketorolac (TORADOL) injection 30 mg, 30 mg, IntraVENous, Q6H PRN, Veronica Martínez MD, 30 mg at 07/22/22 0925    amLODIPine (NORVASC) tablet 10 mg, 10 mg, Oral, DAILY, Veronica Martínez MD, 10 mg at 07/22/22 5909    lisinopriL (PRINIVIL, ZESTRIL) tablet 20 mg, 20 mg, Oral, DAILY, Veronica Martínez MD, 20 mg at 07/22/22 8914    colchicine tablet 0.6 mg, 0.6 mg, Oral, DAILY, Veronica Martínez MD, 0.6 mg at 07/22/22 9705    insulin lispro (HUMALOG) injection, , SubCUTAneous, AC&HS, Veronica Martínez MD    glucose chewable tablet 16 g, 4 Tablet, Oral, PRN, Veronica Martínez MD    glucagon [facility]) injection 1 mg, 1 mg, IntraMUSCular, PRN, Veronica Martínez MD    0.9% sodium chloride infusion, 75 mL/hr, IntraVENous, CONTINUOUS, Veronica Martínez MD, IV Completed at 07/19/22 1225    acetaminophen (TYLENOL) tablet 650 mg, 650 mg, Oral, Q6H PRN **OR** acetaminophen (TYLENOL) suppository 650 mg, 650 mg, Rectal, Q6H PRN, Veronica Martínez MD    polyethylene glycol (MIRALAX) packet 17 g, 17 g, Oral, DAILY PRN, Veronica Martínez MD    ondansetron (ZOFRAN ODT) tablet 4 mg, 4 mg, Oral, Q8H PRN **OR** ondansetron (ZOFRAN) injection 4 mg, 4 mg, IntraVENous, Q6H PRN, Veronica Martínez MD    piperacillin-tazobactam (ZOSYN) 3.375 g in 0.9% sodium chloride (MBP/ADV) 100 mL MBP, 3.375 g, IntraVENous, Q8H, Veronica Martínez MD, Last Rate: 25 mL/hr at 07/22/22 0256, 3.375 g at 07/22/22 0256    pantoprazole (PROTONIX) tablet 40 mg, 40 mg, Oral, ACB, Veronica Martínez MD, 40 mg at 07/22/22 3136

## 2022-07-22 NOTE — PROGRESS NOTES
General Daily Progress Note          Patient Name:   Tammy Carlos       YOB: 1960       Age:  65 y.o. Admit Date: 7/18/2022      Subjective:   Patient is a 65y.o. year old male with a PMH of diabetes, diabetic foot injury, arterial insufficiency, hypertension, GERD, and gout presents to the ED complaining of  right great toe infection. The patient was recently hospitalized for this same problem and received balloon angioplasty of the right lower extremity which initially helped perfusion of the toe. He has since relapsed and complains of excruciating pain that is not well managed on percocet. The patient states that it greatly progress in the last 24 hours and there is now black and gangrenous lesion on the plantar aspect of the toe with swelling over the entire foot. Patient denies any chest pain, SOB, difficulty breathing, palpitations, dizziness, N/V/D, or fevers. X-ray of R great toe:  IMPRESSION  No evidence of fracture or dislocation. Evidence of gout. No  significant change      Seen by the vascular surgeon this morning patient need right common femoral artery above-knee popliteal bypass       7/20  Patient resting in bed comfortably stating pain is well managed. Dressing show iodinated bandaged around affected toe without obvious bleeding. Day 1 s/p bypass of right popliteal artery  Resuming lovenox    7/21  Patient laying in bed stating pain is worse than yesterday d/t discontinuing dilaudid. Pt now on Toradol. Per trauma surgery - hold heparin for 6h d/t groin bleed 2/2 surgery  Albumin 2.4    7/22  Patient laying in bed with pain well-managed. NAD  Likely toe amputation next week. Per trauma surgery:  Will need a CTA aorta distal runoff follow-up bypass    Objective:     Visit Vitals  BP (!) 163/84 (BP 1 Location: Left upper arm, BP Patient Position: Semi fowlers)   Pulse 75   Temp 98.3 °F (36.8 °C)   Resp 16   Ht 6' 1\" (1.854 m)   Wt 106.1 kg (234 lb)   SpO2 97%   BMI 30.87 kg/m²        Recent Results (from the past 24 hour(s))   GLUCOSE, POC    Collection Time: 07/21/22  8:17 AM   Result Value Ref Range    Glucose (POC) 147 (H) 65 - 117 mg/dL    Performed by Ino Peraza 55, POC    Collection Time: 07/21/22 11:33 AM   Result Value Ref Range    Glucose (POC) 245 (H) 65 - 117 mg/dL    Performed by Ino Peraza 55, POC    Collection Time: 07/21/22  4:35 PM   Result Value Ref Range    Glucose (POC) 157 (H) 65 - 117 mg/dL    Performed by Priscilla Paredes    PTT    Collection Time: 07/21/22  6:17 PM   Result Value Ref Range    aPTT 37.3 (H) 21.2 - 34.1 sec    aPTT, therapeutic range   82 - 109 sec   GLUCOSE, POC    Collection Time: 07/21/22  7:26 PM   Result Value Ref Range    Glucose (POC) 150 (H) 65 - 117 mg/dL    Performed by Jenise Hinojosa    PTT    Collection Time: 07/22/22  2:20 AM   Result Value Ref Range    aPTT 40.5 (H) 21.2 - 34.1 sec    aPTT, therapeutic range   82 - 109 sec   GLUCOSE, POC    Collection Time: 07/22/22  7:56 AM   Result Value Ref Range    Glucose (POC) 139 (H) 65 - 117 mg/dL    Performed by Patricia Chacon      [unfilled]      Review of Systems    Constitutional: Negative for chills and fever. HENT: Negative. Eyes: Negative. Respiratory: Negative. Cardiovascular: Negative. Gastrointestinal: Negative for abdominal pain and nausea. Skin: Negative. Neurological: Negative. Physical Exam:      Constitutional: pt is oriented to person, place, and time. HENT:   Head: Normocephalic and atraumatic. Eyes: Pupils are equal, round, and reactive to light. EOM are normal.   Cardiovascular: Normal rate, regular rhythm and normal heart sounds. Pulmonary/Chest: Breath sounds normal. No wheezes. No rales. Exhibits no tenderness. Abdominal: Soft. Bowel sounds are normal. There is no abdominal tenderness. There is no rebound and no guarding. Musculoskeletal: Normal range of motion.    Neurological: pt is alert and oriented to person, place, and time. CTA ABD ART W RUNOFF W WO CONT   Final Result      Occluded long segment right superficial femoral artery stent with reconstitution   in the popliteal artery which demonstrates mild to moderate atherosclerotic   changes. Questionable patency of the distal tibial and peroneal runoff with   limited evaluation due to poor flow-related opacification and arterial   calcifications. Mild to moderate atherosclerotic changes in the left lower extremity with   probable single-vessel runoff to the left foot. Indeterminate bilateral adrenal nodules measuring up to 2 cm. Consider   nonemergent adrenal CT protocol. Bullous/cystic changes in the lung bases. Please refer to above findings for complete details. XR GREAT TOE RT MIN 2 V   Final Result   No evidence of fracture or dislocation. Evidence of gout. No   significant change.          CTA ABD ART W RUNOFF W WO CONT    (Results Pending)        Recent Results (from the past 24 hour(s))   GLUCOSE, POC    Collection Time: 07/21/22  8:17 AM   Result Value Ref Range    Glucose (POC) 147 (H) 65 - 117 mg/dL    Performed by Ino Peraza 55, POC    Collection Time: 07/21/22 11:33 AM   Result Value Ref Range    Glucose (POC) 245 (H) 65 - 117 mg/dL    Performed by Ino Peraza 55, POC    Collection Time: 07/21/22  4:35 PM   Result Value Ref Range    Glucose (POC) 157 (H) 65 - 117 mg/dL    Performed by Andrew Dumont    PTT    Collection Time: 07/21/22  6:17 PM   Result Value Ref Range    aPTT 37.3 (H) 21.2 - 34.1 sec    aPTT, therapeutic range   82 - 109 sec   GLUCOSE, POC    Collection Time: 07/21/22  7:26 PM   Result Value Ref Range    Glucose (POC) 150 (H) 65 - 117 mg/dL    Performed by Morgan Alcantar    PTT    Collection Time: 07/22/22  2:20 AM   Result Value Ref Range    aPTT 40.5 (H) 21.2 - 34.1 sec    aPTT, therapeutic range   82 - 109 sec   GLUCOSE, POC    Collection Time: 07/22/22  7:56 AM Result Value Ref Range    Glucose (POC) 139 (H) 65 - 117 mg/dL    Performed by Mau Farrell        Results       Procedure Component Value Units Date/Time    CULTURE, URINE [[id number]] Collected: 07/19/22 1645    Order Status: Completed Specimen: Urine Updated: 07/21/22 0844     Special Requests: No Special Requests        Culture result: No Growth (<1000 cfu/mL)       CULTURE, BLOOD, PAIRED [[id number]] Collected: 07/18/22 1708    Order Status: Completed Specimen: Blood Updated: 07/21/22 0915     Special Requests: No Special Requests        Culture result: No growth 2 days                Labs:     Recent Labs     07/21/22  0759 07/20/22  0434   WBC 15.4* 19.8*   HGB 10.9* 10.9*   HCT 32.4* 32.5*    428*       Recent Labs     07/21/22  0759 07/20/22  0434   * 133*   K 3.8 4.2    102   CO2 24 23   BUN 7 9   CREA 0.54* 0.66*   * 102*   CA 8.4* 8.4*       Recent Labs     07/21/22  0759 07/20/22  0434   ALT 27 22   AP 84 75   TBILI 0.8 1.0   TP 6.3* 6.1*   ALB 2.4* 2.4*   GLOB 3.9 3.7       Recent Labs     07/22/22  0220 07/21/22  1817 07/21/22  0759   APTT 40.5* 37.3* 33.2        No results for input(s): FE, TIBC, PSAT, FERR in the last 72 hours. No results found for: FOL, RBCF   No results for input(s): PH, PCO2, PO2 in the last 72 hours. No results for input(s): CPK, CKNDX, TROIQ in the last 72 hours.     No lab exists for component: CPKMB    No results found for: CHOL, CHOLX, CHLST, CHOLV, HDL, HDLP, LDL, LDLC, DLDLP, TGLX, TRIGL, TRIGP, CHHD, CHHDX  Lab Results   Component Value Date/Time    Glucose (POC) 139 (H) 07/22/2022 07:56 AM    Glucose (POC) 150 (H) 07/21/2022 07:26 PM    Glucose (POC) 157 (H) 07/21/2022 04:35 PM    Glucose (POC) 245 (H) 07/21/2022 11:33 AM    Glucose (POC) 147 (H) 07/21/2022 08:17 AM     No results found for: COLOR, APPRN, SPGRU, REFSG, EPHRAIM, PROTU, GLUCU, KETU, BILU, UROU, VIANEY, LEUKU, GLUKE, EPSU, BACTU, WBCU, RBCU, CASTS, UCRY      Assessment:     Arterial Insufficiency s/p balloon angioplasty  Right great toe infection  Diabetes Mellitus  Diabetic foot injury  Hypertension  GERD  Gout  RA  Smoking      Plan:   Day 3 s/p bypass    Per trauma surgery: Will need a CTA aorta distal runoff follow-up bypass  Planned toe amputation next week.     Zosyn 3.375 IV every 8 hours  Sliding-scale insulin  Lisinopril 20 mg daily  Protonix 40 mg daily  Amlodipine 10 mg daily      Discussed with the patient and patient wife        Current Facility-Administered Medications:     oxyCODONE ER (OxyCONTIN) tablet 20 mg, 20 mg, Oral, BID, Maria De Jesus Martínez MD, 20 mg at 07/21/22 2100    gabapentin (NEURONTIN) capsule 300 mg, 300 mg, Oral, BID, Vicky West MD, 300 mg at 07/21/22 2100    metFORMIN (GLUCOPHAGE) tablet 500 mg, 500 mg, Oral, BID WITH MEALS, Vicky West MD, 500 mg at 07/21/22 1608    HYDROmorphone (DILAUDID) injection 1 mg, 1 mg, IntraVENous, Q4H PRN, Will Schultz MD, 1 mg at 07/22/22 0504    heparin (porcine) 1,000 unit/mL injection 4,000 Units, 4,000 Units, IntraVENous, PRN, 4,000 Units at 07/20/22 1910 **OR** heparin (porcine) 1,000 unit/mL injection 2,000 Units, 2,000 Units, IntraVENous, PRN, Maria De Jesus Martínez MD, 2,000 Units at 07/22/22 0456    heparin 25,000 units in D5W 250 ml infusion, 11-25 Units/kg/hr (Adjusted), IntraVENous, TITRATE, Maria De Jesus Martínez MD, Last Rate: 19 mL/hr at 07/22/22 0744, 21 Units/kg/hr at 07/22/22 0744    sodium chloride (NS) flush 5-40 mL, 5-40 mL, IntraVENous, Q8H, Maria De Jesus Martínez MD, 10 mL at 07/21/22 1616    sodium chloride (NS) flush 5-40 mL, 5-40 mL, IntraVENous, PRN, Maria De Jesus Martínez MD, 10 mL at 07/21/22 1616    ondansetron (ZOFRAN ODT) tablet 4 mg, 4 mg, Oral, Q8H PRN **OR** ondansetron (ZOFRAN) injection 4 mg, 4 mg, IntraVENous, Q6H PRN, Maria De Jesus Martínez MD    enoxaparin (LOVENOX) injection 40 mg, 40 mg, SubCUTAneous, DAILY, Maria De Jesus Martínez MD, 40 mg at 07/20/22 0850    0.9% sodium chloride infusion, 125 mL/hr, IntraVENous, Kylie Porter MD, Last Rate: 125 mL/hr at 07/21/22 2101, 125 mL/hr at 07/21/22 2101    oxyCODONE-acetaminophen (PERCOCET 10)  mg per tablet 1 Tablet, 1 Tablet, Oral, Q4H PRN, Mauricio, Odin Lang MD, 1 Tablet at 07/22/22 0256    ketorolac (TORADOL) injection 30 mg, 30 mg, IntraVENous, Q6H PRN, Odin Martínez MD, 30 mg at 07/22/22 0258    amLODIPine (NORVASC) tablet 10 mg, 10 mg, Oral, DAILY, Odin Martínez MD, 10 mg at 07/21/22 0931    lisinopriL (PRINIVIL, ZESTRIL) tablet 20 mg, 20 mg, Oral, DAILY, Odin Martínez MD, 20 mg at 07/21/22 0931    colchicine tablet 0.6 mg, 0.6 mg, Oral, DAILY, Odin Martínez MD, 0.6 mg at 07/21/22 0932    insulin lispro (HUMALOG) injection, , SubCUTAneous, AC&HS, Odin Martínez MD    glucose chewable tablet 16 g, 4 Tablet, Oral, PRN, Odin Martínez MD    glucagon [facility]) injection 1 mg, 1 mg, IntraMUSCular, PRN, Odin Martínez MD    0.9% sodium chloride infusion, 75 mL/hr, IntraVENous, CONTINUOUS, Odin Martínez MD, IV Completed at 07/19/22 1225    acetaminophen (TYLENOL) tablet 650 mg, 650 mg, Oral, Q6H PRN **OR** acetaminophen (TYLENOL) suppository 650 mg, 650 mg, Rectal, Q6H PRN, Odin Martínez MD    polyethylene glycol (MIRALAX) packet 17 g, 17 g, Oral, DAILY PRN, Odin Martínez MD    ondansetron (ZOFRAN ODT) tablet 4 mg, 4 mg, Oral, Q8H PRN **OR** ondansetron (ZOFRAN) injection 4 mg, 4 mg, IntraVENous, Q6H PRN, Odin Martínez MD    piperacillin-tazobactam (ZOSYN) 3.375 g in 0.9% sodium chloride (MBP/ADV) 100 mL MBP, 3.375 g, IntraVENous, Q8H, Odin Martínez MD, Last Rate: 25 mL/hr at 07/22/22 0256, 3.375 g at 07/22/22 0256    pantoprazole (PROTONIX) tablet 40 mg, 40 mg, Oral, ACB, Odin Martínez MD, 40 mg at 07/22/22 4604

## 2022-07-22 NOTE — PROGRESS NOTES
Problem: Diabetes Self-Management  Goal: *Disease process and treatment process  Description: Define diabetes and identify own type of diabetes; list 3 options for treating diabetes. Outcome: Progressing Towards Goal  Goal: *Incorporating nutritional management into lifestyle  Description: Describe effect of type, amount and timing of food on blood glucose; list 3 methods for planning meals. Outcome: Progressing Towards Goal  Goal: *Incorporating physical activity into lifestyle  Description: State effect of exercise on blood glucose levels. Outcome: Progressing Towards Goal  Goal: *Developing strategies to promote health/change behavior  Description: Define the ABC's of diabetes; identify appropriate screenings, schedule and personal plan for screenings. Outcome: Progressing Towards Goal  Goal: *Using medications safely  Description: State effect of diabetes medications on diabetes; name diabetes medication taking, action and side effects. Outcome: Progressing Towards Goal  Goal: *Monitoring blood glucose, interpreting and using results  Description: Identify recommended blood glucose targets  and personal targets. Outcome: Progressing Towards Goal     Problem: Pain  Goal: *Control of Pain  Outcome: Progressing Towards Goal  Goal: *PALLIATIVE CARE:  Alleviation of Pain  Outcome: Progressing Towards Goal     Problem: Falls - Risk of  Goal: *Absence of Falls  Description: Document Jhony Fall Risk and appropriate interventions in the flowsheet.   Outcome: Progressing Towards Goal  Note: Fall Risk Interventions:  Mobility Interventions: Bed/chair exit alarm         Medication Interventions: Evaluate medications/consider consulting pharmacy    Elimination Interventions: Call light in reach, Bed/chair exit alarm              Problem: Patient Education: Go to Patient Education Activity  Goal: Patient/Family Education  Outcome: Progressing Towards Goal

## 2022-07-23 LAB
ALBUMIN SERPL-MCNC: 2.5 G/DL (ref 3.5–5)
ALBUMIN/GLOB SERPL: 0.6 {RATIO} (ref 1.1–2.2)
ALP SERPL-CCNC: 84 U/L (ref 45–117)
ALT SERPL-CCNC: 25 U/L (ref 12–78)
ANION GAP SERPL CALC-SCNC: 6 MMOL/L (ref 5–15)
APTT PPP: 50.7 SEC (ref 21.2–34.1)
APTT PPP: 55.8 SEC (ref 21.2–34.1)
APTT PPP: 56.2 SEC (ref 21.2–34.1)
AST SERPL W P-5'-P-CCNC: 16 U/L (ref 15–37)
BILIRUB SERPL-MCNC: 0.5 MG/DL (ref 0.2–1)
BUN SERPL-MCNC: 5 MG/DL (ref 6–20)
BUN/CREAT SERPL: 8 (ref 12–20)
CA-I BLD-MCNC: 8.8 MG/DL (ref 8.5–10.1)
CHLORIDE SERPL-SCNC: 102 MMOL/L (ref 97–108)
CO2 SERPL-SCNC: 28 MMOL/L (ref 21–32)
CREAT SERPL-MCNC: 0.59 MG/DL (ref 0.7–1.3)
ERYTHROCYTE [DISTWIDTH] IN BLOOD BY AUTOMATED COUNT: 12.3 % (ref 11.5–14.5)
GLOBULIN SER CALC-MCNC: 4.2 G/DL (ref 2–4)
GLUCOSE BLD STRIP.AUTO-MCNC: 131 MG/DL (ref 65–117)
GLUCOSE BLD STRIP.AUTO-MCNC: 134 MG/DL (ref 65–117)
GLUCOSE BLD STRIP.AUTO-MCNC: 135 MG/DL (ref 65–117)
GLUCOSE SERPL-MCNC: 129 MG/DL (ref 65–100)
HCT VFR BLD AUTO: 33.7 % (ref 36.6–50.3)
HGB BLD-MCNC: 11.2 G/DL (ref 12.1–17)
MCH RBC QN AUTO: 29.2 PG (ref 26–34)
MCHC RBC AUTO-ENTMCNC: 33.2 G/DL (ref 30–36.5)
MCV RBC AUTO: 87.8 FL (ref 80–99)
NRBC # BLD: 0 K/UL (ref 0–0.01)
NRBC BLD-RTO: 0 PER 100 WBC
PERFORMED BY, TECHID: ABNORMAL
PLATELET # BLD AUTO: 423 K/UL (ref 150–400)
PMV BLD AUTO: 9.5 FL (ref 8.9–12.9)
POTASSIUM SERPL-SCNC: 3.8 MMOL/L (ref 3.5–5.1)
PROT SERPL-MCNC: 6.7 G/DL (ref 6.4–8.2)
RBC # BLD AUTO: 3.84 M/UL (ref 4.1–5.7)
SODIUM SERPL-SCNC: 136 MMOL/L (ref 136–145)
THERAPEUTIC RANGE,PTTT: ABNORMAL SEC (ref 82–109)
WBC # BLD AUTO: 14.2 K/UL (ref 4.1–11.1)

## 2022-07-23 PROCEDURE — 94762 N-INVAS EAR/PLS OXIMTRY CONT: CPT

## 2022-07-23 PROCEDURE — 74011000258 HC RX REV CODE- 258: Performed by: SURGERY

## 2022-07-23 PROCEDURE — 74011250637 HC RX REV CODE- 250/637: Performed by: INTERNAL MEDICINE

## 2022-07-23 PROCEDURE — 36415 COLL VENOUS BLD VENIPUNCTURE: CPT

## 2022-07-23 PROCEDURE — 74011000250 HC RX REV CODE- 250: Performed by: SURGERY

## 2022-07-23 PROCEDURE — 74011250636 HC RX REV CODE- 250/636: Performed by: FAMILY MEDICINE

## 2022-07-23 PROCEDURE — 85730 THROMBOPLASTIN TIME PARTIAL: CPT

## 2022-07-23 PROCEDURE — 65270000029 HC RM PRIVATE

## 2022-07-23 PROCEDURE — 74011250637 HC RX REV CODE- 250/637: Performed by: SURGERY

## 2022-07-23 PROCEDURE — 97530 THERAPEUTIC ACTIVITIES: CPT

## 2022-07-23 PROCEDURE — 82962 GLUCOSE BLOOD TEST: CPT

## 2022-07-23 PROCEDURE — 80053 COMPREHEN METABOLIC PANEL: CPT

## 2022-07-23 PROCEDURE — 74011250636 HC RX REV CODE- 250/636: Performed by: SURGERY

## 2022-07-23 PROCEDURE — 97161 PT EVAL LOW COMPLEX 20 MIN: CPT

## 2022-07-23 PROCEDURE — 85027 COMPLETE CBC AUTOMATED: CPT

## 2022-07-23 RX ADMIN — ENOXAPARIN SODIUM 40 MG: 100 INJECTION SUBCUTANEOUS at 09:01

## 2022-07-23 RX ADMIN — OXYCODONE HYDROCHLORIDE AND ACETAMINOPHEN 1 TABLET: 10; 325 TABLET ORAL at 07:01

## 2022-07-23 RX ADMIN — SODIUM CHLORIDE, PRESERVATIVE FREE 10 ML: 5 INJECTION INTRAVENOUS at 13:15

## 2022-07-23 RX ADMIN — PIPERACILLIN SODIUM AND TAZOBACTAM SODIUM 3.38 G: 3; .375 INJECTION, POWDER, LYOPHILIZED, FOR SOLUTION INTRAVENOUS at 18:17

## 2022-07-23 RX ADMIN — HEPARIN SODIUM 25 UNITS/KG/HR: 10000 INJECTION, SOLUTION INTRAVENOUS at 18:13

## 2022-07-23 RX ADMIN — OXYCODONE HYDROCHLORIDE AND ACETAMINOPHEN 1 TABLET: 10; 325 TABLET ORAL at 00:35

## 2022-07-23 RX ADMIN — AMLODIPINE BESYLATE 10 MG: 5 TABLET ORAL at 09:01

## 2022-07-23 RX ADMIN — OXYCODONE HYDROCHLORIDE 20 MG: 20 TABLET, FILM COATED, EXTENDED RELEASE ORAL at 23:32

## 2022-07-23 RX ADMIN — PIPERACILLIN SODIUM AND TAZOBACTAM SODIUM 3.38 G: 3; .375 INJECTION, POWDER, LYOPHILIZED, FOR SOLUTION INTRAVENOUS at 02:27

## 2022-07-23 RX ADMIN — OXYCODONE HYDROCHLORIDE 20 MG: 20 TABLET, FILM COATED, EXTENDED RELEASE ORAL at 09:01

## 2022-07-23 RX ADMIN — OXYCODONE HYDROCHLORIDE AND ACETAMINOPHEN 1 TABLET: 10; 325 TABLET ORAL at 21:22

## 2022-07-23 RX ADMIN — SODIUM CHLORIDE 75 ML/HR: 9 INJECTION, SOLUTION INTRAVENOUS at 12:52

## 2022-07-23 RX ADMIN — COLCHICINE 0.6 MG: 0.6 TABLET, FILM COATED ORAL at 09:01

## 2022-07-23 RX ADMIN — SODIUM CHLORIDE, PRESERVATIVE FREE 10 ML: 5 INJECTION INTRAVENOUS at 21:34

## 2022-07-23 RX ADMIN — KETOROLAC TROMETHAMINE 30 MG: 30 INJECTION, SOLUTION INTRAMUSCULAR; INTRAVENOUS at 15:45

## 2022-07-23 RX ADMIN — HYDROMORPHONE HYDROCHLORIDE 1 MG: 1 INJECTION, SOLUTION INTRAMUSCULAR; INTRAVENOUS; SUBCUTANEOUS at 02:35

## 2022-07-23 RX ADMIN — HEPARIN SODIUM 2000 UNITS: 1000 INJECTION, SOLUTION INTRAVENOUS; SUBCUTANEOUS at 05:40

## 2022-07-23 RX ADMIN — PIPERACILLIN SODIUM AND TAZOBACTAM SODIUM 3.38 G: 3; .375 INJECTION, POWDER, LYOPHILIZED, FOR SOLUTION INTRAVENOUS at 12:30

## 2022-07-23 RX ADMIN — HEPARIN SODIUM 25 UNITS/KG/HR: 10000 INJECTION, SOLUTION INTRAVENOUS at 06:17

## 2022-07-23 RX ADMIN — SODIUM CHLORIDE, PRESERVATIVE FREE 10 ML: 5 INJECTION INTRAVENOUS at 06:11

## 2022-07-23 RX ADMIN — PANTOPRAZOLE SODIUM 40 MG: 40 TABLET, DELAYED RELEASE ORAL at 06:55

## 2022-07-23 RX ADMIN — LISINOPRIL 20 MG: 20 TABLET ORAL at 09:01

## 2022-07-23 RX ADMIN — POLYETHYLENE GLYCOL 3350 17 G: 17 POWDER, FOR SOLUTION ORAL at 12:30

## 2022-07-23 RX ADMIN — OXYCODONE HYDROCHLORIDE AND ACETAMINOPHEN 1 TABLET: 10; 325 TABLET ORAL at 12:30

## 2022-07-23 RX ADMIN — HYDROMORPHONE HYDROCHLORIDE 1 MG: 1 INJECTION, SOLUTION INTRAMUSCULAR; INTRAVENOUS; SUBCUTANEOUS at 18:17

## 2022-07-23 NOTE — PROGRESS NOTES
Dressing change and wound care provided per notes. Noted dark brown, purple color on great big toe on right toe. Some darkening and pinkish color on the sole of his foot with decreased sensation. Noted wound in between 4th and 5th toe - with top layer removed. Patient denies pain when cleaning. Patient with decreased to no sensation in toes 1-3 on right foot. Discoloration noted on right heel, anterior  right foot, and right shin.

## 2022-07-23 NOTE — PROGRESS NOTES
Problem: Diabetes Self-Management  Goal: *Disease process and treatment process  Description: Define diabetes and identify own type of diabetes; list 3 options for treating diabetes. Outcome: Progressing Towards Goal  Goal: *Incorporating nutritional management into lifestyle  Description: Describe effect of type, amount and timing of food on blood glucose; list 3 methods for planning meals. Outcome: Progressing Towards Goal  Goal: *Monitoring blood glucose, interpreting and using results  Description: Identify recommended blood glucose targets  and personal targets. Outcome: Progressing Towards Goal  Goal: *Prevention, detection, treatment of acute complications  Description: List symptoms of hyper- and hypoglycemia; describe how to treat low blood sugar and actions for lowering  high blood glucose level. Outcome: Progressing Towards Goal     Problem: Pain  Goal: *Control of Pain  Outcome: Progressing Towards Goal  Goal: *PALLIATIVE CARE:  Alleviation of Pain  Outcome: Progressing Towards Goal     Problem: Falls - Risk of  Goal: *Absence of Falls  Description: Document Jhony Fall Risk and appropriate interventions in the flowsheet.   Outcome: Progressing Towards Goal  Note: Fall Risk Interventions:  Mobility Interventions: Bed/chair exit alarm, Patient to call before getting OOB         Medication Interventions: Assess postural VS orthostatic hypotension, Bed/chair exit alarm, Patient to call before getting OOB, Teach patient to arise slowly    Elimination Interventions: Bed/chair exit alarm, Call light in reach, Patient to call for help with toileting needs, Toileting schedule/hourly rounds, Toilet paper/wipes in reach, Urinal in reach

## 2022-07-23 NOTE — PROGRESS NOTES
Neuro/ mobility:  AAOX4. 1 person assist - with walker, heel touch only mobiltiy. Nursing report:  Dressing changed during shift - see nursing notes     Patient requested we hold changing the right groin dressing until night shift.       Patient educated on pain management and need to allow one prn medication to work prior to asking for the next        Nursing Plan of Care:   Pain management + education, dressing changes per orders

## 2022-07-23 NOTE — PROGRESS NOTES
Noted that patient had 2 dressing changes in right groin yesterday. One early morning dressing this AM in groin.   Patient reports saturated dressing in the early AM.  Will continue to monitor the dressing and its contents

## 2022-07-23 NOTE — PROGRESS NOTES
PHYSICAL THERAPY EVALUATION  Patient: Dennis Tejada [de-identified]65 y.o. male)  Date: 7/23/2022  Primary Diagnosis: Gangrene of toe ([address] Karron Bilis  Procedure(s) (LRB):  FEMORAL-POPLITEAL BYPASS RIGHT (Right) 4 Days Post-Op   Precautions: heel touch ,Fall       ASSESSMENT  As per DR aguirre's notes 7/22/22 patient adm (Patient is a 65y.o. year old male with a PMH of diabetes, diabetic foot injury, arterial insufficiency, hypertension, GERD, and gout presents to the ED complaining of  right great toe infection. The patient was recently hospitalized for this same problem and received balloon angioplasty of the right lower extremity which initially helped perfusion of the toe. He has since relapsed and complains of excruciating pain that is not well managed on percocet. The patient states that it greatly progress in the last 24 hours and there is now black and gangrenous lesion on the plantar aspect of the toe with swelling over the entire foot. Patient denies any chest pain, SOB, difficulty breathing, palpitations, dizziness, N/V/D, or fevers. )Patient is s/p fem -pop bypass 4 days ago. Pt A&O x 4. Please refer to flow sheet for home info. Based on the objective data described below, the patient presents with generalized weakness, impaired functional mobility, impaired amb, impaired balance, and impaired safety. Pt semi supine upon PT arrival, agreeable to evaluation. Peer patient report he was not able to amb with nursing yesterday. Per patient report this writer saw him last adm and gave him a boot which he does not have it here. His wife can bring it. Pt required MI for bed mobility,  supine <> sit, cg to min assist at first  sit <> stand transfers. Pt amb 25 feet with gt belt, Rw, and sbd to sup; demonstrating heel touch /steady gt pattern with no lob noted. Pt did good with session today with PT. Pt will benefit from continued skilled PT to address above deficits and return to PLOF.  Current PT DC recommendation home with family . Current Level of Function Impacting Discharge (mobility/balance): patient with just off balance w/o AD. he has a crutches for amb at home and may benefit from walker. He  is reschedule for further SX for rt big toe amputation 2 weeks after. Other factors to consider for discharge: home with family      PLAN :  Recommendations and Planned Interventions: DC PT after eval      Frequency/Duration: Patient will be followed by physical therapy:  elan;l and treat only to address goals. Recommendation for discharge: (in order for the patient to meet his/her long term goals)  Home with Family Care    This discharge recommendation:  Has been made in collaboration with the attending provider and/or case management    IF patient discharges home will need the following DME: patient owns DME required for discharge         SUBJECTIVE:   Patient stated I am ok. would like to walk    OBJECTIVE DATA SUMMARY:   HISTORY:    Past Medical History:   Diagnosis Date    Diabetes ([facility] [address]    History reviewed. No pertinent surgical history. Home Situation  Home Environment: Private residence  # Steps to Enter: 10  Rails to Enter: Yes  One/Two Story Residence: One story  Living Alone: No  Support Systems: Spouse/Significant Other  Patient Expects to be Discharged to[de-identified] Home  Current DME Used/Available at Home: Crutches    EXAMINATION/PRESENTATION/DECISION MAKING:   Critical Behavior:  Neurologic State: Alert  Orientation Level: Oriented X4  Cognition: Appropriate decision making     Hearing:   Auditory  Auditory Impairment: None  Skin:  dressing intact  Edema: rt foot  Range Of Motion:  AROM: Generally decreased, functional                       Strength:    Strength: Generally decreased, functional                    Tone & Sensation:   Tone: Normal                              Coordination:     Vision:      Functional Mobility:  Bed Mobility:  Rolling: Independent  Supine to Sit: Independent  Sit to Supine: Independent  Scooting: Independent  Transfers:  Sit to Stand: Stand-by assistance;Modified independent  Stand to Sit: Modified independent;Stand-by assistance                       Balance:   Sitting: Intact  Standing: Intact; With support  Ambulation/Gait Training:  Distance (ft): 25 Feet (ft)  Assistive Device: Gait belt;Walker, rolling  Ambulation - Level of Assistance: Stand-by assistance     Gait Description (WDL): Exceptions to [facility]                                         Functional Measure:  [address] Short Form  How much difficulty does the patient currently have. .. Unable A Lot A Little None   1. Turning over in bed (including adjusting bedclothes, sheets and blankets)? [] 1   [] 2   [] 3   [x] 4   2. Sitting down on and standing up from a chair with arms ( e.g., wheelchair, bedside commode, etc.)   [] 1   [] 2   [] 3   [] 4   3. Moving from lying on back to sitting on the side of the bed? [] 1   [] 2   [] 3   [x] 4          How much help from another person does the patient currently need. .. Total A Lot A Little None   4. Moving to and from a bed to a chair (including a wheelchair)? [] 1   [] 2   [] 3   [x] 4   5. Need to walk in hospital room? [] 1   [] 2   [x] 3   [] 4   6. Climbing 3-5 steps with a railing? [] 1   [] 2   [x] 3   [] 4   © 2007, Trustees of [facility], under license to [employer]. All rights reserved     Score:  Initial: 22/24 Most Recent: X (Date: 07/23/202 )   Interpretation of Tool:  Represents activities that are increasingly more difficult (i.e. Bed mobility, Transfers, Gait).   Score 24 23 22-20 19-15 14-10 9-7 6   Modifier CH CI CJ CK CL CM CN         Physical Therapy Evaluation Charge Determination   History Examination Presentation Decision-Making   LOW Complexity : Zero comorbidities / personal factors that will impact the outcome / POC LOW Complexity : 1-2 Standardized tests and measures addressing body structure, function, activity limitation and / or participation in recreation  LOW Complexity : Stable, uncomplicated  Other outcome measures [facility] 6        Based on the above components, the patient evaluation is determined to be of the following complexity level: LOW     Pain Ratin/10 rt groin    Activity Tolerance:   Good    After treatment patient left in no apparent distress:   Supine in bed ,callbell at reach. GOALS:No goals    COMMUNICATION/EDUCATION:   The patients plan of care was discussed with: Registered nurse. Fall prevention education was provided and the patient/caregiver indicated understanding. Thank you for this referral.  Nataliya Turpin, PT.    Time Calculation: 25 mins

## 2022-07-23 NOTE — PROGRESS NOTES
aPTT lab resulted. Noted that patient is at max dose and we're unable to titrate the dosage. Dr. Cary English aware per night nurse [address]   We will continue to draw aPtt q6 hrs until ordered otherwise by provider

## 2022-07-23 NOTE — PROGRESS NOTES
Bedside shift report completed with night RN and patient. Introduced self and updated patient on plan of care for the day. Patient has needed items close to them.       Discussed pain management and provided pain medication    Patient's call light within reach and bed at lowest position

## 2022-07-23 NOTE — PROGRESS NOTES
Patient refused to have dressing change with betadine soaked gauze as ordered and requested to use \"honey\" pharmaceutical agent that he used at home. Patient stated he will ask Dr. Clarissa Bingham when he rounds tomorrow as the betadine made the pain worse. Problem: Diabetes Self-Management  Goal: *Disease process and treatment process  Description: Define diabetes and identify own type of diabetes; list 3 options for treating diabetes. Outcome: Progressing Towards Goal  Goal: *Incorporating nutritional management into lifestyle  Description: Describe effect of type, amount and timing of food on blood glucose; list 3 methods for planning meals. Outcome: Progressing Towards Goal  Goal: *Incorporating physical activity into lifestyle  Description: State effect of exercise on blood glucose levels. Outcome: Progressing Towards Goal  Goal: *Developing strategies to promote health/change behavior  Description: Define the ABC's of diabetes; identify appropriate screenings, schedule and personal plan for screenings. Outcome: Progressing Towards Goal  Goal: *Using medications safely  Description: State effect of diabetes medications on diabetes; name diabetes medication taking, action and side effects. Outcome: Progressing Towards Goal  Goal: *Monitoring blood glucose, interpreting and using results  Description: Identify recommended blood glucose targets  and personal targets. Outcome: Progressing Towards Goal  Goal: *Prevention, detection, treatment of acute complications  Description: List symptoms of hyper- and hypoglycemia; describe how to treat low blood sugar and actions for lowering  high blood glucose level. Outcome: Progressing Towards Goal  Goal: *Prevention, detection and treatment of chronic complications  Description: Define the natural course of diabetes and describe the relationship of blood glucose levels to long term complications of diabetes.   Outcome: Progressing Towards Goal  Goal: *Developing strategies to address psychosocial issues  Description: Describe feelings about living with diabetes; identify support needed and support network  Outcome: Progressing Towards Goal  Goal: *Insulin pump training  Outcome: Progressing Towards Goal  Goal: *Sick day guidelines  Outcome: Progressing Towards Goal  Goal: *Patient Specific Goal (EDIT GOAL, INSERT TEXT)  Outcome: Progressing Towards Goal     Problem: Patient Education: Go to Patient Education Activity  Goal: Patient/Family Education  Outcome: Progressing Towards Goal     Problem: Pain  Goal: *Control of Pain  Outcome: Progressing Towards Goal  Goal: *PALLIATIVE CARE:  Alleviation of Pain  Outcome: Progressing Towards Goal     Problem: Patient Education: Go to Patient Education Activity  Goal: Patient/Family Education  Outcome: Progressing Towards Goal     Problem: Falls - Risk of  Goal: *Absence of Falls  Description: Document Jhony Fall Risk and appropriate interventions in the flowsheet.   Outcome: Progressing Towards Goal  Note: Fall Risk Interventions:  Mobility Interventions: Bed/chair exit alarm, Patient to call before getting OOB         Medication Interventions: Assess postural VS orthostatic hypotension, Bed/chair exit alarm, Patient to call before getting OOB, Teach patient to arise slowly    Elimination Interventions: Bed/chair exit alarm, Call light in reach, Patient to call for help with toileting needs, Toileting schedule/hourly rounds, Toilet paper/wipes in reach, Urinal in reach              Problem: Patient Education: Go to Patient Education Activity  Goal: Patient/Family Education  Outcome: Progressing Towards Goal

## 2022-07-23 NOTE — PROGRESS NOTES
Progress Note    Patient: Kimberly Crowell MRN: [id number]  SSN: [id number]    YOB: 1960  Age: 65 y.o. Sex: male      Admit Date: 7/18/2022    LOS: 5 days     Subjective:     65years old with with PAD, diabetes prostatitis, hypertension, scheduled for to amputation on Monday    Objective:     Vitals:    07/22/22 1525 07/22/22 1925 07/23/22 0235 07/23/22 0722   BP: (!) 149/83 (!) 169/83 (!) 166/80 (!) 152/80   Pulse: 77 79 66 64   Resp: 16 16 16 20   Temp: 97.9 °F (36.6 °C) 97.8 °F (36.6 °C) 98 °F (36.7 °C) 97.5 °F (36.4 °C)   SpO2: 96% 99% 98% 97%   Weight:       Height:            Intake and Output:  Current Shift: No intake/output data recorded. Last three shifts: 07/21 1901 - 07/23 0700  In: 1100 [P.O.:500; I.V.:600]  Out: 2750 [Urine:2750]    Physical Exam:   General:  Alert, cooperative, no distress, appears stated age. Eyes:  Conjunctivae/corneas clear. PERRL, EOMs intact. Fundi benign   Ears:  Normal TMs and external ear canals both ears. Nose: Nares normal. Septum midline. Mucosa normal. No drainage or sinus tenderness. Mouth/Throat: Lips, mucosa, and tongue normal. Teeth and gums normal.   Neck: Supple, symmetrical, trachea midline, no adenopathy, thyroid: no enlargment/tenderness/nodules, no carotid bruit and no JVD. Back:   Symmetric, no curvature. ROM normal. No CVA tenderness. Lungs:   Clear to auscultation bilaterally. Heart:  Regular rate and rhythm, S1, S2 normal, no murmur, click, rub or gallop. Abdomen:   Soft, non-tender. Bowel sounds normal. No masses,  No organomegaly. Extremities: Extremities normal, atraumatic, no cyanosis or edema. Pulses: 2+ and symmetric all extremities. Skin: Skin color, texture, turgor normal. No rashes or lesions   Lymph nodes: Cervical, supraclavicular, and axillary nodes normal.   Neurologic: CNII-XII intact. Normal strength, sensation and reflexes throughout. Lab/Data Review:   All lab results for the last 24 hours reviewed.     l  Recent Results (from the past 24 hour(s))   GLUCOSE, POC    Collection Time: 07/22/22  5:10 PM   Result Value Ref Range    Glucose (POC) 136 (H) 65 - 117 mg/dL    Performed by Lucille Ring    PTT    Collection Time: 07/22/22  7:05 PM   Result Value Ref Range    aPTT 48.9 (H) 21.2 - 34.1 sec    aPTT, therapeutic range   82 - 109 sec   GLUCOSE, POC    Collection Time: 07/22/22  9:28 PM   Result Value Ref Range    Glucose (POC) 145 (H) 65 - 117 mg/dL    Performed by Mikey Pollock    CBC W/O DIFF    Collection Time: 07/23/22  4:24 AM   Result Value Ref Range    WBC 14.2 (H) 4.1 - 11.1 K/uL    RBC 3.84 (L) 4.10 - 5.70 M/uL    HGB 11.2 (L) 12.1 - 17.0 g/dL    HCT 33.7 (L) 36.6 - 50.3 %    MCV 87.8 80.0 - 99.0 FL    MCH 29.2 26.0 - 34.0 PG    MCHC 33.2 30.0 - 36.5 g/dL    RDW 12.3 11.5 - 14.5 %    PLATELET 482 (H) 914 - 400 K/uL    MPV 9.5 8.9 - 12.9 FL    NRBC 0.0 0.0  WBC    ABSOLUTE NRBC 0.00 0.00 - 5.48 K/uL   METABOLIC PANEL, COMPREHENSIVE    Collection Time: 07/23/22  4:24 AM   Result Value Ref Range    Sodium 136 136 - 145 mmol/L    Potassium 3.8 3.5 - 5.1 mmol/L    Chloride 102 97 - 108 mmol/L    CO2 28 21 - 32 mmol/L    Anion gap 6 5 - 15 mmol/L    Glucose 129 (H) 65 - 100 mg/dL    BUN 5 (L) 6 - 20 mg/dL    Creatinine 0.59 (L) 0.70 - 1.30 mg/dL    BUN/Creatinine ratio 8 (L) 12 - 20      GFR est AA >60 >60 ml/min/1.73m2    GFR est non-AA >60 >60 ml/min/1.73m2    Calcium 8.8 8.5 - 10.1 mg/dL    Bilirubin, total 0.5 0.2 - 1.0 mg/dL    AST (SGOT) 16 15 - 37 U/L    ALT (SGPT) 25 12 - 78 U/L    Alk.  phosphatase 84 45 - 117 U/L    Protein, total 6.7 6.4 - 8.2 g/dL    Albumin 2.5 (L) 3.5 - 5.0 g/dL    Globulin 4.2 (H) 2.0 - 4.0 g/dL    A-G Ratio 0.6 (L) 1.1 - 2.2     PTT    Collection Time: 07/23/22  4:24 AM   Result Value Ref Range    aPTT 55.8 (H) 21.2 - 34.1 sec    aPTT, therapeutic range   82 - 109 sec   GLUCOSE, POC    Collection Time: 07/23/22  7:15 AM   Result Value Ref Range    Glucose (POC) 134 (H) 65 - 117 mg/dL    Performed by Amy Castillo    PTT    Collection Time: 07/23/22 10:26 AM   Result Value Ref Range    aPTT 56.2 (H) 21.2 - 34.1 sec    aPTT, therapeutic range   82 - 109 sec       Assessment:     Active Problems:    Gangrene of toe ([address] (7/18/2022)    PAD      Plan:   Medications    Signed By: Ceci Pardo MD     July 23, 2022

## 2022-07-23 NOTE — PROGRESS NOTES
Called Dr. Cary English to notify about heparin drip needs to be titrate. Dr. Cary English okay to keep patient heparin gtt @ 25units/kg/hr since patient is going to have surgery.

## 2022-07-24 LAB
APTT PPP: 30.5 SEC (ref 21.2–34.1)
APTT PPP: 37.3 SEC (ref 21.2–34.1)
APTT PPP: 48.2 SEC (ref 21.2–34.1)
GLUCOSE BLD STRIP.AUTO-MCNC: 137 MG/DL (ref 65–117)
GLUCOSE BLD STRIP.AUTO-MCNC: 137 MG/DL (ref 65–117)
GLUCOSE BLD STRIP.AUTO-MCNC: 149 MG/DL (ref 65–117)
PERFORMED BY, TECHID: ABNORMAL
THERAPEUTIC RANGE,PTTT: ABNORMAL SEC (ref 82–109)
THERAPEUTIC RANGE,PTTT: ABNORMAL SEC (ref 82–109)
THERAPEUTIC RANGE,PTTT: NORMAL SEC (ref 82–109)

## 2022-07-24 PROCEDURE — 74011250636 HC RX REV CODE- 250/636: Performed by: SURGERY

## 2022-07-24 PROCEDURE — 85730 THROMBOPLASTIN TIME PARTIAL: CPT

## 2022-07-24 PROCEDURE — 65270000029 HC RM PRIVATE

## 2022-07-24 PROCEDURE — 36415 COLL VENOUS BLD VENIPUNCTURE: CPT

## 2022-07-24 PROCEDURE — 74011000250 HC RX REV CODE- 250: Performed by: SURGERY

## 2022-07-24 PROCEDURE — 74011250636 HC RX REV CODE- 250/636: Performed by: FAMILY MEDICINE

## 2022-07-24 PROCEDURE — 74011250637 HC RX REV CODE- 250/637: Performed by: SURGERY

## 2022-07-24 PROCEDURE — 74011250637 HC RX REV CODE- 250/637: Performed by: INTERNAL MEDICINE

## 2022-07-24 PROCEDURE — 82962 GLUCOSE BLOOD TEST: CPT

## 2022-07-24 PROCEDURE — 94762 N-INVAS EAR/PLS OXIMTRY CONT: CPT

## 2022-07-24 RX ADMIN — LISINOPRIL 20 MG: 20 TABLET ORAL at 09:23

## 2022-07-24 RX ADMIN — AMLODIPINE BESYLATE 10 MG: 5 TABLET ORAL at 09:18

## 2022-07-24 RX ADMIN — SODIUM CHLORIDE 75 ML/HR: 9 INJECTION, SOLUTION INTRAVENOUS at 09:18

## 2022-07-24 RX ADMIN — SODIUM CHLORIDE, PRESERVATIVE FREE 10 ML: 5 INJECTION INTRAVENOUS at 18:39

## 2022-07-24 RX ADMIN — OXYCODONE HYDROCHLORIDE 20 MG: 20 TABLET, FILM COATED, EXTENDED RELEASE ORAL at 21:12

## 2022-07-24 RX ADMIN — HEPARIN SODIUM 25 UNITS/KG/HR: 10000 INJECTION, SOLUTION INTRAVENOUS at 05:32

## 2022-07-24 RX ADMIN — POLYETHYLENE GLYCOL 3350 17 G: 17 POWDER, FOR SOLUTION ORAL at 09:19

## 2022-07-24 RX ADMIN — OXYCODONE HYDROCHLORIDE AND ACETAMINOPHEN 1 TABLET: 10; 325 TABLET ORAL at 15:07

## 2022-07-24 RX ADMIN — ENOXAPARIN SODIUM 40 MG: 100 INJECTION SUBCUTANEOUS at 09:18

## 2022-07-24 RX ADMIN — SODIUM CHLORIDE, PRESERVATIVE FREE 10 ML: 5 INJECTION INTRAVENOUS at 22:18

## 2022-07-24 RX ADMIN — PANTOPRAZOLE SODIUM 40 MG: 40 TABLET, DELAYED RELEASE ORAL at 07:07

## 2022-07-24 RX ADMIN — HYDROMORPHONE HYDROCHLORIDE 1 MG: 1 INJECTION, SOLUTION INTRAMUSCULAR; INTRAVENOUS; SUBCUTANEOUS at 12:27

## 2022-07-24 RX ADMIN — OXYCODONE HYDROCHLORIDE AND ACETAMINOPHEN 1 TABLET: 10; 325 TABLET ORAL at 05:40

## 2022-07-24 RX ADMIN — COLCHICINE 0.6 MG: 0.6 TABLET, FILM COATED ORAL at 09:19

## 2022-07-24 RX ADMIN — OXYCODONE HYDROCHLORIDE 20 MG: 20 TABLET, FILM COATED, EXTENDED RELEASE ORAL at 09:19

## 2022-07-24 RX ADMIN — SODIUM CHLORIDE, PRESERVATIVE FREE 10 ML: 5 INJECTION INTRAVENOUS at 05:44

## 2022-07-24 RX ADMIN — OXYCODONE HYDROCHLORIDE AND ACETAMINOPHEN 1 TABLET: 10; 325 TABLET ORAL at 18:38

## 2022-07-24 RX ADMIN — METFORMIN HYDROCHLORIDE 500 MG: 500 TABLET ORAL at 18:36

## 2022-07-24 RX ADMIN — HEPARIN SODIUM 25 UNITS/KG/HR: 10000 INJECTION, SOLUTION INTRAVENOUS at 18:35

## 2022-07-24 RX ADMIN — HYDROMORPHONE HYDROCHLORIDE 1 MG: 1 INJECTION, SOLUTION INTRAMUSCULAR; INTRAVENOUS; SUBCUTANEOUS at 02:25

## 2022-07-24 RX ADMIN — HEPARIN SODIUM 25 UNITS/KG/HR: 10000 INJECTION, SOLUTION INTRAVENOUS at 07:08

## 2022-07-24 NOTE — PROGRESS NOTES
Progress Note    Patient: Ariela Norris MRN: [id number]  SSN: [id number]    YOB: 1960  Age: 65 y.o. Sex: male      Admit Date: 7/18/2022    LOS: 6 days     Subjective:     65years old with PAD scheduled for amputation in a.m. Objective:     Vitals:    07/23/22 1728 07/23/22 2115 07/24/22 0225 07/24/22 0925   BP: (!) 155/67 (!) 161/99 (!) 162/82 (!) 161/87   Pulse: 65 66 78 75   Resp: 20 18 18 18   Temp: 98.1 °F (36.7 °C) 97.7 °F (36.5 °C) 97.9 °F (36.6 °C) 97.5 °F (36.4 °C)   SpO2: 100% 100% 98% 98%   Weight:       Height:            Intake and Output:  Current Shift: No intake/output data recorded. Last three shifts: 07/22 1901 - 07/24 0700  In: 2425 [P.O.:1000; I.V.:1425]  Out: 4230 [Urine:4230]    Physical Exam:   General:  Alert, cooperative, no distress, appears stated age. Eyes:  Conjunctivae/corneas clear. PERRL, EOMs intact. Fundi benign   Ears:  Normal TMs and external ear canals both ears. Nose: Nares normal. Septum midline. Mucosa normal. No drainage or sinus tenderness. Mouth/Throat: Lips, mucosa, and tongue normal. Teeth and gums normal.   Neck: Supple, symmetrical, trachea midline, no adenopathy, thyroid: no enlargment/tenderness/nodules, no carotid bruit and no JVD. Back:   Symmetric, no curvature. ROM normal. No CVA tenderness. Lungs:   Clear to auscultation bilaterally. Heart:  Regular rate and rhythm, S1, S2 normal, no murmur, click, rub or gallop. Abdomen:   Soft, non-tender. Bowel sounds normal. No masses,  No organomegaly. Extremities: Extremities normal, atraumatic, no cyanosis or edema. Pulses: 2+ and symmetric all extremities. Skin: Skin color, texture, turgor normal. No rashes or lesions   Lymph nodes: Cervical, supraclavicular, and axillary nodes normal.   Neurologic: CNII-XII intact. Normal strength, sensation and reflexes throughout. Lab/Data Review: All lab results for the last 24 hours reviewed.      Recent Results (from the past 24 hour(s))   PTT    Collection Time: 07/23/22 10:26 AM   Result Value Ref Range    aPTT 56.2 (H) 21.2 - 34.1 sec    aPTT, therapeutic range   82 - 109 sec   GLUCOSE, POC    Collection Time: 07/23/22  5:24 PM   Result Value Ref Range    Glucose (POC) 135 (H) 65 - 117 mg/dL    Performed by Jean Bedolla    GLUCOSE, POC    Collection Time: 07/23/22  9:09 PM   Result Value Ref Range    Glucose (POC) 131 (H) 65 - 117 mg/dL    Performed by Lindsey Cobian    PTT    Collection Time: 07/23/22 10:06 PM   Result Value Ref Range    aPTT 50.7 (H) 21.2 - 34.1 sec    aPTT, therapeutic range   82 - 109 sec   PTT    Collection Time: 07/24/22  4:20 AM   Result Value Ref Range    aPTT 30.5 21.2 - 34.1 sec    aPTT, therapeutic range   82 - 109 sec   GLUCOSE, POC    Collection Time: 07/24/22  8:00 AM   Result Value Ref Range    Glucose (POC) 149 (H) 65 - 117 mg/dL    Performed by Uriel Acuna         Assessment:     Active Problems:    Gangrene of toe ([address] (7/18/2022)    PAD    Plan:     Continue on IV heparin  For amputation    Signed By: Jase Aviles MD     July 24, 2022

## 2022-07-24 NOTE — PROGRESS NOTES
Problem: Diabetes Self-Management  Goal: *Disease process and treatment process  Description: Define diabetes and identify own type of diabetes; list 3 options for treating diabetes. Outcome: Progressing Towards Goal  Goal: *Incorporating nutritional management into lifestyle  Description: Describe effect of type, amount and timing of food on blood glucose; list 3 methods for planning meals. Outcome: Progressing Towards Goal  Goal: *Incorporating physical activity into lifestyle  Description: State effect of exercise on blood glucose levels.   Outcome: Progressing Towards Goal     Problem: Pain  Goal: *Control of Pain  Outcome: Progressing Towards Goal  Goal: *PALLIATIVE CARE:  Alleviation of Pain  Outcome: Progressing Towards Goal

## 2022-07-24 NOTE — PROGRESS NOTES
Neuro/ mobility:  AAOX4. 1 person assist - with walker, heel touch only mobiltiy. Nursing report:  Dressing changed during shift by MD -     Patient requested we hold changing the right groin dressing until night shift. Patient updated on available pain medication.      Metformin unheld - contrast over 48 hours ago     Wife at bedside most of the day     Nursing Plan of Care:   Pain management

## 2022-07-24 NOTE — PROGRESS NOTES
Patient up to BR from washup with wife. Supplies provided to patient and wife. Patient removed tele as well.   Notified tele that patient will be off the tele for about 30 minutes while he bathes

## 2022-07-24 NOTE — PROGRESS NOTES
Noted that dilaudid is discontinued from mar - updated patient with change in pain management     Called pharmacy to have percocet dosage added to our pixis for delivery of pain management in the afternoon and into the night as ordered.

## 2022-07-24 NOTE — PROGRESS NOTES
Heparin drip is maintaining at 22.6ml/hr as Dr. Adelaida Falk advised , patient's aptt still not in therapeutic range. Lab draw q6h to monitor aptt. Problem: Diabetes Self-Management  Goal: *Disease process and treatment process  Description: Define diabetes and identify own type of diabetes; list 3 options for treating diabetes. Outcome: Progressing Towards Goal  Goal: *Incorporating nutritional management into lifestyle  Description: Describe effect of type, amount and timing of food on blood glucose; list 3 methods for planning meals. Outcome: Progressing Towards Goal  Goal: *Incorporating physical activity into lifestyle  Description: State effect of exercise on blood glucose levels. Outcome: Progressing Towards Goal  Goal: *Developing strategies to promote health/change behavior  Description: Define the ABC's of diabetes; identify appropriate screenings, schedule and personal plan for screenings. Outcome: Progressing Towards Goal  Goal: *Using medications safely  Description: State effect of diabetes medications on diabetes; name diabetes medication taking, action and side effects. Outcome: Progressing Towards Goal  Goal: *Monitoring blood glucose, interpreting and using results  Description: Identify recommended blood glucose targets  and personal targets. Outcome: Progressing Towards Goal  Goal: *Prevention, detection, treatment of acute complications  Description: List symptoms of hyper- and hypoglycemia; describe how to treat low blood sugar and actions for lowering  high blood glucose level. Outcome: Progressing Towards Goal  Goal: *Prevention, detection and treatment of chronic complications  Description: Define the natural course of diabetes and describe the relationship of blood glucose levels to long term complications of diabetes.   Outcome: Progressing Towards Goal  Goal: *Developing strategies to address psychosocial issues  Description: Describe feelings about living with diabetes; identify support needed and support network  Outcome: Progressing Towards Goal  Goal: *Insulin pump training  Outcome: Progressing Towards Goal  Goal: *Sick day guidelines  Outcome: Progressing Towards Goal  Goal: *Patient Specific Goal (EDIT GOAL, INSERT TEXT)  Outcome: Progressing Towards Goal     Problem: Patient Education: Go to Patient Education Activity  Goal: Patient/Family Education  Outcome: Progressing Towards Goal     Problem: Pain  Goal: *Control of Pain  Outcome: Progressing Towards Goal  Goal: *PALLIATIVE CARE:  Alleviation of Pain  Outcome: Progressing Towards Goal     Problem: Patient Education: Go to Patient Education Activity  Goal: Patient/Family Education  Outcome: Progressing Towards Goal     Problem: Falls - Risk of  Goal: *Absence of Falls  Description: Document Jhony Fall Risk and appropriate interventions in the flowsheet.   Outcome: Progressing Towards Goal  Note: Fall Risk Interventions:  Mobility Interventions: Bed/chair exit alarm, Patient to call before getting OOB, PT Consult for mobility concerns, Utilize walker, cane, or other assistive device         Medication Interventions: Bed/chair exit alarm, Patient to call before getting OOB, Teach patient to arise slowly    Elimination Interventions: Bed/chair exit alarm, Call light in reach, Patient to call for help with toileting needs, Toilet paper/wipes in reach, Toileting schedule/hourly rounds, Urinal in reach              Problem: Patient Education: Go to Patient Education Activity  Goal: Patient/Family Education  Outcome: Progressing Towards Goal     Problem: Patient Education: Go to Patient Education Activity  Goal: Patient/Family Education  Outcome: Progressing Towards Goal

## 2022-07-25 ENCOUNTER — APPOINTMENT (OUTPATIENT)
Dept: CT IMAGING | Age: 62
DRG: 253 | End: 2022-07-25
Attending: INTERNAL MEDICINE

## 2022-07-25 LAB
APTT PPP: 34.8 SEC (ref 21.2–34.1)
APTT PPP: 39.5 SEC (ref 21.2–34.1)
APTT PPP: 49.8 SEC (ref 21.2–34.1)
APTT PPP: 50.8 SEC (ref 21.2–34.1)
BACTERIA SPEC CULT: NORMAL
GLUCOSE BLD STRIP.AUTO-MCNC: 129 MG/DL (ref 65–117)
GLUCOSE BLD STRIP.AUTO-MCNC: 136 MG/DL (ref 65–117)
GLUCOSE BLD STRIP.AUTO-MCNC: 144 MG/DL (ref 65–117)
GLUCOSE BLD STRIP.AUTO-MCNC: 183 MG/DL (ref 65–117)
PERFORMED BY, TECHID: ABNORMAL
SPECIAL REQUESTS,SREQ: NORMAL
THERAPEUTIC RANGE,PTTT: ABNORMAL SEC (ref 82–109)

## 2022-07-25 PROCEDURE — 74011250636 HC RX REV CODE- 250/636: Performed by: SURGERY

## 2022-07-25 PROCEDURE — 74011250636 HC RX REV CODE- 250/636

## 2022-07-25 PROCEDURE — 36415 COLL VENOUS BLD VENIPUNCTURE: CPT

## 2022-07-25 PROCEDURE — 74011250637 HC RX REV CODE- 250/637: Performed by: SURGERY

## 2022-07-25 PROCEDURE — 65270000029 HC RM PRIVATE

## 2022-07-25 PROCEDURE — 85730 THROMBOPLASTIN TIME PARTIAL: CPT

## 2022-07-25 PROCEDURE — 99024 POSTOP FOLLOW-UP VISIT: CPT | Performed by: SURGERY

## 2022-07-25 PROCEDURE — 99232 SBSQ HOSP IP/OBS MODERATE 35: CPT | Performed by: INTERNAL MEDICINE

## 2022-07-25 PROCEDURE — 74011000250 HC RX REV CODE- 250: Performed by: SURGERY

## 2022-07-25 PROCEDURE — 74011000258 HC RX REV CODE- 258

## 2022-07-25 PROCEDURE — 73200 CT UPPER EXTREMITY W/O DYE: CPT

## 2022-07-25 PROCEDURE — 74011250637 HC RX REV CODE- 250/637: Performed by: INTERNAL MEDICINE

## 2022-07-25 PROCEDURE — 82962 GLUCOSE BLOOD TEST: CPT

## 2022-07-25 RX ORDER — AMOXICILLIN AND CLAVULANATE POTASSIUM 875; 125 MG/1; MG/1
1 TABLET, FILM COATED ORAL EVERY 12 HOURS
Status: DISCONTINUED | OUTPATIENT
Start: 2022-07-25 | End: 2022-07-25

## 2022-07-25 RX ADMIN — HEPARIN SODIUM 2000 UNITS: 1000 INJECTION, SOLUTION INTRAVENOUS; SUBCUTANEOUS at 19:32

## 2022-07-25 RX ADMIN — OXYCODONE HYDROCHLORIDE 20 MG: 20 TABLET, FILM COATED, EXTENDED RELEASE ORAL at 09:01

## 2022-07-25 RX ADMIN — AMLODIPINE BESYLATE 10 MG: 5 TABLET ORAL at 09:01

## 2022-07-25 RX ADMIN — HEPARIN SODIUM 25 UNITS/KG/HR: 10000 INJECTION, SOLUTION INTRAVENOUS at 19:24

## 2022-07-25 RX ADMIN — GABAPENTIN 300 MG: 300 CAPSULE ORAL at 20:48

## 2022-07-25 RX ADMIN — POLYETHYLENE GLYCOL 3350 17 G: 17 POWDER, FOR SOLUTION ORAL at 09:07

## 2022-07-25 RX ADMIN — OXYCODONE HYDROCHLORIDE 20 MG: 20 TABLET, FILM COATED, EXTENDED RELEASE ORAL at 21:12

## 2022-07-25 RX ADMIN — OXYCODONE HYDROCHLORIDE AND ACETAMINOPHEN 1 TABLET: 10; 325 TABLET ORAL at 10:39

## 2022-07-25 RX ADMIN — SODIUM CHLORIDE, PRESERVATIVE FREE 10 ML: 5 INJECTION INTRAVENOUS at 15:49

## 2022-07-25 RX ADMIN — SODIUM CHLORIDE, PRESERVATIVE FREE 10 ML: 5 INJECTION INTRAVENOUS at 06:21

## 2022-07-25 RX ADMIN — OXYCODONE HYDROCHLORIDE AND ACETAMINOPHEN 1 TABLET: 10; 325 TABLET ORAL at 00:43

## 2022-07-25 RX ADMIN — OXYCODONE HYDROCHLORIDE AND ACETAMINOPHEN 1 TABLET: 10; 325 TABLET ORAL at 15:47

## 2022-07-25 RX ADMIN — GABAPENTIN 300 MG: 300 CAPSULE ORAL at 09:01

## 2022-07-25 RX ADMIN — PIPERACILLIN AND TAZOBACTAM 3.38 G: 3; .375 INJECTION, POWDER, LYOPHILIZED, FOR SOLUTION INTRAVENOUS at 16:03

## 2022-07-25 RX ADMIN — HEPARIN SODIUM 25 UNITS/KG/HR: 10000 INJECTION, SOLUTION INTRAVENOUS at 06:06

## 2022-07-25 RX ADMIN — PANTOPRAZOLE SODIUM 40 MG: 40 TABLET, DELAYED RELEASE ORAL at 06:34

## 2022-07-25 RX ADMIN — PIPERACILLIN SODIUM AND TAZOBACTAM SODIUM 4.5 G: 4; .5 INJECTION, POWDER, LYOPHILIZED, FOR SOLUTION INTRAVENOUS at 10:55

## 2022-07-25 RX ADMIN — LISINOPRIL 20 MG: 20 TABLET ORAL at 09:01

## 2022-07-25 RX ADMIN — ENOXAPARIN SODIUM 40 MG: 100 INJECTION SUBCUTANEOUS at 09:01

## 2022-07-25 RX ADMIN — OXYCODONE HYDROCHLORIDE AND ACETAMINOPHEN 1 TABLET: 10; 325 TABLET ORAL at 04:52

## 2022-07-25 RX ADMIN — COLCHICINE 0.6 MG: 0.6 TABLET, FILM COATED ORAL at 09:01

## 2022-07-25 RX ADMIN — OXYCODONE HYDROCHLORIDE AND ACETAMINOPHEN 1 TABLET: 10; 325 TABLET ORAL at 19:44

## 2022-07-25 RX ADMIN — METFORMIN HYDROCHLORIDE 500 MG: 500 TABLET ORAL at 09:01

## 2022-07-25 RX ADMIN — OXYCODONE HYDROCHLORIDE AND ACETAMINOPHEN 1 TABLET: 10; 325 TABLET ORAL at 23:38

## 2022-07-25 NOTE — PROGRESS NOTES
Patient examined this morning. Right foot is warm Doppler signal noted. Great toe has become dry gangrenous. I examined the CT angiogram performed on Friday. Patient has distal popliteal artery and tibioperoneal trunk significant plaque. Patient also has looks like two-vessel runoff and these vessels are significantly diseased as well. I talked to the family and the patient's wife about these findings. Patient will need additional procedure below knee revascularization procedure. And I will schedule this endovascular procedure tomorrow. And if intervention is done successfully , this may help with further wound healing. Continue heparin drip for now. Patient's family says they could afford Eliquis from now on. So instead of Coumadin we will placing him Eliquis after tomorrow.

## 2022-07-25 NOTE — PROGRESS NOTES
Clinical chart reviewed. Patient remains on hep drip and is planned to have a procedure tomorrow. His discharge plan remains home self with wife has he has no insurance for home health coverage.

## 2022-07-25 NOTE — PROGRESS NOTES
Infectious Disease Progress Note           Subjective:   Stable, reports RLE pain as of this morning, controlled on current regimen, reports right shoulder swelling   Objective:   Physical Exam:     Visit Vitals  BP (!) 153/79   Pulse 81   Temp 98.2 °F (36.8 °C)   Resp 19   Ht 6' 1\" (1.854 m)   Wt 234 lb (106.1 kg)   SpO2 96%   BMI 30.87 kg/m²      O2 Device: None (Room air)    Temp (24hrs), Av.1 °F (36.7 °C), Min:98 °F (36.7 °C), Max:98.2 °F (36.8 °C)    No intake/output data recorded.  1901 -  0700  In: 1325 [P.O.:500; I.V.:825]  Out: 2880 [Urine:2880]    General: NAD, alert, AAO x 4  HEENT: FLOYD, Moist mucosa   Lungs: CTA b/l, decreased at the bases   Heart: S1S2+, RRR, no murmur  Abdo: Soft, NT, ND, +BS   : No herbert cath   Exts: Stable gangrenous changes involving right great toe . Skin: Right great toe ulcer     Data Review:       Recent Days:  Recent Labs     22  042   WBC 14.2*   HGB 11.2*   HCT 33.7*   *       Recent Labs     22  0424   BUN 5*   CREA 0.59*         Lab Results   Component Value Date/Time    C-Reactive protein 16.20 (H) 2022 05:02 PM        Microbiology     Results       Procedure Component Value Units Date/Time    CULTURE, URINE [[id number]] Collected: 22 1645    Order Status: Completed Specimen: Urine Updated: 22 0844     Special Requests: No Special Requests        Culture result: No Growth (<1000 cfu/mL)       CULTURE, BLOOD, PAIRED [[id number]] Collected: 22 1708    Order Status: Completed Specimen: Blood Updated: 22 1127     Special Requests: No Special Requests        Culture result: No growth 6 days                  Diagnostics   CXR Results  (Last 48 hours)      None             Assessment/Plan     1.  Right great toe ulcer/dry gangrenous changes, underlying PAD, recent MRI neg for osteomyelitis      Stable gangrenous changes involving right great toe, w/o evidence of increased drainage Afebrile routine labs not done today, ordered for AM       Will resume Zosyn until revascularization procedure is completed       Continue to monitor for super infection      2. PAD, S/p recent arteriogram w stent placement, and subsequent occlusion per CTA abdo      S/p right common femoral to popliteal bypass surgery w graft placement on 07/19      CTA on 07/22 revealed sig plague involving distal popliteal artery and tibioperoneal trunk, endovascular procedure scheduled for 07/26 by Dr Martínez      3. H/o gouty arthritis, no acute flare, continue on Colchicine      4. Right foot pain due to above: Continue symptomatic mgt      5. DM: A1C of 7.2 on 07/19, Currently on metformin, will hold for planned vascular procedure     6. Left shoulder swelling, no erythema or warmth. Denies preceding trauma to area, no sig tenderness       CT ordered, to further access.  X-ray a limited study     Lobo Frost MD    7/25/2022

## 2022-07-25 NOTE — PROGRESS NOTES
Nutrition Assessment     Type and Reason for Visit: Initial, RD nutrition re-screen/LOS (x7)    Nutrition Recommendations/Plan:   Continue diet. Please document as % PO and ONS intake in I/Os. Nutrition Assessment:   Admitted for gangrene of toe. Pt familiar to RD- last seen 2 weeks ago. Sx for revascularization delayed today, noted toe amp next week. Noted h/o good intake and no wt loss PTA. Ate >75% of Breakfast today. No new nutrition interventions indicated. Labs unremarkable. Meds: Heparin in D5W 250 mL, PPI, metformin, lisinopril, oxycodone, colchicine, norvasc, miralax, percocet. Malnutrition Assessment:  Malnutrition Status: No malnutrition     Nutrition Related Findings:  Appears nourished; h/o NFPE w/o acute findings x2 weeks ago. No N/V/D nor c/s issues reported. Last BM 7/19; +C - bowel regimen in place. No edema. Current Nutrition Therapies:  ADULT DIET Regular    Anthropometric Measures:  Height:  6' 1\" (185.4 cm)  Current Body Wt:  106.1 kg (233 lb 14.5 oz)  BMI: 30.9    Nutrition Diagnosis:   No nutrition diagnosis at this time     Nutrition Interventions:   Food and/or Nutrient Delivery: Continue current diet  Nutrition Education/Counseling: Education not indicated  Coordination of Nutrition Care: Continue to monitor while inpatient  Plan of Care discussed with: RN    Goals:  Goals: PO intake 75% or greater, Meet at least 75% of estimated needs, within 7 days    Nutrition Monitoring and Evaluation:   Behavioral-Environmental Outcomes: None identified  Food/Nutrient Intake Outcomes: Diet advancement/tolerance, Food and nutrient intake  Physical Signs/Symptoms Outcomes: Biochemical data, Meal time behavior, Weight, Constipation    Discharge Planning: Too soon to determine    Fady Rouse RD  Contact: ext. [phone] or [employer].

## 2022-07-25 NOTE — PROGRESS NOTES
General Daily Progress Note          Patient Name:   Linda Lawler       YOB: 1960       Age:  65 y.o. Admit Date: 7/18/2022      Subjective:   Patient is a 65y.o. year old male with a PMH of diabetes, diabetic foot injury, arterial insufficiency, hypertension, GERD, and gout presents to the ED complaining of  right great toe infection. The patient was recently hospitalized for this same problem and received balloon angioplasty of the right lower extremity which initially helped perfusion of the toe. He has since relapsed and complains of excruciating pain that is not well managed on percocet. The patient states that it greatly progress in the last 24 hours and there is now black and gangrenous lesion on the plantar aspect of the toe with swelling over the entire foot. Patient denies any chest pain, SOB, difficulty breathing, palpitations, dizziness, N/V/D, or fevers. X-ray of R great toe:  IMPRESSION  No evidence of fracture or dislocation. Evidence of gout. No  significant change      Seen by the vascular surgeon this morning patient need right common femoral artery above-knee popliteal bypass       7/20  Patient resting in bed comfortably stating pain is well managed. Dressing show iodinated bandaged around affected toe without obvious bleeding. Day 1 s/p bypass of right popliteal artery  Resuming lovenox    7/21  Patient laying in bed stating pain is worse than yesterday d/t discontinuing dilaudid. Pt now on Toradol. Per trauma surgery - hold heparin for 6h d/t groin bleed 2/2 surgery  Albumin 2.4    7/22  Patient laying in bed with pain well-managed. NAD  Likely toe amputation next week. Per trauma surgery: Will need a CTA aorta distal runoff follow-up bypass    CTA Abd with runoff:  Severe stenoses of the left SFA and mild stenoses of the left popliteal  artery. 7/25  Patient laying in bed in moderate pain. NAD  Surgery delayed d/t staffing issues.   Planned revascularization followed by toe amputation next week pending success of revascularization. Objective:     Visit Vitals  BP (!) 151/81   Pulse 77   Temp 98 °F (36.7 °C)   Resp 18   Ht 6' 1\" (1.854 m)   Wt 106.1 kg (234 lb)   SpO2 94%   BMI 30.87 kg/m²        Recent Results (from the past 24 hour(s))   PTT    Collection Time: 07/24/22 10:36 AM   Result Value Ref Range    aPTT 48.2 (H) 21.2 - 34.1 sec    aPTT, therapeutic range   82 - 109 sec   GLUCOSE, POC    Collection Time: 07/24/22 11:33 AM   Result Value Ref Range    Glucose (POC) 137 (H) 65 - 117 mg/dL    Performed by Lori Arias    PTT    Collection Time: 07/24/22  4:10 PM   Result Value Ref Range    aPTT 37.3 (H) 21.2 - 34.1 sec    aPTT, therapeutic range   82 - 109 sec   GLUCOSE, POC    Collection Time: 07/24/22  5:33 PM   Result Value Ref Range    Glucose (POC) 137 (H) 65 - 117 mg/dL    Performed by ARYAN ODOM    PTT    Collection Time: 07/24/22 11:03 PM   Result Value Ref Range    aPTT 50.8 (H) 21.2 - 34.1 sec    aPTT, therapeutic range   82 - 109 sec   PTT    Collection Time: 07/25/22  4:10 AM   Result Value Ref Range    aPTT 34.8 (H) 21.2 - 34.1 sec    aPTT, therapeutic range   82 - 109 sec   GLUCOSE, POC    Collection Time: 07/25/22  7:52 AM   Result Value Ref Range    Glucose (POC) 129 (H) 65 - 117 mg/dL    Performed by Sarah Pardo      [unfilled]      Review of Systems    Constitutional: Negative for chills and fever. HENT: Negative. Eyes: Negative. Respiratory: Negative. Cardiovascular: Negative. Gastrointestinal: Negative for abdominal pain and nausea. Skin: Negative. Neurological: Negative. Physical Exam:      Constitutional: pt is oriented to person, place, and time. HENT:   Head: Normocephalic and atraumatic. Eyes: Pupils are equal, round, and reactive to light. EOM are normal.   Cardiovascular: Normal rate, regular rhythm and normal heart sounds. Pulmonary/Chest: Breath sounds normal. No wheezes. No rales.    Exhibits no tenderness. Abdominal: Soft. Bowel sounds are normal. There is no abdominal tenderness. There is no rebound and no guarding. Musculoskeletal: Normal range of motion. Neurological: pt is alert and oriented to person, place, and time. CTA ABD ART W RUNOFF W WO CONT   Final Result   1. Status post right common femoral to popliteal arterial bypass graft without   abnormalities. 2.  No aortic aneurysm or dissection. Patent inflow vessels. 3.  Incomplete evaluation of right runoff vessels, though suspect three-vessel   runoff. 4.  Severe stenoses of the left SFA and mild stenoses of the left popliteal   artery. 5.  Single vessel runoff of left posterior tibial artery. 6.  Additional findings as above. CTA ABD ART W RUNOFF W WO CONT   Final Result      Occluded long segment right superficial femoral artery stent with reconstitution   in the popliteal artery which demonstrates mild to moderate atherosclerotic   changes. Questionable patency of the distal tibial and peroneal runoff with   limited evaluation due to poor flow-related opacification and arterial   calcifications. Mild to moderate atherosclerotic changes in the left lower extremity with   probable single-vessel runoff to the left foot. Indeterminate bilateral adrenal nodules measuring up to 2 cm. Consider   nonemergent adrenal CT protocol. Bullous/cystic changes in the lung bases. Please refer to above findings for complete details. XR GREAT TOE RT MIN 2 V   Final Result   No evidence of fracture or dislocation. Evidence of gout. No   significant change.               Recent Results (from the past 24 hour(s))   PTT    Collection Time: 07/24/22 10:36 AM   Result Value Ref Range    aPTT 48.2 (H) 21.2 - 34.1 sec    aPTT, therapeutic range   82 - 109 sec   GLUCOSE, POC    Collection Time: 07/24/22 11:33 AM   Result Value Ref Range    Glucose (POC) 137 (H) 65 - 117 mg/dL    Performed by Rekha Colón    PTT Collection Time: 07/24/22  4:10 PM   Result Value Ref Range    aPTT 37.3 (H) 21.2 - 34.1 sec    aPTT, therapeutic range   82 - 109 sec   GLUCOSE, POC    Collection Time: 07/24/22  5:33 PM   Result Value Ref Range    Glucose (POC) 137 (H) 65 - 117 mg/dL    Performed by ARYAN ODOM    PTT    Collection Time: 07/24/22 11:03 PM   Result Value Ref Range    aPTT 50.8 (H) 21.2 - 34.1 sec    aPTT, therapeutic range   82 - 109 sec   PTT    Collection Time: 07/25/22  4:10 AM   Result Value Ref Range    aPTT 34.8 (H) 21.2 - 34.1 sec    aPTT, therapeutic range   82 - 109 sec   GLUCOSE, POC    Collection Time: 07/25/22  7:52 AM   Result Value Ref Range    Glucose (POC) 129 (H) 65 - 117 mg/dL    Performed by Aidan Crawford        Results       Procedure Component Value Units Date/Time    CULTURE, URINE [[id number]] Collected: 07/19/22 1645    Order Status: Completed Specimen: Urine Updated: 07/21/22 0844     Special Requests: No Special Requests        Culture result: No Growth (<1000 cfu/mL)       CULTURE, BLOOD, PAIRED [[id number]] Collected: 07/18/22 1708    Order Status: Completed Specimen: Blood Updated: 07/24/22 0620     Special Requests: No Special Requests        Culture result: No growth 5 days                Labs:     Recent Labs     07/23/22  0424   WBC 14.2*   HGB 11.2*   HCT 33.7*   *       Recent Labs     07/23/22  0424      K 3.8      CO2 28   BUN 5*   CREA 0.59*   *   CA 8.8       Recent Labs     07/23/22  0424   ALT 25   AP 84   TBILI 0.5   TP 6.7   ALB 2.5*   GLOB 4.2*       Recent Labs     07/25/22  0410 07/24/22  2303 07/24/22  1610   APTT 34.8* 50.8* 37.3*        No results for input(s): FE, TIBC, PSAT, FERR in the last 72 hours. No results found for: FOL, RBCF   No results for input(s): PH, PCO2, PO2 in the last 72 hours. No results for input(s): CPK, CKNDX, TROIQ in the last 72 hours.     No lab exists for component: CPKMB    No results found for: CHOL, CHOLX, CHLST, CHOLV, HDL, HDLP, LDL, LDLC, DLDLP, TGLX, TRIGL, TRIGP, CHHD, CHHDX  Lab Results   Component Value Date/Time    Glucose (POC) 129 (H) 07/25/2022 07:52 AM    Glucose (POC) 137 (H) 07/24/2022 05:33 PM    Glucose (POC) 137 (H) 07/24/2022 11:33 AM    Glucose (POC) 149 (H) 07/24/2022 08:00 AM    Glucose (POC) 131 (H) 07/23/2022 09:09 PM     No results found for: COLOR, APPRN, SPGRU, REFSG, EPHRAIM, PROTU, GLUCU, KETU, BILU, UROU, VIANEY, LEUKU, GLUKE, EPSU, BACTU, WBCU, RBCU, CASTS, UCRY      Assessment:     Arterial Insufficiency s/p balloon angioplasty  Right great toe infection  Diabetes Mellitus  Diabetic foot injury  Hypertension  GERD  Gout  RA  Smoking      Plan:   Amputation planned today    Per trauma surgery:  Will need a CTA aorta distal runoff follow-up bypass    Zosyn 3.375 IV every 8 hours  Sliding-scale insulin  Lisinopril 20 mg daily  Protonix 40 mg daily  Amlodipine 10 mg daily      Discussed with the patient and patient wife        Current Facility-Administered Medications:     oxyCODONE ER (OxyCONTIN) tablet 20 mg, 20 mg, Oral, BID, Eddie Martínez MD, 20 mg at 07/24/22 2112    gabapentin (NEURONTIN) capsule 300 mg, 300 mg, Oral, BID, Vicky West MD, 300 mg at 07/22/22 2129    metFORMIN (GLUCOPHAGE) tablet 500 mg, 500 mg, Oral, BID WITH MEALS, Vicky West MD, 500 mg at 07/24/22 1836    heparin (porcine) 1,000 unit/mL injection 4,000 Units, 4,000 Units, IntraVENous, PRN, 4,000 Units at 07/20/22 1910 **OR** heparin (porcine) 1,000 unit/mL injection 2,000 Units, 2,000 Units, IntraVENous, PRN, Eddie Martínez MD, 2,000 Units at 07/23/22 0540    heparin 25,000 units in D5W 250 ml infusion, 11-25 Units/kg/hr (Adjusted), IntraVENous, TITRATE, Eddie Martínez MD, Last Rate: 22.6 mL/hr at 07/25/22 0720, 25 Units/kg/hr at 07/25/22 0720    sodium chloride (NS) flush 5-40 mL, 5-40 mL, IntraVENous, Q8H, Eddie Martínez MD, 10 mL at 07/25/22 5354    sodium chloride (NS) flush 5-40 mL, 5-40 mL, IntraVENous, PRN, Mauricio, Oz Rangel MD, 10 mL at 07/21/22 1616    ondansetron (ZOFRAN ODT) tablet 4 mg, 4 mg, Oral, Q8H PRN **OR** ondansetron (ZOFRAN) injection 4 mg, 4 mg, IntraVENous, Q6H PRN, Oz Martínez MD    enoxaparin (LOVENOX) injection 40 mg, 40 mg, SubCUTAneous, DAILY, Oz Martínez MD, 40 mg at 07/24/22 0918    0.9% sodium chloride infusion, 125 mL/hr, IntraVENous, CONTINUOUS, Oz Martínez MD, Last Rate: 125 mL/hr at 07/21/22 2101, 125 mL/hr at 07/21/22 2101    oxyCODONE-acetaminophen (PERCOCET 10)  mg per tablet 1 Tablet, 1 Tablet, Oral, Q4H PRN, Oz Martínez MD, 1 Tablet at 07/25/22 0452    amLODIPine (NORVASC) tablet 10 mg, 10 mg, Oral, DAILY, Oz Martínez MD, 10 mg at 07/24/22 3446    lisinopriL (PRINIVIL, ZESTRIL) tablet 20 mg, 20 mg, Oral, DAILY, Oz Martínez MD, 20 mg at 07/24/22 2698    colchicine tablet 0.6 mg, 0.6 mg, Oral, DAILY, Oz Martínez MD, 0.6 mg at 07/24/22 0919    insulin lispro (HUMALOG) injection, , SubCUTAneous, AC&HS, Oz Martínez MD    glucose chewable tablet 16 g, 4 Tablet, Oral, PRN, Oz Martínez MD    glucagon (GLUCAGEN) injection 1 mg, 1 mg, IntraMUSCular, PRN, Oz Martínez MD    0.9% sodium chloride infusion, 75 mL/hr, IntraVENous, CONTINUOUS, Oz Martínez MD, Last Rate: 75 mL/hr at 07/24/22 0918, 75 mL/hr at 07/24/22 8835    acetaminophen (TYLENOL) tablet 650 mg, 650 mg, Oral, Q6H PRN **OR** acetaminophen (TYLENOL) suppository 650 mg, 650 mg, Rectal, Q6H PRN, Oz Martínez MD    polyethylene glycol (MIRALAX) packet 17 g, 17 g, Oral, DAILY PRN, Oz Martínez MD, 17 g at 07/24/22 0919    pantoprazole (PROTONIX) tablet 40 mg, 40 mg, Oral, ACB, Oz Martínez MD, 40 mg at 07/25/22 9585

## 2022-07-25 NOTE — PROGRESS NOTES
General Daily Progress Note          Patient Name:   Tammy Carlos       YOB: 1960       Age:  65 y.o. Admit Date: 7/18/2022      Subjective:   Patient is a 65y.o. year old male with a PMH of diabetes, diabetic foot injury, arterial insufficiency, hypertension, GERD, and gout presents to the ED complaining of  right great toe infection. The patient was recently hospitalized for this same problem and received balloon angioplasty of the right lower extremity which initially helped perfusion of the toe. He has since relapsed and complains of excruciating pain that is not well managed on percocet. The patient states that it greatly progress in the last 24 hours and there is now black and gangrenous lesion on the plantar aspect of the toe with swelling over the entire foot. Patient denies any chest pain, SOB, difficulty breathing, palpitations, dizziness, N/V/D, or fevers. X-ray of R great toe:  IMPRESSION  No evidence of fracture or dislocation. Evidence of gout. No  significant change      Seen by the vascular surgeon this morning patient need right common femoral artery above-knee popliteal bypass       7/20  Patient resting in bed comfortably stating pain is well managed. Dressing show iodinated bandaged around affected toe without obvious bleeding. Day 1 s/p bypass of right popliteal artery  Resuming lovenox    7/21  Patient laying in bed stating pain is worse than yesterday d/t discontinuing dilaudid. Pt now on Toradol. Per trauma surgery - hold heparin for 6h d/t groin bleed 2/2 surgery  Albumin 2.4    7/22  Patient laying in bed with pain well-managed. NAD  Likely toe amputation next week. Per trauma surgery: Will need a CTA aorta distal runoff follow-up bypass    CTA Abd with runoff:  Severe stenoses of the left SFA and mild stenoses of the left popliteal  artery. 7/25  Patient laying in bed in moderate pain. NAD  Surgery delayed d/t staffing issues.   Planned revascularization followed by toe amputation next week pending success of revascularization. Objective:     Visit Vitals  BP (!) 153/79   Pulse 81   Temp 98.2 °F (36.8 °C)   Resp 19   Ht 6' 1\" (1.854 m)   Wt 106.1 kg (234 lb)   SpO2 96%   BMI 30.87 kg/m²        Recent Results (from the past 24 hour(s))   PTT    Collection Time: 07/24/22  4:10 PM   Result Value Ref Range    aPTT 37.3 (H) 21.2 - 34.1 sec    aPTT, therapeutic range   82 - 109 sec   GLUCOSE, POC    Collection Time: 07/24/22  5:33 PM   Result Value Ref Range    Glucose (POC) 137 (H) 65 - 117 mg/dL    Performed by ARYAN ODOM    PTT    Collection Time: 07/24/22 11:03 PM   Result Value Ref Range    aPTT 50.8 (H) 21.2 - 34.1 sec    aPTT, therapeutic range   82 - 109 sec   PTT    Collection Time: 07/25/22  4:10 AM   Result Value Ref Range    aPTT 34.8 (H) 21.2 - 34.1 sec    aPTT, therapeutic range   82 - 109 sec   GLUCOSE, POC    Collection Time: 07/25/22  7:52 AM   Result Value Ref Range    Glucose (POC) 129 (H) 65 - 117 mg/dL    Performed by Gabi Madera    PTT    Collection Time: 07/25/22 10:31 AM   Result Value Ref Range    aPTT 49.8 (H) 21.2 - 34.1 sec    aPTT, therapeutic range   82 - 109 sec   GLUCOSE, POC    Collection Time: 07/25/22 11:27 AM   Result Value Ref Range    Glucose (POC) 136 (H) 65 - 117 mg/dL    Performed by Shantal Olmedo      [unfilled]      Review of Systems    Constitutional: Negative for chills and fever. HENT: Negative. Eyes: Negative. Respiratory: Negative. Cardiovascular: Negative. Gastrointestinal: Negative for abdominal pain and nausea. Skin: Negative. Neurological: Negative. Physical Exam:      Constitutional: pt is oriented to person, place, and time. HENT:   Head: Normocephalic and atraumatic. Eyes: Pupils are equal, round, and reactive to light. EOM are normal.   Cardiovascular: Normal rate, regular rhythm and normal heart sounds. Pulmonary/Chest: Breath sounds normal. No wheezes. No rales. Exhibits no tenderness. Abdominal: Soft. Bowel sounds are normal. There is no abdominal tenderness. There is no rebound and no guarding. Musculoskeletal: Normal range of motion. Neurological: pt is alert and oriented to person, place, and time. CTA ABD ART W RUNOFF W WO CONT   Final Result   1. Status post right common femoral to popliteal arterial bypass graft without   abnormalities. 2.  No aortic aneurysm or dissection. Patent inflow vessels. 3.  Incomplete evaluation of right runoff vessels, though suspect three-vessel   runoff. 4.  Severe stenoses of the left SFA and mild stenoses of the left popliteal   artery. 5.  Single vessel runoff of left posterior tibial artery. 6.  Additional findings as above. CTA ABD ART W RUNOFF W WO CONT   Final Result      Occluded long segment right superficial femoral artery stent with reconstitution   in the popliteal artery which demonstrates mild to moderate atherosclerotic   changes. Questionable patency of the distal tibial and peroneal runoff with   limited evaluation due to poor flow-related opacification and arterial   calcifications. Mild to moderate atherosclerotic changes in the left lower extremity with   probable single-vessel runoff to the left foot. Indeterminate bilateral adrenal nodules measuring up to 2 cm. Consider   nonemergent adrenal CT protocol. Bullous/cystic changes in the lung bases. Please refer to above findings for complete details. XR GREAT TOE RT MIN 2 V   Final Result   No evidence of fracture or dislocation. Evidence of gout. No   significant change.          CT SHOULDER LT WO CONT    (Results Pending)        Recent Results (from the past 24 hour(s))   PTT    Collection Time: 07/24/22  4:10 PM   Result Value Ref Range    aPTT 37.3 (H) 21.2 - 34.1 sec    aPTT, therapeutic range   82 - 109 sec   GLUCOSE, POC    Collection Time: 07/24/22  5:33 PM   Result Value Ref Range    Glucose (POC) 137 (H) 65 - 117 mg/dL    Performed by Ish Browning    PTT    Collection Time: 07/24/22 11:03 PM   Result Value Ref Range    aPTT 50.8 (H) 21.2 - 34.1 sec    aPTT, therapeutic range   82 - 109 sec   PTT    Collection Time: 07/25/22  4:10 AM   Result Value Ref Range    aPTT 34.8 (H) 21.2 - 34.1 sec    aPTT, therapeutic range   82 - 109 sec   GLUCOSE, POC    Collection Time: 07/25/22  7:52 AM   Result Value Ref Range    Glucose (POC) 129 (H) 65 - 117 mg/dL    Performed by Nadya Resendiz    PTT    Collection Time: 07/25/22 10:31 AM   Result Value Ref Range    aPTT 49.8 (H) 21.2 - 34.1 sec    aPTT, therapeutic range   82 - 109 sec   GLUCOSE, POC    Collection Time: 07/25/22 11:27 AM   Result Value Ref Range    Glucose (POC) 136 (H) 65 - 117 mg/dL    Performed by Joelene Homans        Results       Procedure Component Value Units Date/Time    CULTURE, URINE [[id number]] Collected: 07/19/22 1645    Order Status: Completed Specimen: Urine Updated: 07/21/22 0844     Special Requests: No Special Requests        Culture result: No Growth (<1000 cfu/mL)       CULTURE, BLOOD, PAIRED [[id number]] Collected: 07/18/22 1708    Order Status: Completed Specimen: Blood Updated: 07/25/22 1127     Special Requests: No Special Requests        Culture result: No growth 6 days                Labs:     Recent Labs     07/23/22  0424   WBC 14.2*   HGB 11.2*   HCT 33.7*   *       Recent Labs     07/23/22  0424      K 3.8      CO2 28   BUN 5*   CREA 0.59*   *   CA 8.8       Recent Labs     07/23/22  0424   ALT 25   AP 84   TBILI 0.5   TP 6.7   ALB 2.5*   GLOB 4.2*       Recent Labs     07/25/22  1031 07/25/22  0410 07/24/22  2303   APTT 49.8* 34.8* 50.8*        No results for input(s): FE, TIBC, PSAT, FERR in the last 72 hours. No results found for: FOL, RBCF   No results for input(s): PH, PCO2, PO2 in the last 72 hours. No results for input(s): CPK, CKNDX, TROIQ in the last 72 hours.     No lab exists for component: CPKMB    No results found for: CHOL, CHOLX, CHLST, CHOLV, HDL, HDLP, LDL, LDLC, DLDLP, TGLX, TRIGL, TRIGP, CHHD, CHHDX  Lab Results   Component Value Date/Time    Glucose (POC) 136 (H) 07/25/2022 11:27 AM    Glucose (POC) 129 (H) 07/25/2022 07:52 AM    Glucose (POC) 137 (H) 07/24/2022 05:33 PM    Glucose (POC) 137 (H) 07/24/2022 11:33 AM    Glucose (POC) 149 (H) 07/24/2022 08:00 AM     No results found for: COLOR, APPRN, SPGRU, REFSG, EPHRAIM, PROTU, GLUCU, KETU, BILU, UROU, VIANEY, LEUKU, GLUKE, EPSU, BACTU, WBCU, RBCU, CASTS, UCRY      Assessment:     Arterial Insufficiency s/p balloon angioplasty  Right great toe infection  Diabetes Mellitus  Diabetic foot injury  Hypertension  GERD  Gout  RA  Smoking      Plan:   Amputation planned today    Per trauma surgery:  Will need a CTA aorta distal runoff follow-up bypass    Zosyn 3.375 IV every 8 hours  Sliding-scale insulin  Lisinopril 20 mg daily  Protonix 40 mg daily  Amlodipine 10 mg daily      Discussed with the patient and patient wife        Current Facility-Administered Medications:     [COMPLETED] piperacillin-tazobactam (ZOSYN) 4.5 g in 0.9% sodium chloride (MBP/ADV) 100 mL MBP, 4.5 g, IntraVENous, ONCE, Last Rate: 200 mL/hr at 07/25/22 1055, 4.5 g at 07/25/22 1055 **FOLLOWED BY** piperacillin-tazobactam (ZOSYN) 3.375 g in 0.9% sodium chloride (MBP/ADV) 100 mL MBP, 3.375 g, IntraVENous, Q8H, [place], RN    oxyCODONE ER (OxyCONTIN) tablet 20 mg, 20 mg, Oral, BID, Juanjose Martínez MD, 20 mg at 07/25/22 0901    gabapentin (NEURONTIN) capsule 300 mg, 300 mg, Oral, BID, Vicky West MD, 300 mg at 07/25/22 0901    metFORMIN (GLUCOPHAGE) tablet 500 mg, 500 mg, Oral, BID WITH MEALS, Vicky West MD, 500 mg at 07/25/22 0901    heparin (porcine) 1,000 unit/mL injection 4,000 Units, 4,000 Units, IntraVENous, PRN, 4,000 Units at 07/20/22 1910 **OR** heparin (porcine) 1,000 unit/mL injection 2,000 Units, 2,000 Units, IntraVENous, PRN, Cheril Schlatter, MD, 2,000 Units at 07/23/22 0540    heparin 25,000 units in D5W 250 ml infusion, 11-25 Units/kg/hr (Adjusted), IntraVENous, TITRATE, George Martínez MD, Last Rate: 22.6 mL/hr at 07/25/22 0720, 25 Units/kg/hr at 07/25/22 0720    sodium chloride (NS) flush 5-40 mL, 5-40 mL, IntraVENous, Q8H, George Martínez MD, 10 mL at 07/25/22 1649    sodium chloride (NS) flush 5-40 mL, 5-40 mL, IntraVENous, PRN, George Martínez MD, 10 mL at 07/21/22 1616    ondansetron (ZOFRAN ODT) tablet 4 mg, 4 mg, Oral, Q8H PRN **OR** ondansetron (ZOFRAN) injection 4 mg, 4 mg, IntraVENous, Q6H PRN, George Martínez MD    enoxaparin (LOVENOX) injection 40 mg, 40 mg, SubCUTAneous, DAILY, George Martínez MD, 40 mg at 07/25/22 0901    0.9% sodium chloride infusion, 125 mL/hr, IntraVENous, CONTINUOUS, George Martínez MD, Last Rate: 125 mL/hr at 07/21/22 2101, 125 mL/hr at 07/21/22 2101    oxyCODONE-acetaminophen (PERCOCET 10)  mg per tablet 1 Tablet, 1 Tablet, Oral, Q4H PRN, George Martínez MD, 1 Tablet at 07/25/22 1039    amLODIPine (NORVASC) tablet 10 mg, 10 mg, Oral, DAILY, George Martínez MD, 10 mg at 07/25/22 0901    lisinopriL (PRINIVIL, ZESTRIL) tablet 20 mg, 20 mg, Oral, DAILY, George Martínez MD, 20 mg at 07/25/22 0901    colchicine tablet 0.6 mg, 0.6 mg, Oral, DAILY, George Martínez MD, 0.6 mg at 07/25/22 0901    insulin lispro (HUMALOG) injection, , SubCUTAneous, AC&HS, George Martínez MD    glucose chewable tablet 16 g, 4 Tablet, Oral, PRN, George Martínez MD    glucagon (GLUCAGEN) injection 1 mg, 1 mg, IntraMUSCular, PRN, George Martínez MD    0.9% sodium chloride infusion, 75 mL/hr, IntraVENous, CONTINUOUS, George Martínez MD, Last Rate: 75 mL/hr at 07/24/22 0918, 75 mL/hr at 07/24/22 8062    acetaminophen (TYLENOL) tablet 650 mg, 650 mg, Oral, Q6H PRN **OR** acetaminophen (TYLENOL) suppository 650 mg, 650 mg, Rectal, Q6H PRN, George Marítnez MD    polyethylene glycol (MIRALAX) packet 17 g, 17 g, Oral, DAILY PRN, George Martínez MD, 17 g at 07/25/22 0907    pantoprazole (PROTONIX) tablet 40 mg, 40 mg, Oral, ACB, Mauricio, Ailin Tidwell MD, 40 mg at 07/25/22 8911

## 2022-07-26 ENCOUNTER — ANESTHESIA EVENT (OUTPATIENT)
Dept: SURGERY | Age: 62
DRG: 253 | End: 2022-07-26

## 2022-07-26 LAB
ALBUMIN SERPL-MCNC: 2.5 G/DL (ref 3.5–5)
ALBUMIN SERPL-MCNC: 2.8 G/DL (ref 3.5–5)
ALBUMIN/GLOB SERPL: 0.5 {RATIO} (ref 1.1–2.2)
ALBUMIN/GLOB SERPL: 0.7 {RATIO} (ref 1.1–2.2)
ALP SERPL-CCNC: 76 U/L (ref 45–117)
ALP SERPL-CCNC: 80 U/L (ref 45–117)
ALT SERPL-CCNC: 24 U/L (ref 12–78)
ALT SERPL-CCNC: 25 U/L (ref 12–78)
ANION GAP SERPL CALC-SCNC: 6 MMOL/L (ref 5–15)
ANION GAP SERPL CALC-SCNC: 9 MMOL/L (ref 5–15)
APTT PPP: 124.2 SEC (ref 21.2–34.1)
APTT PPP: 45.4 SEC (ref 21.2–34.1)
APTT PPP: 45.8 SEC (ref 21.2–34.1)
AST SERPL W P-5'-P-CCNC: 14 U/L (ref 15–37)
AST SERPL W P-5'-P-CCNC: 16 U/L (ref 15–37)
BASOPHILS # BLD: 0.1 K/UL (ref 0–0.1)
BASOPHILS NFR BLD: 1 % (ref 0–1)
BILIRUB SERPL-MCNC: 0.4 MG/DL (ref 0.2–1)
BILIRUB SERPL-MCNC: 0.4 MG/DL (ref 0.2–1)
BUN SERPL-MCNC: 6 MG/DL (ref 6–20)
BUN SERPL-MCNC: 7 MG/DL (ref 6–20)
BUN/CREAT SERPL: 10 (ref 12–20)
BUN/CREAT SERPL: 10 (ref 12–20)
CA-I BLD-MCNC: 9.1 MG/DL (ref 8.5–10.1)
CA-I BLD-MCNC: 9.3 MG/DL (ref 8.5–10.1)
CHLORIDE SERPL-SCNC: 102 MMOL/L (ref 97–108)
CHLORIDE SERPL-SCNC: 103 MMOL/L (ref 97–108)
CO2 SERPL-SCNC: 25 MMOL/L (ref 21–32)
CO2 SERPL-SCNC: 28 MMOL/L (ref 21–32)
CREAT SERPL-MCNC: 0.63 MG/DL (ref 0.7–1.3)
CREAT SERPL-MCNC: 0.67 MG/DL (ref 0.7–1.3)
DIFFERENTIAL METHOD BLD: ABNORMAL
EOSINOPHIL # BLD: 0.4 K/UL (ref 0–0.4)
EOSINOPHIL NFR BLD: 3 % (ref 0–7)
ERYTHROCYTE [DISTWIDTH] IN BLOOD BY AUTOMATED COUNT: 12.8 % (ref 11.5–14.5)
ERYTHROCYTE [DISTWIDTH] IN BLOOD BY AUTOMATED COUNT: 13 % (ref 11.5–14.5)
GLOBULIN SER CALC-MCNC: 4.1 G/DL (ref 2–4)
GLOBULIN SER CALC-MCNC: 4.6 G/DL (ref 2–4)
GLUCOSE BLD STRIP.AUTO-MCNC: 120 MG/DL (ref 65–117)
GLUCOSE BLD STRIP.AUTO-MCNC: 120 MG/DL (ref 65–117)
GLUCOSE BLD STRIP.AUTO-MCNC: 147 MG/DL (ref 65–117)
GLUCOSE BLD STRIP.AUTO-MCNC: 155 MG/DL (ref 65–117)
GLUCOSE SERPL-MCNC: 127 MG/DL (ref 65–100)
GLUCOSE SERPL-MCNC: 133 MG/DL (ref 65–100)
HCT VFR BLD AUTO: 33.7 % (ref 36.6–50.3)
HCT VFR BLD AUTO: 34.8 % (ref 36.6–50.3)
HGB BLD-MCNC: 11.1 G/DL (ref 12.1–17)
HGB BLD-MCNC: 11.7 G/DL (ref 12.1–17)
IMM GRANULOCYTES # BLD AUTO: 0.2 K/UL (ref 0–0.04)
IMM GRANULOCYTES NFR BLD AUTO: 2 % (ref 0–0.5)
INR PPP: 1.2 (ref 0.9–1.1)
LYMPHOCYTES # BLD: 1.7 K/UL (ref 0.8–3.5)
LYMPHOCYTES NFR BLD: 13 % (ref 12–49)
MCH RBC QN AUTO: 29.1 PG (ref 26–34)
MCH RBC QN AUTO: 29.6 PG (ref 26–34)
MCHC RBC AUTO-ENTMCNC: 32.9 G/DL (ref 30–36.5)
MCHC RBC AUTO-ENTMCNC: 33.6 G/DL (ref 30–36.5)
MCV RBC AUTO: 88.1 FL (ref 80–99)
MCV RBC AUTO: 88.2 FL (ref 80–99)
MONOCYTES # BLD: 0.9 K/UL (ref 0–1)
MONOCYTES NFR BLD: 7 % (ref 5–13)
NEUTS SEG # BLD: 10 K/UL (ref 1.8–8)
NEUTS SEG NFR BLD: 74 % (ref 32–75)
NRBC # BLD: 0 K/UL (ref 0–0.01)
NRBC # BLD: 0 K/UL (ref 0–0.01)
NRBC BLD-RTO: 0 PER 100 WBC
NRBC BLD-RTO: 0 PER 100 WBC
PERFORMED BY, TECHID: ABNORMAL
PLATELET # BLD AUTO: 401 K/UL (ref 150–400)
PLATELET # BLD AUTO: 478 K/UL (ref 150–400)
PMV BLD AUTO: 9.5 FL (ref 8.9–12.9)
PMV BLD AUTO: 9.7 FL (ref 8.9–12.9)
POTASSIUM SERPL-SCNC: 3.8 MMOL/L (ref 3.5–5.1)
POTASSIUM SERPL-SCNC: 4.4 MMOL/L (ref 3.5–5.1)
PROT SERPL-MCNC: 6.9 G/DL (ref 6.4–8.2)
PROT SERPL-MCNC: 7.1 G/DL (ref 6.4–8.2)
PROTHROMBIN TIME: 15.5 SEC (ref 11.9–14.6)
RBC # BLD AUTO: 3.82 M/UL (ref 4.1–5.7)
RBC # BLD AUTO: 3.95 M/UL (ref 4.1–5.7)
SODIUM SERPL-SCNC: 136 MMOL/L (ref 136–145)
SODIUM SERPL-SCNC: 137 MMOL/L (ref 136–145)
THERAPEUTIC RANGE,PTTT: ABNORMAL SEC (ref 82–109)
WBC # BLD AUTO: 13.5 K/UL (ref 4.1–11.1)
WBC # BLD AUTO: 14.5 K/UL (ref 4.1–11.1)

## 2022-07-26 PROCEDURE — C1769 GUIDE WIRE: HCPCS | Performed by: SURGERY

## 2022-07-26 PROCEDURE — 99152 MOD SED SAME PHYS/QHP 5/>YRS: CPT | Performed by: SURGERY

## 2022-07-26 PROCEDURE — 36245 INS CATH ABD/L-EXT ART 1ST: CPT | Performed by: SURGERY

## 2022-07-26 PROCEDURE — 82962 GLUCOSE BLOOD TEST: CPT

## 2022-07-26 PROCEDURE — 74011250636 HC RX REV CODE- 250/636

## 2022-07-26 PROCEDURE — 37211 THROMBOLYTIC ART THERAPY: CPT | Performed by: SURGERY

## 2022-07-26 PROCEDURE — 75625 CONTRAST EXAM ABDOMINL AORTA: CPT | Performed by: SURGERY

## 2022-07-26 PROCEDURE — 85027 COMPLETE CBC AUTOMATED: CPT

## 2022-07-26 PROCEDURE — 3E033GC INTRODUCTION OF OTHER THERAPEUTIC SUBSTANCE INTO PERIPHERAL VEIN, PERCUTANEOUS APPROACH: ICD-10-PCS | Performed by: SURGERY

## 2022-07-26 PROCEDURE — 77030013516 HC DEV INFL ANGI MRTM -B: Performed by: SURGERY

## 2022-07-26 PROCEDURE — 74011250637 HC RX REV CODE- 250/637: Performed by: SURGERY

## 2022-07-26 PROCEDURE — C1887 CATHETER, GUIDING: HCPCS | Performed by: SURGERY

## 2022-07-26 PROCEDURE — 80053 COMPREHEN METABOLIC PANEL: CPT

## 2022-07-26 PROCEDURE — 85730 THROMBOPLASTIN TIME PARTIAL: CPT

## 2022-07-26 PROCEDURE — 65610000006 HC RM INTENSIVE CARE

## 2022-07-26 PROCEDURE — 74011250636 HC RX REV CODE- 250/636: Performed by: SURGERY

## 2022-07-26 PROCEDURE — 99153 MOD SED SAME PHYS/QHP EA: CPT | Performed by: SURGERY

## 2022-07-26 PROCEDURE — 99232 SBSQ HOSP IP/OBS MODERATE 35: CPT | Performed by: INTERNAL MEDICINE

## 2022-07-26 PROCEDURE — 3E05317 INTRODUCTION OF OTHER THROMBOLYTIC INTO PERIPHERAL ARTERY, PERCUTANEOUS APPROACH: ICD-10-PCS | Performed by: SURGERY

## 2022-07-26 PROCEDURE — C1725 CATH, TRANSLUMIN NON-LASER: HCPCS | Performed by: SURGERY

## 2022-07-26 PROCEDURE — 74011000258 HC RX REV CODE- 258: Performed by: SURGERY

## 2022-07-26 PROCEDURE — 75710 ARTERY X-RAYS ARM/LEG: CPT | Performed by: SURGERY

## 2022-07-26 PROCEDURE — 75716 ARTERY X-RAYS ARMS/LEGS: CPT | Performed by: SURGERY

## 2022-07-26 PROCEDURE — 047K3Z1 DILATION OF RIGHT FEMORAL ARTERY USING DRUG-COATED BALLOON, PERCUTANEOUS APPROACH: ICD-10-PCS | Performed by: SURGERY

## 2022-07-26 PROCEDURE — C1894 INTRO/SHEATH, NON-LASER: HCPCS | Performed by: SURGERY

## 2022-07-26 PROCEDURE — 74011000250 HC RX REV CODE- 250: Performed by: SURGERY

## 2022-07-26 PROCEDURE — 36415 COLL VENOUS BLD VENIPUNCTURE: CPT

## 2022-07-26 PROCEDURE — 37228 HC PTA TIB/PER UNI INIT: CPT | Performed by: SURGERY

## 2022-07-26 PROCEDURE — 76937 US GUIDE VASCULAR ACCESS: CPT | Performed by: SURGERY

## 2022-07-26 PROCEDURE — 74011000258 HC RX REV CODE- 258

## 2022-07-26 PROCEDURE — 85610 PROTHROMBIN TIME: CPT

## 2022-07-26 PROCEDURE — 85025 COMPLETE CBC W/AUTO DIFF WBC: CPT

## 2022-07-26 PROCEDURE — 74011000636 HC RX REV CODE- 636: Performed by: SURGERY

## 2022-07-26 PROCEDURE — 77030006078 HC TAPE GLOW N TEL LEMV -B: Performed by: SURGERY

## 2022-07-26 PROCEDURE — 37228 PR REVSC OPN/PRQ TIB/PERO W/ANGIOPLASTY UNI: CPT | Performed by: SURGERY

## 2022-07-26 RX ORDER — HEPARIN SODIUM 200 [USP'U]/100ML
INJECTION, SOLUTION INTRAVENOUS
Status: COMPLETED | OUTPATIENT
Start: 2022-07-26 | End: 2022-07-26

## 2022-07-26 RX ORDER — FENTANYL CITRATE 50 UG/ML
INJECTION, SOLUTION INTRAMUSCULAR; INTRAVENOUS AS NEEDED
Status: DISCONTINUED | OUTPATIENT
Start: 2022-07-26 | End: 2022-07-26 | Stop reason: HOSPADM

## 2022-07-26 RX ORDER — SODIUM CHLORIDE 0.9 % (FLUSH) 0.9 %
5-40 SYRINGE (ML) INJECTION AS NEEDED
Status: DISCONTINUED | OUTPATIENT
Start: 2022-07-26 | End: 2022-07-26

## 2022-07-26 RX ORDER — NITROGLYCERIN 5 MG/ML
INJECTION, SOLUTION INTRAVENOUS AS NEEDED
Status: DISCONTINUED | OUTPATIENT
Start: 2022-07-26 | End: 2022-07-26 | Stop reason: HOSPADM

## 2022-07-26 RX ORDER — HEPARIN SODIUM 1000 [USP'U]/ML
INJECTION, SOLUTION INTRAVENOUS; SUBCUTANEOUS AS NEEDED
Status: DISCONTINUED | OUTPATIENT
Start: 2022-07-26 | End: 2022-07-26 | Stop reason: HOSPADM

## 2022-07-26 RX ORDER — SODIUM CHLORIDE 0.9 % (FLUSH) 0.9 %
5-40 SYRINGE (ML) INJECTION EVERY 8 HOURS
Status: DISCONTINUED | OUTPATIENT
Start: 2022-07-26 | End: 2022-07-28 | Stop reason: SDUPTHER

## 2022-07-26 RX ORDER — HYDROMORPHONE HYDROCHLORIDE 1 MG/ML
1 INJECTION, SOLUTION INTRAMUSCULAR; INTRAVENOUS; SUBCUTANEOUS
Status: DISCONTINUED | OUTPATIENT
Start: 2022-07-26 | End: 2022-07-28 | Stop reason: HOSPADM

## 2022-07-26 RX ORDER — HYDROCODONE BITARTRATE AND ACETAMINOPHEN 7.5; 325 MG/1; MG/1
1 TABLET ORAL
Status: DISCONTINUED | OUTPATIENT
Start: 2022-07-26 | End: 2022-07-28 | Stop reason: HOSPADM

## 2022-07-26 RX ORDER — LIDOCAINE HYDROCHLORIDE 10 MG/ML
INJECTION INFILTRATION; PERINEURAL AS NEEDED
Status: DISCONTINUED | OUTPATIENT
Start: 2022-07-26 | End: 2022-07-26 | Stop reason: HOSPADM

## 2022-07-26 RX ORDER — MIDAZOLAM HYDROCHLORIDE 1 MG/ML
INJECTION INTRAMUSCULAR; INTRAVENOUS AS NEEDED
Status: DISCONTINUED | OUTPATIENT
Start: 2022-07-26 | End: 2022-07-26 | Stop reason: HOSPADM

## 2022-07-26 RX ADMIN — GABAPENTIN 300 MG: 300 CAPSULE ORAL at 12:05

## 2022-07-26 RX ADMIN — OXYCODONE HYDROCHLORIDE AND ACETAMINOPHEN 1 TABLET: 10; 325 TABLET ORAL at 07:48

## 2022-07-26 RX ADMIN — HEPARIN SODIUM 23 UNITS/KG/HR: 10000 INJECTION, SOLUTION INTRAVENOUS at 19:19

## 2022-07-26 RX ADMIN — SODIUM CHLORIDE, PRESERVATIVE FREE 10 ML: 5 INJECTION INTRAVENOUS at 06:21

## 2022-07-26 RX ADMIN — ALTEPLASE 1 MG/HR: 2.2 INJECTION, POWDER, LYOPHILIZED, FOR SOLUTION INTRAVENOUS at 10:50

## 2022-07-26 RX ADMIN — SODIUM CHLORIDE, PRESERVATIVE FREE 10 ML: 5 INJECTION INTRAVENOUS at 21:20

## 2022-07-26 RX ADMIN — LISINOPRIL 20 MG: 20 TABLET ORAL at 12:03

## 2022-07-26 RX ADMIN — HEPARIN SODIUM 2000 UNITS: 1000 INJECTION, SOLUTION INTRAVENOUS; SUBCUTANEOUS at 21:15

## 2022-07-26 RX ADMIN — OXYCODONE HYDROCHLORIDE AND ACETAMINOPHEN 1 TABLET: 10; 325 TABLET ORAL at 03:56

## 2022-07-26 RX ADMIN — PIPERACILLIN AND TAZOBACTAM 3.38 G: 3; .375 INJECTION, POWDER, LYOPHILIZED, FOR SOLUTION INTRAVENOUS at 16:21

## 2022-07-26 RX ADMIN — HYDROMORPHONE HYDROCHLORIDE 1 MG: 1 INJECTION, SOLUTION INTRAMUSCULAR; INTRAVENOUS; SUBCUTANEOUS at 19:18

## 2022-07-26 RX ADMIN — SODIUM CHLORIDE, PRESERVATIVE FREE 10 ML: 5 INJECTION INTRAVENOUS at 14:00

## 2022-07-26 RX ADMIN — ALTEPLASE 1 MG/HR: 2.2 INJECTION, POWDER, LYOPHILIZED, FOR SOLUTION INTRAVENOUS at 16:17

## 2022-07-26 RX ADMIN — HEPARIN SODIUM 23 UNITS/KG/HR: 10000 INJECTION, SOLUTION INTRAVENOUS at 14:18

## 2022-07-26 RX ADMIN — AMLODIPINE BESYLATE 10 MG: 5 TABLET ORAL at 12:03

## 2022-07-26 RX ADMIN — ALTEPLASE 1 MG/HR: 2.2 INJECTION, POWDER, LYOPHILIZED, FOR SOLUTION INTRAVENOUS at 20:23

## 2022-07-26 RX ADMIN — PIPERACILLIN AND TAZOBACTAM 3.38 G: 3; .375 INJECTION, POWDER, LYOPHILIZED, FOR SOLUTION INTRAVENOUS at 01:51

## 2022-07-26 RX ADMIN — OXYCODONE HYDROCHLORIDE 20 MG: 20 TABLET, FILM COATED, EXTENDED RELEASE ORAL at 21:02

## 2022-07-26 RX ADMIN — HYDROMORPHONE HYDROCHLORIDE 1 MG: 1 INJECTION, SOLUTION INTRAMUSCULAR; INTRAVENOUS; SUBCUTANEOUS at 23:26

## 2022-07-26 RX ADMIN — HYDROCODONE BITARTRATE AND ACETAMINOPHEN 1 TABLET: 7.5; 325 TABLET ORAL at 16:08

## 2022-07-26 RX ADMIN — HYDROCODONE BITARTRATE AND ACETAMINOPHEN 1 TABLET: 7.5; 325 TABLET ORAL at 12:03

## 2022-07-26 RX ADMIN — HYDROMORPHONE HYDROCHLORIDE 1 MG: 1 INJECTION, SOLUTION INTRAMUSCULAR; INTRAVENOUS; SUBCUTANEOUS at 14:27

## 2022-07-26 RX ADMIN — SODIUM CHLORIDE 75 ML/HR: 9 INJECTION, SOLUTION INTRAVENOUS at 07:48

## 2022-07-26 RX ADMIN — OXYCODONE HYDROCHLORIDE 20 MG: 20 TABLET, FILM COATED, EXTENDED RELEASE ORAL at 12:05

## 2022-07-26 RX ADMIN — COLCHICINE 0.6 MG: 0.6 TABLET, FILM COATED ORAL at 12:03

## 2022-07-26 RX ADMIN — GABAPENTIN 300 MG: 300 CAPSULE ORAL at 20:22

## 2022-07-26 NOTE — PROGRESS NOTES
General Daily Progress Note          Patient Name:   Zuleima Tejada       YOB: 1960       Age:  65 y.o. Admit Date: 7/18/2022      Subjective:   Patient is a 65y.o. year old male with a PMH of diabetes, diabetic foot injury, arterial insufficiency, hypertension, GERD, and gout presents to the ED complaining of  right great toe infection. The patient was recently hospitalized for this same problem and received balloon angioplasty of the right lower extremity which initially helped perfusion of the toe. He has since relapsed and complains of excruciating pain that is not well managed on percocet. The patient states that it greatly progress in the last 24 hours and there is now black and gangrenous lesion on the plantar aspect of the toe with swelling over the entire foot. Patient denies any chest pain, SOB, difficulty breathing, palpitations, dizziness, N/V/D, or fevers. X-ray of R great toe:  IMPRESSION  No evidence of fracture or dislocation. Evidence of gout. No  significant change      Seen by the vascular surgeon this morning patient need right common femoral artery above-knee popliteal bypass       7/20  Patient resting in bed comfortably stating pain is well managed. Dressing show iodinated bandaged around affected toe without obvious bleeding. Day 1 s/p bypass of right popliteal artery  Resuming lovenox    7/21  Patient laying in bed stating pain is worse than yesterday d/t discontinuing dilaudid. Pt now on Toradol. Per trauma surgery - hold heparin for 6h d/t groin bleed 2/2 surgery  Albumin 2.4    7/22  Patient laying in bed with pain well-managed. NAD  Likely toe amputation next week. Per trauma surgery: Will need a CTA aorta distal runoff follow-up bypass    CTA Abd with runoff:  Severe stenoses of the left SFA and mild stenoses of the left popliteal  artery. 7/25  Patient laying in bed in moderate pain. NAD  Surgery delayed d/t staffing issues.   Planned revascularization followed by toe amputation next week pending success of revascularization. 7/26  Patient laying in bed post-op. NAD  Surgery today - clot was dislodged during procedure and alteplase was given. Follow-up procedure tomorrow to confirm resolution of clot.   Heparin restarted post-op  Metformin held per surgery  Initiated heparin drip      Objective:     Visit Vitals  BP (!) 164/74   Pulse 64   Temp 97.8 °F (36.6 °C)   Resp 18   Ht 6' 1\" (1.854 m)   Wt 106.1 kg (234 lb)   SpO2 95%   BMI 30.87 kg/m²        Recent Results (from the past 24 hour(s))   GLUCOSE, POC    Collection Time: 07/25/22  3:56 PM   Result Value Ref Range    Glucose (POC) 144 (H) 65 - 117 mg/dL    Performed by Robel Bazan    PTT    Collection Time: 07/25/22  5:28 PM   Result Value Ref Range    aPTT 39.5 (H) 21.2 - 34.1 sec    aPTT, therapeutic range   82 - 109 sec   GLUCOSE, POC    Collection Time: 07/25/22  8:35 PM   Result Value Ref Range    Glucose (POC) 183 (H) 65 - 117 mg/dL    Performed by Ximena Mahajan    CBC W/O DIFF    Collection Time: 07/26/22  2:14 AM   Result Value Ref Range    WBC 14.5 (H) 4.1 - 11.1 K/uL    RBC 3.95 (L) 4.10 - 5.70 M/uL    HGB 11.7 (L) 12.1 - 17.0 g/dL    HCT 34.8 (L) 36.6 - 50.3 %    MCV 88.1 80.0 - 99.0 FL    MCH 29.6 26.0 - 34.0 PG    MCHC 33.6 30.0 - 36.5 g/dL    RDW 12.8 11.5 - 14.5 %    PLATELET 402 (H) 783 - 400 K/uL    MPV 9.5 8.9 - 12.9 FL    NRBC 0.0 0.0  WBC    ABSOLUTE NRBC 0.00 0.00 - 6.67 K/uL   METABOLIC PANEL, COMPREHENSIVE    Collection Time: 07/26/22  2:14 AM   Result Value Ref Range    Sodium 136 136 - 145 mmol/L    Potassium 4.4 3.5 - 5.1 mmol/L    Chloride 102 97 - 108 mmol/L    CO2 25 21 - 32 mmol/L    Anion gap 9 5 - 15 mmol/L    Glucose 127 (H) 65 - 100 mg/dL    BUN 7 6 - 20 mg/dL    Creatinine 0.67 (L) 0.70 - 1.30 mg/dL    BUN/Creatinine ratio 10 (L) 12 - 20      GFR est AA >60 >60 ml/min/1.73m2    GFR est non-AA >60 >60 ml/min/1.73m2    Calcium 9.3 8.5 - 10.1 mg/dL    Bilirubin, total 0.4 0.2 - 1.0 mg/dL    AST (SGOT) 14 (L) 15 - 37 U/L    ALT (SGPT) 24 12 - 78 U/L    Alk. phosphatase 80 45 - 117 U/L    Protein, total 6.9 6.4 - 8.2 g/dL    Albumin 2.8 (L) 3.5 - 5.0 g/dL    Globulin 4.1 (H) 2.0 - 4.0 g/dL    A-G Ratio 0.7 (L) 1.1 - 2.2     PTT    Collection Time: 07/26/22  2:14 AM   Result Value Ref Range    aPTT 45.4 (H) 21.2 - 34.1 sec    aPTT, therapeutic range   82 - 109 sec   GLUCOSE, POC    Collection Time: 07/26/22  7:13 AM   Result Value Ref Range    Glucose (POC) 120 (H) 65 - 117 mg/dL    Performed by Gregoria Fritz    GLUCOSE, POC    Collection Time: 07/26/22 12:17 PM   Result Value Ref Range    Glucose (POC) 120 (H) 65 - 117 mg/dL    Performed by Tonie Caal      [unfilled]      Review of Systems    Constitutional: Negative for chills and fever. HENT: Negative. Eyes: Negative. Respiratory: Negative. Cardiovascular: Negative. Gastrointestinal: Negative for abdominal pain and nausea. Skin: Negative. Neurological: Negative. Physical Exam:      Constitutional: pt is oriented to person, place, and time. HENT:   Head: Normocephalic and atraumatic. Eyes: Pupils are equal, round, and reactive to light. EOM are normal.   Cardiovascular: Normal rate, regular rhythm and normal heart sounds. Pulmonary/Chest: Breath sounds normal. No wheezes. No rales. Exhibits no tenderness. Abdominal: Soft. Bowel sounds are normal. There is no abdominal tenderness. There is no rebound and no guarding. Musculoskeletal: Normal range of motion. Neurological: pt is alert and oriented to person, place, and time. CT SHOULDER LT WO CONT   Final Result   1. Osteopenia. Acromioclavicular degenerative change. No acute fracture         CTA ABD ART W RUNOFF W WO CONT   Final Result   1. Status post right common femoral to popliteal arterial bypass graft without   abnormalities. 2.  No aortic aneurysm or dissection. Patent inflow vessels.    3.  Incomplete evaluation of right runoff vessels, though suspect three-vessel   runoff. 4.  Severe stenoses of the left SFA and mild stenoses of the left popliteal   artery. 5.  Single vessel runoff of left posterior tibial artery. 6.  Additional findings as above. CTA ABD ART W RUNOFF W WO CONT   Final Result      Occluded long segment right superficial femoral artery stent with reconstitution   in the popliteal artery which demonstrates mild to moderate atherosclerotic   changes. Questionable patency of the distal tibial and peroneal runoff with   limited evaluation due to poor flow-related opacification and arterial   calcifications. Mild to moderate atherosclerotic changes in the left lower extremity with   probable single-vessel runoff to the left foot. Indeterminate bilateral adrenal nodules measuring up to 2 cm. Consider   nonemergent adrenal CT protocol. Bullous/cystic changes in the lung bases. Please refer to above findings for complete details. XR GREAT TOE RT MIN 2 V   Final Result   No evidence of fracture or dislocation. Evidence of gout. No   significant change.               Recent Results (from the past 24 hour(s))   GLUCOSE, POC    Collection Time: 07/25/22  3:56 PM   Result Value Ref Range    Glucose (POC) 144 (H) 65 - 117 mg/dL    Performed by Kelvin Cuadra    PTT    Collection Time: 07/25/22  5:28 PM   Result Value Ref Range    aPTT 39.5 (H) 21.2 - 34.1 sec    aPTT, therapeutic range   82 - 109 sec   GLUCOSE, POC    Collection Time: 07/25/22  8:35 PM   Result Value Ref Range    Glucose (POC) 183 (H) 65 - 117 mg/dL    Performed by Chris Nunez    CBC W/O DIFF    Collection Time: 07/26/22  2:14 AM   Result Value Ref Range    WBC 14.5 (H) 4.1 - 11.1 K/uL    RBC 3.95 (L) 4.10 - 5.70 M/uL    HGB 11.7 (L) 12.1 - 17.0 g/dL    HCT 34.8 (L) 36.6 - 50.3 %    MCV 88.1 80.0 - 99.0 FL    MCH 29.6 26.0 - 34.0 PG    MCHC 33.6 30.0 - 36.5 g/dL    RDW 12.8 11.5 - 14.5 %    PLATELET 259 (H) 060 - 400 K/uL    MPV 9.5 8.9 - 12.9 FL    NRBC 0.0 0.0  WBC    ABSOLUTE NRBC 0.00 0.00 - 4.88 K/uL   METABOLIC PANEL, COMPREHENSIVE    Collection Time: 07/26/22  2:14 AM   Result Value Ref Range    Sodium 136 136 - 145 mmol/L    Potassium 4.4 3.5 - 5.1 mmol/L    Chloride 102 97 - 108 mmol/L    CO2 25 21 - 32 mmol/L    Anion gap 9 5 - 15 mmol/L    Glucose 127 (H) 65 - 100 mg/dL    BUN 7 6 - 20 mg/dL    Creatinine 0.67 (L) 0.70 - 1.30 mg/dL    BUN/Creatinine ratio 10 (L) 12 - 20      GFR est AA >60 >60 ml/min/1.73m2    GFR est non-AA >60 >60 ml/min/1.73m2    Calcium 9.3 8.5 - 10.1 mg/dL    Bilirubin, total 0.4 0.2 - 1.0 mg/dL    AST (SGOT) 14 (L) 15 - 37 U/L    ALT (SGPT) 24 12 - 78 U/L    Alk.  phosphatase 80 45 - 117 U/L    Protein, total 6.9 6.4 - 8.2 g/dL    Albumin 2.8 (L) 3.5 - 5.0 g/dL    Globulin 4.1 (H) 2.0 - 4.0 g/dL    A-G Ratio 0.7 (L) 1.1 - 2.2     PTT    Collection Time: 07/26/22  2:14 AM   Result Value Ref Range    aPTT 45.4 (H) 21.2 - 34.1 sec    aPTT, therapeutic range   82 - 109 sec   GLUCOSE, POC    Collection Time: 07/26/22  7:13 AM   Result Value Ref Range    Glucose (POC) 120 (H) 65 - 117 mg/dL    Performed by Prachi Shermna    GLUCOSE, POC    Collection Time: 07/26/22 12:17 PM   Result Value Ref Range    Glucose (POC) 120 (H) 65 - 117 mg/dL    Performed by Christian Hurst        Results       Procedure Component Value Units Date/Time    CULTURE, URINE [[id number]] Collected: 07/19/22 4661    Order Status: Completed Specimen: Urine Updated: 07/21/22 8006     Special Requests: No Special Requests        Culture result: No Growth (<1000 cfu/mL)       CULTURE, BLOOD, PAIRED [[id number]] Collected: 07/18/22 1708    Order Status: Completed Specimen: Blood Updated: 07/25/22 1127     Special Requests: No Special Requests        Culture result: No growth 6 days                Labs:     Recent Labs     07/26/22 0214   WBC 14.5*   HGB 11.7*   HCT 34.8*   *       Recent Labs     07/26/22 0214    K 4.4      CO2 25   BUN 7   CREA 0.67*   *   CA 9.3       Recent Labs     07/26/22  0214   ALT 24   AP 80   TBILI 0.4   TP 6.9   ALB 2.8*   GLOB 4.1*       Recent Labs     07/26/22  0214 07/25/22  1728 07/25/22  1031   APTT 45.4* 39.5* 49.8*        No results for input(s): FE, TIBC, PSAT, FERR in the last 72 hours. No results found for: FOL, RBCF   No results for input(s): PH, PCO2, PO2 in the last 72 hours. No results for input(s): CPK, CKNDX, TROIQ in the last 72 hours. No lab exists for component: CPKMB    No results found for: CHOL, CHOLX, CHLST, CHOLV, HDL, HDLP, LDL, LDLC, DLDLP, TGLX, TRIGL, TRIGP, CHHD, CHHDX  Lab Results   Component Value Date/Time    Glucose (POC) 120 (H) 07/26/2022 12:17 PM    Glucose (POC) 120 (H) 07/26/2022 07:13 AM    Glucose (POC) 183 (H) 07/25/2022 08:35 PM    Glucose (POC) 144 (H) 07/25/2022 03:56 PM    Glucose (POC) 136 (H) 07/25/2022 11:27 AM     No results found for: COLOR, APPRN, SPGRU, REFSG, EPHRAIM, PROTU, GLUCU, KETU, BILU, UROU, VIANEY, LEUKU, GLUKE, EPSU, BACTU, WBCU, RBCU, CASTS, UCRY      Assessment:     Arterial Insufficiency s/p balloon angioplasty  Right great toe infection  Diabetes Mellitus  Diabetic foot injury  Hypertension  GERD  Gout  RA  Smoking  endovascular procedure    Plan:   S/p surgery today    4mg alteplase given 2/2 dislodged clot. Follow-up procedure tomorrow to confirm resolution of clot.     Zosyn 3.375 IV every 8 hours  Sliding-scale insulin  Lisinopril 20 mg daily  Protonix 40 mg daily  Amlodipine 10 mg daily  Gabapentin 300mg PO BID  Oxycodone 20mg PO BID    Monitor patient vitals closely      Discussed with the patient and patient wife        Current Facility-Administered Medications:     alteplase (CATHFLO) 5 mg in 0.9% sodium chloride 500 mL infusion, 1 mg/hr, IntraCATHeter- ARTerial, CONTINUOUS, MauricioVasquez MD, Last Rate: 100 mL/hr at 07/26/22 1050, 1 mg/hr at 07/26/22 1050    sodium chloride (NS) flush 5-40 mL, 5-40 mL, IntraVENous, Q8H, Juanjose Martínez MD    HYDROcodone-acetaminophen [facility]) 7.5-325 mg per tablet 1 Tablet, 1 Tablet, Oral, Q4H PRN, Juanjose Martínez MD, 1 Tablet at 07/26/22 1203    HYDROmorphone (DILAUDID) injection 1 mg, 1 mg, IntraVENous, Q4H PRN, Juanjose Martínez MD    [COMPLETED] piperacillin-tazobactam (ZOSYN) 4.5 g in 0.9% sodium chloride (MBP/ADV) 100 mL MBP, 4.5 g, IntraVENous, ONCE, Last Rate: 200 mL/hr at 07/25/22 1055, 4.5 g at 07/25/22 1055 **FOLLOWED BY** piperacillin-tazobactam (ZOSYN) 3.375 g in 0.9% sodium chloride (MBP/ADV) 100 mL MBP, 3.375 g, IntraVENous, Q8H, Juanjose Martínez MD, Last Rate: 25 mL/hr at 07/26/22 0151, 3.375 g at 07/26/22 0151    oxyCODONE ER (OxyCONTIN) tablet 20 mg, 20 mg, Oral, BID, Juanjose Martínez MD, 20 mg at 07/26/22 1205    gabapentin (NEURONTIN) capsule 300 mg, 300 mg, Oral, BID, Juanjose Martínez MD, 300 mg at 07/26/22 1205    [Held by provider] metFORMIN (GLUCOPHAGE) tablet 500 mg, 500 mg, Oral, BID WITH MEALS, Vicky West MD, 500 mg at 07/25/22 0901    heparin (porcine) 1,000 unit/mL injection 4,000 Units, 4,000 Units, IntraVENous, PRN, 4,000 Units at 07/20/22 1910 **OR** heparin (porcine) 1,000 unit/mL injection 2,000 Units, 2,000 Units, IntraVENous, PRN, Juanjose Martínez MD, 2,000 Units at 07/25/22 1932    heparin 25,000 units in D5W 250 ml infusion, 11-25 Units/kg/hr (Adjusted), IntraVENous, TITRATE, Juanjose Martínez MD, Last Rate: 22.6 mL/hr at 07/26/22 1004, 25 Units/kg/hr at 07/26/22 1004    sodium chloride (NS) flush 5-40 mL, 5-40 mL, IntraVENous, Q8H, Mauricio, Juanjose De La Fuente MD, 10 mL at 07/26/22 0621    ondansetron (ZOFRAN ODT) tablet 4 mg, 4 mg, Oral, Q8H PRN **OR** ondansetron (ZOFRAN) injection 4 mg, 4 mg, IntraVENous, Q6H PRN, Mauricio, Juanjose De La Fuente MD    oxyCODONE-acetaminophen (PERCOCET 10)  mg per tablet 1 Tablet, 1 Tablet, Oral, Q4H PRN, Cheril Schlatter, MD, 1 Tablet at 07/26/22 0748    amLODIPine (NORVASC) tablet 10 mg, 10 mg, Oral, DAILY, Jun, [address] Hortencia Sr MD, 10 mg at 07/26/22 1203    lisinopriL (PRINIVIL, ZESTRIL) tablet 20 mg, 20 mg, Oral, DAILY, Jennifer Martínez MD, 20 mg at 07/26/22 1203    colchicine tablet 0.6 mg, 0.6 mg, Oral, DAILY, Jennifer Martínez MD, 0.6 mg at 07/26/22 1203    insulin lispro (HUMALOG) injection, , SubCUTAneous, AC&HS, Jennifer Martínez MD    glucose chewable tablet 16 g, 4 Tablet, Oral, PRN, Jennifer Martínez MD    glucagon [facility]) injection 1 mg, 1 mg, IntraMUSCular, PRN, Jennifer Martínez MD    acetaminophen (TYLENOL) tablet 650 mg, 650 mg, Oral, Q6H PRN **OR** acetaminophen (TYLENOL) suppository 650 mg, 650 mg, Rectal, Q6H PRN, Jennifer Martínez MD    polyethylene glycol (MIRALAX) packet 17 g, 17 g, Oral, DAILY PRN, Jennifer Martínez MD, 17 g at 07/25/22 0907    pantoprazole (PROTONIX) tablet 40 mg, 40 mg, Oral, ACB, Jennifer Martínez MD, 40 mg at 07/25/22 4542

## 2022-07-26 NOTE — PROGRESS NOTES
Writer notified that patient would be going to [address] [place] [address] room 265. Patient's wife in patient's room waiting.   Writer informed wife that patient would be going to ICU room 265

## 2022-07-26 NOTE — PROGRESS NOTES
Infectious Disease Progress Note           Subjective:   Pt seen and examined at bedside. Stable, underwent endovascular procedure by Dr Jamil Matthews today, found to have clots, s/p TPA, currently in the ICU for close monitoring, right LE pain is better   Objective:   Physical Exam:     Visit Vitals  BP (!) 164/74   Pulse 64   Temp 98.1 °F (36.7 °C)   Resp 18   Ht 6' 1\" (1.854 m)   Wt 234 lb (106.1 kg)   SpO2 95%   BMI 30.87 kg/m²      O2 Device: None    Temp (24hrs), Av.1 °F (36.7 °C), Min:97.8 °F (36.6 °C), Max:98.3 °F (36.8 °C)    701 - 1900  In: -   Out: 1718 [KAQWV:6464]   1901 - 700  In: 1183.4 [P.O.:600; I.V.:583.4]  Out: 2800 [Urine:2800]    General: NAD, alert, AAO x 4  HEENT: FLOYD, Moist mucosa   Lungs: CTA b/l, decreased at the bases   Heart: S1S2+, RRR, no murmur  Abdo: Soft, NT, ND, +BS   : No herbert cath   Exts: Stable gangrenous changes involving right great toe . Skin: Right great toe ulcer     Data Review:       Recent Days:  Recent Labs     22  1245 22  0214   WBC 13.5* 14.5*   HGB 11.1* 11.7*   HCT 33.7* 34.8*   * 478*       Recent Labs     22  1245 22  0214   BUN 6 7   CREA 0.63* 0.67*         Lab Results   Component Value Date/Time    C-Reactive protein 16.20 (H) 2022 05:02 PM        Microbiology     Results       Procedure Component Value Units Date/Time    CULTURE, URINE [[id number]] Collected: 22 1645    Order Status: Completed Specimen: Urine Updated: 2234     Special Requests: No Special Requests        Culture result: No Growth (<1000 cfu/mL)       CULTURE, BLOOD, PAIRED [[id number]] Collected: 22 1708    Order Status: Completed Specimen: Blood Updated: 22 5165     Special Requests: No Special Requests        Culture result: No growth 6 days                  Diagnostics   CXR Results  (Last 48 hours)      None             Assessment/Plan     1.  Right great toe ulcer/dry gangrenous changes, underlying PAD, recent MRI neg for osteomyelitis      Stable gangrenous changes involving right great toe, w/o evidence of increased drainage      Afebrile, mild decrease in WBC on todays labs       Continue on empiric Zosyn, routine labs in the morning      2. PAD, S/p recent arteriogram w stent placement, and subsequent occlusion per CTA abdo (07/18)       S/p right common femoral to popliteal bypass surgery w graft placement on 07/19      CTA on 07/22 revealed sig plaque involving distal popliteal artery and tibioperoneal trunk      S/p endovascular procedure today (07/26) by Dr Anna Cruz), TPA done for clot, more procedure scheduled for AM      3. H/o gouty arthritis, no acute flare, continue on Colchicine      4. Right foot pain due to above: Pain is subjectively better today      5. DM: A1C of 7.2 on 07/19, was on metformin, held for endovascular procedure     6. Left shoulder swelling, no erythema or warmth. No acute findings noted on CT of left shoulder 07/25    7.  Rash on back per wife, not personally examined, no other areas or involvement       Suspected cutaneous candidiasis, topical antifungal cream ordered     Sara Olivo MD    7/26/2022

## 2022-07-26 NOTE — ANESTHESIA PREPROCEDURE EVALUATION
Relevant Problems   HEMATOLOGY   (+) Osteomyelitis (HCC)       Anesthetic History   No history of anesthetic complications            Review of Systems / Medical History  Patient summary reviewed, nursing notes reviewed and pertinent labs reviewed    Pulmonary          Smoker      Comments: SNORING. Former smoker. Neuro/Psych              Cardiovascular    Hypertension          PAD         GI/Hepatic/Renal     GERD           Endo/Other    Diabetes    Obesity, arthritis (Rheumatoid arthritis. ) and anemia    Comments: Gout. Other Findings   Comments: Right BIG TOE INFECTION. OSTEOMYELITIS. Physical Exam    Airway  Mallampati: III  TM Distance: < 4 cm  Neck ROM: short neck   Mouth opening: Normal     Cardiovascular    Rhythm: regular  Rate: normal         Dental      Comments: DENTURES. Pulmonary  Breath sounds clear to auscultation               Abdominal  GI exam deferred      Comments: Heparin infusing @ 23 units/kg/hr.   Other Findings            Anesthetic Plan    ASA: 4, emergent  Anesthesia type: MAC          Induction: Intravenous  Anesthetic plan and risks discussed with: Patient and Spouse

## 2022-07-26 NOTE — PROGRESS NOTES
General Daily Progress Note          Patient Name:   Vee Pond       YOB: 1960       Age:  65 y.o. Admit Date: 7/18/2022      Subjective:   Patient is a 65y.o. year old male with a PMH of diabetes, diabetic foot injury, arterial insufficiency, hypertension, GERD, and gout presents to the ED complaining of  right great toe infection. The patient was recently hospitalized for this same problem and received balloon angioplasty of the right lower extremity which initially helped perfusion of the toe. He has since relapsed and complains of excruciating pain that is not well managed on percocet. The patient states that it greatly progress in the last 24 hours and there is now black and gangrenous lesion on the plantar aspect of the toe with swelling over the entire foot. Patient denies any chest pain, SOB, difficulty breathing, palpitations, dizziness, N/V/D, or fevers. X-ray of R great toe:  IMPRESSION  No evidence of fracture or dislocation. Evidence of gout. No  significant change      Seen by the vascular surgeon this morning patient need right common femoral artery above-knee popliteal bypass       7/20  Patient resting in bed comfortably stating pain is well managed. Dressing show iodinated bandaged around affected toe without obvious bleeding. Day 1 s/p bypass of right popliteal artery  Resuming lovenox    7/21  Patient laying in bed stating pain is worse than yesterday d/t discontinuing dilaudid. Pt now on Toradol. Per trauma surgery - hold heparin for 6h d/t groin bleed 2/2 surgery  Albumin 2.4    7/22  Patient laying in bed with pain well-managed. NAD  Likely toe amputation next week. Per trauma surgery: Will need a CTA aorta distal runoff follow-up bypass    CTA Abd with runoff:  Severe stenoses of the left SFA and mild stenoses of the left popliteal  artery. 7/25  Patient laying in bed in moderate pain. NAD  Surgery delayed d/t staffing issues.   Planned revascularization followed by toe amputation next week pending success of revascularization. 7/26  Patient laying in bed post-op. NAD  Surgery today - clot was dislodged during procedure and alteplase was given. Follow-up procedure tomorrow to confirm resolution of clot.   Heparin restarted post-op  Metformin held per surgery  Initiated heparin drip      Objective:     Visit Vitals  /79 (BP 1 Location: Left upper arm, BP Patient Position: At rest)   Pulse 63   Temp 97.8 °F (36.6 °C)   Resp 18   Ht 6' 1\" (1.854 m)   Wt 106.1 kg (234 lb)   SpO2 95%   BMI 30.87 kg/m²        Recent Results (from the past 24 hour(s))   PTT    Collection Time: 07/25/22 10:31 AM   Result Value Ref Range    aPTT 49.8 (H) 21.2 - 34.1 sec    aPTT, therapeutic range   82 - 109 sec   GLUCOSE, POC    Collection Time: 07/25/22 11:27 AM   Result Value Ref Range    Glucose (POC) 136 (H) 65 - 117 mg/dL    Performed by Jb Aguilar    GLUCOSE, POC    Collection Time: 07/25/22  3:56 PM   Result Value Ref Range    Glucose (POC) 144 (H) 65 - 117 mg/dL    Performed by Yrn Kendrick    PTT    Collection Time: 07/25/22  5:28 PM   Result Value Ref Range    aPTT 39.5 (H) 21.2 - 34.1 sec    aPTT, therapeutic range   82 - 109 sec   GLUCOSE, POC    Collection Time: 07/25/22  8:35 PM   Result Value Ref Range    Glucose (POC) 183 (H) 65 - 117 mg/dL    Performed by Matilde Gallo    CBC W/O DIFF    Collection Time: 07/26/22  2:14 AM   Result Value Ref Range    WBC 14.5 (H) 4.1 - 11.1 K/uL    RBC 3.95 (L) 4.10 - 5.70 M/uL    HGB 11.7 (L) 12.1 - 17.0 g/dL    HCT 34.8 (L) 36.6 - 50.3 %    MCV 88.1 80.0 - 99.0 FL    MCH 29.6 26.0 - 34.0 PG    MCHC 33.6 30.0 - 36.5 g/dL    RDW 12.8 11.5 - 14.5 %    PLATELET 422 (H) 990 - 400 K/uL    MPV 9.5 8.9 - 12.9 FL    NRBC 0.0 0.0  WBC    ABSOLUTE NRBC 0.00 0.00 - 3.92 K/uL   METABOLIC PANEL, COMPREHENSIVE    Collection Time: 07/26/22  2:14 AM   Result Value Ref Range    Sodium 136 136 - 145 mmol/L    Potassium 4.4 3.5 - 5.1 mmol/L    Chloride 102 97 - 108 mmol/L    CO2 25 21 - 32 mmol/L    Anion gap 9 5 - 15 mmol/L    Glucose 127 (H) 65 - 100 mg/dL    BUN 7 6 - 20 mg/dL    Creatinine 0.67 (L) 0.70 - 1.30 mg/dL    BUN/Creatinine ratio 10 (L) 12 - 20      GFR est AA >60 >60 ml/min/1.73m2    GFR est non-AA >60 >60 ml/min/1.73m2    Calcium 9.3 8.5 - 10.1 mg/dL    Bilirubin, total 0.4 0.2 - 1.0 mg/dL    AST (SGOT) 14 (L) 15 - 37 U/L    ALT (SGPT) 24 12 - 78 U/L    Alk. phosphatase 80 45 - 117 U/L    Protein, total 6.9 6.4 - 8.2 g/dL    Albumin 2.8 (L) 3.5 - 5.0 g/dL    Globulin 4.1 (H) 2.0 - 4.0 g/dL    A-G Ratio 0.7 (L) 1.1 - 2.2     PTT    Collection Time: 07/26/22  2:14 AM   Result Value Ref Range    aPTT 45.4 (H) 21.2 - 34.1 sec    aPTT, therapeutic range   82 - 109 sec   GLUCOSE, POC    Collection Time: 07/26/22  7:13 AM   Result Value Ref Range    Glucose (POC) 120 (H) 65 - 117 mg/dL    Performed by Uriel Acuna      [unfilled]      Review of Systems    Constitutional: Negative for chills and fever. HENT: Negative. Eyes: Negative. Respiratory: Negative. Cardiovascular: Negative. Gastrointestinal: Negative for abdominal pain and nausea. Skin: Negative. Neurological: Negative. Physical Exam:      Constitutional: pt is oriented to person, place, and time. HENT:   Head: Normocephalic and atraumatic. Eyes: Pupils are equal, round, and reactive to light. EOM are normal.   Cardiovascular: Normal rate, regular rhythm and normal heart sounds. Pulmonary/Chest: Breath sounds normal. No wheezes. No rales. Exhibits no tenderness. Abdominal: Soft. Bowel sounds are normal. There is no abdominal tenderness. There is no rebound and no guarding. Musculoskeletal: Normal range of motion. Neurological: pt is alert and oriented to person, place, and time. CT SHOULDER LT WO CONT   Final Result   1. Osteopenia. Acromioclavicular degenerative change.  No acute fracture         CTA ABD ART W RUNOFF W WO CONT Final Result   1. Status post right common femoral to popliteal arterial bypass graft without   abnormalities. 2.  No aortic aneurysm or dissection. Patent inflow vessels. 3.  Incomplete evaluation of right runoff vessels, though suspect three-vessel   runoff. 4.  Severe stenoses of the left SFA and mild stenoses of the left popliteal   artery. 5.  Single vessel runoff of left posterior tibial artery. 6.  Additional findings as above. CTA ABD ART W RUNOFF W WO CONT   Final Result      Occluded long segment right superficial femoral artery stent with reconstitution   in the popliteal artery which demonstrates mild to moderate atherosclerotic   changes. Questionable patency of the distal tibial and peroneal runoff with   limited evaluation due to poor flow-related opacification and arterial   calcifications. Mild to moderate atherosclerotic changes in the left lower extremity with   probable single-vessel runoff to the left foot. Indeterminate bilateral adrenal nodules measuring up to 2 cm. Consider   nonemergent adrenal CT protocol. Bullous/cystic changes in the lung bases. Please refer to above findings for complete details. XR GREAT TOE RT MIN 2 V   Final Result   No evidence of fracture or dislocation. Evidence of gout. No   significant change.               Recent Results (from the past 24 hour(s))   PTT    Collection Time: 07/25/22 10:31 AM   Result Value Ref Range    aPTT 49.8 (H) 21.2 - 34.1 sec    aPTT, therapeutic range   82 - 109 sec   GLUCOSE, POC    Collection Time: 07/25/22 11:27 AM   Result Value Ref Range    Glucose (POC) 136 (H) 65 - 117 mg/dL    Performed by Lacie Flowers    GLUCOSE, POC    Collection Time: 07/25/22  3:56 PM   Result Value Ref Range    Glucose (POC) 144 (H) 65 - 117 mg/dL    Performed by Benny Patricia    PTT    Collection Time: 07/25/22  5:28 PM   Result Value Ref Range    aPTT 39.5 (H) 21.2 - 34.1 sec    aPTT, therapeutic range   82 - 109 sec   GLUCOSE, POC    Collection Time: 07/25/22  8:35 PM   Result Value Ref Range    Glucose (POC) 183 (H) 65 - 117 mg/dL    Performed by Lilly Cortes    CBC W/O DIFF    Collection Time: 07/26/22  2:14 AM   Result Value Ref Range    WBC 14.5 (H) 4.1 - 11.1 K/uL    RBC 3.95 (L) 4.10 - 5.70 M/uL    HGB 11.7 (L) 12.1 - 17.0 g/dL    HCT 34.8 (L) 36.6 - 50.3 %    MCV 88.1 80.0 - 99.0 FL    MCH 29.6 26.0 - 34.0 PG    MCHC 33.6 30.0 - 36.5 g/dL    RDW 12.8 11.5 - 14.5 %    PLATELET 829 (H) 828 - 400 K/uL    MPV 9.5 8.9 - 12.9 FL    NRBC 0.0 0.0  WBC    ABSOLUTE NRBC 0.00 0.00 - 0.74 K/uL   METABOLIC PANEL, COMPREHENSIVE    Collection Time: 07/26/22  2:14 AM   Result Value Ref Range    Sodium 136 136 - 145 mmol/L    Potassium 4.4 3.5 - 5.1 mmol/L    Chloride 102 97 - 108 mmol/L    CO2 25 21 - 32 mmol/L    Anion gap 9 5 - 15 mmol/L    Glucose 127 (H) 65 - 100 mg/dL    BUN 7 6 - 20 mg/dL    Creatinine 0.67 (L) 0.70 - 1.30 mg/dL    BUN/Creatinine ratio 10 (L) 12 - 20      GFR est AA >60 >60 ml/min/1.73m2    GFR est non-AA >60 >60 ml/min/1.73m2    Calcium 9.3 8.5 - 10.1 mg/dL    Bilirubin, total 0.4 0.2 - 1.0 mg/dL    AST (SGOT) 14 (L) 15 - 37 U/L    ALT (SGPT) 24 12 - 78 U/L    Alk.  phosphatase 80 45 - 117 U/L    Protein, total 6.9 6.4 - 8.2 g/dL    Albumin 2.8 (L) 3.5 - 5.0 g/dL    Globulin 4.1 (H) 2.0 - 4.0 g/dL    A-G Ratio 0.7 (L) 1.1 - 2.2     PTT    Collection Time: 07/26/22  2:14 AM   Result Value Ref Range    aPTT 45.4 (H) 21.2 - 34.1 sec    aPTT, therapeutic range   82 - 109 sec   GLUCOSE, POC    Collection Time: 07/26/22  7:13 AM   Result Value Ref Range    Glucose (POC) 120 (H) 65 - 117 mg/dL    Performed by Nika Garcia        Results       Procedure Component Value Units Date/Time    CULTURE, URINE [[id number]] Collected: 07/19/22 1645    Order Status: Completed Specimen: Urine Updated: 07/21/22 0844     Special Requests: No Special Requests        Culture result: No Growth (<1000 cfu/mL) CULTURE, BLOOD, PAIRED [[id number]] Collected: 07/18/22 1708    Order Status: Completed Specimen: Blood Updated: 07/25/22 1127     Special Requests: No Special Requests        Culture result: No growth 6 days                Labs:     Recent Labs     07/26/22 0214   WBC 14.5*   HGB 11.7*   HCT 34.8*   *       Recent Labs     07/26/22 0214      K 4.4      CO2 25   BUN 7   CREA 0.67*   *   CA 9.3       Recent Labs     07/26/22 0214   ALT 24   AP 80   TBILI 0.4   TP 6.9   ALB 2.8*   GLOB 4.1*       Recent Labs     07/26/22 0214 07/25/22  1728 07/25/22  1031   APTT 45.4* 39.5* 49.8*        No results for input(s): FE, TIBC, PSAT, FERR in the last 72 hours. No results found for: FOL, RBCF   No results for input(s): PH, PCO2, PO2 in the last 72 hours. No results for input(s): CPK, CKNDX, TROIQ in the last 72 hours. No lab exists for component: CPKMB    No results found for: CHOL, CHOLX, CHLST, CHOLV, HDL, HDLP, LDL, LDLC, DLDLP, TGLX, TRIGL, TRIGP, CHHD, CHHDX  Lab Results   Component Value Date/Time    Glucose (POC) 120 (H) 07/26/2022 07:13 AM    Glucose (POC) 183 (H) 07/25/2022 08:35 PM    Glucose (POC) 144 (H) 07/25/2022 03:56 PM    Glucose (POC) 136 (H) 07/25/2022 11:27 AM    Glucose (POC) 129 (H) 07/25/2022 07:52 AM     No results found for: COLOR, APPRN, SPGRU, REFSG, EPHRAIM, PROTU, GLUCU, KETU, BILU, UROU, VIANEY, LEUKU, GLUKE, EPSU, BACTU, WBCU, RBCU, CASTS, UCRY      Assessment:     Arterial Insufficiency s/p balloon angioplasty  Right great toe infection  Diabetes Mellitus  Diabetic foot injury  Hypertension  GERD  Gout  RA  Smoking      Plan:   S/p surgery today    4mg alteplase given 2/2 dislodged clot. Follow-up procedure tomorrow to confirm resolution of clot.     Zosyn 3.375 IV every 8 hours  Sliding-scale insulin  Lisinopril 20 mg daily  Protonix 40 mg daily  Amlodipine 10 mg daily  Gabapentin 300mg PO BID  Oxycodone 20mg PO BID    Monitor patient vitals closely      Discussed with the patient and patient wife        Current Facility-Administered Medications:     [COMPLETED] piperacillin-tazobactam (ZOSYN) 4.5 g in 0.9% sodium chloride (MBP/ADV) 100 mL MBP, 4.5 g, IntraVENous, ONCE, Last Rate: 200 mL/hr at 07/25/22 1055, 4.5 g at 07/25/22 1055 **FOLLOWED BY** piperacillin-tazobactam (ZOSYN) 3.375 g in 0.9% sodium chloride (MBP/ADV) 100 mL MBP, 3.375 g, IntraVENous, Q8H, Belen Stahl RN, Last Rate: 25 mL/hr at 07/26/22 0151, 3.375 g at 07/26/22 0151    oxyCODONE ER (OxyCONTIN) tablet 20 mg, 20 mg, Oral, BID, Christian Martínez MD, 20 mg at 07/25/22 2112    gabapentin (NEURONTIN) capsule 300 mg, 300 mg, Oral, BID, Vicky West MD, 300 mg at 07/25/22 2048    [Held by provider] metFORMIN (GLUCOPHAGE) tablet 500 mg, 500 mg, Oral, BID WITH MEALS, Vicky West MD, 500 mg at 07/25/22 0901    heparin (porcine) 1,000 unit/mL injection 4,000 Units, 4,000 Units, IntraVENous, PRN, 4,000 Units at 07/20/22 1910 **OR** heparin (porcine) 1,000 unit/mL injection 2,000 Units, 2,000 Units, IntraVENous, PRN, Christian Martínez MD, 2,000 Units at 07/25/22 1932    [Held by provider] heparin 25,000 units in D5W 250 ml infusion, 11-25 Units/kg/hr (Adjusted), IntraVENous, TITRATE, Christian Martínez MD, Last Rate: 22.6 mL/hr at 07/25/22 1924, 25 Units/kg/hr at 07/25/22 1924    sodium chloride (NS) flush 5-40 mL, 5-40 mL, IntraVENous, Q8H, Christian Martínez MD, 10 mL at 07/26/22 1219    sodium chloride (NS) flush 5-40 mL, 5-40 mL, IntraVENous, PRN, Christian Martínez MD, 10 mL at 07/21/22 1616    ondansetron (ZOFRAN ODT) tablet 4 mg, 4 mg, Oral, Q8H PRN **OR** ondansetron (ZOFRAN) injection 4 mg, 4 mg, IntraVENous, Q6H PRN, Christian Martínez MD    enoxaparin (LOVENOX) injection 40 mg, 40 mg, SubCUTAneous, DAILY, Christian Martínez MD, 40 mg at 07/25/22 0901    0.9% sodium chloride infusion, 125 mL/hr, IntraVENous, CONTINUOUS, Christian Martínez MD, Last Rate: 125 mL/hr at 07/21/22 2101, 125 mL/hr at 07/21/22 2101 oxyCODONE-acetaminophen (PERCOCET 10)  mg per tablet 1 Tablet, 1 Tablet, Oral, Q4H PRN, Anuj Martínez MD, 1 Tablet at 07/26/22 0748    amLODIPine (NORVASC) tablet 10 mg, 10 mg, Oral, DAILY, Anuj Martínez MD, 10 mg at 07/25/22 0901    lisinopriL (PRINIVIL, ZESTRIL) tablet 20 mg, 20 mg, Oral, DAILY, Anuj Martínez MD, 20 mg at 07/25/22 0901    colchicine tablet 0.6 mg, 0.6 mg, Oral, DAILY, Anuj Martínez MD, 0.6 mg at 07/25/22 0901    insulin lispro (HUMALOG) injection, , SubCUTAneous, AC&HS, Anuj Martínez MD    glucose chewable tablet 16 g, 4 Tablet, Oral, PRN, Anuj Martínez MD    glucagon (GLUCAGEN) injection 1 mg, 1 mg, IntraMUSCular, PRN, Anuj Martínez MD    0.9% sodium chloride infusion, 75 mL/hr, IntraVENous, CONTINUOUS, Anuj Martínez MD, Last Rate: 75 mL/hr at 07/26/22 0748, 75 mL/hr at 07/26/22 0748    acetaminophen (TYLENOL) tablet 650 mg, 650 mg, Oral, Q6H PRN **OR** acetaminophen (TYLENOL) suppository 650 mg, 650 mg, Rectal, Q6H PRN, Anuj Martínez MD    polyethylene glycol (MIRALAX) packet 17 g, 17 g, Oral, DAILY PRN, Anuj Martínez MD, 17 g at 07/25/22 0907    pantoprazole (PROTONIX) tablet 40 mg, 40 mg, Oral, ACB, Anuj Martínez MD, 40 mg at 07/25/22 4360

## 2022-07-26 NOTE — PROGRESS NOTES
Visited family in [address] [facility] for family care. Patient's wife Trinity was in the room along initially until staff came to provide medical update. Wife shared about patient's medical situation and history of care. She seemed to process her emotions tearfully after hearing about 's situation and being transferred to ICU unit. Provided supportive presence and assisted Trinity to the ICU with her belongings. Normalized her emotions while affirming Yury's health care needs as well as their familial bond and support. Advised of  availability. Contact chaplains for further referrals. Chaplain Dulce Burt M.Div.    can be reached by calling the  at [facility]  [phone]

## 2022-07-27 ENCOUNTER — APPOINTMENT (OUTPATIENT)
Dept: GENERAL RADIOLOGY | Age: 62
DRG: 253 | End: 2022-07-27
Attending: SURGERY

## 2022-07-27 ENCOUNTER — ANESTHESIA (OUTPATIENT)
Dept: SURGERY | Age: 62
DRG: 253 | End: 2022-07-27

## 2022-07-27 LAB
ALBUMIN SERPL-MCNC: 2.5 G/DL (ref 3.5–5)
ALBUMIN SERPL-MCNC: 2.8 G/DL (ref 3.5–5)
ALBUMIN/GLOB SERPL: 0.5 {RATIO} (ref 1.1–2.2)
ALBUMIN/GLOB SERPL: 0.6 {RATIO} (ref 1.1–2.2)
ALP SERPL-CCNC: 74 U/L (ref 45–117)
ALP SERPL-CCNC: 82 U/L (ref 45–117)
ALT SERPL-CCNC: 22 U/L (ref 12–78)
ALT SERPL-CCNC: 23 U/L (ref 12–78)
ANION GAP SERPL CALC-SCNC: 7 MMOL/L (ref 5–15)
ANION GAP SERPL CALC-SCNC: 7 MMOL/L (ref 5–15)
APTT PPP: 37 SEC (ref 21.2–34.1)
APTT PPP: 50 SEC (ref 21.2–34.1)
APTT PPP: 51.6 SEC (ref 21.2–34.1)
AST SERPL W P-5'-P-CCNC: 13 U/L (ref 15–37)
AST SERPL W P-5'-P-CCNC: 26 U/L (ref 15–37)
BASOPHILS # BLD: 0.1 K/UL (ref 0–0.1)
BASOPHILS NFR BLD: 1 % (ref 0–1)
BILIRUB SERPL-MCNC: 0.5 MG/DL (ref 0.2–1)
BILIRUB SERPL-MCNC: 0.6 MG/DL (ref 0.2–1)
BUN SERPL-MCNC: 6 MG/DL (ref 6–20)
BUN SERPL-MCNC: 6 MG/DL (ref 6–20)
BUN/CREAT SERPL: 8 (ref 12–20)
BUN/CREAT SERPL: 9 (ref 12–20)
CA-I BLD-MCNC: 9.1 MG/DL (ref 8.5–10.1)
CA-I BLD-MCNC: 9.2 MG/DL (ref 8.5–10.1)
CHLORIDE SERPL-SCNC: 102 MMOL/L (ref 97–108)
CHLORIDE SERPL-SCNC: 99 MMOL/L (ref 97–108)
CO2 SERPL-SCNC: 27 MMOL/L (ref 21–32)
CO2 SERPL-SCNC: 28 MMOL/L (ref 21–32)
CREAT SERPL-MCNC: 0.68 MG/DL (ref 0.7–1.3)
CREAT SERPL-MCNC: 0.75 MG/DL (ref 0.7–1.3)
DIFFERENTIAL METHOD BLD: ABNORMAL
EOSINOPHIL # BLD: 0.3 K/UL (ref 0–0.4)
EOSINOPHIL NFR BLD: 2 % (ref 0–7)
ERYTHROCYTE [DISTWIDTH] IN BLOOD BY AUTOMATED COUNT: 13 % (ref 11.5–14.5)
ERYTHROCYTE [DISTWIDTH] IN BLOOD BY AUTOMATED COUNT: 13.1 % (ref 11.5–14.5)
FIBRINOGEN PPP-MCNC: 647 MG/DL (ref 220–535)
GLOBULIN SER CALC-MCNC: 4.6 G/DL (ref 2–4)
GLOBULIN SER CALC-MCNC: 4.7 G/DL (ref 2–4)
GLUCOSE BLD STRIP.AUTO-MCNC: 110 MG/DL (ref 65–117)
GLUCOSE BLD STRIP.AUTO-MCNC: 130 MG/DL (ref 65–117)
GLUCOSE BLD STRIP.AUTO-MCNC: 158 MG/DL (ref 65–117)
GLUCOSE BLD STRIP.AUTO-MCNC: 267 MG/DL (ref 65–117)
GLUCOSE SERPL-MCNC: 125 MG/DL (ref 65–100)
GLUCOSE SERPL-MCNC: 157 MG/DL (ref 65–100)
HCT VFR BLD AUTO: 33.5 % (ref 36.6–50.3)
HCT VFR BLD AUTO: 35.5 % (ref 36.6–50.3)
HGB BLD-MCNC: 11.3 G/DL (ref 12.1–17)
HGB BLD-MCNC: 11.9 G/DL (ref 12.1–17)
IMM GRANULOCYTES # BLD AUTO: 0.2 K/UL (ref 0–0.04)
IMM GRANULOCYTES NFR BLD AUTO: 1 % (ref 0–0.5)
LYMPHOCYTES # BLD: 0.9 K/UL (ref 0.8–3.5)
LYMPHOCYTES NFR BLD: 5 % (ref 12–49)
MCH RBC QN AUTO: 29.5 PG (ref 26–34)
MCH RBC QN AUTO: 29.6 PG (ref 26–34)
MCHC RBC AUTO-ENTMCNC: 33.5 G/DL (ref 30–36.5)
MCHC RBC AUTO-ENTMCNC: 33.7 G/DL (ref 30–36.5)
MCV RBC AUTO: 87.5 FL (ref 80–99)
MCV RBC AUTO: 88.3 FL (ref 80–99)
MONOCYTES # BLD: 0.4 K/UL (ref 0–1)
MONOCYTES NFR BLD: 2 % (ref 5–13)
NEUTS SEG # BLD: 16.5 K/UL (ref 1.8–8)
NEUTS SEG NFR BLD: 89 % (ref 32–75)
NRBC # BLD: 0 K/UL (ref 0–0.01)
NRBC # BLD: 0 K/UL (ref 0–0.01)
NRBC BLD-RTO: 0 PER 100 WBC
NRBC BLD-RTO: 0 PER 100 WBC
PERFORMED BY, TECHID: ABNORMAL
PERFORMED BY, TECHID: NORMAL
PLATELET # BLD AUTO: 353 K/UL (ref 150–400)
PLATELET # BLD AUTO: 386 K/UL (ref 150–400)
PMV BLD AUTO: 9.2 FL (ref 8.9–12.9)
PMV BLD AUTO: 9.6 FL (ref 8.9–12.9)
POTASSIUM SERPL-SCNC: 3.8 MMOL/L (ref 3.5–5.1)
POTASSIUM SERPL-SCNC: 4.3 MMOL/L (ref 3.5–5.1)
PROT SERPL-MCNC: 7.1 G/DL (ref 6.4–8.2)
PROT SERPL-MCNC: 7.5 G/DL (ref 6.4–8.2)
RBC # BLD AUTO: 3.83 M/UL (ref 4.1–5.7)
RBC # BLD AUTO: 4.02 M/UL (ref 4.1–5.7)
SODIUM SERPL-SCNC: 134 MMOL/L (ref 136–145)
SODIUM SERPL-SCNC: 136 MMOL/L (ref 136–145)
THERAPEUTIC RANGE,PTTT: ABNORMAL SEC (ref 82–109)
WBC # BLD AUTO: 16.3 K/UL (ref 4.1–11.1)
WBC # BLD AUTO: 18.5 K/UL (ref 4.1–11.1)

## 2022-07-27 PROCEDURE — 76060000033 HC ANESTHESIA 1 TO 1.5 HR: Performed by: SURGERY

## 2022-07-27 PROCEDURE — 2709999900 HC NON-CHARGEABLE SUPPLY: Performed by: SURGERY

## 2022-07-27 PROCEDURE — C1769 GUIDE WIRE: HCPCS | Performed by: SURGERY

## 2022-07-27 PROCEDURE — 74011250637 HC RX REV CODE- 250/637: Performed by: INTERNAL MEDICINE

## 2022-07-27 PROCEDURE — 85730 THROMBOPLASTIN TIME PARTIAL: CPT

## 2022-07-27 PROCEDURE — 74011250637 HC RX REV CODE- 250/637: Performed by: SURGERY

## 2022-07-27 PROCEDURE — 047P3ZZ DILATION OF RIGHT ANTERIOR TIBIAL ARTERY, PERCUTANEOUS APPROACH: ICD-10-PCS | Performed by: SURGERY

## 2022-07-27 PROCEDURE — 99231 SBSQ HOSP IP/OBS SF/LOW 25: CPT | Performed by: INTERNAL MEDICINE

## 2022-07-27 PROCEDURE — 65270000029 HC RM PRIVATE

## 2022-07-27 PROCEDURE — 85025 COMPLETE CBC W/AUTO DIFF WBC: CPT

## 2022-07-27 PROCEDURE — 80053 COMPREHEN METABOLIC PANEL: CPT

## 2022-07-27 PROCEDURE — 77030013516 HC DEV INFL ANGI MRTM -B: Performed by: SURGERY

## 2022-07-27 PROCEDURE — 82962 GLUCOSE BLOOD TEST: CPT

## 2022-07-27 PROCEDURE — 76210000004 HC OR PH I REC 4.5 TO 5 HR: Performed by: SURGERY

## 2022-07-27 PROCEDURE — 85384 FIBRINOGEN ACTIVITY: CPT

## 2022-07-27 PROCEDURE — 74011636637 HC RX REV CODE- 636/637: Performed by: SURGERY

## 2022-07-27 PROCEDURE — 74011000258 HC RX REV CODE- 258: Performed by: SURGERY

## 2022-07-27 PROCEDURE — 74011250636 HC RX REV CODE- 250/636: Performed by: SURGERY

## 2022-07-27 PROCEDURE — C1894 INTRO/SHEATH, NON-LASER: HCPCS | Performed by: SURGERY

## 2022-07-27 PROCEDURE — C1725 CATH, TRANSLUMIN NON-LASER: HCPCS | Performed by: SURGERY

## 2022-07-27 PROCEDURE — 74011250636 HC RX REV CODE- 250/636: Performed by: NURSE ANESTHETIST, CERTIFIED REGISTERED

## 2022-07-27 PROCEDURE — 36415 COLL VENOUS BLD VENIPUNCTURE: CPT

## 2022-07-27 PROCEDURE — 76000 FLUOROSCOPY <1 HR PHYS/QHP: CPT

## 2022-07-27 PROCEDURE — 85027 COMPLETE CBC AUTOMATED: CPT

## 2022-07-27 PROCEDURE — C1714 CATH, TRANS ATHERECTOMY, DIR: HCPCS | Performed by: SURGERY

## 2022-07-27 PROCEDURE — C1887 CATHETER, GUIDING: HCPCS | Performed by: SURGERY

## 2022-07-27 PROCEDURE — C1760 CLOSURE DEV, VASC: HCPCS | Performed by: SURGERY

## 2022-07-27 PROCEDURE — 37229 PR REVSC OPN/PRQ TIB/PERO W/ATHRC/ANGIOP SM VSL: CPT | Performed by: SURGERY

## 2022-07-27 PROCEDURE — 76010000149 HC OR TIME 1 TO 1.5 HR: Performed by: SURGERY

## 2022-07-27 PROCEDURE — 77030041029 HC DEV TORQ GDWIRE MRTM -A: Performed by: SURGERY

## 2022-07-27 PROCEDURE — 74011000250 HC RX REV CODE- 250: Performed by: NURSE ANESTHETIST, CERTIFIED REGISTERED

## 2022-07-27 PROCEDURE — 77030018842 HC SOL IRR SOD CL 9% BAXT -A: Performed by: SURGERY

## 2022-07-27 PROCEDURE — 74011000250 HC RX REV CODE- 250: Performed by: SURGERY

## 2022-07-27 PROCEDURE — 99024 POSTOP FOLLOW-UP VISIT: CPT | Performed by: SURGERY

## 2022-07-27 RX ORDER — FACIAL-BODY WIPES
10 EACH TOPICAL
Status: DISPENSED | OUTPATIENT
Start: 2022-07-27 | End: 2022-07-28

## 2022-07-27 RX ORDER — SODIUM CHLORIDE 0.9 % (FLUSH) 0.9 %
5-40 SYRINGE (ML) INJECTION AS NEEDED
Status: DISCONTINUED | OUTPATIENT
Start: 2022-07-27 | End: 2022-07-28 | Stop reason: HOSPADM

## 2022-07-27 RX ORDER — HEPARIN SODIUM,PORCINE/PF 10 UNIT/ML
SYRINGE (ML) INTRAVENOUS AS NEEDED
Status: DISCONTINUED | OUTPATIENT
Start: 2022-07-27 | End: 2022-07-27 | Stop reason: HOSPADM

## 2022-07-27 RX ORDER — HEPARIN SODIUM 1000 [USP'U]/ML
INJECTION, SOLUTION INTRAVENOUS; SUBCUTANEOUS AS NEEDED
Status: DISCONTINUED | OUTPATIENT
Start: 2022-07-27 | End: 2022-07-27 | Stop reason: HOSPADM

## 2022-07-27 RX ORDER — HYDROMORPHONE HYDROCHLORIDE 1 MG/ML
0.5 INJECTION, SOLUTION INTRAMUSCULAR; INTRAVENOUS; SUBCUTANEOUS
Status: CANCELLED | OUTPATIENT
Start: 2022-07-27

## 2022-07-27 RX ORDER — SODIUM CHLORIDE 0.9 % (FLUSH) 0.9 %
5-40 SYRINGE (ML) INJECTION EVERY 8 HOURS
Status: CANCELLED | OUTPATIENT
Start: 2022-07-27

## 2022-07-27 RX ORDER — SODIUM CHLORIDE 9 MG/ML
INJECTION, SOLUTION INTRAVENOUS
Status: DISCONTINUED | OUTPATIENT
Start: 2022-07-27 | End: 2022-07-27 | Stop reason: HOSPADM

## 2022-07-27 RX ORDER — DEXAMETHASONE SODIUM PHOSPHATE 4 MG/ML
INJECTION, SOLUTION INTRA-ARTICULAR; INTRALESIONAL; INTRAMUSCULAR; INTRAVENOUS; SOFT TISSUE AS NEEDED
Status: DISCONTINUED | OUTPATIENT
Start: 2022-07-27 | End: 2022-07-27 | Stop reason: HOSPADM

## 2022-07-27 RX ORDER — LIDOCAINE HYDROCHLORIDE 10 MG/ML
0.1 INJECTION, SOLUTION EPIDURAL; INFILTRATION; INTRACAUDAL; PERINEURAL AS NEEDED
Status: CANCELLED | OUTPATIENT
Start: 2022-07-27

## 2022-07-27 RX ORDER — PROPOFOL 10 MG/ML
INJECTION, EMULSION INTRAVENOUS AS NEEDED
Status: DISCONTINUED | OUTPATIENT
Start: 2022-07-27 | End: 2022-07-27 | Stop reason: HOSPADM

## 2022-07-27 RX ORDER — FENTANYL CITRATE 50 UG/ML
50 INJECTION, SOLUTION INTRAMUSCULAR; INTRAVENOUS
Status: CANCELLED | OUTPATIENT
Start: 2022-07-27

## 2022-07-27 RX ORDER — DOCUSATE SODIUM 100 MG/1
100 CAPSULE, LIQUID FILLED ORAL 2 TIMES DAILY
Status: DISCONTINUED | OUTPATIENT
Start: 2022-07-27 | End: 2022-07-28 | Stop reason: HOSPADM

## 2022-07-27 RX ORDER — ONDANSETRON 2 MG/ML
INJECTION INTRAMUSCULAR; INTRAVENOUS AS NEEDED
Status: DISCONTINUED | OUTPATIENT
Start: 2022-07-27 | End: 2022-07-27 | Stop reason: HOSPADM

## 2022-07-27 RX ORDER — EPHEDRINE SULFATE/0.9% NACL/PF 50 MG/5 ML
5 SYRINGE (ML) INTRAVENOUS AS NEEDED
Status: CANCELLED | OUTPATIENT
Start: 2022-07-27

## 2022-07-27 RX ORDER — DEXMEDETOMIDINE HYDROCHLORIDE 100 UG/ML
INJECTION, SOLUTION INTRAVENOUS AS NEEDED
Status: DISCONTINUED | OUTPATIENT
Start: 2022-07-27 | End: 2022-07-27 | Stop reason: HOSPADM

## 2022-07-27 RX ORDER — ONDANSETRON 2 MG/ML
4 INJECTION INTRAMUSCULAR; INTRAVENOUS AS NEEDED
Status: CANCELLED | OUTPATIENT
Start: 2022-07-27

## 2022-07-27 RX ORDER — SODIUM CHLORIDE 0.9 % (FLUSH) 0.9 %
5-40 SYRINGE (ML) INJECTION EVERY 8 HOURS
Status: DISCONTINUED | OUTPATIENT
Start: 2022-07-27 | End: 2022-07-28 | Stop reason: SDUPTHER

## 2022-07-27 RX ORDER — SODIUM CHLORIDE 0.9 % (FLUSH) 0.9 %
5-40 SYRINGE (ML) INJECTION AS NEEDED
Status: CANCELLED | OUTPATIENT
Start: 2022-07-27

## 2022-07-27 RX ORDER — DIPHENHYDRAMINE HYDROCHLORIDE 50 MG/ML
12.5 INJECTION, SOLUTION INTRAMUSCULAR; INTRAVENOUS AS NEEDED
Status: CANCELLED | OUTPATIENT
Start: 2022-07-27 | End: 2022-07-27

## 2022-07-27 RX ORDER — FENTANYL CITRATE 50 UG/ML
50 INJECTION, SOLUTION INTRAMUSCULAR; INTRAVENOUS AS NEEDED
Status: CANCELLED | OUTPATIENT
Start: 2022-07-27

## 2022-07-27 RX ORDER — FUROSEMIDE 10 MG/ML
20 INJECTION INTRAMUSCULAR; INTRAVENOUS ONCE
Status: COMPLETED | OUTPATIENT
Start: 2022-07-27 | End: 2022-07-27

## 2022-07-27 RX ORDER — OXYCODONE AND ACETAMINOPHEN 5; 325 MG/1; MG/1
1 TABLET ORAL AS NEEDED
Status: CANCELLED | OUTPATIENT
Start: 2022-07-27

## 2022-07-27 RX ORDER — MIDAZOLAM HYDROCHLORIDE 1 MG/ML
INJECTION, SOLUTION INTRAMUSCULAR; INTRAVENOUS AS NEEDED
Status: DISCONTINUED | OUTPATIENT
Start: 2022-07-27 | End: 2022-07-27 | Stop reason: HOSPADM

## 2022-07-27 RX ORDER — FENTANYL CITRATE 50 UG/ML
INJECTION, SOLUTION INTRAMUSCULAR; INTRAVENOUS AS NEEDED
Status: DISCONTINUED | OUTPATIENT
Start: 2022-07-27 | End: 2022-07-27 | Stop reason: HOSPADM

## 2022-07-27 RX ADMIN — PIPERACILLIN AND TAZOBACTAM 3.38 G: 3; .375 INJECTION, POWDER, LYOPHILIZED, FOR SOLUTION INTRAVENOUS at 00:23

## 2022-07-27 RX ADMIN — FENTANYL CITRATE 100 MCG: 50 INJECTION, SOLUTION INTRAMUSCULAR; INTRAVENOUS at 07:50

## 2022-07-27 RX ADMIN — GABAPENTIN 300 MG: 300 CAPSULE ORAL at 10:32

## 2022-07-27 RX ADMIN — HYDROMORPHONE HYDROCHLORIDE 1 MG: 1 INJECTION, SOLUTION INTRAMUSCULAR; INTRAVENOUS; SUBCUTANEOUS at 12:44

## 2022-07-27 RX ADMIN — HYDROMORPHONE HYDROCHLORIDE 1 MG: 1 INJECTION, SOLUTION INTRAMUSCULAR; INTRAVENOUS; SUBCUTANEOUS at 23:30

## 2022-07-27 RX ADMIN — AMLODIPINE BESYLATE 10 MG: 5 TABLET ORAL at 10:32

## 2022-07-27 RX ADMIN — DOCUSATE SODIUM 100 MG: 100 CAPSULE, LIQUID FILLED ORAL at 21:42

## 2022-07-27 RX ADMIN — OXYCODONE HYDROCHLORIDE AND ACETAMINOPHEN 1 TABLET: 10; 325 TABLET ORAL at 19:38

## 2022-07-27 RX ADMIN — PROPOFOL 30 MG: 10 INJECTION, EMULSION INTRAVENOUS at 08:25

## 2022-07-27 RX ADMIN — PROPOFOL 20 MG: 10 INJECTION, EMULSION INTRAVENOUS at 08:40

## 2022-07-27 RX ADMIN — HYDROMORPHONE HYDROCHLORIDE 1 MG: 1 INJECTION, SOLUTION INTRAMUSCULAR; INTRAVENOUS; SUBCUTANEOUS at 03:31

## 2022-07-27 RX ADMIN — OXYCODONE HYDROCHLORIDE AND ACETAMINOPHEN 1 TABLET: 10; 325 TABLET ORAL at 15:33

## 2022-07-27 RX ADMIN — INSULIN LISPRO 7 UNITS: 100 INJECTION, SOLUTION INTRAVENOUS; SUBCUTANEOUS at 16:30

## 2022-07-27 RX ADMIN — DEXMEDETOMIDINE HYDROCHLORIDE 6 MCG: 100 INJECTION, SOLUTION INTRAVENOUS at 08:54

## 2022-07-27 RX ADMIN — OXYCODONE HYDROCHLORIDE 20 MG: 20 TABLET, FILM COATED, EXTENDED RELEASE ORAL at 21:42

## 2022-07-27 RX ADMIN — PROPOFOL 50 MG: 10 INJECTION, EMULSION INTRAVENOUS at 08:00

## 2022-07-27 RX ADMIN — PIPERACILLIN AND TAZOBACTAM 3.38 G: 3; .375 INJECTION, POWDER, LYOPHILIZED, FOR SOLUTION INTRAVENOUS at 10:38

## 2022-07-27 RX ADMIN — HEPARIN SODIUM 14 UNITS/KG/HR: 10000 INJECTION, SOLUTION INTRAVENOUS at 11:24

## 2022-07-27 RX ADMIN — SODIUM CHLORIDE, PRESERVATIVE FREE 10 ML: 5 INJECTION INTRAVENOUS at 23:10

## 2022-07-27 RX ADMIN — PROPOFOL 30 MG: 10 INJECTION, EMULSION INTRAVENOUS at 07:55

## 2022-07-27 RX ADMIN — DEXMEDETOMIDINE HYDROCHLORIDE 4 MCG: 100 INJECTION, SOLUTION INTRAVENOUS at 08:45

## 2022-07-27 RX ADMIN — DEXMEDETOMIDINE HYDROCHLORIDE 2 MCG: 100 INJECTION, SOLUTION INTRAVENOUS at 08:11

## 2022-07-27 RX ADMIN — FUROSEMIDE 20 MG: 10 INJECTION, SOLUTION INTRAMUSCULAR; INTRAVENOUS at 06:21

## 2022-07-27 RX ADMIN — ONDANSETRON 4 MG: 2 INJECTION INTRAMUSCULAR; INTRAVENOUS at 07:58

## 2022-07-27 RX ADMIN — HEPARIN SODIUM 5000 UNITS: 1000 INJECTION, SOLUTION INTRAVENOUS; SUBCUTANEOUS at 08:35

## 2022-07-27 RX ADMIN — OXYCODONE HYDROCHLORIDE AND ACETAMINOPHEN 1 TABLET: 10; 325 TABLET ORAL at 09:56

## 2022-07-27 RX ADMIN — DEXMEDETOMIDINE HYDROCHLORIDE 4 MCG: 100 INJECTION, SOLUTION INTRAVENOUS at 08:16

## 2022-07-27 RX ADMIN — FENTANYL CITRATE 100 MCG: 50 INJECTION, SOLUTION INTRAMUSCULAR; INTRAVENOUS at 08:51

## 2022-07-27 RX ADMIN — ALTEPLASE 1 MG/HR: 2.2 INJECTION, POWDER, LYOPHILIZED, FOR SOLUTION INTRAVENOUS at 02:16

## 2022-07-27 RX ADMIN — PROPOFOL 20 MG: 10 INJECTION, EMULSION INTRAVENOUS at 08:31

## 2022-07-27 RX ADMIN — SODIUM CHLORIDE: 9 INJECTION, SOLUTION INTRAVENOUS at 07:30

## 2022-07-27 RX ADMIN — PROPOFOL 20 MG: 10 INJECTION, EMULSION INTRAVENOUS at 07:54

## 2022-07-27 RX ADMIN — SODIUM CHLORIDE, PRESERVATIVE FREE 10 ML: 5 INJECTION INTRAVENOUS at 14:00

## 2022-07-27 RX ADMIN — HEPARIN SODIUM 27 UNITS/KG/HR: 10000 INJECTION, SOLUTION INTRAVENOUS at 04:38

## 2022-07-27 RX ADMIN — POLYETHYLENE GLYCOL 3350 17 G: 17 POWDER, FOR SOLUTION ORAL at 15:33

## 2022-07-27 RX ADMIN — HEPARIN SODIUM 16 UNITS/KG/HR: 10000 INJECTION, SOLUTION INTRAVENOUS at 19:32

## 2022-07-27 RX ADMIN — PIPERACILLIN AND TAZOBACTAM 3.38 G: 3; .375 INJECTION, POWDER, LYOPHILIZED, FOR SOLUTION INTRAVENOUS at 17:41

## 2022-07-27 RX ADMIN — DEXMEDETOMIDINE HYDROCHLORIDE 6 MCG: 100 INJECTION, SOLUTION INTRAVENOUS at 08:03

## 2022-07-27 RX ADMIN — LISINOPRIL 20 MG: 20 TABLET ORAL at 10:55

## 2022-07-27 RX ADMIN — HEPARIN SODIUM 2000 UNITS: 1000 INJECTION, SOLUTION INTRAVENOUS; SUBCUTANEOUS at 19:38

## 2022-07-27 RX ADMIN — PROPOFOL 50 MG: 10 INJECTION, EMULSION INTRAVENOUS at 08:49

## 2022-07-27 RX ADMIN — MIDAZOLAM HYDROCHLORIDE 2 MG: 2 INJECTION, SOLUTION INTRAMUSCULAR; INTRAVENOUS at 07:40

## 2022-07-27 RX ADMIN — DEXAMETHASONE SODIUM PHOSPHATE 4 MG: 4 INJECTION, SOLUTION INTRA-ARTICULAR; INTRALESIONAL; INTRAMUSCULAR; INTRAVENOUS; SOFT TISSUE at 07:58

## 2022-07-27 RX ADMIN — PROPOFOL 10 MG: 10 INJECTION, EMULSION INTRAVENOUS at 08:16

## 2022-07-27 RX ADMIN — COLCHICINE 0.6 MG: 0.6 TABLET, FILM COATED ORAL at 10:55

## 2022-07-27 RX ADMIN — ALTEPLASE 1 MG/HR: 2.2 INJECTION, POWDER, LYOPHILIZED, FOR SOLUTION INTRAVENOUS at 07:34

## 2022-07-27 RX ADMIN — HEPARIN SODIUM 2000 UNITS: 1000 INJECTION, SOLUTION INTRAVENOUS; SUBCUTANEOUS at 04:40

## 2022-07-27 RX ADMIN — HYDROCODONE BITARTRATE AND ACETAMINOPHEN 1 TABLET: 7.5; 325 TABLET ORAL at 02:03

## 2022-07-27 RX ADMIN — PROPOFOL 20 MG: 10 INJECTION, EMULSION INTRAVENOUS at 08:11

## 2022-07-27 NOTE — PROGRESS NOTES
Report called to PACU RN, Tom Fang. Wife updated and taken to 2nd floor waiting area with patient's belongings.

## 2022-07-27 NOTE — PROGRESS NOTES
PT'S PULSE OX DROPPED TO 86% ON ROOM AIR AFTER RECEIVING DILAUDID 1 MG AT 0331. HE IS RESTING WITH EYES CLOSED; EASILY AWAKENED; INSTRUCTED TO TAKE DEEPER BREATHS. O2 APPPLIED AT 3 L/M.  REQUESTED MORE PAIN MEDICATION BUT INSTRUCTED THAT HE WAS NOW NPO AND THAT HIS RESPIRATORY STATUS HAD DECREASED (RESPIRATIONS SHALLOW) BECAUSE OF NARCOTICS.     ALSO, PT REFUSED TO SIGN INFORMED CONSENT UNTIL HE SPOKE WITH DR. HUNG AND WITH HIS WIFE. ([phone])

## 2022-07-27 NOTE — PROGRESS NOTES
Bedside and Verbal shift change report given to DEBBY Oropeza (oncoming nurse) by Gerard Bates RN (offgoing nurse). Report included the following information SBAR, Kardex, OR Summary, Intake/Output, and Cardiac Rhythm NS .

## 2022-07-27 NOTE — PROGRESS NOTES
Spoke with patient's wife, Deborah Harris. Brought her to patient's bedside. Updated her on waiting for bed for patient. Verbalized understanding.  I have patient's belongings at bedside

## 2022-07-27 NOTE — PROGRESS NOTES
Patient examined ICU. Patient was transferred ICU for tPA infusion therapy. Patient had a significant clot burden below knee level requiring tPA infusion overnight. This morning patient right foot feels warm and Doppler signal noted. Patient will be undergoing repeat angiogram right leg and the removal of the tPA infusion catheter. Patient is explained about the rationale for the procedure risks benefits complication.

## 2022-07-27 NOTE — PROGRESS NOTES
Infectious Disease Progress Note           Subjective:   Stable, still w intermittent right LE pain, but notes pain is better. Thinks pain may be exacerbated by gabpentin which has been discontinued. Notes constipation   Objective:   Physical Exam:     Visit Vitals  /70 (BP 1 Location: Left upper arm, BP Patient Position: At rest;Supine)   Pulse 91   Temp 98.3 °F (36.8 °C)   Resp 20   Ht 6' 1\" (1.854 m)   Wt 247 lb 12.8 oz (112.4 kg)   SpO2 99%   BMI 32.69 kg/m²    O2 Flow Rate (L/min): 2 l/min O2 Device: None (Room air)    Temp (24hrs), Av °F (36.7 °C), Min:97.2 °F (36.2 °C), Max:98.3 °F (36.8 °C)    No intake/output data recorded. 701 -  1900  In: 2222.2 [P.O.:600;  I.V.:1622.2]  Out: 7530 [Urine:7500]    General: NAD, alert, AAO x 4  HEENT: FLOYD, Moist mucosa   Lungs: CTA b/l, decreased at the bases   Heart: S1S2+, RRR, no murmur  Abdo: Soft, NT, ND, +BS   : No herbert cath   Exts: Dry gangrenous changes involving right great toe, minimal drainage   Skin: Right great toe ulcer     Data Review:       Recent Days:  Recent Labs     22  1006 22  0345 22  1245   WBC 18.5* 16.3* 13.5*   HGB 11.9* 11.3* 11.1*   HCT 35.5* 33.5* 33.7*    386 401*       Recent Labs     22  1006 22  0345 22  1245   BUN 6 6 6   CREA 0.75 0.68* 0.63*         Lab Results   Component Value Date/Time    C-Reactive protein 16.20 (H) 2022 05:02 PM        Microbiology     Results       Procedure Component Value Units Date/Time    CULTURE, URINE [[id number]] Collected: 22 1645    Order Status: Completed Specimen: Urine Updated: 22 4283     Special Requests: No Special Requests        Culture result: No Growth (<1000 cfu/mL)       CULTURE, BLOOD, PAIRED [[id number]] Collected: 22 1708    Order Status: Completed Specimen: Blood Updated: 22 1127     Special Requests: No Special Requests        Culture result: No growth 6 days Diagnostics   CXR Results  (Last 48 hours)      None             Assessment/Plan     1. Right great toe ulcer/dry gangrenous changes, underlying PAD, recent MRI neg for osteomyelitis      Dry gangrenous changes involving right great toe       WBC up to 18.5, Afebrile, will leave on empiric Zosyn       Routine labs in the morning      2. PAD, S/p recent arteriogram w stent placement, and subsequent occlusion per CTA abdo (07/18)       S/p right common femoral to popliteal bypass surgery w graft placement on 07/19      CTA on 07/22 revealed sig plaque involving distal popliteal artery and tibioperoneal trunk      Underwent angiogram w removal of tPA infusion catheter today       3. Right foot pain due to above, Neurontin discontinued due to concerns it may be exacerbating pain      4.  Constipation: Dulcolax suppository ordered as well as daily colace     Melissa Woods MD    7/27/2022

## 2022-07-27 NOTE — PROGRESS NOTES
General Daily Progress Note          Patient Name:   Linda Lawler       YOB: 1960       Age:  65 y.o. Admit Date: 7/18/2022      Subjective:   Patient is a 65y.o. year old male with a PMH of diabetes, diabetic foot injury, arterial insufficiency, hypertension, GERD, and gout presents to the ED complaining of  right great toe infection. The patient was recently hospitalized for this same problem and received balloon angioplasty of the right lower extremity which initially helped perfusion of the toe. He has since relapsed and complains of excruciating pain that is not well managed on percocet. The patient states that it greatly progress in the last 24 hours and there is now black and gangrenous lesion on the plantar aspect of the toe with swelling over the entire foot. Patient denies any chest pain, SOB, difficulty breathing, palpitations, dizziness, N/V/D, or fevers. X-ray of R great toe:  IMPRESSION  No evidence of fracture or dislocation. Evidence of gout. No  significant change      Seen by the vascular surgeon this morning patient need right common femoral artery above-knee popliteal bypass       7/20  Patient resting in bed comfortably stating pain is well managed. Dressing show iodinated bandaged around affected toe without obvious bleeding. Day 1 s/p bypass of right popliteal artery  Resuming lovenox    7/21  Patient laying in bed stating pain is worse than yesterday d/t discontinuing dilaudid. Pt now on Toradol. Per trauma surgery - hold heparin for 6h d/t groin bleed 2/2 surgery  Albumin 2.4    7/22  Patient laying in bed with pain well-managed. NAD  Likely toe amputation next week. Per trauma surgery: Will need a CTA aorta distal runoff follow-up bypass    CTA Abd with runoff:  Severe stenoses of the left SFA and mild stenoses of the left popliteal  artery. 7/25  Patient laying in bed in moderate pain. NAD  Surgery delayed d/t staffing issues.   Planned revascularization followed by toe amputation next week pending success of revascularization. 7/26  Patient laying in bed post-op. NAD  Surgery today - clot was dislodged during procedure and alteplase was given. Follow-up procedure tomorrow to confirm resolution of clot. Heparin restarted post-op  Metformin held per surgery  Initiated heparin drip    7/27    Patient alert awake status post surgery no chest pain or shortness of breath      Patient was transferred ICU for tPA infusion therapy. Patient had a significant clot burden below knee level requiring tPA infusion overnight. This morning patient right foot feels warm and Doppler signal noted. Patient will be undergoing repeat angiogram right leg and the removal of the tPA infusion catheter. Objective:     Visit Vitals  BP (!) 151/74 (BP 1 Location: Right upper arm, BP Patient Position: At rest)   Pulse 63   Temp 98.1 °F (36.7 °C)   Resp 14   Ht 6' 1\" (1.854 m)   Wt 112.4 kg (247 lb 12.8 oz)   SpO2 100%   BMI 32.69 kg/m²        Recent Results (from the past 24 hour(s))   CBC WITH AUTOMATED DIFF    Collection Time: 07/26/22 12:45 PM   Result Value Ref Range    WBC 13.5 (H) 4.1 - 11.1 K/uL    RBC 3.82 (L) 4.10 - 5.70 M/uL    HGB 11.1 (L) 12.1 - 17.0 g/dL    HCT 33.7 (L) 36.6 - 50.3 %    MCV 88.2 80.0 - 99.0 FL    MCH 29.1 26.0 - 34.0 PG    MCHC 32.9 30.0 - 36.5 g/dL    RDW 13.0 11.5 - 14.5 %    PLATELET 885 (H) 301 - 400 K/uL    MPV 9.7 8.9 - 12.9 FL    NRBC 0.0 0.0  WBC    ABSOLUTE NRBC 0.00 0.00 - 0.01 K/uL    NEUTROPHILS 74 32 - 75 %    LYMPHOCYTES 13 12 - 49 %    MONOCYTES 7 5 - 13 %    EOSINOPHILS 3 0 - 7 %    BASOPHILS 1 0 - 1 %    IMMATURE GRANULOCYTES 2 (H) 0 - 0.5 %    ABS. NEUTROPHILS 10.0 (H) 1.8 - 8.0 K/UL    ABS. LYMPHOCYTES 1.7 0.8 - 3.5 K/UL    ABS. MONOCYTES 0.9 0.0 - 1.0 K/UL    ABS. EOSINOPHILS 0.4 0.0 - 0.4 K/UL    ABS. BASOPHILS 0.1 0.0 - 0.1 K/UL    ABS. IMM.  GRANS. 0.2 (H) 0.00 - 0.04 K/UL    DF AUTOMATED     METABOLIC PANEL, COMPREHENSIVE    Collection Time: 07/26/22 12:45 PM   Result Value Ref Range    Sodium 137 136 - 145 mmol/L    Potassium 3.8 3.5 - 5.1 mmol/L    Chloride 103 97 - 108 mmol/L    CO2 28 21 - 32 mmol/L    Anion gap 6 5 - 15 mmol/L    Glucose 133 (H) 65 - 100 mg/dL    BUN 6 6 - 20 mg/dL    Creatinine 0.63 (L) 0.70 - 1.30 mg/dL    BUN/Creatinine ratio 10 (L) 12 - 20      GFR est AA >60 >60 ml/min/1.73m2    GFR est non-AA >60 >60 ml/min/1.73m2    Calcium 9.1 8.5 - 10.1 mg/dL    Bilirubin, total 0.4 0.2 - 1.0 mg/dL    AST (SGOT) 16 15 - 37 U/L    ALT (SGPT) 25 12 - 78 U/L    Alk.  phosphatase 76 45 - 117 U/L    Protein, total 7.1 6.4 - 8.2 g/dL    Albumin 2.5 (L) 3.5 - 5.0 g/dL    Globulin 4.6 (H) 2.0 - 4.0 g/dL    A-G Ratio 0.5 (L) 1.1 - 2.2     PROTHROMBIN TIME + INR    Collection Time: 07/26/22 12:45 PM   Result Value Ref Range    Prothrombin time 15.5 (H) 11.9 - 14.6 sec    INR 1.2 (H) 0.9 - 1.1     PTT    Collection Time: 07/26/22 12:45 PM   Result Value Ref Range    aPTT 124.2 (HH) 21.2 - 34.1 sec    aPTT, therapeutic range   82 - 109 sec   GLUCOSE, POC    Collection Time: 07/26/22  3:10 PM   Result Value Ref Range    Glucose (POC) 147 (H) 65 - 117 mg/dL    Performed by Viola Jackson    PTT    Collection Time: 07/26/22  8:15 PM   Result Value Ref Range    aPTT 45.8 (H) 21.2 - 34.1 sec    aPTT, therapeutic range   82 - 109 sec   GLUCOSE, POC    Collection Time: 07/26/22  9:02 PM   Result Value Ref Range    Glucose (POC) 155 (H) 65 - 117 mg/dL    Performed by Roni Farrell    PTT    Collection Time: 07/27/22  3:45 AM   Result Value Ref Range    aPTT 51.6 (H) 21.2 - 34.1 sec    aPTT, therapeutic range   82 - 109 sec   CBC W/O DIFF    Collection Time: 07/27/22  3:45 AM   Result Value Ref Range    WBC 16.3 (H) 4.1 - 11.1 K/uL    RBC 3.83 (L) 4.10 - 5.70 M/uL    HGB 11.3 (L) 12.1 - 17.0 g/dL    HCT 33.5 (L) 36.6 - 50.3 %    MCV 87.5 80.0 - 99.0 FL    MCH 29.5 26.0 - 34.0 PG    MCHC 33.7 30.0 - 36.5 g/dL    RDW 13.0 11.5 - 14.5 %    PLATELET 266 090 - 003 K/uL    MPV 9.2 8.9 - 12.9 FL    NRBC 0.0 0.0  WBC    ABSOLUTE NRBC 0.00 0.00 - 2.51 K/uL   METABOLIC PANEL, COMPREHENSIVE    Collection Time: 07/27/22  3:45 AM   Result Value Ref Range    Sodium 136 136 - 145 mmol/L    Potassium 3.8 3.5 - 5.1 mmol/L    Chloride 102 97 - 108 mmol/L    CO2 27 21 - 32 mmol/L    Anion gap 7 5 - 15 mmol/L    Glucose 125 (H) 65 - 100 mg/dL    BUN 6 6 - 20 mg/dL    Creatinine 0.68 (L) 0.70 - 1.30 mg/dL    BUN/Creatinine ratio 9 (L) 12 - 20      GFR est AA >60 >60 ml/min/1.73m2    GFR est non-AA >60 >60 ml/min/1.73m2    Calcium 9.1 8.5 - 10.1 mg/dL    Bilirubin, total 0.5 0.2 - 1.0 mg/dL    AST (SGOT) 13 (L) 15 - 37 U/L    ALT (SGPT) 23 12 - 78 U/L    Alk. phosphatase 74 45 - 117 U/L    Protein, total 7.1 6.4 - 8.2 g/dL    Albumin 2.5 (L) 3.5 - 5.0 g/dL    Globulin 4.6 (H) 2.0 - 4.0 g/dL    A-G Ratio 0.5 (L) 1.1 - 2.2     GLUCOSE, POC    Collection Time: 07/27/22  7:40 AM   Result Value Ref Range    Glucose (POC) 130 (H) 65 - 117 mg/dL    Performed by Roger Mcgrath    GLUCOSE, POC    Collection Time: 07/27/22  9:27 AM   Result Value Ref Range    Glucose (POC) 158 (H) 65 - 117 mg/dL    Performed by Jose M Judge    CBC WITH AUTOMATED DIFF    Collection Time: 07/27/22 10:06 AM   Result Value Ref Range    WBC 18.5 (H) 4.1 - 11.1 K/uL    RBC 4.02 (L) 4.10 - 5.70 M/uL    HGB 11.9 (L) 12.1 - 17.0 g/dL    HCT 35.5 (L) 36.6 - 50.3 %    MCV 88.3 80.0 - 99.0 FL    MCH 29.6 26.0 - 34.0 PG    MCHC 33.5 30.0 - 36.5 g/dL    RDW 13.1 11.5 - 14.5 %    PLATELET 863 903 - 322 K/uL    MPV 9.6 8.9 - 12.9 FL    NRBC 0.0 0.0  WBC    ABSOLUTE NRBC 0.00 0.00 - 0.01 K/uL    NEUTROPHILS 89 (H) 32 - 75 %    LYMPHOCYTES 5 (L) 12 - 49 %    MONOCYTES 2 (L) 5 - 13 %    EOSINOPHILS 2 0 - 7 %    BASOPHILS 1 0 - 1 %    IMMATURE GRANULOCYTES 1 (H) 0 - 0.5 %    ABS. NEUTROPHILS 16.5 (H) 1.8 - 8.0 K/UL    ABS. LYMPHOCYTES 0.9 0.8 - 3.5 K/UL    ABS.  MONOCYTES 0.4 0.0 - 1.0 K/UL    ABS. EOSINOPHILS 0.3 0.0 - 0.4 K/UL    ABS. BASOPHILS 0.1 0.0 - 0.1 K/UL    ABS. IMM. GRANS. 0.2 (H) 0.00 - 0.04 K/UL    DF AUTOMATED     METABOLIC PANEL, COMPREHENSIVE    Collection Time: 07/27/22 10:06 AM   Result Value Ref Range    Sodium 134 (L) 136 - 145 mmol/L    Potassium 4.3 3.5 - 5.1 mmol/L    Chloride 99 97 - 108 mmol/L    CO2 28 21 - 32 mmol/L    Anion gap 7 5 - 15 mmol/L    Glucose 157 (H) 65 - 100 mg/dL    BUN 6 6 - 20 mg/dL    Creatinine 0.75 0.70 - 1.30 mg/dL    BUN/Creatinine ratio 8 (L) 12 - 20      GFR est AA >60 >60 ml/min/1.73m2    GFR est non-AA >60 >60 ml/min/1.73m2    Calcium 9.2 8.5 - 10.1 mg/dL    Bilirubin, total 0.6 0.2 - 1.0 mg/dL    AST (SGOT) 26 15 - 37 U/L    ALT (SGPT) 22 12 - 78 U/L    Alk. phosphatase 82 45 - 117 U/L    Protein, total 7.5 6.4 - 8.2 g/dL    Albumin 2.8 (L) 3.5 - 5.0 g/dL    Globulin 4.7 (H) 2.0 - 4.0 g/dL    A-G Ratio 0.6 (L) 1.1 - 2.2     PTT    Collection Time: 07/27/22 10:06 AM   Result Value Ref Range    aPTT 50.0 (H) 21.2 - 34.1 sec    aPTT, therapeutic range   82 - 109 sec   FIBRINOGEN    Collection Time: 07/27/22 10:06 AM   Result Value Ref Range    Fibrinogen 647 (H) 220 - 535 mg/dL     [unfilled]      Review of Systems    Constitutional: Negative for chills and fever. HENT: Negative. Eyes: Negative. Respiratory: Negative. Cardiovascular: Negative. Gastrointestinal: Negative for abdominal pain and nausea. Skin: Negative. Neurological: Negative. Physical Exam:      Constitutional: pt is oriented to person, place, and time. HENT:   Head: Normocephalic and atraumatic. Eyes: Pupils are equal, round, and reactive to light. EOM are normal.   Cardiovascular: Normal rate, regular rhythm and normal heart sounds. Pulmonary/Chest: Breath sounds normal. No wheezes. No rales. Exhibits no tenderness. Abdominal: Soft. Bowel sounds are normal. There is no abdominal tenderness. There is no rebound and no guarding. Musculoskeletal: Normal range of motion. Neurological: pt is alert and oriented to person, place, and time. XR FLUOROSCOPY UNDER 60 MINUTES   Final Result      CT SHOULDER LT WO CONT   Final Result   1. Osteopenia. Acromioclavicular degenerative change. No acute fracture         CTA ABD ART W RUNOFF W WO CONT   Final Result   1. Status post right common femoral to popliteal arterial bypass graft without   abnormalities. 2.  No aortic aneurysm or dissection. Patent inflow vessels. 3.  Incomplete evaluation of right runoff vessels, though suspect three-vessel   runoff. 4.  Severe stenoses of the left SFA and mild stenoses of the left popliteal   artery. 5.  Single vessel runoff of left posterior tibial artery. 6.  Additional findings as above. CTA ABD ART W RUNOFF W WO CONT   Final Result      Occluded long segment right superficial femoral artery stent with reconstitution   in the popliteal artery which demonstrates mild to moderate atherosclerotic   changes. Questionable patency of the distal tibial and peroneal runoff with   limited evaluation due to poor flow-related opacification and arterial   calcifications. Mild to moderate atherosclerotic changes in the left lower extremity with   probable single-vessel runoff to the left foot. Indeterminate bilateral adrenal nodules measuring up to 2 cm. Consider   nonemergent adrenal CT protocol. Bullous/cystic changes in the lung bases. Please refer to above findings for complete details. XR GREAT TOE RT MIN 2 V   Final Result   No evidence of fracture or dislocation. Evidence of gout. No   significant change.               Recent Results (from the past 24 hour(s))   CBC WITH AUTOMATED DIFF    Collection Time: 07/26/22 12:45 PM   Result Value Ref Range    WBC 13.5 (H) 4.1 - 11.1 K/uL    RBC 3.82 (L) 4.10 - 5.70 M/uL    HGB 11.1 (L) 12.1 - 17.0 g/dL    HCT 33.7 (L) 36.6 - 50.3 %    MCV 88.2 80.0 - 99.0 FL    MCH 29.1 26.0 - 34.0 PG    MCHC 32.9 30.0 - 36.5 g/dL    RDW 13.0 11.5 - 14.5 %    PLATELET 261 (H) 890 - 400 K/uL    MPV 9.7 8.9 - 12.9 FL    NRBC 0.0 0.0  WBC    ABSOLUTE NRBC 0.00 0.00 - 0.01 K/uL    NEUTROPHILS 74 32 - 75 %    LYMPHOCYTES 13 12 - 49 %    MONOCYTES 7 5 - 13 %    EOSINOPHILS 3 0 - 7 %    BASOPHILS 1 0 - 1 %    IMMATURE GRANULOCYTES 2 (H) 0 - 0.5 %    ABS. NEUTROPHILS 10.0 (H) 1.8 - 8.0 K/UL    ABS. LYMPHOCYTES 1.7 0.8 - 3.5 K/UL    ABS. MONOCYTES 0.9 0.0 - 1.0 K/UL    ABS. EOSINOPHILS 0.4 0.0 - 0.4 K/UL    ABS. BASOPHILS 0.1 0.0 - 0.1 K/UL    ABS. IMM. GRANS. 0.2 (H) 0.00 - 0.04 K/UL    DF AUTOMATED     METABOLIC PANEL, COMPREHENSIVE    Collection Time: 07/26/22 12:45 PM   Result Value Ref Range    Sodium 137 136 - 145 mmol/L    Potassium 3.8 3.5 - 5.1 mmol/L    Chloride 103 97 - 108 mmol/L    CO2 28 21 - 32 mmol/L    Anion gap 6 5 - 15 mmol/L    Glucose 133 (H) 65 - 100 mg/dL    BUN 6 6 - 20 mg/dL    Creatinine 0.63 (L) 0.70 - 1.30 mg/dL    BUN/Creatinine ratio 10 (L) 12 - 20      GFR est AA >60 >60 ml/min/1.73m2    GFR est non-AA >60 >60 ml/min/1.73m2    Calcium 9.1 8.5 - 10.1 mg/dL    Bilirubin, total 0.4 0.2 - 1.0 mg/dL    AST (SGOT) 16 15 - 37 U/L    ALT (SGPT) 25 12 - 78 U/L    Alk.  phosphatase 76 45 - 117 U/L    Protein, total 7.1 6.4 - 8.2 g/dL    Albumin 2.5 (L) 3.5 - 5.0 g/dL    Globulin 4.6 (H) 2.0 - 4.0 g/dL    A-G Ratio 0.5 (L) 1.1 - 2.2     PROTHROMBIN TIME + INR    Collection Time: 07/26/22 12:45 PM   Result Value Ref Range    Prothrombin time 15.5 (H) 11.9 - 14.6 sec    INR 1.2 (H) 0.9 - 1.1     PTT    Collection Time: 07/26/22 12:45 PM   Result Value Ref Range    aPTT 124.2 (HH) 21.2 - 34.1 sec    aPTT, therapeutic range   82 - 109 sec   GLUCOSE, POC    Collection Time: 07/26/22  3:10 PM   Result Value Ref Range    Glucose (POC) 147 (H) 65 - 117 mg/dL    Performed by Peng Harry    PTT    Collection Time: 07/26/22  8:15 PM   Result Value Ref Range    aPTT 45.8 (H) 21.2 - 34.1 sec    aPTT, therapeutic range   82 - 109 sec   GLUCOSE, POC    Collection Time: 07/26/22  9:02 PM   Result Value Ref Range    Glucose (POC) 155 (H) 65 - 117 mg/dL    Performed by Lucio Velez    PTT    Collection Time: 07/27/22  3:45 AM   Result Value Ref Range    aPTT 51.6 (H) 21.2 - 34.1 sec    aPTT, therapeutic range   82 - 109 sec   CBC W/O DIFF    Collection Time: 07/27/22  3:45 AM   Result Value Ref Range    WBC 16.3 (H) 4.1 - 11.1 K/uL    RBC 3.83 (L) 4.10 - 5.70 M/uL    HGB 11.3 (L) 12.1 - 17.0 g/dL    HCT 33.5 (L) 36.6 - 50.3 %    MCV 87.5 80.0 - 99.0 FL    MCH 29.5 26.0 - 34.0 PG    MCHC 33.7 30.0 - 36.5 g/dL    RDW 13.0 11.5 - 14.5 %    PLATELET 487 934 - 830 K/uL    MPV 9.2 8.9 - 12.9 FL    NRBC 0.0 0.0  WBC    ABSOLUTE NRBC 0.00 0.00 - 5.26 K/uL   METABOLIC PANEL, COMPREHENSIVE    Collection Time: 07/27/22  3:45 AM   Result Value Ref Range    Sodium 136 136 - 145 mmol/L    Potassium 3.8 3.5 - 5.1 mmol/L    Chloride 102 97 - 108 mmol/L    CO2 27 21 - 32 mmol/L    Anion gap 7 5 - 15 mmol/L    Glucose 125 (H) 65 - 100 mg/dL    BUN 6 6 - 20 mg/dL    Creatinine 0.68 (L) 0.70 - 1.30 mg/dL    BUN/Creatinine ratio 9 (L) 12 - 20      GFR est AA >60 >60 ml/min/1.73m2    GFR est non-AA >60 >60 ml/min/1.73m2    Calcium 9.1 8.5 - 10.1 mg/dL    Bilirubin, total 0.5 0.2 - 1.0 mg/dL    AST (SGOT) 13 (L) 15 - 37 U/L    ALT (SGPT) 23 12 - 78 U/L    Alk.  phosphatase 74 45 - 117 U/L    Protein, total 7.1 6.4 - 8.2 g/dL    Albumin 2.5 (L) 3.5 - 5.0 g/dL    Globulin 4.6 (H) 2.0 - 4.0 g/dL    A-G Ratio 0.5 (L) 1.1 - 2.2     GLUCOSE, POC    Collection Time: 07/27/22  7:40 AM   Result Value Ref Range    Glucose (POC) 130 (H) 65 - 117 mg/dL    Performed by Ezequiel Catalan    GLUCOSE, POC    Collection Time: 07/27/22  9:27 AM   Result Value Ref Range    Glucose (POC) 158 (H) 65 - 117 mg/dL    Performed by Chica Alford    CBC WITH AUTOMATED DIFF    Collection Time: 07/27/22 10:06 AM   Result Value Ref Range    WBC 18.5 (H) 4.1 - 11.1 K/uL    RBC 4.02 (L) 4.10 - 5.70 M/uL    HGB 11.9 (L) 12.1 - 17.0 g/dL    HCT 35.5 (L) 36.6 - 50.3 %    MCV 88.3 80.0 - 99.0 FL    MCH 29.6 26.0 - 34.0 PG    MCHC 33.5 30.0 - 36.5 g/dL    RDW 13.1 11.5 - 14.5 %    PLATELET 079 058 - 235 K/uL    MPV 9.6 8.9 - 12.9 FL    NRBC 0.0 0.0  WBC    ABSOLUTE NRBC 0.00 0.00 - 0.01 K/uL    NEUTROPHILS 89 (H) 32 - 75 %    LYMPHOCYTES 5 (L) 12 - 49 %    MONOCYTES 2 (L) 5 - 13 %    EOSINOPHILS 2 0 - 7 %    BASOPHILS 1 0 - 1 %    IMMATURE GRANULOCYTES 1 (H) 0 - 0.5 %    ABS. NEUTROPHILS 16.5 (H) 1.8 - 8.0 K/UL    ABS. LYMPHOCYTES 0.9 0.8 - 3.5 K/UL    ABS. MONOCYTES 0.4 0.0 - 1.0 K/UL    ABS. EOSINOPHILS 0.3 0.0 - 0.4 K/UL    ABS. BASOPHILS 0.1 0.0 - 0.1 K/UL    ABS. IMM. GRANS. 0.2 (H) 0.00 - 0.04 K/UL    DF AUTOMATED     METABOLIC PANEL, COMPREHENSIVE    Collection Time: 07/27/22 10:06 AM   Result Value Ref Range    Sodium 134 (L) 136 - 145 mmol/L    Potassium 4.3 3.5 - 5.1 mmol/L    Chloride 99 97 - 108 mmol/L    CO2 28 21 - 32 mmol/L    Anion gap 7 5 - 15 mmol/L    Glucose 157 (H) 65 - 100 mg/dL    BUN 6 6 - 20 mg/dL    Creatinine 0.75 0.70 - 1.30 mg/dL    BUN/Creatinine ratio 8 (L) 12 - 20      GFR est AA >60 >60 ml/min/1.73m2    GFR est non-AA >60 >60 ml/min/1.73m2    Calcium 9.2 8.5 - 10.1 mg/dL    Bilirubin, total 0.6 0.2 - 1.0 mg/dL    AST (SGOT) 26 15 - 37 U/L    ALT (SGPT) 22 12 - 78 U/L    Alk.  phosphatase 82 45 - 117 U/L    Protein, total 7.5 6.4 - 8.2 g/dL    Albumin 2.8 (L) 3.5 - 5.0 g/dL    Globulin 4.7 (H) 2.0 - 4.0 g/dL    A-G Ratio 0.6 (L) 1.1 - 2.2     PTT    Collection Time: 07/27/22 10:06 AM   Result Value Ref Range    aPTT 50.0 (H) 21.2 - 34.1 sec    aPTT, therapeutic range   82 - 109 sec   FIBRINOGEN    Collection Time: 07/27/22 10:06 AM   Result Value Ref Range    Fibrinogen 647 (H) 220 - 535 mg/dL       Results       Procedure Component Value Units Date/Time    CULTURE, URINE [[id number]] Collected: 07/19/22 4580    Order Status: Completed Specimen: Urine Updated: 07/21/22 0844     Special Requests: No Special Requests        Culture result: No Growth (<1000 cfu/mL)       CULTURE, BLOOD, PAIRED [[id number]] Collected: 07/18/22 1708    Order Status: Completed Specimen: Blood Updated: 07/25/22 1127     Special Requests: No Special Requests        Culture result: No growth 6 days                Labs:     Recent Labs     07/27/22  1006 07/27/22  0345   WBC 18.5* 16.3*   HGB 11.9* 11.3*   HCT 35.5* 33.5*    386       Recent Labs     07/27/22 1006 07/27/22 0345 07/26/22  1245   * 136 137   K 4.3 3.8 3.8   CL 99 102 103   CO2 28 27 28   BUN 6 6 6   CREA 0.75 0.68* 0.63*   * 125* 133*   CA 9.2 9.1 9.1       Recent Labs     07/27/22  1006 07/27/22  0345 07/26/22  1245   ALT 22 23 25   AP 82 74 76   TBILI 0.6 0.5 0.4   TP 7.5 7.1 7.1   ALB 2.8* 2.5* 2.5*   GLOB 4.7* 4.6* 4.6*       Recent Labs     07/27/22 1006 07/27/22 0345 07/26/22 2015 07/26/22  1245   INR  --   --   --  1.2*   PTP  --   --   --  15.5*   APTT 50.0* 51.6* 45.8* 124.2*        No results for input(s): FE, TIBC, PSAT, FERR in the last 72 hours. No results found for: FOL, RBCF   No results for input(s): PH, PCO2, PO2 in the last 72 hours. No results for input(s): CPK, CKNDX, TROIQ in the last 72 hours.     No lab exists for component: CPKMB    No results found for: CHOL, CHOLX, CHLST, CHOLV, HDL, HDLP, LDL, LDLC, DLDLP, TGLX, TRIGL, TRIGP, CHHD, CHHDX  Lab Results   Component Value Date/Time    Glucose (POC) 158 (H) 07/27/2022 09:27 AM    Glucose (POC) 130 (H) 07/27/2022 07:40 AM    Glucose (POC) 155 (H) 07/26/2022 09:02 PM    Glucose (POC) 147 (H) 07/26/2022 03:10 PM    Glucose (POC) 120 (H) 07/26/2022 12:17 PM     No results found for: COLOR, APPRN, SPGRU, REFSG, EPHRAIM, PROTU, GLUCU, KETU, BILU, UROU, VIANEY, LEUKU, GLUKE, EPSU, BACTU, WBCU, RBCU, CASTS, UCRY      Assessment:     Arterial Insufficiency s/p balloon angioplasty  Right great toe infection  Diabetes Mellitus  Diabetic foot injury  Hypertension  GERD  Gout  RA  Smoking  endovascular procedure    Patient was transferred ICU for tPA infusion therapy. Patient had a significant clot burden below knee level requiring tPA infusion overnight. This morning patient right foot feels warm and Doppler signal noted. Patient will be undergoing repeat angiogram right leg and the removal of the tPA infusion catheter. Plan:   S/p surgery today    4mg alteplase given 2/2 dislodged clot. Follow-up procedure tomorrow to confirm resolution of clot.     Amlodipine 10 mg daily  Colchicine 0.6 mg daily   gabapentin 300 mg twice a day  Lisinopril 20 mg daily  Oxycodone 20 mg twice a day  Protonix 40 daily  IV Zosyn 3.375 IV every 8 hours  Heparin drip    Monitor patient vitals closely      Possible discharge in next 24 to 48 hours        Current Facility-Administered Medications:     sodium chloride (NS) flush 5-40 mL, 5-40 mL, IntraVENous, Q8H, Meche Martínez MD    sodium chloride (NS) flush 5-40 mL, 5-40 mL, IntraVENous, PRN, Meche Martínez MD    sodium chloride (NS) flush 5-40 mL, 5-40 mL, IntraVENous, Q8H, Meche Martínez MD, 10 mL at 07/26/22 2120    HYDROcodone-acetaminophen (NORCO) 7.5-325 mg per tablet 1 Tablet, 1 Tablet, Oral, Q4H PRN, Meche Martínez MD, 1 Tablet at 07/27/22 0203    HYDROmorphone (DILAUDID) injection 1 mg, 1 mg, IntraVENous, Q4H PRN, Meche Martínez MD, 1 mg at 07/27/22 0331    [COMPLETED] piperacillin-tazobactam (ZOSYN) 4.5 g in 0.9% sodium chloride (MBP/ADV) 100 mL MBP, 4.5 g, IntraVENous, ONCE, Last Rate: 200 mL/hr at 07/25/22 1055, 4.5 g at 07/25/22 1055 **FOLLOWED BY** piperacillin-tazobactam (ZOSYN) 3.375 g in 0.9% sodium chloride (MBP/ADV) 100 mL MBP, 3.375 g, IntraVENous, Q8H, Meche Martínez MD, Last Rate: 25 mL/hr at 07/27/22 1038, 3.375 g at 07/27/22 1038    oxyCODONE ER (OxyCONTIN) tablet 20 mg, 20 mg, Oral, BID, Meche Martínez MD, 20 mg at 07/26/22 2102    gabapentin (NEURONTIN) capsule 300 mg, 300 mg, Oral, BID, Susie Martínez MD, 300 mg at 07/27/22 1032    [Held by provider] metFORMIN (GLUCOPHAGE) tablet 500 mg, 500 mg, Oral, BID WITH MEALS, Vicky West MD, 500 mg at 07/25/22 0901    heparin (porcine) 1,000 unit/mL injection 4,000 Units, 4,000 Units, IntraVENous, PRN, 4,000 Units at 07/20/22 1910 **OR** heparin (porcine) 1,000 unit/mL injection 2,000 Units, 2,000 Units, IntraVENous, PRN, Susie Martínez MD, 2,000 Units at 07/27/22 0440    heparin 25,000 units in D5W 250 ml infusion, 11-25 Units/kg/hr (Adjusted), IntraVENous, TITRATE, Susie Martínez MD, Last Rate: 12.7 mL/hr at 07/27/22 1124, 14 Units/kg/hr at 07/27/22 1124    sodium chloride (NS) flush 5-40 mL, 5-40 mL, IntraVENous, Q8H, Susie Martínez MD, 10 mL at 07/26/22 1400    ondansetron (ZOFRAN ODT) tablet 4 mg, 4 mg, Oral, Q8H PRN **OR** ondansetron (ZOFRAN) injection 4 mg, 4 mg, IntraVENous, Q6H PRN, Susie Martínez MD    oxyCODONE-acetaminophen (PERCOCET 10)  mg per tablet 1 Tablet, 1 Tablet, Oral, Q4H PRN, Susie Martínez MD, 1 Tablet at 07/27/22 0956    amLODIPine (NORVASC) tablet 10 mg, 10 mg, Oral, DAILY, Susie Martínez MD, 10 mg at 07/27/22 1032    lisinopriL (PRINIVIL, ZESTRIL) tablet 20 mg, 20 mg, Oral, DAILY, Susie Martínez MD, 20 mg at 07/27/22 1055    colchicine tablet 0.6 mg, 0.6 mg, Oral, DAILY, Susie Martínez MD, 0.6 mg at 07/27/22 1055    insulin lispro (HUMALOG) injection, , SubCUTAneous, AC&HS, Susie Martínez MD    glucose chewable tablet 16 g, 4 Tablet, Oral, PRN, Susie Martínez MD    glucagon [facility]) injection 1 mg, 1 mg, IntraMUSCular, PRN, Susie Martínez MD    acetaminophen (TYLENOL) tablet 650 mg, 650 mg, Oral, Q6H PRN **OR** acetaminophen (TYLENOL) suppository 650 mg, 650 mg, Rectal, Q6H PRN, Susie Martínez MD    polyethylene glycol (MIRALAX) packet 17 g, 17 g, Oral, DAILY PRN, Susie Martínez MD, 17 g at 07/25/22 0907    pantoprazole (PROTONIX) tablet 40 mg, 40 mg, Oral, ACB, Rylie Martínezport MD [place], 40 mg at 07/25/22 8239

## 2022-07-28 VITALS
WEIGHT: 247.8 LBS | HEIGHT: 73 IN | TEMPERATURE: 97.9 F | OXYGEN SATURATION: 98 % | DIASTOLIC BLOOD PRESSURE: 80 MMHG | HEART RATE: 62 BPM | RESPIRATION RATE: 18 BRPM | BODY MASS INDEX: 32.84 KG/M2 | SYSTOLIC BLOOD PRESSURE: 142 MMHG

## 2022-07-28 LAB
APTT PPP: 40.3 SEC (ref 21.2–34.1)
APTT PPP: 41.4 SEC (ref 21.2–34.1)
GLUCOSE BLD STRIP.AUTO-MCNC: 132 MG/DL (ref 65–117)
PERFORMED BY, TECHID: ABNORMAL
THERAPEUTIC RANGE,PTTT: ABNORMAL SEC (ref 82–109)
THERAPEUTIC RANGE,PTTT: ABNORMAL SEC (ref 82–109)

## 2022-07-28 PROCEDURE — 82962 GLUCOSE BLOOD TEST: CPT

## 2022-07-28 PROCEDURE — 74011250637 HC RX REV CODE- 250/637: Performed by: INTERNAL MEDICINE

## 2022-07-28 PROCEDURE — 74011250636 HC RX REV CODE- 250/636: Performed by: SURGERY

## 2022-07-28 PROCEDURE — 74011250637 HC RX REV CODE- 250/637: Performed by: SURGERY

## 2022-07-28 PROCEDURE — 85730 THROMBOPLASTIN TIME PARTIAL: CPT

## 2022-07-28 PROCEDURE — 74011250637 HC RX REV CODE- 250/637: Performed by: FAMILY MEDICINE

## 2022-07-28 PROCEDURE — 36415 COLL VENOUS BLD VENIPUNCTURE: CPT

## 2022-07-28 PROCEDURE — 74011000258 HC RX REV CODE- 258: Performed by: SURGERY

## 2022-07-28 RX ORDER — AMOXICILLIN AND CLAVULANATE POTASSIUM 875; 125 MG/1; MG/1
1 TABLET, FILM COATED ORAL EVERY 12 HOURS
Qty: 20 TABLET | Refills: 0 | Status: SHIPPED | OUTPATIENT
Start: 2022-07-28 | End: 2022-08-02

## 2022-07-28 RX ORDER — OXYCODONE HCL 20 MG/1
20 TABLET, FILM COATED, EXTENDED RELEASE ORAL 2 TIMES DAILY
Qty: 20 TABLET | Refills: 0 | Status: SHIPPED | OUTPATIENT
Start: 2022-07-28 | End: 2022-09-02

## 2022-07-28 RX ADMIN — COLCHICINE 0.6 MG: 0.6 TABLET, FILM COATED ORAL at 08:39

## 2022-07-28 RX ADMIN — HEPARIN SODIUM 2000 UNITS: 1000 INJECTION, SOLUTION INTRAVENOUS; SUBCUTANEOUS at 02:25

## 2022-07-28 RX ADMIN — OXYCODONE HYDROCHLORIDE 20 MG: 20 TABLET, FILM COATED, EXTENDED RELEASE ORAL at 08:39

## 2022-07-28 RX ADMIN — LISINOPRIL 20 MG: 20 TABLET ORAL at 08:38

## 2022-07-28 RX ADMIN — HYDROCODONE BITARTRATE AND ACETAMINOPHEN 1 TABLET: 7.5; 325 TABLET ORAL at 06:42

## 2022-07-28 RX ADMIN — OXYCODONE HYDROCHLORIDE AND ACETAMINOPHEN 1 TABLET: 10; 325 TABLET ORAL at 11:57

## 2022-07-28 RX ADMIN — HYDROMORPHONE HYDROCHLORIDE 1 MG: 1 INJECTION, SOLUTION INTRAMUSCULAR; INTRAVENOUS; SUBCUTANEOUS at 03:57

## 2022-07-28 RX ADMIN — AMLODIPINE BESYLATE 10 MG: 5 TABLET ORAL at 08:38

## 2022-07-28 RX ADMIN — PIPERACILLIN AND TAZOBACTAM 3.38 G: 3; .375 INJECTION, POWDER, LYOPHILIZED, FOR SOLUTION INTRAVENOUS at 00:07

## 2022-07-28 RX ADMIN — HEPARIN SODIUM 18 UNITS/KG/HR: 10000 INJECTION, SOLUTION INTRAVENOUS at 02:22

## 2022-07-28 RX ADMIN — DOCUSATE SODIUM 100 MG: 100 CAPSULE, LIQUID FILLED ORAL at 08:39

## 2022-07-28 RX ADMIN — PIPERACILLIN AND TAZOBACTAM 3.38 G: 3; .375 INJECTION, POWDER, LYOPHILIZED, FOR SOLUTION INTRAVENOUS at 08:39

## 2022-07-28 RX ADMIN — APIXABAN 10 MG: 5 TABLET, FILM COATED ORAL at 10:30

## 2022-07-28 RX ADMIN — PANTOPRAZOLE SODIUM 40 MG: 40 TABLET, DELAYED RELEASE ORAL at 06:37

## 2022-07-28 NOTE — DISCHARGE INSTRUCTIONS
Discharge Instructions       PATIENT ID: Mara De Leon  MRN: [id number]   YOB: 1960    DATE OF ADMISSION: [unfilled]    DATE OF DISCHARGE: 7/28/2022    PRIMARY CARE PROVIDER: @PCP@     ATTENDING PHYSICIAN: [unfilled]  DISCHARGING PROVIDER: Myra Dsouza MD    To contact this individual call [phone] and ask the  to page. If unavailable ask to be transferred the Adult Hospitalist Department. DISCHARGE DIAGNOSES peripheral vascular disease    CONSULTATIONS: [unfilled]    PROCEDURES/SURGERIES: Procedure(s):  RIGHT ANTERIOR TIBIA  ARTERY ANGIOGRAM WITH BALLOON ANGIOPLASTY AND ARTHROTOMY     PENDING TEST RESULTS:   At the time of discharge the following test results are still pending: None    FOLLOW UP APPOINTMENTS:   [unfilled]     ADDITIONAL CARE RECOMMENDATIONS: Wound care    DIET: Diabetic Diet      ACTIVITY: Activity as tolerated    Wound care: Wound Care Order: submitted to [facility] Please view [email or web address]    EQUIPMENT needed: ***      DISCHARGE MEDICATIONS:   See Medication Reconciliation Form    It is important that you take the medication exactly as they are prescribed. Keep your medication in the bottles provided by the pharmacist and keep a list of the medication names, dosages, and times to be taken in your wallet. Do not take other medications without consulting your doctor. NOTIFY YOUR PHYSICIAN FOR ANY OF THE FOLLOWING:   Fever over 101 degrees for 24 hours. Chest pain, shortness of breath, fever, chills, nausea, vomiting, diarrhea, change in mentation, falling, weakness, bleeding. Severe pain or pain not relieved by medications. Or, any other signs or symptoms that you may have questions about.       DISPOSITION:    Home With:   OT  PT  HH  RN       SNF/Inpatient Rehab/LTAC    Independent/assisted living    Hospice    Other:         PROBLEM LIST Updated:  Yes ***       Signed:   Myra Dsouza MD  7/28/2022  9:44 AM    Discharge Instructions       PATIENT ID: Richard Quintana  MRN: [id number]   YOB: 1960    DATE OF ADMISSION: [unfilled]    DATE OF DISCHARGE: 7/28/2022    PRIMARY CARE PROVIDER: @PCP@     ATTENDING PHYSICIAN: [unfilled]  DISCHARGING PROVIDER: Lara Alvarez MD    To contact this individual call [phone] and ask the  to page. If unavailable ask to be transferred the Adult Hospitalist Department. DISCHARGE DIAGNOSES ***    CONSULTATIONS: [unfilled]    PROCEDURES/SURGERIES: Procedure(s):  RIGHT ANTERIOR TIBIA  ARTERY ANGIOGRAM WITH BALLOON ANGIOPLASTY AND ARTHROTOMY     PENDING TEST RESULTS:   At the time of discharge the following test results are still pending: ***    [facility]MENTS[facility]   [facility]     ADDITIONAL CARE RECOMMENDATIONS: ***    DIET: {diet:19728}      ACTIVITY: {discharge activity:04981}    Wound care: {[facility] Wound Care Instructions:38203}    EQUIPMENT needed: ***      DISCHARGE MEDICATIONS:   See Medication Reconciliation Form    It is important that you take the medication exactly as they are prescribed. Keep your medication in the bottles provided by the pharmacist and keep a list of the medication names, dosages, and times to be taken in your wallet. Do not take other medications without consulting your doctor. NOTIFY YOUR PHYSICIAN FOR ANY OF THE FOLLOWING:   Fever over 101 degrees for 24 hours. Chest pain, shortness of breath, fever, chills, nausea, vomiting, diarrhea, change in mentation, falling, weakness, bleeding. Severe pain or pain not relieved by medications. Or, any other signs or symptoms that you may have questions about.       DISPOSITION:    Home With:   OT  PT    RN       SNF/Inpatient Rehab/LTAC    Independent/assisted living    Hospice    Other:         PROBLEM LIST Updated:  Yes ***       Signed:   Lara Alvarez MD  7/28/2022  9:44 AM

## 2022-07-28 NOTE — OP NOTES
This is operative report on [facility], patient's YOB: 1960. Date of surgery: July 27, 2022. Surgeon: Dr. Villa Jewell. Diagnosis:  1. Attention to recent right common femoral artery to above-knee popliteal bypass with a 6 mm PTFE graft. 2.  Nonhealing right great toe gangrene. 3.  Below-knee three-vessel occlusive disease process  4. right below-knee intervention with angiogram with tPA infusion catheter placement 24 hours ago. Postprocedure diagnosis:  1. Same as above  2. Recanalization of the right anterior tibial artery. Procedure:  1. Right leg first-order angiogram through the existing graft. 2.  Right anterior tibial artery second-order angiogram supervision and interpretation. 3.  Right anterior tibial artery balloon PTA angioplasty using 2.5 x 60 mm [employer] balloon, 3 x 60 mm [employer] balloon. 4.  Right anterior tibial artery angioplasty using atherectomy device using Hawk 1S device. 5.  Closure of the left common femoral artery using Vascade closure system. Anesthesia: Monitored anesthetic care. Anesthesiologist: Dr. Alissa Carrasco  EBL: Minimal.  Contrast used and fluoroscopic time: Please see the OR record. Assistant: Ms. Martniez. Complication: None    Indication for procedure: Mr. Jeff Conrad is currently hospitalized with right great toe gangrene. Patient had right common femoral artery above-knee popliteal artery bypass graft. Patient has nonhealing great toe gangrene. CT angiogram shows right below-knee two-vessel disease. Patient will need a additional below-knee intervention due to nonhealing great toe gangrene. Patient underwent a right leg intervention angiogram 24 hours ago. Patient was found to have significant thrombosis below knee 3 vessels. Patient had a tPA infusion catheter placed. Patient did receive tPA 1 mg/h for the last 24 hours. Patient will need a repeat angiogram with intervention.   Patient and family discussed additional procedure with angiogram on the right leg. Patient was discussed rational for the procedure risks benefits and complication. Patient has signed the procedure consent. Description of procedure: Patient was brought to the operating table comfortably in supine position. Followed by patient was adequately sedated per anesthesia. Followed by left groin access sheath was prepared with a Betadine solution. Followed by sterile drapes applied usual fashion. Timeout was called at this time and procedure commenced. Using existing tPA infusion catheter. Angiograms obtained. Angiogram shows below knee thrombus resolved. Patient has TP trunk occlusion with collateralization and reconstitution to mid to distal posterior July. Patient had proximal anterior tibial artery occluded. Therefore Glidewire was advanced using 0.035 wire through the existing catheter. Anterior tibial artery was cannulated. Followed by Jeff cross catheter was advanced to the proximal anterior tibial artery. Followed by 0.014 Grannis wire was exchanged and was able to cannulate occluded anterior tibial artery followed by 2.5 x 60 mm Jeannette cross balloon was advanced, proximal anterior tibial artery was treated with pressure of 14 POONAM for 2 minutes. Followed by Rosalba Dayo device was advanced to the proximal ventricular artery and 2 passes were performed with atherectomy. Followed by this area was further treated with a 3 x 60 mm Jeannette cross balloon with a pressure of 14 POONAM for 2 minutes. Final angiogram shows anterior tibial artery was recanalized. Followed by working sheath was removed under fluoroscopic image guidance, and the left common femoral access was closed with a Vascade closure system. After hemostasis obtained sterile dressings applied. Intraoperative angiographic findings, supervision and interpretation:  As described above.

## 2022-07-28 NOTE — ANESTHESIA POSTPROCEDURE EVALUATION
Procedure(s):  RIGHT ANTERIOR TIBIA  ARTERY ANGIOGRAM WITH BALLOON ANGIOPLASTY AND ARTHROTOMY .     MAC    Anesthesia Post Evaluation      Multimodal analgesia: multimodal analgesia used between 6 hours prior to anesthesia start to PACU discharge  Patient location during evaluation: PACU  Patient participation: complete - patient participated  Level of consciousness: awake  Pain score: 0  Pain management: adequate  Airway patency: patent  Anesthetic complications: no  Cardiovascular status: acceptable  Respiratory status: acceptable  Hydration status: acceptable  Post anesthesia nausea and vomiting:  controlled  Final Post Anesthesia Temperature Assessment:  Normothermia (36.0-37.5 degrees C)      INITIAL Post-op Vital signs:   Vitals Value Taken Time   /80 07/28/22 0811   Temp 36.6 °C (97.9 °F) 07/28/22 0811   Pulse 62 07/28/22 0811   Resp 18 07/28/22 0811   SpO2 98 % 07/28/22 0811

## 2022-07-28 NOTE — PROGRESS NOTES
CM reviewed chart, Pt to D/C home today with no CM needs identified. HH was order for Pt, however Pt does not have insurance. Discharge plan of care/case management plan validated with provider discharge order.

## 2022-07-28 NOTE — DISCHARGE SUMMARY
Discharge Summary       PATIENT ID: Ariela Norris  MRN: [id number]   YOB: 1960    DATE OF ADMISSION: 7/18/2022  2:35 PM    DATE OF DISCHARGE:   PRIMARY CARE PROVIDER: Bam Rojas MD     ATTENDING PHYSICIAN: Pascual Martinez  DISCHARGING PROVIDER: Kelsy Ferrer      CONSULTATIONS: IP CONSULT TO GENERAL SURGERY  IP CONSULT TO INFECTIOUS DISEASES    PROCEDURES/SURGERIES: Procedure(s):  RIGHT ANTERIOR TIBIA  ARTERY ANGIOGRAM WITH BALLOON ANGIOPLASTY AND ARTHROTOMY     ADMITTING DIAGNOSES:    Patient Active Problem List    Diagnosis Date Noted    Gangrene of toe ([facility] [address] 07/18/2022    Toe infection 06/30/2022    Osteomyelitis ([facility] [address] 06/30/2022       DISCHARGE DIAGNOSES / PLAN:      Arterial Insufficiency s/p balloon angioplasty  Right great toe infection  Diabetes Mellitus  Diabetic foot injury  Hypertension  GERD  Gout  RA  Smoking  endovascular procedure     Patient was transferred ICU for tPA infusion therapy. Patient had a significant clot burden below knee level requiring tPA infusion overnight. This morning patient right foot feels warm and Doppler signal noted. Patient will be undergoing repeat angiogram right leg and the removal of the tPA infusion catheter    Seen by vascular surgeon and had    Diagnosis:  1. Attention to recent right common femoral artery to above-knee popliteal bypass with a 6 mm PTFE graft. 2.  Nonhealing right great toe gangrene. 3.  Below-knee three-vessel occlusive disease process  4. right below-knee intervention with angiogram with tPA infusion catheter placement 24 hours ago. Postprocedure diagnosis:  1. Same as above  2. Recanalization of the right anterior tibial artery. DISCHARGE MEDICATIONS:  Current Discharge Medication List        START taking these medications    Details   oxyCODONE ER (OxyCONTIN) 20 mg ER tablet Take 1 Tablet by mouth two (2) times a day for 30 days.  Max Daily Amount: 40 mg.  Qty: 20 Tablet, Refills: 0  Start date: 7/28/2022, End date: 8/27/2022    Associated Diagnoses: Arterial insufficiency (HCC)      amoxicillin-clavulanate (Augmentin) 875-125 mg per tablet Take 1 Tablet by mouth every twelve (12) hours. Qty: 20 Tablet, Refills: 0  Start date: 7/28/2022           CONTINUE these medications which have CHANGED    Details   !! apixaban (Eliquis) 5 mg tablet Take 1 Tablet by mouth two (2) times a day. Qty: 28 Tablet, Refills: 0  Start date: 7/28/2022      !! apixaban (Eliquis) 5 mg tablet Take 1 Tablet by mouth two (2) times a day. Qty: 60 Tablet, Refills: 2  Start date: 7/28/2022       !! - Potential duplicate medications found. Please discuss with provider. CONTINUE these medications which have NOT CHANGED    Details   colchicine 0.6 mg tablet Take 0.6 mg by mouth daily. lisinopriL (PRINIVIL, ZESTRIL) 20 mg tablet Take 1 Tablet by mouth daily. Qty: 60 Tablet, Refills: 0      amLODIPine (NORVASC) 10 mg tablet Take 1 Tablet by mouth daily. Qty: 30 Tablet, Refills: 0      pantoprazole (PROTONIX) 40 mg tablet Take 40 mg by mouth daily. metFORMIN (GLUCOPHAGE) 500 mg tablet Take 500 mg by mouth daily (with dinner). STOP taking these medications       clopidogreL (PLAVIX) 75 mg tab Comments:   Reason for Stopping:         indomethacin (INDOCIN) 50 mg capsule Comments:   Reason for Stopping:                 NOTIFY YOUR PHYSICIAN FOR ANY OF THE FOLLOWING:   Fever over 101 degrees for 24 hours. Chest pain, shortness of breath, fever, chills, nausea, vomiting, diarrhea, change in mentation, falling, weakness, bleeding. Severe pain or pain not relieved by medications. Or, any other signs or symptoms that you may have questions about.     DISPOSITION:  x  Home With:   OT  PT  HH  RN       Long term SNF/Inpatient Rehab    Independent/assisted living    Hospice    Other:       PATIENT CONDITION AT DISCHARGE: Stable      PHYSICAL EXAMINATION AT DISCHARGE:  General:          Alert, cooperative, no distress, appears stated age. HEENT:           Atraumatic, anicteric sclerae, pink conjunctivae                          No oral ulcers, mucosa moist, throat clear, dentition fair  Neck:               Supple, symmetrical  Lungs:             Clear to auscultation bilaterally. No Wheezing or Rhonchi. No rales. Chest wall:      No tenderness  No Accessory muscle use. Heart:              Regular  rhythm,  No  murmur   No edema  Abdomen:        Soft, non-tender. Not distended. Bowel sounds normal  Extremities:     No cyanosis. No clubbing,                            Skin turgor normal, Capillary refill normal  Skin:                Not pale. Not Jaundiced  No rashes   Psych:             Not anxious or agitated. Neurologic:      Alert, moves all extremities, answers questions appropriately and responds to commands     XR FLUOROSCOPY UNDER 60 MINUTES   Final Result      CT SHOULDER LT WO CONT   Final Result   1. Osteopenia. Acromioclavicular degenerative change. No acute fracture         CTA ABD ART W RUNOFF W WO CONT   Final Result   1. Status post right common femoral to popliteal arterial bypass graft without   abnormalities. 2.  No aortic aneurysm or dissection. Patent inflow vessels. 3.  Incomplete evaluation of right runoff vessels, though suspect three-vessel   runoff. 4.  Severe stenoses of the left SFA and mild stenoses of the left popliteal   artery. 5.  Single vessel runoff of left posterior tibial artery. 6.  Additional findings as above. CTA ABD ART W RUNOFF W WO CONT   Final Result      Occluded long segment right superficial femoral artery stent with reconstitution   in the popliteal artery which demonstrates mild to moderate atherosclerotic   changes. Questionable patency of the distal tibial and peroneal runoff with   limited evaluation due to poor flow-related opacification and arterial   calcifications.       Mild to moderate atherosclerotic changes in the left lower extremity with probable single-vessel runoff to the left foot. Indeterminate bilateral adrenal nodules measuring up to 2 cm. Consider   nonemergent adrenal CT protocol. Bullous/cystic changes in the lung bases. Please refer to above findings for complete details. XR GREAT TOE RT MIN 2 V   Final Result   No evidence of fracture or dislocation. Evidence of gout. No   significant change. Recent Results (from the past 24 hour(s))   CBC WITH AUTOMATED DIFF    Collection Time: 07/27/22 10:06 AM   Result Value Ref Range    WBC 18.5 (H) 4.1 - 11.1 K/uL    RBC 4.02 (L) 4.10 - 5.70 M/uL    HGB 11.9 (L) 12.1 - 17.0 g/dL    HCT 35.5 (L) 36.6 - 50.3 %    MCV 88.3 80.0 - 99.0 FL    MCH 29.6 26.0 - 34.0 PG    MCHC 33.5 30.0 - 36.5 g/dL    RDW 13.1 11.5 - 14.5 %    PLATELET 251 272 - 625 K/uL    MPV 9.6 8.9 - 12.9 FL    NRBC 0.0 0.0  WBC    ABSOLUTE NRBC 0.00 0.00 - 0.01 K/uL    NEUTROPHILS 89 (H) 32 - 75 %    LYMPHOCYTES 5 (L) 12 - 49 %    MONOCYTES 2 (L) 5 - 13 %    EOSINOPHILS 2 0 - 7 %    BASOPHILS 1 0 - 1 %    IMMATURE GRANULOCYTES 1 (H) 0 - 0.5 %    ABS. NEUTROPHILS 16.5 (H) 1.8 - 8.0 K/UL    ABS. LYMPHOCYTES 0.9 0.8 - 3.5 K/UL    ABS. MONOCYTES 0.4 0.0 - 1.0 K/UL    ABS. EOSINOPHILS 0.3 0.0 - 0.4 K/UL    ABS. BASOPHILS 0.1 0.0 - 0.1 K/UL    ABS. IMM. GRANS. 0.2 (H) 0.00 - 0.04 K/UL    DF AUTOMATED     METABOLIC PANEL, COMPREHENSIVE    Collection Time: 07/27/22 10:06 AM   Result Value Ref Range    Sodium 134 (L) 136 - 145 mmol/L    Potassium 4.3 3.5 - 5.1 mmol/L    Chloride 99 97 - 108 mmol/L    CO2 28 21 - 32 mmol/L    Anion gap 7 5 - 15 mmol/L    Glucose 157 (H) 65 - 100 mg/dL    BUN 6 6 - 20 mg/dL    Creatinine 0.75 0.70 - 1.30 mg/dL    BUN/Creatinine ratio 8 (L) 12 - 20      GFR est AA >60 >60 ml/min/1.73m2    GFR est non-AA >60 >60 ml/min/1.73m2    Calcium 9.2 8.5 - 10.1 mg/dL    Bilirubin, total 0.6 0.2 - 1.0 mg/dL    AST (SGOT) 26 15 - 37 U/L    ALT (SGPT) 22 12 - 78 U/L    Alk. phosphatase 82 45 - 117 U/L    Protein, total 7.5 6.4 - 8.2 g/dL    Albumin 2.8 (L) 3.5 - 5.0 g/dL    Globulin 4.7 (H) 2.0 - 4.0 g/dL    A-G Ratio 0.6 (L) 1.1 - 2.2     PTT    Collection Time: 07/27/22 10:06 AM   Result Value Ref Range    aPTT 50.0 (H) 21.2 - 34.1 sec    aPTT, therapeutic range   82 - 109 sec   FIBRINOGEN    Collection Time: 07/27/22 10:06 AM   Result Value Ref Range    Fibrinogen 647 (H) 220 - 535 mg/dL   GLUCOSE, POC    Collection Time: 07/27/22  4:28 PM   Result Value Ref Range    Glucose (POC) 267 (H) 65 - 117 mg/dL    Performed by Mukul Booth    PTT    Collection Time: 07/27/22  5:24 PM   Result Value Ref Range    aPTT 37.0 (H) 21.2 - 34.1 sec    aPTT, therapeutic range   82 - 109 sec   GLUCOSE, POC    Collection Time: 07/27/22  8:19 PM   Result Value Ref Range    Glucose (POC) 110 65 - 117 mg/dL    Performed by Homero Meza    PTT    Collection Time: 07/28/22  1:22 AM   Result Value Ref Range    aPTT 41.4 (H) 21.2 - 34.1 sec    aPTT, therapeutic range   82 - 109 sec   GLUCOSE, POC    Collection Time: 07/28/22  8:13 AM   Result Value Ref Range    Glucose (POC) 132 (H) 65 - 117 mg/dL    Performed by Missye Leobardo    PTT    Collection Time: 07/28/22  8:23 AM   Result Value Ref Range    aPTT 40.3 (H) 21.2 - 34.1 sec    aPTT, therapeutic range   82 - 109 sec          HOSPITAL COURSE:      Patient is a 65y.o. year old male with a PMH of diabetes, diabetic foot injury, arterial insufficiency, hypertension, GERD, and gout presents to the ED complaining of  right great toe infection. The patient was recently hospitalized for this same problem and received balloon angioplasty of the right lower extremity which initially helped perfusion of the toe. He has since relapsed and complains of excruciating pain that is not well managed on percocet.  The patient states that it greatly progress in the last 24 hours and there is now black and gangrenous lesion on the plantar aspect of the toe with swelling over the entire foot. Patient denies any chest pain, SOB, difficulty breathing, palpitations, dizziness, N/V/D, or fevers. X-ray of R great toe:  IMPRESSION  No evidence of fracture or dislocation. Evidence of gout. No  significant change        Seen by the vascular surgeon this morning patient need right common femoral artery above-knee popliteal bypass         7/20  Patient resting in bed comfortably stating pain is well managed. Dressing show iodinated bandaged around affected toe without obvious bleeding. Day 1 s/p bypass of right popliteal artery  Resuming lovenox     7/21  Patient laying in bed stating pain is worse than yesterday d/t discontinuing dilaudid. Pt now on Toradol. Per trauma surgery - hold heparin for 6h d/t groin bleed 2/2 surgery  Albumin 2.4     7/22  Patient laying in bed with pain well-managed. NAD  Likely toe amputation next week. Per trauma surgery: Will need a CTA aorta distal runoff follow-up bypass     CTA Abd with runoff:  Severe stenoses of the left SFA and mild stenoses of the left popliteal  artery. 7/25  Patient laying in bed in moderate pain. NAD  Surgery delayed d/t staffing issues. Planned revascularization followed by toe amputation next week pending success of revascularization. 7/26  Patient laying in bed post-op. NAD  Surgery today - clot was dislodged during procedure and alteplase was given. Follow-up procedure tomorrow to confirm resolution of clot. Heparin restarted post-op  Metformin held per surgery  Initiated heparin drip     7/27     Patient alert awake status post surgery no chest pain or shortness of breath        Patient was transferred ICU for tPA infusion therapy. Patient had a significant clot burden below knee level requiring tPA infusion overnight. This morning patient right foot feels warm and Doppler signal noted. Patient will be undergoing repeat angiogram right leg and the removal of the tPA infusion catheter.     Seen by vascular surgeon    Diagnosis:  1. Attention to recent right common femoral artery to above-knee popliteal bypass with a 6 mm PTFE graft. 2.  Nonhealing right great toe gangrene. 3.  Below-knee three-vessel occlusive disease process  4. right below-knee intervention with angiogram with tPA infusion catheter placement 24 hours ago. Postprocedure diagnosis:  1. Same as above  2. Recanalization of the right anterior tibial artery. Right foot is warm. Patient has Doppler signal noted both DP and PT. Patient can be discharged home today. Patient will need right great toe amputation as outpatient. And I will arrange this. Patient will need to Eliquis 10 mg p.o. twice daily for next 7 days and then switch her over to 5 mg p.o. twice daily. Continue heparin drip until patient discharge. Give 10 mg Eliquis oral dose at the time of discharge.       Need to follow-up with Dr. Austin Blue  next week  Medication reconciliation done time shopping 35 minutes 50% time spent counseling and coordination of care         Signed:   Dhaval Charles MD  7/28/2022  9:45 AM

## 2022-07-28 NOTE — PROGRESS NOTES
General Daily Progress Note          Patient Name:   Alexis Wooten       YOB: 1960       Age:  65 y.o. Admit Date: 7/18/2022      Subjective:   Patient is a 65y.o. year old male with a PMH of diabetes, diabetic foot injury, arterial insufficiency, hypertension, GERD, and gout presents to the ED complaining of  right great toe infection. The patient was recently hospitalized for this same problem and received balloon angioplasty of the right lower extremity which initially helped perfusion of the toe. He has since relapsed and complains of excruciating pain that is not well managed on percocet. The patient states that it greatly progress in the last 24 hours and there is now black and gangrenous lesion on the plantar aspect of the toe with swelling over the entire foot. Patient denies any chest pain, SOB, difficulty breathing, palpitations, dizziness, N/V/D, or fevers. X-ray of R great toe:  IMPRESSION  No evidence of fracture or dislocation. Evidence of gout. No  significant change      Seen by the vascular surgeon this morning patient need right common femoral artery above-knee popliteal bypass       7/20  Patient resting in bed comfortably stating pain is well managed. Dressing show iodinated bandaged around affected toe without obvious bleeding. Day 1 s/p bypass of right popliteal artery  Resuming lovenox    7/21  Patient laying in bed stating pain is worse than yesterday d/t discontinuing dilaudid. Pt now on Toradol. Per trauma surgery - hold heparin for 6h d/t groin bleed 2/2 surgery  Albumin 2.4    7/22  Patient laying in bed with pain well-managed. NAD  Likely toe amputation next week. Per trauma surgery: Will need a CTA aorta distal runoff follow-up bypass    CTA Abd with runoff:  Severe stenoses of the left SFA and mild stenoses of the left popliteal  artery. 7/25  Patient laying in bed in moderate pain. NAD  Surgery delayed d/t staffing issues.   Planned revascularization followed by toe amputation next week pending success of revascularization. 7/26  Patient laying in bed post-op. NAD  Surgery today - clot was dislodged during procedure and alteplase was given. Follow-up procedure tomorrow to confirm resolution of clot. Heparin restarted post-op  Metformin held per surgery  Initiated heparin drip    7/27    Patient alert awake status post surgery no chest pain or shortness of breath      Patient was transferred ICU for tPA infusion therapy. Patient had a significant clot burden below knee level requiring tPA infusion overnight. This morning patient right foot feels warm and Doppler signal noted. Patient will be undergoing repeat angiogram right leg and the removal of the tPA infusion catheter. 7/28  Patient laying in bed comfortably complaining of occasional shooting pains.   1 day post-op without complications  Plan to follow-up with Dr. Umair Toro on Monday to schedule amputation  MRI previously negative for osteomyelitis  Planned discharge today    Objective:     Visit Vitals  BP (!) 142/80 (BP 1 Location: Left upper arm, BP Patient Position: At rest;Supine)   Pulse 62   Temp 97.9 °F (36.6 °C)   Resp 18   Ht 6' 1\" (1.854 m)   Wt 112.4 kg (247 lb 12.8 oz)   SpO2 98%   BMI 32.69 kg/m²        Recent Results (from the past 24 hour(s))   GLUCOSE, POC    Collection Time: 07/27/22  9:27 AM   Result Value Ref Range    Glucose (POC) 158 (H) 65 - 117 mg/dL    Performed by Jennifer Sampson    CBC WITH AUTOMATED DIFF    Collection Time: 07/27/22 10:06 AM   Result Value Ref Range    WBC 18.5 (H) 4.1 - 11.1 K/uL    RBC 4.02 (L) 4.10 - 5.70 M/uL    HGB 11.9 (L) 12.1 - 17.0 g/dL    HCT 35.5 (L) 36.6 - 50.3 %    MCV 88.3 80.0 - 99.0 FL    MCH 29.6 26.0 - 34.0 PG    MCHC 33.5 30.0 - 36.5 g/dL    RDW 13.1 11.5 - 14.5 %    PLATELET 528 965 - 817 K/uL    MPV 9.6 8.9 - 12.9 FL    NRBC 0.0 0.0  WBC    ABSOLUTE NRBC 0.00 0.00 - 0.01 K/uL    NEUTROPHILS 89 (H) 32 - 75 %    LYMPHOCYTES 5 (L) 12 - 49 %    MONOCYTES 2 (L) 5 - 13 %    EOSINOPHILS 2 0 - 7 %    BASOPHILS 1 0 - 1 %    IMMATURE GRANULOCYTES 1 (H) 0 - 0.5 %    ABS. NEUTROPHILS 16.5 (H) 1.8 - 8.0 K/UL    ABS. LYMPHOCYTES 0.9 0.8 - 3.5 K/UL    ABS. MONOCYTES 0.4 0.0 - 1.0 K/UL    ABS. EOSINOPHILS 0.3 0.0 - 0.4 K/UL    ABS. BASOPHILS 0.1 0.0 - 0.1 K/UL    ABS. IMM. GRANS. 0.2 (H) 0.00 - 0.04 K/UL    DF AUTOMATED     METABOLIC PANEL, COMPREHENSIVE    Collection Time: 07/27/22 10:06 AM   Result Value Ref Range    Sodium 134 (L) 136 - 145 mmol/L    Potassium 4.3 3.5 - 5.1 mmol/L    Chloride 99 97 - 108 mmol/L    CO2 28 21 - 32 mmol/L    Anion gap 7 5 - 15 mmol/L    Glucose 157 (H) 65 - 100 mg/dL    BUN 6 6 - 20 mg/dL    Creatinine 0.75 0.70 - 1.30 mg/dL    BUN/Creatinine ratio 8 (L) 12 - 20      GFR est AA >60 >60 ml/min/1.73m2    GFR est non-AA >60 >60 ml/min/1.73m2    Calcium 9.2 8.5 - 10.1 mg/dL    Bilirubin, total 0.6 0.2 - 1.0 mg/dL    AST (SGOT) 26 15 - 37 U/L    ALT (SGPT) 22 12 - 78 U/L    Alk.  phosphatase 82 45 - 117 U/L    Protein, total 7.5 6.4 - 8.2 g/dL    Albumin 2.8 (L) 3.5 - 5.0 g/dL    Globulin 4.7 (H) 2.0 - 4.0 g/dL    A-G Ratio 0.6 (L) 1.1 - 2.2     PTT    Collection Time: 07/27/22 10:06 AM   Result Value Ref Range    aPTT 50.0 (H) 21.2 - 34.1 sec    aPTT, therapeutic range   82 - 109 sec   FIBRINOGEN    Collection Time: 07/27/22 10:06 AM   Result Value Ref Range    Fibrinogen 647 (H) 220 - 535 mg/dL   GLUCOSE, POC    Collection Time: 07/27/22  4:28 PM   Result Value Ref Range    Glucose (POC) 267 (H) 65 - 117 mg/dL    Performed by Melinda Almaguer    PTT    Collection Time: 07/27/22  5:24 PM   Result Value Ref Range    aPTT 37.0 (H) 21.2 - 34.1 sec    aPTT, therapeutic range   82 - 109 sec   GLUCOSE, POC    Collection Time: 07/27/22  8:19 PM   Result Value Ref Range    Glucose (POC) 110 65 - 117 mg/dL    Performed by Rajni Sexton    PTT    Collection Time: 07/28/22  1:22 AM   Result Value Ref Range    aPTT 41.4 (H) 21.2 - 34.1 sec aPTT, therapeutic range   82 - 109 sec   GLUCOSE, POC    Collection Time: 07/28/22  8:13 AM   Result Value Ref Range    Glucose (POC) 132 (H) 65 - 117 mg/dL    Performed by Karla Horne      [unfilled]      Review of Systems    Constitutional: Negative for chills and fever. HENT: Negative. Eyes: Negative. Respiratory: Negative. Cardiovascular: Negative. Gastrointestinal: Negative for abdominal pain and nausea. Skin: Negative. Neurological: Negative. Physical Exam:      Constitutional: pt is oriented to person, place, and time. HENT:   Head: Normocephalic and atraumatic. Eyes: Pupils are equal, round, and reactive to light. EOM are normal.   Cardiovascular: Normal rate, regular rhythm and normal heart sounds. Pulmonary/Chest: Breath sounds normal. No wheezes. No rales. Exhibits no tenderness. Abdominal: Soft. Bowel sounds are normal. There is no abdominal tenderness. There is no rebound and no guarding. Musculoskeletal: Normal range of motion. Neurological: pt is alert and oriented to person, place, and time. XR FLUOROSCOPY UNDER 60 MINUTES   Final Result      CT SHOULDER LT WO CONT   Final Result   1. Osteopenia. Acromioclavicular degenerative change. No acute fracture         CTA ABD ART W RUNOFF W WO CONT   Final Result   1. Status post right common femoral to popliteal arterial bypass graft without   abnormalities. 2.  No aortic aneurysm or dissection. Patent inflow vessels. 3.  Incomplete evaluation of right runoff vessels, though suspect three-vessel   runoff. 4.  Severe stenoses of the left SFA and mild stenoses of the left popliteal   artery. 5.  Single vessel runoff of left posterior tibial artery. 6.  Additional findings as above.          CTA ABD ART W RUNOFF W WO CONT   Final Result      Occluded long segment right superficial femoral artery stent with reconstitution   in the popliteal artery which demonstrates mild to moderate atherosclerotic changes. Questionable patency of the distal tibial and peroneal runoff with   limited evaluation due to poor flow-related opacification and arterial   calcifications. Mild to moderate atherosclerotic changes in the left lower extremity with   probable single-vessel runoff to the left foot. Indeterminate bilateral adrenal nodules measuring up to 2 cm. Consider   nonemergent adrenal CT protocol. Bullous/cystic changes in the lung bases. Please refer to above findings for complete details. XR GREAT TOE RT MIN 2 V   Final Result   No evidence of fracture or dislocation. Evidence of gout. No   significant change. Recent Results (from the past 24 hour(s))   GLUCOSE, POC    Collection Time: 07/27/22  9:27 AM   Result Value Ref Range    Glucose (POC) 158 (H) 65 - 117 mg/dL    Performed by Chica Alford    CBC WITH AUTOMATED DIFF    Collection Time: 07/27/22 10:06 AM   Result Value Ref Range    WBC 18.5 (H) 4.1 - 11.1 K/uL    RBC 4.02 (L) 4.10 - 5.70 M/uL    HGB 11.9 (L) 12.1 - 17.0 g/dL    HCT 35.5 (L) 36.6 - 50.3 %    MCV 88.3 80.0 - 99.0 FL    MCH 29.6 26.0 - 34.0 PG    MCHC 33.5 30.0 - 36.5 g/dL    RDW 13.1 11.5 - 14.5 %    PLATELET 311 495 - 279 K/uL    MPV 9.6 8.9 - 12.9 FL    NRBC 0.0 0.0  WBC    ABSOLUTE NRBC 0.00 0.00 - 0.01 K/uL    NEUTROPHILS 89 (H) 32 - 75 %    LYMPHOCYTES 5 (L) 12 - 49 %    MONOCYTES 2 (L) 5 - 13 %    EOSINOPHILS 2 0 - 7 %    BASOPHILS 1 0 - 1 %    IMMATURE GRANULOCYTES 1 (H) 0 - 0.5 %    ABS. NEUTROPHILS 16.5 (H) 1.8 - 8.0 K/UL    ABS. LYMPHOCYTES 0.9 0.8 - 3.5 K/UL    ABS. MONOCYTES 0.4 0.0 - 1.0 K/UL    ABS. EOSINOPHILS 0.3 0.0 - 0.4 K/UL    ABS. BASOPHILS 0.1 0.0 - 0.1 K/UL    ABS. IMM.  GRANS. 0.2 (H) 0.00 - 0.04 K/UL    DF AUTOMATED     METABOLIC PANEL, COMPREHENSIVE    Collection Time: 07/27/22 10:06 AM   Result Value Ref Range    Sodium 134 (L) 136 - 145 mmol/L    Potassium 4.3 3.5 - 5.1 mmol/L    Chloride 99 97 - 108 mmol/L    CO2 28 21 - 32 mmol/L    Anion gap 7 5 - 15 mmol/L    Glucose 157 (H) 65 - 100 mg/dL    BUN 6 6 - 20 mg/dL    Creatinine 0.75 0.70 - 1.30 mg/dL    BUN/Creatinine ratio 8 (L) 12 - 20      GFR est AA >60 >60 ml/min/1.73m2    GFR est non-AA >60 >60 ml/min/1.73m2    Calcium 9.2 8.5 - 10.1 mg/dL    Bilirubin, total 0.6 0.2 - 1.0 mg/dL    AST (SGOT) 26 15 - 37 U/L    ALT (SGPT) 22 12 - 78 U/L    Alk.  phosphatase 82 45 - 117 U/L    Protein, total 7.5 6.4 - 8.2 g/dL    Albumin 2.8 (L) 3.5 - 5.0 g/dL    Globulin 4.7 (H) 2.0 - 4.0 g/dL    A-G Ratio 0.6 (L) 1.1 - 2.2     PTT    Collection Time: 07/27/22 10:06 AM   Result Value Ref Range    aPTT 50.0 (H) 21.2 - 34.1 sec    aPTT, therapeutic range   82 - 109 sec   FIBRINOGEN    Collection Time: 07/27/22 10:06 AM   Result Value Ref Range    Fibrinogen 647 (H) 220 - 535 mg/dL   GLUCOSE, POC    Collection Time: 07/27/22  4:28 PM   Result Value Ref Range    Glucose (POC) 267 (H) 65 - 117 mg/dL    Performed by Ana De Jesus    PTT    Collection Time: 07/27/22  5:24 PM   Result Value Ref Range    aPTT 37.0 (H) 21.2 - 34.1 sec    aPTT, therapeutic range   82 - 109 sec   GLUCOSE, POC    Collection Time: 07/27/22  8:19 PM   Result Value Ref Range    Glucose (POC) 110 65 - 117 mg/dL    Performed by Adonay Wu    PTT    Collection Time: 07/28/22  1:22 AM   Result Value Ref Range    aPTT 41.4 (H) 21.2 - 34.1 sec    aPTT, therapeutic range   82 - 109 sec   GLUCOSE, POC    Collection Time: 07/28/22  8:13 AM   Result Value Ref Range    Glucose (POC) 132 (H) 65 - 117 mg/dL    Performed by Ana De Jesus        Results       Procedure Component Value Units Date/Time    CULTURE, URINE [[id number]] Collected: 07/19/22 1645    Order Status: Completed Specimen: Urine Updated: 07/21/22 0844     Special Requests: No Special Requests        Culture result: No Growth (<1000 cfu/mL)       CULTURE, BLOOD, PAIRED [[id number]] Collected: 07/18/22 1708    Order Status: Completed Specimen: Blood Updated: 07/25/22 1127 Special Requests: No Special Requests        Culture result: No growth 6 days                Labs:     Recent Labs     07/27/22  1006 07/27/22  0345   WBC 18.5* 16.3*   HGB 11.9* 11.3*   HCT 35.5* 33.5*    386       Recent Labs     07/27/22  1006 07/27/22  0345 07/26/22  1245   * 136 137   K 4.3 3.8 3.8   CL 99 102 103   CO2 28 27 28   BUN 6 6 6   CREA 0.75 0.68* 0.63*   * 125* 133*   CA 9.2 9.1 9.1       Recent Labs     07/27/22  1006 07/27/22  0345 07/26/22  1245   ALT 22 23 25   AP 82 74 76   TBILI 0.6 0.5 0.4   TP 7.5 7.1 7.1   ALB 2.8* 2.5* 2.5*   GLOB 4.7* 4.6* 4.6*       Recent Labs     07/28/22  0122 07/27/22  1724 07/27/22  1006 07/26/22 2015 07/26/22  1245   INR  --   --   --   --  1.2*   PTP  --   --   --   --  15.5*   APTT 41.4* 37.0* 50.0*   < > 124.2*    < > = values in this interval not displayed. No results for input(s): FE, TIBC, PSAT, FERR in the last 72 hours. No results found for: FOL, RBCF   No results for input(s): PH, PCO2, PO2 in the last 72 hours. No results for input(s): CPK, CKNDX, TROIQ in the last 72 hours. No lab exists for component: CPKMB    No results found for: CHOL, CHOLX, CHLST, CHOLV, HDL, HDLP, LDL, LDLC, DLDLP, TGLX, TRIGL, TRIGP, CHHD, CHHDX  Lab Results   Component Value Date/Time    Glucose (POC) 132 (H) 07/28/2022 08:13 AM    Glucose (POC) 110 07/27/2022 08:19 PM    Glucose (POC) 267 (H) 07/27/2022 04:28 PM    Glucose (POC) 158 (H) 07/27/2022 09:27 AM    Glucose (POC) 130 (H) 07/27/2022 07:40 AM     No results found for: COLOR, APPRN, SPGRU, REFSG, EPHRAIM, PROTU, GLUCU, KETU, BILU, UROU, VIANEY, LEUKU, GLUKE, EPSU, BACTU, WBCU, RBCU, CASTS, UCRY      Assessment:     Arterial Insufficiency s/p balloon angioplasty  Right great toe infection  Diabetes Mellitus  Diabetic foot injury  Hypertension  GERD  Gout  RA  Smoking  endovascular procedure    Patient was transferred ICU for tPA infusion therapy.   Patient had a significant clot burden below knee level requiring tPA infusion overnight. This morning patient right foot feels warm and Doppler signal noted. Patient will be undergoing repeat angiogram right leg and the removal of the tPA infusion catheter. Plan:   S/p surgery yesterday  Per Dr. Yazan Diaz d/c heparin drip and begin Eliquis 10mg PO BID    4mg alteplase given 2/2 dislodged clot.  - Clots resolved    Continue metformin    Amlodipine 10 mg daily  Colchicine 0.6 mg daily   gabapentin 300 mg twice a day  Lisinopril 20 mg daily  Oxycodone 20 mg twice a day  Protonix 40 daily  IV Zosyn 3.375 IV every 8 hours      Monitor patient vitals closely      Possible discharge in next 24 to 48 hours        Current Facility-Administered Medications:     sodium chloride (NS) flush 5-40 mL, 5-40 mL, IntraVENous, PRN, Moon Martínez MD    docusate sodium (COLACE) capsule 100 mg, 100 mg, Oral, BID, Vicky West MD, 100 mg at 07/27/22 2142    HYDROcodone-acetaminophen ([facility]) 7.5-325 mg per tablet 1 Tablet, 1 Tablet, Oral, Q4H PRN, Moon Martínez MD, 1 Tablet at 07/28/22 9382    HYDROmorphone (DILAUDID) injection 1 mg, 1 mg, IntraVENous, Q4H PRN, Moon Martínez MD, 1 mg at 07/28/22 3287    [COMPLETED] piperacillin-tazobactam (ZOSYN) 4.5 g in 0.9% sodium chloride (MBP/ADV) 100 mL MBP, 4.5 g, IntraVENous, ONCE, Last Rate: 200 mL/hr at 07/25/22 1055, 4.5 g at 07/25/22 1055 **FOLLOWED BY** piperacillin-tazobactam (ZOSYN) 3.375 g in 0.9% sodium chloride (MBP/ADV) 100 mL MBP, 3.375 g, IntraVENous, Q8H, Moon Martínez MD, Last Rate: 25 mL/hr at 07/28/22 0007, 3.375 g at 07/28/22 0007    oxyCODONE ER (OxyCONTIN) tablet 20 mg, 20 mg, Oral, BID, Moon Martínez MD, 20 mg at 07/27/22 2142    [Held by provider] metFORMIN (GLUCOPHAGE) tablet 500 mg, 500 mg, Oral, BID WITH MEALS, Vicky West MD, 500 mg at 07/25/22 0901    heparin (porcine) 1,000 unit/mL injection 4,000 Units, 4,000 Units, IntraVENous, PRN, 4,000 Units at 07/20/22 1910 **OR** heparin (porcine) 1,000 unit/mL injection 2,000 Units, 2,000 Units, IntraVENous, PRN, Luis Martínez MD, 2,000 Units at 07/28/22 0225    heparin 25,000 units in D5W 250 ml infusion, 11-25 Units/kg/hr (Adjusted), IntraVENous, TITRATE, Luis Martínez MD, Last Rate: 16.3 mL/hr at 07/28/22 0222, 18 Units/kg/hr at 07/28/22 0222    ondansetron (ZOFRAN ODT) tablet 4 mg, 4 mg, Oral, Q8H PRN **OR** ondansetron (ZOFRAN) injection 4 mg, 4 mg, IntraVENous, Q6H PRN, Luis Martínez MD    oxyCODONE-acetaminophen (PERCOCET 10)  mg per tablet 1 Tablet, 1 Tablet, Oral, Q4H PRN, Luis Martínez MD, 1 Tablet at 07/27/22 1938    amLODIPine (NORVASC) tablet 10 mg, 10 mg, Oral, DAILY, Luis Martínez MD, 10 mg at 07/27/22 1032    lisinopriL (PRINIVIL, ZESTRIL) tablet 20 mg, 20 mg, Oral, DAILY, Luis Martínez MD, 20 mg at 07/27/22 1055    colchicine tablet 0.6 mg, 0.6 mg, Oral, DAILY, Luis Martínez MD, 0.6 mg at 07/27/22 1055    insulin lispro (HUMALOG) injection, , SubCUTAneous, AC&HS, Luis Martínez MD, 7 Units at 07/27/22 1630    glucose chewable tablet 16 g, 4 Tablet, Oral, PRN, Luis Martínez MD    glucagon [facility]) injection 1 mg, 1 mg, IntraMUSCular, PRN, Luis Martínez MD    acetaminophen (TYLENOL) tablet 650 mg, 650 mg, Oral, Q6H PRN **OR** acetaminophen (TYLENOL) suppository 650 mg, 650 mg, Rectal, Q6H PRN, Stephania Yancey MD    polyethylene glycol (MIRALAX) packet 17 g, 17 g, Oral, DAILY PRN, Luis Martínez MD, 17 g at 07/27/22 1533    pantoprazole (PROTONIX) tablet 40 mg, 40 mg, Oral, ACB, Mauricio, Luis Glasgow MD, 40 mg at 07/28/22 1485

## 2022-07-28 NOTE — PROGRESS NOTES
Patient examined this morning. Patient appears to be comfortable. Vital signs stable. Right foot is warm. Patient has Doppler signal noted both DP and PT. Patient can be discharged home today. Patient will need right great toe amputation as outpatient. And I will arrange this. Patient will need to Eliquis 10 mg p.o. twice daily for next 7 days and then switch her over to 5 mg p.o. twice daily. Continue heparin drip until patient discharge. Give 10 mg Eliquis oral dose at the time of discharge. Please have patient come see me next Monday.

## 2022-07-28 NOTE — PROGRESS NOTES
Discharge plan of care/case management plan validated with provider discharge order. Discharge medications reviewed with patient and spouse and appropriate educational materials and side effects teaching were provided. Both IVs removed, catheters intact. Prescriptions electronically sent to [facility] per pt preference. Wife and sister present for discharge instructions and transportation home.

## 2022-07-29 ENCOUNTER — TELEPHONE (OUTPATIENT)
Dept: SURGERY | Age: 62
End: 2022-07-29

## 2022-07-29 NOTE — TELEPHONE ENCOUNTER
[facility] called this afternoon and asked if Dr Dominic Graham could call her  Tan Valenzuela in something for constipation, they have tried over the counter/home remedies and nothing has worked.  Please call patient when able [phone], thanks stated

## 2022-08-01 ENCOUNTER — OFFICE VISIT (OUTPATIENT)
Dept: SURGERY | Age: 62
End: 2022-08-01

## 2022-08-01 VITALS
SYSTOLIC BLOOD PRESSURE: 117 MMHG | HEIGHT: 73 IN | OXYGEN SATURATION: 98 % | RESPIRATION RATE: 20 BRPM | BODY MASS INDEX: 29.42 KG/M2 | DIASTOLIC BLOOD PRESSURE: 72 MMHG | TEMPERATURE: 97.8 F | WEIGHT: 222 LBS | HEART RATE: 85 BPM

## 2022-08-01 DIAGNOSIS — I96 GANGRENE OF TOE (HCC): Primary | ICD-10-CM

## 2022-08-01 PROCEDURE — 99024 POSTOP FOLLOW-UP VISIT: CPT | Performed by: SURGERY

## 2022-08-01 NOTE — PROGRESS NOTES
Chief Complaint   Patient presents with    Wound Check     Right foot     Visit Vitals  /72 (BP 1 Location: Left upper arm, BP Patient Position: Sitting, BP Cuff Size: Adult)   Pulse 85   Temp 97.8 °F (36.6 °C) (Temporal)   Resp 20   Ht 6' 1\" (1.854 m)   Wt 222 lb (100.7 kg)   SpO2 98%   BMI 29.29 kg/m²

## 2022-08-01 NOTE — PROGRESS NOTES
SUBJECTIVE:  Patient examined. Patient had right leg bypass surgery. Past Medical History:  No date: Diabetes [facility])  Past Surgical History:  2022: VASCULAR SURGERY PROCEDURE UNLIST; Right  [facility]  Social History    Tobacco Use      Smoking status: Former        Packs/day: 1.50        Years: 15.00        Pack years: 22.5        Types: Cigarettes        Quit date: 2022        Years since quittin.0      Smokeless tobacco: Never    Alcohol use: Not Currently    Prior to Admission medications :  Medication oxyCODONE ER (OxyCONTIN) 20 mg ER tablet, Sig Take 1 Tablet by mouth two (2) times a day for 30 days. Max Daily Amount: 40 mg., Start Date 22, End Date 22, Taking? Yes, Authorizing Provider Consuelo Sanchez MD    Medication amoxicillin-clavulanate (Augmentin) 875-125 mg per tablet, Sig Take 1 Tablet by mouth every twelve (12) hours. , Start Date 22, End Date , Taking? Yes, Authorizing Provider Consuelo Sanchez MD    Medication apixaban (Eliquis) 5 mg tablet, Sig Take 1 Tablet by mouth two (2) times a day., Start Date 22, End Date , Taking? Yes, Authorizing Provider Consuelo Sanchez MD    Medication colchicine 0.6 mg tablet, Sig Take 0.6 mg by mouth daily. , Start Date , End Date , Taking? Yes, Authorizing Provider Provider, Historical    Medication lisinopriL (PRINIVIL, ZESTRIL) 20 mg tablet, Sig Take 1 Tablet by mouth daily. , Start Date 22, End Date , Taking? Yes, Authorizing Provider Consuelo Sanchez MD    Medication amLODIPine (NORVASC) 10 mg tablet, Sig Take 1 Tablet by mouth daily. , Start Date 22, End Date , Taking? Yes, Authorizing Provider Consuelo Sanchez MD    Medication pantoprazole (PROTONIX) 40 mg tablet, Sig Take 40 mg by mouth daily. , Start Date , End Date , Taking? Yes, Authorizing Provider Provider, Historical    Medication metFORMIN (GLUCOPHAGE) 500 mg tablet, Sig Take 500 mg by mouth daily (with dinner). , Start Date , End Date , Taking?  Yes, Authorizing Provider Provider, Historical    Medication apixaban (Eliquis) 5 mg tablet, Sig Take 1 Tablet by mouth two (2) times a day. Patient not taking: Reported on 8/1/2022, Start Date 7/28/22, End Date , Taking? , Authorizing Provider Eloise Hendrickson MD      No Known Allergies      OBJECTIVE:  /72 (BP 1 Location: Left upper arm, BP Patient Position: Sitting, BP Cuff Size: Adult)   Pulse 85   Temp 97.8 °F (36.6 °C) (Temporal)   Resp 20   Ht 6' 1\" (1.854 m)   Wt 222 lb (100.7 kg)   SpO2 98%   BMI 29.29 kg/m²     PT IS PLEASANT AND AWAKE AND ALERT. WOUND EXAM:  Great toe appears to be dry gangrene. ASSESSMENT:  Problem List Items Addressed This Visit        Other    Gangrene of toe ([facility] [address] - Primary       PLAN:  Patient will need a right great toe amputation for dry gangrene. Patient will be admitted to hospital for this.

## 2022-08-02 ENCOUNTER — HOSPITAL ENCOUNTER (INPATIENT)
Age: 62
LOS: 6 days | Discharge: HOME HEALTH | DRG: 256 | End: 2022-08-08
Attending: STUDENT IN AN ORGANIZED HEALTH CARE EDUCATION/TRAINING PROGRAM | Admitting: SURGERY

## 2022-08-02 DIAGNOSIS — I96 TOE GANGRENE (HCC): ICD-10-CM

## 2022-08-02 DIAGNOSIS — I73.9 PVD (PERIPHERAL VASCULAR DISEASE) (HCC): ICD-10-CM

## 2022-08-02 DIAGNOSIS — L97.513 CHRONIC ULCER OF RIGHT GREAT TOE WITH NECROSIS OF MUSCLE (HCC): Primary | ICD-10-CM

## 2022-08-02 PROBLEM — I99.8 LIMB ISCHEMIA: Status: ACTIVE | Noted: 2022-08-02

## 2022-08-02 LAB
ALBUMIN SERPL-MCNC: 3.3 G/DL (ref 3.5–5)
ALBUMIN/GLOB SERPL: 0.8 {RATIO} (ref 1.1–2.2)
ALP SERPL-CCNC: 84 U/L (ref 45–117)
ALT SERPL-CCNC: 26 U/L (ref 12–78)
ANION GAP SERPL CALC-SCNC: 6 MMOL/L (ref 5–15)
APTT PPP: 30.5 SEC (ref 21.2–34.1)
AST SERPL W P-5'-P-CCNC: 12 U/L (ref 15–37)
BASOPHILS # BLD: 0.1 K/UL (ref 0–0.1)
BASOPHILS # BLD: 0.2 K/UL (ref 0–0.1)
BASOPHILS NFR BLD: 1 % (ref 0–1)
BASOPHILS NFR BLD: 1 % (ref 0–1)
BILIRUB SERPL-MCNC: 0.9 MG/DL (ref 0.2–1)
BUN SERPL-MCNC: 15 MG/DL (ref 6–20)
BUN/CREAT SERPL: 12 (ref 12–20)
CA-I BLD-MCNC: 9.5 MG/DL (ref 8.5–10.1)
CHLORIDE SERPL-SCNC: 102 MMOL/L (ref 97–108)
CO2 SERPL-SCNC: 25 MMOL/L (ref 21–32)
CREAT SERPL-MCNC: 1.26 MG/DL (ref 0.7–1.3)
DIFFERENTIAL METHOD BLD: ABNORMAL
DIFFERENTIAL METHOD BLD: ABNORMAL
EOSINOPHIL # BLD: 0.5 K/UL (ref 0–0.4)
EOSINOPHIL # BLD: 0.7 K/UL (ref 0–0.4)
EOSINOPHIL NFR BLD: 3 % (ref 0–7)
EOSINOPHIL NFR BLD: 4 % (ref 0–7)
ERYTHROCYTE [DISTWIDTH] IN BLOOD BY AUTOMATED COUNT: 13.5 % (ref 11.5–14.5)
ERYTHROCYTE [DISTWIDTH] IN BLOOD BY AUTOMATED COUNT: 13.6 % (ref 11.5–14.5)
GLOBULIN SER CALC-MCNC: 4.3 G/DL (ref 2–4)
GLUCOSE BLD STRIP.AUTO-MCNC: 152 MG/DL (ref 65–117)
GLUCOSE SERPL-MCNC: 181 MG/DL (ref 65–100)
HCT VFR BLD AUTO: 37.5 % (ref 36.6–50.3)
HCT VFR BLD AUTO: 39.1 % (ref 36.6–50.3)
HGB BLD-MCNC: 12.4 G/DL (ref 12.1–17)
HGB BLD-MCNC: 13 G/DL (ref 12.1–17)
IMM GRANULOCYTES # BLD AUTO: 0.1 K/UL (ref 0–0.04)
IMM GRANULOCYTES # BLD AUTO: 0.1 K/UL (ref 0–0.04)
IMM GRANULOCYTES NFR BLD AUTO: 1 % (ref 0–0.5)
IMM GRANULOCYTES NFR BLD AUTO: 1 % (ref 0–0.5)
INR PPP: 1.6 (ref 0.9–1.1)
LYMPHOCYTES # BLD: 2.4 K/UL (ref 0.8–3.5)
LYMPHOCYTES # BLD: 2.6 K/UL (ref 0.8–3.5)
LYMPHOCYTES NFR BLD: 13 % (ref 12–49)
LYMPHOCYTES NFR BLD: 14 % (ref 12–49)
MCH RBC QN AUTO: 29.3 PG (ref 26–34)
MCH RBC QN AUTO: 29.5 PG (ref 26–34)
MCHC RBC AUTO-ENTMCNC: 33.1 G/DL (ref 30–36.5)
MCHC RBC AUTO-ENTMCNC: 33.2 G/DL (ref 30–36.5)
MCV RBC AUTO: 88.1 FL (ref 80–99)
MCV RBC AUTO: 89.1 FL (ref 80–99)
MONOCYTES # BLD: 1.5 K/UL (ref 0–1)
MONOCYTES # BLD: 1.6 K/UL (ref 0–1)
MONOCYTES NFR BLD: 8 % (ref 5–13)
MONOCYTES NFR BLD: 9 % (ref 5–13)
NEUTS SEG # BLD: 13.1 K/UL (ref 1.8–8)
NEUTS SEG # BLD: 13.8 K/UL (ref 1.8–8)
NEUTS SEG NFR BLD: 72 % (ref 32–75)
NEUTS SEG NFR BLD: 73 % (ref 32–75)
NRBC # BLD: 0 K/UL (ref 0–0.01)
NRBC # BLD: 0 K/UL (ref 0–0.01)
NRBC BLD-RTO: 0 PER 100 WBC
NRBC BLD-RTO: 0 PER 100 WBC
PERFORMED BY, TECHID: ABNORMAL
PLATELET # BLD AUTO: 467 K/UL (ref 150–400)
PLATELET # BLD AUTO: 474 K/UL (ref 150–400)
PMV BLD AUTO: 9.7 FL (ref 8.9–12.9)
PMV BLD AUTO: 9.9 FL (ref 8.9–12.9)
POTASSIUM SERPL-SCNC: 3.8 MMOL/L (ref 3.5–5.1)
PROT SERPL-MCNC: 7.6 G/DL (ref 6.4–8.2)
PROTHROMBIN TIME: 18.7 SEC (ref 11.9–14.6)
RBC # BLD AUTO: 4.21 M/UL (ref 4.1–5.7)
RBC # BLD AUTO: 4.44 M/UL (ref 4.1–5.7)
SODIUM SERPL-SCNC: 133 MMOL/L (ref 136–145)
THERAPEUTIC RANGE,PTTT: NORMAL SEC (ref 82–109)
WBC # BLD AUTO: 17.8 K/UL (ref 4.1–11.1)
WBC # BLD AUTO: 18.8 K/UL (ref 4.1–11.1)

## 2022-08-02 PROCEDURE — 99222 1ST HOSP IP/OBS MODERATE 55: CPT | Performed by: SURGERY

## 2022-08-02 PROCEDURE — 65270000029 HC RM PRIVATE

## 2022-08-02 PROCEDURE — 74011250637 HC RX REV CODE- 250/637: Performed by: SURGERY

## 2022-08-02 PROCEDURE — 85730 THROMBOPLASTIN TIME PARTIAL: CPT

## 2022-08-02 PROCEDURE — 74011250636 HC RX REV CODE- 250/636: Performed by: SURGERY

## 2022-08-02 PROCEDURE — 36415 COLL VENOUS BLD VENIPUNCTURE: CPT

## 2022-08-02 PROCEDURE — 82962 GLUCOSE BLOOD TEST: CPT

## 2022-08-02 PROCEDURE — 74011636637 HC RX REV CODE- 636/637: Performed by: SURGERY

## 2022-08-02 PROCEDURE — 85610 PROTHROMBIN TIME: CPT

## 2022-08-02 PROCEDURE — 99285 EMERGENCY DEPT VISIT HI MDM: CPT

## 2022-08-02 PROCEDURE — 74011000250 HC RX REV CODE- 250: Performed by: SURGERY

## 2022-08-02 PROCEDURE — 85025 COMPLETE CBC W/AUTO DIFF WBC: CPT

## 2022-08-02 PROCEDURE — 74011000258 HC RX REV CODE- 258: Performed by: SURGERY

## 2022-08-02 PROCEDURE — 80053 COMPREHEN METABOLIC PANEL: CPT

## 2022-08-02 RX ORDER — SODIUM CHLORIDE 9 MG/ML
75 INJECTION, SOLUTION INTRAVENOUS CONTINUOUS
Status: DISCONTINUED | OUTPATIENT
Start: 2022-08-02 | End: 2022-08-08 | Stop reason: HOSPADM

## 2022-08-02 RX ORDER — AMOXICILLIN AND CLAVULANATE POTASSIUM 875; 125 MG/1; MG/1
1 TABLET, FILM COATED ORAL EVERY 12 HOURS
COMMUNITY
Start: 2022-07-28 | End: 2022-09-02

## 2022-08-02 RX ORDER — AMLODIPINE BESYLATE 5 MG/1
10 TABLET ORAL DAILY
Status: DISCONTINUED | OUTPATIENT
Start: 2022-08-03 | End: 2022-08-08 | Stop reason: HOSPADM

## 2022-08-02 RX ORDER — DEXTROSE MONOHYDRATE 100 MG/ML
250 INJECTION, SOLUTION INTRAVENOUS ONCE
Status: DISPENSED | OUTPATIENT
Start: 2022-08-02 | End: 2022-08-03

## 2022-08-02 RX ORDER — HYDROMORPHONE HYDROCHLORIDE 1 MG/ML
1 INJECTION, SOLUTION INTRAMUSCULAR; INTRAVENOUS; SUBCUTANEOUS
Status: DISPENSED | OUTPATIENT
Start: 2022-08-02 | End: 2022-08-04

## 2022-08-02 RX ORDER — ONDANSETRON 2 MG/ML
4 INJECTION INTRAMUSCULAR; INTRAVENOUS
Status: DISCONTINUED | OUTPATIENT
Start: 2022-08-02 | End: 2022-08-07 | Stop reason: SDUPTHER

## 2022-08-02 RX ORDER — COLCHICINE 0.6 MG/1
0.6 TABLET ORAL DAILY
Status: DISCONTINUED | OUTPATIENT
Start: 2022-08-03 | End: 2022-08-08 | Stop reason: HOSPADM

## 2022-08-02 RX ORDER — MAGNESIUM SULFATE 100 %
4 CRYSTALS MISCELLANEOUS AS NEEDED
Status: DISCONTINUED | OUTPATIENT
Start: 2022-08-02 | End: 2022-08-08 | Stop reason: HOSPADM

## 2022-08-02 RX ORDER — LISINOPRIL 20 MG/1
20 TABLET ORAL DAILY
Status: DISCONTINUED | OUTPATIENT
Start: 2022-08-03 | End: 2022-08-08 | Stop reason: HOSPADM

## 2022-08-02 RX ORDER — SODIUM CHLORIDE 0.9 % (FLUSH) 0.9 %
5-40 SYRINGE (ML) INJECTION EVERY 8 HOURS
Status: DISCONTINUED | OUTPATIENT
Start: 2022-08-02 | End: 2022-08-03

## 2022-08-02 RX ORDER — HEPARIN SODIUM 10000 [USP'U]/100ML
12-25 INJECTION, SOLUTION INTRAVENOUS
Status: DISCONTINUED | OUTPATIENT
Start: 2022-08-02 | End: 2022-08-02

## 2022-08-02 RX ORDER — POLYETHYLENE GLYCOL 3350 17 G/17G
17 POWDER, FOR SOLUTION ORAL DAILY PRN
Status: DISCONTINUED | OUTPATIENT
Start: 2022-08-02 | End: 2022-08-08 | Stop reason: HOSPADM

## 2022-08-02 RX ORDER — ACETAMINOPHEN 325 MG/1
650 TABLET ORAL
Status: DISCONTINUED | OUTPATIENT
Start: 2022-08-02 | End: 2022-08-08 | Stop reason: HOSPADM

## 2022-08-02 RX ORDER — HEPARIN SODIUM 1000 [USP'U]/ML
2000 INJECTION, SOLUTION INTRAVENOUS; SUBCUTANEOUS AS NEEDED
Status: DISCONTINUED | OUTPATIENT
Start: 2022-08-02 | End: 2022-08-07 | Stop reason: SDUPTHER

## 2022-08-02 RX ORDER — OXYCODONE HCL 10 MG/1
20 TABLET, FILM COATED, EXTENDED RELEASE ORAL 2 TIMES DAILY
Status: DISCONTINUED | OUTPATIENT
Start: 2022-08-02 | End: 2022-08-08 | Stop reason: HOSPADM

## 2022-08-02 RX ORDER — ACETAMINOPHEN 650 MG/1
650 SUPPOSITORY RECTAL
Status: DISCONTINUED | OUTPATIENT
Start: 2022-08-02 | End: 2022-08-08 | Stop reason: HOSPADM

## 2022-08-02 RX ORDER — ENOXAPARIN SODIUM 100 MG/ML
40 INJECTION SUBCUTANEOUS DAILY
Status: DISCONTINUED | OUTPATIENT
Start: 2022-08-03 | End: 2022-08-02

## 2022-08-02 RX ORDER — ONDANSETRON 4 MG/1
4 TABLET, ORALLY DISINTEGRATING ORAL
Status: DISCONTINUED | OUTPATIENT
Start: 2022-08-02 | End: 2022-08-07 | Stop reason: SDUPTHER

## 2022-08-02 RX ORDER — CLINDAMYCIN PHOSPHATE 600 MG/50ML
600 INJECTION INTRAVENOUS EVERY 6 HOURS
Status: DISCONTINUED | OUTPATIENT
Start: 2022-08-02 | End: 2022-08-08 | Stop reason: HOSPADM

## 2022-08-02 RX ORDER — HYDROMORPHONE HYDROCHLORIDE 1 MG/ML
1 INJECTION, SOLUTION INTRAMUSCULAR; INTRAVENOUS; SUBCUTANEOUS
Status: DISCONTINUED | OUTPATIENT
Start: 2022-08-02 | End: 2022-08-02

## 2022-08-02 RX ORDER — INSULIN LISPRO 100 [IU]/ML
INJECTION, SOLUTION INTRAVENOUS; SUBCUTANEOUS EVERY 6 HOURS
Status: DISCONTINUED | OUTPATIENT
Start: 2022-08-02 | End: 2022-08-08 | Stop reason: HOSPADM

## 2022-08-02 RX ORDER — HEPARIN SODIUM 1000 [USP'U]/ML
4000 INJECTION, SOLUTION INTRAVENOUS; SUBCUTANEOUS AS NEEDED
Status: DISCONTINUED | OUTPATIENT
Start: 2022-08-02 | End: 2022-08-07 | Stop reason: SDUPTHER

## 2022-08-02 RX ORDER — SODIUM CHLORIDE 0.9 % (FLUSH) 0.9 %
5-40 SYRINGE (ML) INJECTION AS NEEDED
Status: DISCONTINUED | OUTPATIENT
Start: 2022-08-02 | End: 2022-08-07 | Stop reason: SDUPTHER

## 2022-08-02 RX ORDER — HEPARIN SODIUM 10000 [USP'U]/100ML
11-25 INJECTION, SOLUTION INTRAVENOUS
Status: DISCONTINUED | OUTPATIENT
Start: 2022-08-02 | End: 2022-08-05

## 2022-08-02 RX ORDER — PANTOPRAZOLE SODIUM 40 MG/1
40 TABLET, DELAYED RELEASE ORAL DAILY
Status: DISCONTINUED | OUTPATIENT
Start: 2022-08-03 | End: 2022-08-08 | Stop reason: HOSPADM

## 2022-08-02 RX ADMIN — PIPERACILLIN AND TAZOBACTAM 3.38 G: 3; .375 INJECTION, POWDER, FOR SOLUTION INTRAVENOUS at 17:11

## 2022-08-02 RX ADMIN — CLINDAMYCIN IN 5 PERCENT DEXTROSE 600 MG: 12 INJECTION, SOLUTION INTRAVENOUS at 17:48

## 2022-08-02 RX ADMIN — HEPARIN SODIUM 4000 UNITS: 1000 INJECTION, SOLUTION INTRAVENOUS; SUBCUTANEOUS at 20:31

## 2022-08-02 RX ADMIN — SODIUM CHLORIDE 75 ML/HR: 9 INJECTION, SOLUTION INTRAVENOUS at 16:58

## 2022-08-02 RX ADMIN — HEPARIN SODIUM 15 UNITS/KG/HR: 10000 INJECTION, SOLUTION INTRAVENOUS at 20:24

## 2022-08-02 RX ADMIN — HYDROMORPHONE HYDROCHLORIDE 1 MG: 1 INJECTION, SOLUTION INTRAMUSCULAR; INTRAVENOUS; SUBCUTANEOUS at 17:15

## 2022-08-02 RX ADMIN — OXYCODONE HYDROCHLORIDE 20 MG: 10 TABLET, FILM COATED, EXTENDED RELEASE ORAL at 20:35

## 2022-08-02 RX ADMIN — SODIUM CHLORIDE, PRESERVATIVE FREE 10 ML: 5 INJECTION INTRAVENOUS at 23:28

## 2022-08-02 RX ADMIN — INSULIN LISPRO 3 UNITS: 100 INJECTION, SOLUTION INTRAVENOUS; SUBCUTANEOUS at 18:32

## 2022-08-02 RX ADMIN — HYDROMORPHONE HYDROCHLORIDE 1 MG: 1 INJECTION, SOLUTION INTRAMUSCULAR; INTRAVENOUS; SUBCUTANEOUS at 22:58

## 2022-08-02 RX ADMIN — CLINDAMYCIN IN 5 PERCENT DEXTROSE 600 MG: 12 INJECTION, SOLUTION INTRAVENOUS at 23:13

## 2022-08-02 RX ADMIN — PIPERACILLIN AND TAZOBACTAM 3.38 G: 3; .375 INJECTION, POWDER, FOR SOLUTION INTRAVENOUS at 23:14

## 2022-08-02 RX ADMIN — HEPARIN SODIUM 2000 UNITS: 1000 INJECTION, SOLUTION INTRAVENOUS; SUBCUTANEOUS at 19:31

## 2022-08-02 RX ADMIN — SODIUM CHLORIDE, PRESERVATIVE FREE 10 ML: 5 INJECTION INTRAVENOUS at 18:35

## 2022-08-02 NOTE — ACP (ADVANCE CARE PLANNING)
Advance Care Planning   Healthcare Decision Maker:       Primary Decision Maker: Haven Davis [facility] - [phone]

## 2022-08-02 NOTE — ROUTINE PROCESS
TRANSFER - OUT REPORT:    Verbal report given to Shimon Rob RN(name) on [employer]  being transferred to [facility](unit) for routine progression of care       Report consisted of patients Situation, Background, Assessment and   Recommendations(SBAR). Information from the following report(s) ED Summary was reviewed with the receiving nurse. Lines:   Peripheral IV 08/02/22 Anterior; Left Forearm (Active)        Opportunity for questions and clarification was provided.       Patient transported with:   Wengo

## 2022-08-02 NOTE — PROGRESS NOTES
Admission Medication Reconciliation:    Information obtained from:  Patient    Comments/Recommendations: Reviewed PTA medications and patient's allergies. Removed eliquis 5 mg        Allergies:  Patient has no known allergies. Significant PMH/Disease States:   Past Medical History:   Diagnosis Date    Diabetes [facility])      Chief Complaint for this Admission:    Chief Complaint   Patient presents with    Foot Pain     right     Prior to Admission Medications:   Prior to Admission Medications   Prescriptions Last Dose Informant Patient Reported? Taking? amLODIPine (NORVASC) 10 mg tablet  Significant Other No Yes   Sig: Take 1 Tablet by mouth daily. amoxicillin-clavulanate (AUGMENTIN) 875-125 mg per tablet  Significant Other Yes Yes   Sig: Take 1 Tablet by mouth every twelve (12) hours. colchicine 0.6 mg tablet  Significant Other Yes Yes   Sig: Take 0.6 mg by mouth daily. lisinopriL (PRINIVIL, ZESTRIL) 20 mg tablet  Significant Other No Yes   Sig: Take 1 Tablet by mouth daily. metFORMIN (GLUCOPHAGE) 500 mg tablet  Significant Other Yes Yes   Sig: Take 500 mg by mouth daily (with dinner). oxyCODONE ER (OxyCONTIN) 20 mg ER tablet  Significant Other No Yes   Sig: Take 1 Tablet by mouth two (2) times a day for 30 days. Max Daily Amount: 40 mg.   pantoprazole (PROTONIX) 40 mg tablet  Significant Other Yes Yes   Sig: Take 40 mg by mouth daily.       Facility-Administered Medications: None       Amanda Slois

## 2022-08-02 NOTE — ED PROVIDER NOTES
EMERGENCY DEPARTMENT HISTORY AND PHYSICAL EXAM      Date: 8/2/2022  Patient Name: [facility]    History of Presenting Illness     Chief Complaint   Patient presents with    Foot Pain     right       History Provided By: Patient    HPI: [facility], 65 y.o. male with a significant past medical history of peripheral vascular disease presents to the ED with nonhealing wound to the right great toe. He has had multiple revascularization procedures attempted but continues to have necrosis of the right great toe, recently diagnosed with dry gangrene. He is on an outpatient course of antibiotics. He denies any measured fevers, no vomiting, no abdominal pain, no erythema spreading from the wound. He was seen in clinic today and recommended to present to the emergency department for admission to have the toe versus foot amputated. PCP: Lauir Hdz MD    No current facility-administered medications on file prior to encounter. Current Outpatient Medications on File Prior to Encounter   Medication Sig Dispense Refill    oxyCODONE ER (OxyCONTIN) 20 mg ER tablet Take 1 Tablet by mouth two (2) times a day for 30 days. Max Daily Amount: 40 mg. 20 Tablet 0    amoxicillin-clavulanate (Augmentin) 875-125 mg per tablet Take 1 Tablet by mouth every twelve (12) hours. 20 Tablet 0    apixaban (Eliquis) 5 mg tablet Take 1 Tablet by mouth two (2) times a day. 28 Tablet 0    apixaban (Eliquis) 5 mg tablet Take 1 Tablet by mouth two (2) times a day. (Patient not taking: Reported on 8/1/2022) 60 Tablet 2    colchicine 0.6 mg tablet Take 0.6 mg by mouth daily. lisinopriL (PRINIVIL, ZESTRIL) 20 mg tablet Take 1 Tablet by mouth daily. 60 Tablet 0    amLODIPine (NORVASC) 10 mg tablet Take 1 Tablet by mouth daily. 30 Tablet 0    pantoprazole (PROTONIX) 40 mg tablet Take 40 mg by mouth daily. metFORMIN (GLUCOPHAGE) 500 mg tablet Take 500 mg by mouth daily (with dinner).          Past History     Past Medical History:  Past Medical History:   Diagnosis Date    Diabetes [facility])        Past Surgical History:  Past Surgical History:   Procedure Laterality Date    VASCULAR SURGERY PROCEDURE UNLIST Right 2022       Family History:  Family History   Problem Relation Age of Onset    Cancer Mother     Diabetes Mother     Cancer Father        Social History:  Social History     Tobacco Use    Smoking status: Former     Packs/day: 1.50     Years: 15.00     Pack years: 22.50     Types: Cigarettes     Quit date: 2022     Years since quittin.0    Smokeless tobacco: Never   Vaping Use    Vaping Use: Never used   Substance Use Topics    Alcohol use: Not Currently    Drug use: Never       Allergies:  No Known Allergies    Review of Systems   Review of Systems   Constitutional:  Negative for fever. HENT:  Negative for congestion. Respiratory:  Negative for cough and shortness of breath. Cardiovascular:  Negative for chest pain. Gastrointestinal:  Negative for abdominal pain, constipation, nausea and vomiting. Genitourinary:  Negative for dysuria. Musculoskeletal:  Negative for arthralgias and myalgias. Skin:  Negative for rash. Wound to the the right great toe per HPI   Allergic/Immunologic: Negative for immunocompromised state. Neurological:  Negative for syncope. Psychiatric/Behavioral:  Negative for confusion. Physical Exam   Physical Exam  Constitutional:       General: He is not in acute distress. HENT:      Head: Normocephalic and atraumatic. Eyes:      Pupils: Pupils are equal, round, and reactive to light. Cardiovascular:      Rate and Rhythm: Normal rate and regular rhythm. Pulmonary:      Effort: Pulmonary effort is normal.      Breath sounds: Normal breath sounds. Abdominal:      General: Abdomen is flat. Musculoskeletal:      Cervical back: Normal range of motion.    Skin:     Comments: Right great toe wound wrapped with iodinated dressing   Neurological:      Mental Status: He is alert. Lab and Diagnostic Study Results   Labs -   No results found for this or any previous visit (from the past 12 hour(s)). Radiologic Studies -   @lastxrresult@  CT Results  (Last 48 hours)      None          CXR Results  (Last 48 hours)      None            Medical Decision Making and ED Course   - I am the first provider for this patient. I reviewed the vital signs, available nursing notes, past medical history, past surgical history, family history and social history. - Initial assessment performed. The patients presenting problems have been discussed, and they are in agreement with the care plan formulated and outlined with them. I have encouraged them to ask questions as they arise throughout their visit. Vital Signs-Reviewed the patient's vital signs. 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 Spoke with Dr. Jamil Matthews who initiated this plan and has placed an admission order. Vital stable, no signs of systemic infection. [facility]       ED Course:        Procedures   Performed by: Delbert Rees MD  Procedures      Disposition   Disposition: Admitted to Floor Surgical Floor the case was discussed with the admitting physician Mauricio  Admitted  Diagnosis/Clinical Impression     Clinical Impression:   1. 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 PVD (peripheral vascular disease) ([facility])        Attestations: Hernán Trujillo MD, am the primary clinician of record. Please note that this dictation was completed with [employer], the [employer] voice recognition software.   Quite often unanticipated grammatical, syntax, homophones, and other interpretive errors are inadvertently transcribed by the computer software. Please disregard these errors. Please excuse any errors that have escaped final proofreading. Thank you.

## 2022-08-02 NOTE — PROGRESS NOTES
Reason for Readmission:     Gangrene Toe         RUR Score/Risk Level:   N/A      PCP: First and Last name:  Brayan Arenas    Name of Practice:    Are you a current patient: Yes/No: Yes   Approximate date of last visit: Been a while since last visit. Can you participate in a virtual visit with your PCP: Yes/Call    Is a Care Conference indicated: No      Did you attend your follow up appointment (s): If not, why not: No, not schediled until 8/5/22. Resources/supports as identified by patient/family:   Family       Top Challenges facing patient (as identified by patient/family and CM): Finances/Medication cost?  No     Transportation   No     Support system or lack thereof? No    Living arrangements? No        Self-care/ADLs/Cognition? No         Current Advanced Directive/Advance Care Plan:  Full Code           Plan for utilizing home health:  None @ this time. Uses crutch/walker. Transition of Care Plan:    Based on readmission, the patient's previous Plan of Care   has been evaluated and/or modified. The current Transition of Care Plan is:    D/C Plan is home with wife & she will transport. Call R[facility] into [facility].

## 2022-08-03 LAB
APTT PPP: 35 SEC (ref 21.2–34.1)
APTT PPP: 40.2 SEC (ref 21.2–34.1)
APTT PPP: 47.9 SEC (ref 21.2–34.1)
APTT PPP: 48.9 SEC (ref 21.2–34.1)
GLUCOSE BLD STRIP.AUTO-MCNC: 110 MG/DL (ref 65–117)
GLUCOSE BLD STRIP.AUTO-MCNC: 114 MG/DL (ref 65–117)
GLUCOSE BLD STRIP.AUTO-MCNC: 129 MG/DL (ref 65–117)
PERFORMED BY, TECHID: ABNORMAL
PERFORMED BY, TECHID: NORMAL
PERFORMED BY, TECHID: NORMAL
THERAPEUTIC RANGE,PTTT: ABNORMAL SEC (ref 82–109)

## 2022-08-03 PROCEDURE — 82962 GLUCOSE BLOOD TEST: CPT

## 2022-08-03 PROCEDURE — 85730 THROMBOPLASTIN TIME PARTIAL: CPT

## 2022-08-03 PROCEDURE — 74011636637 HC RX REV CODE- 636/637: Performed by: SURGERY

## 2022-08-03 PROCEDURE — 74011000258 HC RX REV CODE- 258: Performed by: SURGERY

## 2022-08-03 PROCEDURE — 65270000029 HC RM PRIVATE

## 2022-08-03 PROCEDURE — 74011250636 HC RX REV CODE- 250/636: Performed by: SURGERY

## 2022-08-03 PROCEDURE — 74011250637 HC RX REV CODE- 250/637: Performed by: SURGERY

## 2022-08-03 PROCEDURE — 36415 COLL VENOUS BLD VENIPUNCTURE: CPT

## 2022-08-03 RX ORDER — OXYCODONE AND ACETAMINOPHEN 5; 325 MG/1; MG/1
2 TABLET ORAL
Status: DISCONTINUED | OUTPATIENT
Start: 2022-08-03 | End: 2022-08-08 | Stop reason: HOSPADM

## 2022-08-03 RX ADMIN — CLINDAMYCIN IN 5 PERCENT DEXTROSE 600 MG: 12 INJECTION, SOLUTION INTRAVENOUS at 09:26

## 2022-08-03 RX ADMIN — OXYCODONE AND ACETAMINOPHEN 2 TABLET: 5; 325 TABLET ORAL at 18:39

## 2022-08-03 RX ADMIN — PIPERACILLIN AND TAZOBACTAM 3.38 G: 3; .375 INJECTION, POWDER, FOR SOLUTION INTRAVENOUS at 22:28

## 2022-08-03 RX ADMIN — HYDROMORPHONE HYDROCHLORIDE 1 MG: 1 INJECTION, SOLUTION INTRAMUSCULAR; INTRAVENOUS; SUBCUTANEOUS at 03:39

## 2022-08-03 RX ADMIN — HEPARIN SODIUM 2000 UNITS: 1000 INJECTION, SOLUTION INTRAVENOUS; SUBCUTANEOUS at 22:42

## 2022-08-03 RX ADMIN — OXYCODONE HYDROCHLORIDE 20 MG: 10 TABLET, FILM COATED, EXTENDED RELEASE ORAL at 21:29

## 2022-08-03 RX ADMIN — HEPARIN SODIUM 2000 UNITS: 1000 INJECTION, SOLUTION INTRAVENOUS; SUBCUTANEOUS at 17:46

## 2022-08-03 RX ADMIN — CLINDAMYCIN IN 5 PERCENT DEXTROSE 600 MG: 12 INJECTION, SOLUTION INTRAVENOUS at 03:30

## 2022-08-03 RX ADMIN — OXYCODONE HYDROCHLORIDE 20 MG: 10 TABLET, FILM COATED, EXTENDED RELEASE ORAL at 09:26

## 2022-08-03 RX ADMIN — PIPERACILLIN AND TAZOBACTAM 3.38 G: 3; .375 INJECTION, POWDER, FOR SOLUTION INTRAVENOUS at 09:26

## 2022-08-03 RX ADMIN — HYDROMORPHONE HYDROCHLORIDE 1 MG: 1 INJECTION, SOLUTION INTRAMUSCULAR; INTRAVENOUS; SUBCUTANEOUS at 16:04

## 2022-08-03 RX ADMIN — CLINDAMYCIN IN 5 PERCENT DEXTROSE 600 MG: 12 INJECTION, SOLUTION INTRAVENOUS at 21:29

## 2022-08-03 RX ADMIN — PANTOPRAZOLE SODIUM 40 MG: 40 TABLET, DELAYED RELEASE ORAL at 09:27

## 2022-08-03 RX ADMIN — CLINDAMYCIN IN 5 PERCENT DEXTROSE 600 MG: 12 INJECTION, SOLUTION INTRAVENOUS at 16:04

## 2022-08-03 RX ADMIN — HYDROMORPHONE HYDROCHLORIDE 1 MG: 1 INJECTION, SOLUTION INTRAMUSCULAR; INTRAVENOUS; SUBCUTANEOUS at 07:49

## 2022-08-03 RX ADMIN — LISINOPRIL 20 MG: 20 TABLET ORAL at 09:27

## 2022-08-03 RX ADMIN — PIPERACILLIN AND TAZOBACTAM 3.38 G: 3; .375 INJECTION, POWDER, FOR SOLUTION INTRAVENOUS at 03:30

## 2022-08-03 RX ADMIN — HYDROMORPHONE HYDROCHLORIDE 1 MG: 1 INJECTION, SOLUTION INTRAMUSCULAR; INTRAVENOUS; SUBCUTANEOUS at 20:06

## 2022-08-03 RX ADMIN — HEPARIN SODIUM 17 UNITS/KG/HR: 10000 INJECTION, SOLUTION INTRAVENOUS at 05:31

## 2022-08-03 RX ADMIN — SODIUM CHLORIDE 75 ML/HR: 9 INJECTION, SOLUTION INTRAVENOUS at 21:36

## 2022-08-03 RX ADMIN — INSULIN LISPRO 3 UNITS: 100 INJECTION, SOLUTION INTRAVENOUS; SUBCUTANEOUS at 00:17

## 2022-08-03 RX ADMIN — AMLODIPINE BESYLATE 10 MG: 5 TABLET ORAL at 09:26

## 2022-08-03 RX ADMIN — COLCHICINE 0.6 MG: 0.6 TABLET, FILM COATED ORAL at 09:26

## 2022-08-03 RX ADMIN — HEPARIN SODIUM 2000 UNITS: 1000 INJECTION, SOLUTION INTRAVENOUS; SUBCUTANEOUS at 05:30

## 2022-08-03 RX ADMIN — HYDROMORPHONE HYDROCHLORIDE 1 MG: 1 INJECTION, SOLUTION INTRAMUSCULAR; INTRAVENOUS; SUBCUTANEOUS at 12:07

## 2022-08-03 RX ADMIN — HEPARIN SODIUM 19 UNITS/KG/HR: 10000 INJECTION, SOLUTION INTRAVENOUS at 17:45

## 2022-08-03 RX ADMIN — HEPARIN SODIUM 17 UNITS/KG/HR: 10000 INJECTION, SOLUTION INTRAVENOUS at 15:35

## 2022-08-03 RX ADMIN — PIPERACILLIN AND TAZOBACTAM 3.38 G: 3; .375 INJECTION, POWDER, FOR SOLUTION INTRAVENOUS at 16:04

## 2022-08-03 NOTE — PROGRESS NOTES
CM reviewed chart. Patient on IV antibiotics and IV heparin. Waiting for surgical procedure once stable for said procedure. Patient does not have insurance. Any needs at discharge will need to be out of pocket expense.

## 2022-08-03 NOTE — H&P
History and Physical    Chief complaints: Right great toe gangrene. History of Presenting Illness:  Kimberly Crowell is a 65 y.o. very pleasant diabetic gentleman who has developed a right great toe gangrene about a month ago. Patient underwent multiple procedures for revascularization procedure. Patient currently has right common femoral artery to above-knee popliteal bypass graft with secondary intervention below-knee disease with endovascular technique. This procedure was performed about 2 weeks ago. Patient is readmitted today because of the great toe gangrene did not improve requiring amputation. Patient still have a mild to moderate pain on the right foot constant. Patient has no fever or chills. Patient currently on Eliquis and Plavix therapy. On examination. Patient has palpable femoral pulses. Both groin incision and thigh incision shows mild cellulitis. Groin incision has partially open as well. Patient has Doppler signal noted on DP and PT on the right foot. Mild cellulitis noted. Patient does have dry gangrene of the right great toe to the webspace. Past Medical History:   Diagnosis Date    Diabetes [facility])       Past Surgical History:   Procedure Laterality Date    VASCULAR SURGERY PROCEDURE UNLIST Right 2022     Family History   Problem Relation Age of Onset    Cancer Mother     Diabetes Mother     Cancer Father       Social History     Tobacco Use    Smoking status: Former     Packs/day: 1.50     Years: 15.00     Pack years: 22.50     Types: Cigarettes     Quit date: 2022     Years since quittin.0    Smokeless tobacco: Never   Substance Use Topics    Alcohol use: Not Currently       Prior to Admission medications    Medication Sig Start Date End Date Taking? Authorizing Provider   amoxicillin-clavulanate (AUGMENTIN) 875-125 mg per tablet Take 1 Tablet by mouth every twelve (12) hours.  7/28/22 10/8/22 Yes Provider, Historical   oxyCODONE ER (OxyCONTIN) 20 mg ER tablet Take 1 Tablet by mouth two (2) times a day for 30 days. Max Daily Amount: 40 mg. 7/28/22 8/27/22 Yes Wale Ferrer MD   colchicine 0.6 mg tablet Take 0.6 mg by mouth daily. Yes Provider, Historical   lisinopriL (PRINIVIL, ZESTRIL) 20 mg tablet Take 1 Tablet by mouth daily. 7/9/22  Yes Wale Ferrer MD   amLODIPine (NORVASC) 10 mg tablet Take 1 Tablet by mouth daily. 7/9/22  Yes Wale Ferrer MD   pantoprazole (PROTONIX) 40 mg tablet Take 40 mg by mouth daily. Yes Provider, Historical   metFORMIN (GLUCOPHAGE) 500 mg tablet Take 500 mg by mouth daily (with dinner). Yes Provider, Historical     No Known Allergies     Review of Systems:  Pertinent review of systems discussed in HPI, and rest of organ systems personally reviewed and they are negative. Objective:   Vital signs reviewed:      Visit Vitals  /73 (BP 1 Location: Right upper arm, BP Patient Position: Semi fowlers; At rest)   Pulse 63   Temp 98 °F (36.7 °C)   Resp 18   Ht 6' 1\" (1.854 m)   Wt 222 lb (100.7 kg)   SpO2 98%   BMI 29.29 kg/m²       Physical Exam:   General appearance:   Patient is awake and alert, not in particular distress. Head and neck atraumatic normocephalic. ENT shows normal oral mucosa, no jaundice no hoarse voice. Eyes: Pupil equal gaze appropriate. Cardiac system regular rate rhythm. Pulmonary: No audible wheeze. Chest wall: Chest wall excursion normal with respiration cycle, there is no deformity or chest trauma. Abdomen: Soft not tender or distended, bowel sounds active. There is no obvious palpable mass, or hernia. Neurologic: Nonfocal.  Cranial nerves intact, no new focal findings. Musculoskeletal system: Motor function normal limits, motor function 5 out of 5, range of motion normal in all 4 extremity  Skin: Warm and moist.  Hematologic system: No obvious bruising. Psychosocial: Appropriate and cooperative. Vascular examination: As discussed above.               Data Review: Labs are reviewed. Discussed  Recent Results (from the past 24 hour(s))   CBC WITH AUTOMATED DIFF    Collection Time: 08/02/22  4:23 PM   Result Value Ref Range    WBC 17.8 (H) 4.1 - 11.1 K/uL    RBC 4.44 4.10 - 5.70 M/uL    HGB 13.0 12.1 - 17.0 g/dL    HCT 39.1 36.6 - 50.3 %    MCV 88.1 80.0 - 99.0 FL    MCH 29.3 26.0 - 34.0 PG    MCHC 33.2 30.0 - 36.5 g/dL    RDW 13.6 11.5 - 14.5 %    PLATELET 918 (H) 095 - 400 K/uL    MPV 9.7 8.9 - 12.9 FL    NRBC 0.0 0.0  WBC    ABSOLUTE NRBC 0.00 0.00 - 0.01 K/uL    NEUTROPHILS 73 32 - 75 %    LYMPHOCYTES 13 12 - 49 %    MONOCYTES 9 5 - 13 %    EOSINOPHILS 3 0 - 7 %    BASOPHILS 1 0 - 1 %    IMMATURE GRANULOCYTES 1 (H) 0 - 0.5 %    ABS. NEUTROPHILS 13.1 (H) 1.8 - 8.0 K/UL    ABS. LYMPHOCYTES 2.4 0.8 - 3.5 K/UL    ABS. MONOCYTES 1.6 (H) 0.0 - 1.0 K/UL    ABS. EOSINOPHILS 0.5 (H) 0.0 - 0.4 K/UL    ABS. BASOPHILS 0.1 0.0 - 0.1 K/UL    ABS. IMM. GRANS. 0.1 (H) 0.00 - 0.04 K/UL    DF AUTOMATED     METABOLIC PANEL, COMPREHENSIVE    Collection Time: 08/02/22  4:23 PM   Result Value Ref Range    Sodium 133 (L) 136 - 145 mmol/L    Potassium 3.8 3.5 - 5.1 mmol/L    Chloride 102 97 - 108 mmol/L    CO2 25 21 - 32 mmol/L    Anion gap 6 5 - 15 mmol/L    Glucose 181 (H) 65 - 100 mg/dL    BUN 15 6 - 20 mg/dL    Creatinine 1.26 0.70 - 1.30 mg/dL    BUN/Creatinine ratio 12 12 - 20      GFR est AA >60 >60 ml/min/1.73m2    GFR est non-AA 58 (L) >60 ml/min/1.73m2    Calcium 9.5 8.5 - 10.1 mg/dL    Bilirubin, total 0.9 0.2 - 1.0 mg/dL    AST (SGOT) 12 (L) 15 - 37 U/L    ALT (SGPT) 26 12 - 78 U/L    Alk.  phosphatase 84 45 - 117 U/L    Protein, total 7.6 6.4 - 8.2 g/dL    Albumin 3.3 (L) 3.5 - 5.0 g/dL    Globulin 4.3 (H) 2.0 - 4.0 g/dL    A-G Ratio 0.8 (L) 1.1 - 2.2     PROTHROMBIN TIME + INR    Collection Time: 08/02/22  4:23 PM   Result Value Ref Range    Prothrombin time 18.7 (H) 11.9 - 14.6 sec    INR 1.6 (H) 0.9 - 1.1     PTT    Collection Time: 08/02/22  7:38 PM   Result Value Ref Range    aPTT 30.5 21.2 - 34.1 sec    aPTT, therapeutic range   82 - 109 sec   CBC WITH AUTOMATED DIFF    Collection Time: 08/02/22  8:06 PM   Result Value Ref Range    WBC 18.8 (H) 4.1 - 11.1 K/uL    RBC 4.21 4.10 - 5.70 M/uL    HGB 12.4 12.1 - 17.0 g/dL    HCT 37.5 36.6 - 50.3 %    MCV 89.1 80.0 - 99.0 FL    MCH 29.5 26.0 - 34.0 PG    MCHC 33.1 30.0 - 36.5 g/dL    RDW 13.5 11.5 - 14.5 %    PLATELET 177 (H) 344 - 400 K/uL    MPV 9.9 8.9 - 12.9 FL    NRBC 0.0 0.0  WBC    ABSOLUTE NRBC 0.00 0.00 - 0.01 K/uL    NEUTROPHILS 72 32 - 75 %    LYMPHOCYTES 14 12 - 49 %    MONOCYTES 8 5 - 13 %    EOSINOPHILS 4 0 - 7 %    BASOPHILS 1 0 - 1 %    IMMATURE GRANULOCYTES 1 (H) 0 - 0.5 %    ABS. NEUTROPHILS 13.8 (H) 1.8 - 8.0 K/UL    ABS. LYMPHOCYTES 2.6 0.8 - 3.5 K/UL    ABS. MONOCYTES 1.5 (H) 0.0 - 1.0 K/UL    ABS. EOSINOPHILS 0.7 (H) 0.0 - 0.4 K/UL    ABS. BASOPHILS 0.2 (H) 0.0 - 0.1 K/UL    ABS. IMM. GRANS. 0.1 (H) 0.00 - 0.04 K/UL    DF AUTOMATED     GLUCOSE, POC    Collection Time: 08/02/22 11:26 PM   Result Value Ref Range    Glucose (POC) 152 (H) 65 - 117 mg/dL    Performed by Carlton Palkions reviewed: discussed as below. No name on file. Assessment:     Active Problems:    Toe gangrene ([address] (8/2/2022)      Limb ischemia (8/2/2022)        Plan:     Patient will be admitted to hospital.  Started him on IV antibiotics include Zosyn and clindamycin. Patient will need a local wound care on the groin and great toe area. We will hold Eliquis. Start him on heparin drip. Patient will require right great toe amputation. Patient was discussed about care plans and prognosis in details. Since the patient took Eliquis yesterday we cannot perform the amputation until 3 to 4 days later.

## 2022-08-03 NOTE — ANTIMICROBIAL STEWARDSHIP
The Antimicrobial Stewardship Team has reviewed current therapy. Patient is on Cleocin and Zosyn for right toe gangrene. Zosyn covers most gram neg, gram pos, and anaerobic organisms. Cleocin puts the patient at high risk for C.difficile.

## 2022-08-04 LAB
APTT PPP: 65.3 SEC (ref 21.2–34.1)
APTT PPP: 66.1 SEC (ref 21.2–34.1)
GLUCOSE BLD STRIP.AUTO-MCNC: 124 MG/DL (ref 65–117)
GLUCOSE BLD STRIP.AUTO-MCNC: 131 MG/DL (ref 65–117)
GLUCOSE BLD STRIP.AUTO-MCNC: 138 MG/DL (ref 65–117)
GLUCOSE BLD STRIP.AUTO-MCNC: 161 MG/DL (ref 65–117)
GLUCOSE BLD STRIP.AUTO-MCNC: 173 MG/DL (ref 65–117)
PERFORMED BY, TECHID: ABNORMAL
THERAPEUTIC RANGE,PTTT: ABNORMAL SEC (ref 82–109)
THERAPEUTIC RANGE,PTTT: ABNORMAL SEC (ref 82–109)

## 2022-08-04 PROCEDURE — 85730 THROMBOPLASTIN TIME PARTIAL: CPT

## 2022-08-04 PROCEDURE — 36415 COLL VENOUS BLD VENIPUNCTURE: CPT

## 2022-08-04 PROCEDURE — 99232 SBSQ HOSP IP/OBS MODERATE 35: CPT | Performed by: SURGERY

## 2022-08-04 PROCEDURE — 74011250636 HC RX REV CODE- 250/636: Performed by: SURGERY

## 2022-08-04 PROCEDURE — 65270000029 HC RM PRIVATE

## 2022-08-04 PROCEDURE — 74011000258 HC RX REV CODE- 258: Performed by: SURGERY

## 2022-08-04 PROCEDURE — 74011250637 HC RX REV CODE- 250/637: Performed by: SURGERY

## 2022-08-04 PROCEDURE — 82962 GLUCOSE BLOOD TEST: CPT

## 2022-08-04 RX ORDER — HYDROMORPHONE HYDROCHLORIDE 1 MG/ML
1 INJECTION, SOLUTION INTRAMUSCULAR; INTRAVENOUS; SUBCUTANEOUS
Status: DISPENSED | OUTPATIENT
Start: 2022-08-04 | End: 2022-08-06

## 2022-08-04 RX ADMIN — HYDROMORPHONE HYDROCHLORIDE 1 MG: 1 INJECTION, SOLUTION INTRAMUSCULAR; INTRAVENOUS; SUBCUTANEOUS at 19:55

## 2022-08-04 RX ADMIN — OXYCODONE HYDROCHLORIDE 20 MG: 10 TABLET, FILM COATED, EXTENDED RELEASE ORAL at 09:11

## 2022-08-04 RX ADMIN — HYDROMORPHONE HYDROCHLORIDE 1 MG: 1 INJECTION, SOLUTION INTRAMUSCULAR; INTRAVENOUS; SUBCUTANEOUS at 15:14

## 2022-08-04 RX ADMIN — HYDROMORPHONE HYDROCHLORIDE 1 MG: 1 INJECTION, SOLUTION INTRAMUSCULAR; INTRAVENOUS; SUBCUTANEOUS at 04:32

## 2022-08-04 RX ADMIN — HEPARIN SODIUM 2000 UNITS: 1000 INJECTION, SOLUTION INTRAVENOUS; SUBCUTANEOUS at 16:51

## 2022-08-04 RX ADMIN — HYDROMORPHONE HYDROCHLORIDE 1 MG: 1 INJECTION, SOLUTION INTRAMUSCULAR; INTRAVENOUS; SUBCUTANEOUS at 23:58

## 2022-08-04 RX ADMIN — HEPARIN SODIUM 2000 UNITS: 1000 INJECTION, SOLUTION INTRAVENOUS; SUBCUTANEOUS at 06:32

## 2022-08-04 RX ADMIN — CLINDAMYCIN IN 5 PERCENT DEXTROSE 600 MG: 12 INJECTION, SOLUTION INTRAVENOUS at 04:28

## 2022-08-04 RX ADMIN — OXYCODONE AND ACETAMINOPHEN 2 TABLET: 5; 325 TABLET ORAL at 22:21

## 2022-08-04 RX ADMIN — CLINDAMYCIN IN 5 PERCENT DEXTROSE 600 MG: 12 INJECTION, SOLUTION INTRAVENOUS at 22:21

## 2022-08-04 RX ADMIN — PIPERACILLIN AND TAZOBACTAM 3.38 G: 3; .375 INJECTION, POWDER, FOR SOLUTION INTRAVENOUS at 05:27

## 2022-08-04 RX ADMIN — SODIUM CHLORIDE 75 ML/HR: 9 INJECTION, SOLUTION INTRAVENOUS at 23:36

## 2022-08-04 RX ADMIN — OXYCODONE HYDROCHLORIDE 20 MG: 10 TABLET, FILM COATED, EXTENDED RELEASE ORAL at 21:30

## 2022-08-04 RX ADMIN — CLINDAMYCIN IN 5 PERCENT DEXTROSE 600 MG: 12 INJECTION, SOLUTION INTRAVENOUS at 16:53

## 2022-08-04 RX ADMIN — HEPARIN SODIUM 23 UNITS/KG/HR: 10000 INJECTION, SOLUTION INTRAVENOUS at 06:49

## 2022-08-04 RX ADMIN — PANTOPRAZOLE SODIUM 40 MG: 40 TABLET, DELAYED RELEASE ORAL at 09:11

## 2022-08-04 RX ADMIN — LISINOPRIL 20 MG: 20 TABLET ORAL at 09:11

## 2022-08-04 RX ADMIN — AMLODIPINE BESYLATE 10 MG: 5 TABLET ORAL at 09:11

## 2022-08-04 RX ADMIN — CLINDAMYCIN IN 5 PERCENT DEXTROSE 600 MG: 12 INJECTION, SOLUTION INTRAVENOUS at 09:11

## 2022-08-04 RX ADMIN — PIPERACILLIN AND TAZOBACTAM 3.38 G: 3; .375 INJECTION, POWDER, FOR SOLUTION INTRAVENOUS at 23:31

## 2022-08-04 RX ADMIN — HYDROMORPHONE HYDROCHLORIDE 1 MG: 1 INJECTION, SOLUTION INTRAMUSCULAR; INTRAVENOUS; SUBCUTANEOUS at 10:14

## 2022-08-04 RX ADMIN — HEPARIN SODIUM 25 UNITS/KG/HR: 10000 INJECTION, SOLUTION INTRAVENOUS at 19:08

## 2022-08-04 RX ADMIN — HYDROMORPHONE HYDROCHLORIDE 1 MG: 1 INJECTION, SOLUTION INTRAMUSCULAR; INTRAVENOUS; SUBCUTANEOUS at 00:10

## 2022-08-04 RX ADMIN — PIPERACILLIN AND TAZOBACTAM 3.38 G: 3; .375 INJECTION, POWDER, FOR SOLUTION INTRAVENOUS at 13:34

## 2022-08-04 RX ADMIN — COLCHICINE 0.6 MG: 0.6 TABLET, FILM COATED ORAL at 09:11

## 2022-08-04 NOTE — PROGRESS NOTES
PROGRESS NOTE      Chief Complaints:  Patient has no new complaints. HPI and  Objective:    Pain is under control with the oxycodone extended release with the Percocet. Patient denies chest pain shortness of breath. Review of Systems:  Rest review of system negative, personally reviewed. EXAM:  Visit Vitals  /74 (BP 1 Location: Right upper arm, BP Patient Position: At rest)   Pulse 62   Temp 97.5 °F (36.4 °C)   Resp 18   Ht 6' 1\" (1.854 m)   Wt 222 lb (100.7 kg)   SpO2 100%   BMI 29.29 kg/m²       Patient is awake and alert  Head and neck atraumatic, normocephalic. ENT: No hoarse voice  Cardiac system regular rate rhythm. Pulmonary no audible wheeze  Chest wall excursion normal with respiration cycle  Abdomen is soft not particularly distended. Neurologically nonfocal.  Skin is warm and moist.  Psychosocial: Cooperative. Vascular examination as previously noted no changes.     Recent Results (from the past 24 hour(s))   PTT    Collection Time: 08/03/22 11:33 AM   Result Value Ref Range    aPTT 47.9 (H) 21.2 - 34.1 sec    aPTT, therapeutic range   82 - 109 sec   GLUCOSE, POC    Collection Time: 08/03/22 11:38 AM   Result Value Ref Range    Glucose (POC) 129 (H) 65 - 117 mg/dL    Performed by [employer]    PTT    Collection Time: 08/03/22  4:27 PM   Result Value Ref Range    aPTT 35.0 (H) 21.2 - 34.1 sec    aPTT, therapeutic range   82 - 109 sec   GLUCOSE, POC    Collection Time: 08/03/22  5:05 PM   Result Value Ref Range    Glucose (POC) 110 65 - 117 mg/dL    Performed by [employer]    PTT    Collection Time: 08/03/22  9:21 PM   Result Value Ref Range    aPTT 48.9 (H) 21.2 - 34.1 sec    aPTT, therapeutic range   82 - 109 sec   GLUCOSE, POC    Collection Time: 08/04/22 12:10 AM   Result Value Ref Range    Glucose (POC) 138 (H) 65 - 117 mg/dL    Performed by Amy Castillo    PTT    Collection Time: 08/04/22  5:04 AM   Result Value Ref Range    aPTT 66.1 (H) 21.2 - 34.1 sec    aPTT, therapeutic range 82 - 109 sec   GLUCOSE, POC    Collection Time: 08/04/22  6:31 AM   Result Value Ref Range    Glucose (POC) 124 (H) 65 - 117 mg/dL    Performed by Lamine ZAZUETA        ASSESSMENT:   Patient is 65 y.o. with diagnosis of : Active Problems:    Toe gangrene ([facility] [address] [address] (8/2/2022)      Limb ischemia (8/2/2022)        PLAN:                 Continue heparin drip. Continue IV antibiotic. Continue local wound care. We will schedule for right great toe amputation on Saturday morning. Continue current care.

## 2022-08-05 LAB
APTT PPP: 106.1 SEC (ref 21.2–34.1)
APTT PPP: 79.8 SEC (ref 21.2–34.1)
APTT PPP: 81.4 SEC (ref 21.2–34.1)
GLUCOSE BLD STRIP.AUTO-MCNC: 113 MG/DL (ref 65–117)
GLUCOSE BLD STRIP.AUTO-MCNC: 145 MG/DL (ref 65–117)
GLUCOSE BLD STRIP.AUTO-MCNC: 150 MG/DL (ref 65–117)
GLUCOSE BLD STRIP.AUTO-MCNC: 163 MG/DL (ref 65–117)
PERFORMED BY, TECHID: ABNORMAL
PERFORMED BY, TECHID: NORMAL
THERAPEUTIC RANGE,PTTT: ABNORMAL SEC (ref 82–109)

## 2022-08-05 PROCEDURE — 65270000029 HC RM PRIVATE

## 2022-08-05 PROCEDURE — 82962 GLUCOSE BLOOD TEST: CPT

## 2022-08-05 PROCEDURE — 74011250637 HC RX REV CODE- 250/637: Performed by: SURGERY

## 2022-08-05 PROCEDURE — 85730 THROMBOPLASTIN TIME PARTIAL: CPT

## 2022-08-05 PROCEDURE — 74011636637 HC RX REV CODE- 636/637: Performed by: SURGERY

## 2022-08-05 PROCEDURE — 74011000258 HC RX REV CODE- 258: Performed by: SURGERY

## 2022-08-05 PROCEDURE — 36415 COLL VENOUS BLD VENIPUNCTURE: CPT

## 2022-08-05 PROCEDURE — 74011250636 HC RX REV CODE- 250/636: Performed by: SURGERY

## 2022-08-05 PROCEDURE — 99232 SBSQ HOSP IP/OBS MODERATE 35: CPT | Performed by: SURGERY

## 2022-08-05 RX ORDER — HEPARIN SODIUM 10000 [USP'U]/100ML
18-36 INJECTION, SOLUTION INTRAVENOUS
Status: DISCONTINUED | OUTPATIENT
Start: 2022-08-05 | End: 2022-08-07 | Stop reason: SDUPTHER

## 2022-08-05 RX ADMIN — SODIUM CHLORIDE 75 ML/HR: 9 INJECTION, SOLUTION INTRAVENOUS at 14:24

## 2022-08-05 RX ADMIN — HYDROMORPHONE HYDROCHLORIDE 1 MG: 1 INJECTION, SOLUTION INTRAMUSCULAR; INTRAVENOUS; SUBCUTANEOUS at 14:34

## 2022-08-05 RX ADMIN — CLINDAMYCIN IN 5 PERCENT DEXTROSE 600 MG: 12 INJECTION, SOLUTION INTRAVENOUS at 09:27

## 2022-08-05 RX ADMIN — OXYCODONE AND ACETAMINOPHEN 2 TABLET: 5; 325 TABLET ORAL at 12:06

## 2022-08-05 RX ADMIN — INSULIN LISPRO 3 UNITS: 100 INJECTION, SOLUTION INTRAVENOUS; SUBCUTANEOUS at 12:04

## 2022-08-05 RX ADMIN — CLINDAMYCIN IN 5 PERCENT DEXTROSE 600 MG: 12 INJECTION, SOLUTION INTRAVENOUS at 16:12

## 2022-08-05 RX ADMIN — PIPERACILLIN AND TAZOBACTAM 3.38 G: 3; .375 INJECTION, POWDER, FOR SOLUTION INTRAVENOUS at 14:24

## 2022-08-05 RX ADMIN — HYDROMORPHONE HYDROCHLORIDE 1 MG: 1 INJECTION, SOLUTION INTRAMUSCULAR; INTRAVENOUS; SUBCUTANEOUS at 04:23

## 2022-08-05 RX ADMIN — HEPARIN SODIUM 27 UNITS/KG/HR: 10000 INJECTION, SOLUTION INTRAVENOUS at 12:03

## 2022-08-05 RX ADMIN — AMLODIPINE BESYLATE 10 MG: 5 TABLET ORAL at 09:26

## 2022-08-05 RX ADMIN — PANTOPRAZOLE SODIUM 40 MG: 40 TABLET, DELAYED RELEASE ORAL at 09:27

## 2022-08-05 RX ADMIN — INSULIN LISPRO 3 UNITS: 100 INJECTION, SOLUTION INTRAVENOUS; SUBCUTANEOUS at 00:10

## 2022-08-05 RX ADMIN — HYDROMORPHONE HYDROCHLORIDE 1 MG: 1 INJECTION, SOLUTION INTRAMUSCULAR; INTRAVENOUS; SUBCUTANEOUS at 23:18

## 2022-08-05 RX ADMIN — PIPERACILLIN AND TAZOBACTAM 3.38 G: 3; .375 INJECTION, POWDER, FOR SOLUTION INTRAVENOUS at 06:33

## 2022-08-05 RX ADMIN — HYDROMORPHONE HYDROCHLORIDE 1 MG: 1 INJECTION, SOLUTION INTRAMUSCULAR; INTRAVENOUS; SUBCUTANEOUS at 10:04

## 2022-08-05 RX ADMIN — OXYCODONE HYDROCHLORIDE 20 MG: 10 TABLET, FILM COATED, EXTENDED RELEASE ORAL at 09:26

## 2022-08-05 RX ADMIN — PIPERACILLIN AND TAZOBACTAM 3.38 G: 3; .375 INJECTION, POWDER, FOR SOLUTION INTRAVENOUS at 21:15

## 2022-08-05 RX ADMIN — HYDROMORPHONE HYDROCHLORIDE 1 MG: 1 INJECTION, SOLUTION INTRAMUSCULAR; INTRAVENOUS; SUBCUTANEOUS at 18:38

## 2022-08-05 RX ADMIN — CLINDAMYCIN IN 5 PERCENT DEXTROSE 600 MG: 12 INJECTION, SOLUTION INTRAVENOUS at 21:15

## 2022-08-05 RX ADMIN — HEPARIN SODIUM 2000 UNITS: 1000 INJECTION, SOLUTION INTRAVENOUS; SUBCUTANEOUS at 10:44

## 2022-08-05 RX ADMIN — LISINOPRIL 20 MG: 20 TABLET ORAL at 09:26

## 2022-08-05 RX ADMIN — OXYCODONE HYDROCHLORIDE 20 MG: 10 TABLET, FILM COATED, EXTENDED RELEASE ORAL at 21:15

## 2022-08-05 RX ADMIN — HEPARIN SODIUM 2000 UNITS: 1000 INJECTION, SOLUTION INTRAVENOUS; SUBCUTANEOUS at 02:50

## 2022-08-05 RX ADMIN — CLINDAMYCIN IN 5 PERCENT DEXTROSE 600 MG: 12 INJECTION, SOLUTION INTRAVENOUS at 04:21

## 2022-08-05 RX ADMIN — COLCHICINE 0.6 MG: 0.6 TABLET, FILM COATED ORAL at 09:27

## 2022-08-05 RX ADMIN — HEPARIN SODIUM 25 UNITS/KG/HR: 10000 INJECTION, SOLUTION INTRAVENOUS at 06:46

## 2022-08-05 RX ADMIN — POLYETHYLENE GLYCOL 3350 17 G: 17 POWDER, FOR SOLUTION ORAL at 09:27

## 2022-08-05 RX ADMIN — SODIUM CHLORIDE 75 ML/HR: 9 INJECTION, SOLUTION INTRAVENOUS at 23:35

## 2022-08-05 RX ADMIN — HEPARIN SODIUM 27 UNITS/KG/HR: 10000 INJECTION, SOLUTION INTRAVENOUS at 23:35

## 2022-08-05 NOTE — PROGRESS NOTES
Patient's PTT was 81.4. It was not therapeutic. Patient's heparin is already at highest dose per protocol 25 units. Called Dr. Staples and he said he will adjust heparin to accommodate patient's PTT levels.

## 2022-08-05 NOTE — ROUTINE PROCESS
RE:  Heparin rate change  Patient is on a heparin drip. Currently running at 25units/kg/hr. aPTT came back at 79.8. Our protocol states patient should receive 2000 unit bolus and drip should be increased by 2units/kg/hr. Oatient received to 2000 unit bolus, but Dr Edi Seaman s orders state 25units/kg/hr is the max dose. Spoke with Bhargav Arias in Pharmacy as well as Iris Reed, Supervisor. Since there were 3 attempts trying to get Dr Edi Seaman, with no answer, the rate change to 27units/kg/hr was not complete. Heparin at this time remains at 25units/kg/hr.

## 2022-08-05 NOTE — PROGRESS NOTES
PROGRESS NOTE      Chief Complaints:. Patient was comfortable without distress. Patient has no new complaints  HPI and  Objective:  No fever chills denies chest pain shortness of breath. Review of Systems:  Rest review of system negative, personally reviewed. EXAM:  Visit Vitals  BP (!) 158/72 (BP 1 Location: Left upper arm, BP Patient Position: At rest)   Pulse 63   Temp 98 °F (36.7 °C)   Resp 19   Ht 6' 1\" (1.854 m)   Wt 222 lb (100.7 kg)   SpO2 99%   BMI 29.29 kg/m²       Patient is awake and alert  Head and neck atraumatic, normocephalic. ENT: No hoarse voice  Cardiac system regular rate rhythm. Pulmonary no audible wheeze  Chest wall excursion normal with respiration cycle  Abdomen is soft not particularly distended. Neurologically nonfocal.  Skin is warm and moist.  Psychosocial: Cooperative. Vascular examination as previously noted no changes.     Recent Results (from the past 24 hour(s))   GLUCOSE, POC    Collection Time: 08/04/22 11:21 AM   Result Value Ref Range    Glucose (POC) 161 (H) 65 - 117 mg/dL    Performed by Chuck Christine    PTT    Collection Time: 08/04/22 12:41 PM   Result Value Ref Range    aPTT 65.3 (H) 21.2 - 34.1 sec    aPTT, therapeutic range   82 - 109 sec   GLUCOSE, POC    Collection Time: 08/04/22  4:53 PM   Result Value Ref Range    Glucose (POC) 131 (H) 65 - 117 mg/dL    Performed by [id number] Louie Hopkins, POC    Collection Time: 08/04/22  7:39 PM   Result Value Ref Range    Glucose (POC) 173 (H) 65 - 117 mg/dL    Performed by Maria Del Carmen Glaser    GLUCOSE, POC    Collection Time: 08/05/22 12:06 AM   Result Value Ref Range    Glucose (POC) 163 (H) 65 - 117 mg/dL    Performed by [facility]    PTT    Collection Time: 08/05/22 12:53 AM   Result Value Ref Range    aPTT 79.8 (H) 21.2 - 34.1 sec    aPTT, therapeutic range   82 - 109 sec   GLUCOSE, POC    Collection Time: 08/05/22  6:37 AM   Result Value Ref Range    Glucose (POC) 113 65 - 117 mg/dL    Performed by Roe Forte ASSESSMENT:   Patient is 65 y.o. with diagnosis of : Active Problems:    Toe gangrene ([address] (8/2/2022)      Limb ischemia (8/2/2022)        PLAN:                 Patient is scheduled for right great toe amputation tomorrow. Patient was discussed about procedure risks benefits and complication.

## 2022-08-05 NOTE — PROGRESS NOTES
CM reviewed chart. Patient waiting on surgical procedure. Patient does not have Insurance, any needs at discharge will be out of pocket expense.

## 2022-08-06 ENCOUNTER — ANESTHESIA (OUTPATIENT)
Dept: SURGERY | Age: 62
DRG: 256 | End: 2022-08-06

## 2022-08-06 ENCOUNTER — ANESTHESIA EVENT (OUTPATIENT)
Dept: SURGERY | Age: 62
DRG: 256 | End: 2022-08-06

## 2022-08-06 LAB
APTT PPP: 37.9 SEC (ref 21.2–34.1)
APTT PPP: 79.7 SEC (ref 21.2–34.1)
GLUCOSE BLD STRIP.AUTO-MCNC: 105 MG/DL (ref 65–117)
GLUCOSE BLD STRIP.AUTO-MCNC: 107 MG/DL (ref 65–117)
GLUCOSE BLD STRIP.AUTO-MCNC: 127 MG/DL (ref 65–117)
GLUCOSE BLD STRIP.AUTO-MCNC: 154 MG/DL (ref 65–117)
GLUCOSE BLD STRIP.AUTO-MCNC: 163 MG/DL (ref 65–117)
PERFORMED BY, TECHID: ABNORMAL
PERFORMED BY, TECHID: NORMAL
PERFORMED BY, TECHID: NORMAL
THERAPEUTIC RANGE,PTTT: ABNORMAL SEC (ref 82–109)
THERAPEUTIC RANGE,PTTT: ABNORMAL SEC (ref 82–109)

## 2022-08-06 PROCEDURE — 0Y6P0Z0 DETACHMENT AT RIGHT 1ST TOE, COMPLETE, OPEN APPROACH: ICD-10-PCS | Performed by: SURGERY

## 2022-08-06 PROCEDURE — 76210000006 HC OR PH I REC 0.5 TO 1 HR: Performed by: SURGERY

## 2022-08-06 PROCEDURE — 2709999900 HC NON-CHARGEABLE SUPPLY: Performed by: SURGERY

## 2022-08-06 PROCEDURE — 77030031139 HC SUT VCRL2 J&J -A: Performed by: SURGERY

## 2022-08-06 PROCEDURE — 74011250636 HC RX REV CODE- 250/636: Performed by: SURGERY

## 2022-08-06 PROCEDURE — 76060000032 HC ANESTHESIA 0.5 TO 1 HR: Performed by: SURGERY

## 2022-08-06 PROCEDURE — 74011000250 HC RX REV CODE- 250: Performed by: NURSE ANESTHETIST, CERTIFIED REGISTERED

## 2022-08-06 PROCEDURE — 74011636637 HC RX REV CODE- 636/637: Performed by: SURGERY

## 2022-08-06 PROCEDURE — 77030010509 HC AIRWY LMA MSK TELE -A: Performed by: ANESTHESIOLOGY

## 2022-08-06 PROCEDURE — 85730 THROMBOPLASTIN TIME PARTIAL: CPT

## 2022-08-06 PROCEDURE — 74011000258 HC RX REV CODE- 258: Performed by: SURGERY

## 2022-08-06 PROCEDURE — 74011250636 HC RX REV CODE- 250/636: Performed by: NURSE ANESTHETIST, CERTIFIED REGISTERED

## 2022-08-06 PROCEDURE — 88305 TISSUE EXAM BY PATHOLOGIST: CPT

## 2022-08-06 PROCEDURE — 82962 GLUCOSE BLOOD TEST: CPT

## 2022-08-06 PROCEDURE — 74011250637 HC RX REV CODE- 250/637: Performed by: SURGERY

## 2022-08-06 PROCEDURE — 36415 COLL VENOUS BLD VENIPUNCTURE: CPT

## 2022-08-06 PROCEDURE — 88311 DECALCIFY TISSUE: CPT

## 2022-08-06 PROCEDURE — 28825 PARTIAL AMPUTATION OF TOE: CPT | Performed by: SURGERY

## 2022-08-06 PROCEDURE — 65270000029 HC RM PRIVATE

## 2022-08-06 PROCEDURE — 76010000138 HC OR TIME 0.5 TO 1 HR: Performed by: SURGERY

## 2022-08-06 PROCEDURE — 74011000250 HC RX REV CODE- 250: Performed by: SURGERY

## 2022-08-06 RX ORDER — MIDAZOLAM HYDROCHLORIDE 1 MG/ML
INJECTION, SOLUTION INTRAMUSCULAR; INTRAVENOUS AS NEEDED
Status: DISCONTINUED | OUTPATIENT
Start: 2022-08-06 | End: 2022-08-06 | Stop reason: HOSPADM

## 2022-08-06 RX ORDER — SODIUM CHLORIDE, SODIUM LACTATE, POTASSIUM CHLORIDE, CALCIUM CHLORIDE 600; 310; 30; 20 MG/100ML; MG/100ML; MG/100ML; MG/100ML
INJECTION, SOLUTION INTRAVENOUS
Status: DISCONTINUED | OUTPATIENT
Start: 2022-08-06 | End: 2022-08-06 | Stop reason: HOSPADM

## 2022-08-06 RX ORDER — ONDANSETRON 2 MG/ML
4 INJECTION INTRAMUSCULAR; INTRAVENOUS AS NEEDED
Status: CANCELLED | OUTPATIENT
Start: 2022-08-06

## 2022-08-06 RX ORDER — SODIUM CHLORIDE 0.9 % (FLUSH) 0.9 %
5-40 SYRINGE (ML) INJECTION EVERY 8 HOURS
Status: CANCELLED | OUTPATIENT
Start: 2022-08-06

## 2022-08-06 RX ORDER — HYDROMORPHONE HYDROCHLORIDE 1 MG/ML
1 INJECTION, SOLUTION INTRAMUSCULAR; INTRAVENOUS; SUBCUTANEOUS
Status: DISCONTINUED | OUTPATIENT
Start: 2022-08-06 | End: 2022-08-06 | Stop reason: HOSPADM

## 2022-08-06 RX ORDER — EPHEDRINE SULFATE/0.9% NACL/PF 50 MG/5 ML
5 SYRINGE (ML) INTRAVENOUS AS NEEDED
Status: CANCELLED | OUTPATIENT
Start: 2022-08-06

## 2022-08-06 RX ORDER — LIDOCAINE HYDROCHLORIDE 20 MG/ML
INJECTION, SOLUTION INFILTRATION; PERINEURAL AS NEEDED
Status: DISCONTINUED | OUTPATIENT
Start: 2022-08-06 | End: 2022-08-06 | Stop reason: HOSPADM

## 2022-08-06 RX ORDER — FENTANYL CITRATE 50 UG/ML
50 INJECTION, SOLUTION INTRAMUSCULAR; INTRAVENOUS
Status: CANCELLED | OUTPATIENT
Start: 2022-08-06

## 2022-08-06 RX ORDER — DIPHENHYDRAMINE HYDROCHLORIDE 50 MG/ML
12.5 INJECTION, SOLUTION INTRAMUSCULAR; INTRAVENOUS AS NEEDED
Status: CANCELLED | OUTPATIENT
Start: 2022-08-06 | End: 2022-08-06

## 2022-08-06 RX ORDER — ALBUTEROL SULFATE 2.5 MG/.5ML
2.5 SOLUTION RESPIRATORY (INHALATION) AS NEEDED
Status: DISCONTINUED | OUTPATIENT
Start: 2022-08-06 | End: 2022-08-06 | Stop reason: HOSPADM

## 2022-08-06 RX ORDER — MIDAZOLAM HYDROCHLORIDE 1 MG/ML
0.5 INJECTION, SOLUTION INTRAMUSCULAR; INTRAVENOUS
Status: CANCELLED | OUTPATIENT
Start: 2022-08-06

## 2022-08-06 RX ORDER — ONDANSETRON 2 MG/ML
INJECTION INTRAMUSCULAR; INTRAVENOUS AS NEEDED
Status: DISCONTINUED | OUTPATIENT
Start: 2022-08-06 | End: 2022-08-06 | Stop reason: HOSPADM

## 2022-08-06 RX ORDER — FENTANYL CITRATE 50 UG/ML
INJECTION, SOLUTION INTRAMUSCULAR; INTRAVENOUS AS NEEDED
Status: DISCONTINUED | OUTPATIENT
Start: 2022-08-06 | End: 2022-08-06 | Stop reason: HOSPADM

## 2022-08-06 RX ORDER — SODIUM CHLORIDE 0.9 % (FLUSH) 0.9 %
5-40 SYRINGE (ML) INJECTION AS NEEDED
Status: CANCELLED | OUTPATIENT
Start: 2022-08-06

## 2022-08-06 RX ORDER — HYDROMORPHONE HYDROCHLORIDE 1 MG/ML
1 INJECTION, SOLUTION INTRAMUSCULAR; INTRAVENOUS; SUBCUTANEOUS
Status: DISCONTINUED | OUTPATIENT
Start: 2022-08-06 | End: 2022-08-07 | Stop reason: CLARIF

## 2022-08-06 RX ORDER — PROPOFOL 10 MG/ML
INJECTION, EMULSION INTRAVENOUS AS NEEDED
Status: DISCONTINUED | OUTPATIENT
Start: 2022-08-06 | End: 2022-08-06 | Stop reason: HOSPADM

## 2022-08-06 RX ORDER — OXYCODONE HYDROCHLORIDE 15 MG/1
15 TABLET ORAL AS NEEDED
Status: DISCONTINUED | OUTPATIENT
Start: 2022-08-06 | End: 2022-08-06 | Stop reason: HOSPADM

## 2022-08-06 RX ORDER — LIDOCAINE HYDROCHLORIDE 20 MG/ML
INJECTION, SOLUTION EPIDURAL; INFILTRATION; INTRACAUDAL; PERINEURAL AS NEEDED
Status: DISCONTINUED | OUTPATIENT
Start: 2022-08-06 | End: 2022-08-06 | Stop reason: HOSPADM

## 2022-08-06 RX ADMIN — CLINDAMYCIN IN 5 PERCENT DEXTROSE 600 MG: 12 INJECTION, SOLUTION INTRAVENOUS at 22:17

## 2022-08-06 RX ADMIN — ONDANSETRON 4 MG: 2 INJECTION INTRAMUSCULAR; INTRAVENOUS at 21:00

## 2022-08-06 RX ADMIN — HYDROMORPHONE HYDROCHLORIDE 1 MG: 1 INJECTION, SOLUTION INTRAMUSCULAR; INTRAVENOUS; SUBCUTANEOUS at 07:56

## 2022-08-06 RX ADMIN — CLINDAMYCIN IN 5 PERCENT DEXTROSE 600 MG: 12 INJECTION, SOLUTION INTRAVENOUS at 03:37

## 2022-08-06 RX ADMIN — HYDROMORPHONE HYDROCHLORIDE 1 MG: 1 INJECTION, SOLUTION INTRAMUSCULAR; INTRAVENOUS; SUBCUTANEOUS at 23:34

## 2022-08-06 RX ADMIN — HYDROMORPHONE HYDROCHLORIDE 1 MG: 1 INJECTION, SOLUTION INTRAMUSCULAR; INTRAVENOUS; SUBCUTANEOUS at 11:43

## 2022-08-06 RX ADMIN — FENTANYL CITRATE 100 MCG: 50 INJECTION, SOLUTION INTRAMUSCULAR; INTRAVENOUS at 21:09

## 2022-08-06 RX ADMIN — COLCHICINE 0.6 MG: 0.6 TABLET, FILM COATED ORAL at 09:18

## 2022-08-06 RX ADMIN — SODIUM CHLORIDE, POTASSIUM CHLORIDE, SODIUM LACTATE AND CALCIUM CHLORIDE: 600; 310; 30; 20 INJECTION, SOLUTION INTRAVENOUS at 20:53

## 2022-08-06 RX ADMIN — OXYCODONE AND ACETAMINOPHEN 2 TABLET: 5; 325 TABLET ORAL at 14:18

## 2022-08-06 RX ADMIN — PIPERACILLIN AND TAZOBACTAM 3.38 G: 3; .375 INJECTION, POWDER, FOR SOLUTION INTRAVENOUS at 22:17

## 2022-08-06 RX ADMIN — LISINOPRIL 20 MG: 20 TABLET ORAL at 09:18

## 2022-08-06 RX ADMIN — PIPERACILLIN AND TAZOBACTAM 3.38 G: 3; .375 INJECTION, POWDER, FOR SOLUTION INTRAVENOUS at 14:15

## 2022-08-06 RX ADMIN — HEPARIN SODIUM 2000 UNITS: 1000 INJECTION, SOLUTION INTRAVENOUS; SUBCUTANEOUS at 01:44

## 2022-08-06 RX ADMIN — INSULIN LISPRO 3 UNITS: 100 INJECTION, SOLUTION INTRAVENOUS; SUBCUTANEOUS at 00:44

## 2022-08-06 RX ADMIN — OXYCODONE HYDROCHLORIDE 20 MG: 10 TABLET, FILM COATED, EXTENDED RELEASE ORAL at 22:16

## 2022-08-06 RX ADMIN — PIPERACILLIN AND TAZOBACTAM 3.38 G: 3; .375 INJECTION, POWDER, FOR SOLUTION INTRAVENOUS at 05:28

## 2022-08-06 RX ADMIN — HYDROMORPHONE HYDROCHLORIDE 1 MG: 1 INJECTION, SOLUTION INTRAMUSCULAR; INTRAVENOUS; SUBCUTANEOUS at 03:02

## 2022-08-06 RX ADMIN — AMLODIPINE BESYLATE 10 MG: 5 TABLET ORAL at 09:18

## 2022-08-06 RX ADMIN — MIDAZOLAM HYDROCHLORIDE 2 MG: 2 INJECTION, SOLUTION INTRAMUSCULAR; INTRAVENOUS at 21:00

## 2022-08-06 RX ADMIN — PANTOPRAZOLE SODIUM 40 MG: 40 TABLET, DELAYED RELEASE ORAL at 09:18

## 2022-08-06 RX ADMIN — CLINDAMYCIN IN 5 PERCENT DEXTROSE 600 MG: 12 INJECTION, SOLUTION INTRAVENOUS at 16:05

## 2022-08-06 RX ADMIN — HYDROMORPHONE HYDROCHLORIDE 1 MG: 1 INJECTION, SOLUTION INTRAMUSCULAR; INTRAVENOUS; SUBCUTANEOUS at 16:05

## 2022-08-06 RX ADMIN — OXYCODONE HYDROCHLORIDE 20 MG: 10 TABLET, FILM COATED, EXTENDED RELEASE ORAL at 09:18

## 2022-08-06 RX ADMIN — LIDOCAINE HYDROCHLORIDE 100 MG: 20 INJECTION, SOLUTION EPIDURAL; INFILTRATION; INTRACAUDAL; PERINEURAL at 21:05

## 2022-08-06 RX ADMIN — CLINDAMYCIN IN 5 PERCENT DEXTROSE 600 MG: 12 INJECTION, SOLUTION INTRAVENOUS at 09:18

## 2022-08-06 RX ADMIN — PROPOFOL 200 MG: 10 INJECTION, EMULSION INTRAVENOUS at 21:05

## 2022-08-06 RX ADMIN — SODIUM CHLORIDE 75 ML/HR: 9 INJECTION, SOLUTION INTRAVENOUS at 14:15

## 2022-08-06 NOTE — ANESTHESIA PREPROCEDURE EVALUATION
Relevant Problems   HEMATOLOGY   (+) Osteomyelitis (HCC)       Anesthetic History   No history of anesthetic complications            Review of Systems / Medical History  Patient summary reviewed, nursing notes reviewed and pertinent labs reviewed    Pulmonary          Smoker      Comments: SNORING. Neuro/Psych              Cardiovascular    Hypertension          PAD         GI/Hepatic/Renal     GERD           Endo/Other    Diabetes: type 2    Obesity and arthritis (Gouty Arthritis)     Other Findings   Comments:   Procedure Information    Case: [id number] Date/Time: 08/06/22 2211  Procedure: Right Great Toe Amputation (Right)  Anesthesia type: General  Pre-op diagnosis: Gangrene  Location: [facility] ADD-ON / [facility] OR  Surgeons: Kate Martínez MD               Physical Exam    Airway  Mallampati: III  TM Distance: < 4 cm  Neck ROM: short neck   Mouth opening: Normal     Cardiovascular    Rhythm: regular  Rate: normal         Dental      Comments: DENTURES.     Pulmonary  Breath sounds clear to auscultation               Abdominal  GI exam deferred       Other Findings            Anesthetic Plan    ASA: 3  Anesthesia type: general          Induction: Intravenous  Anesthetic plan and risks discussed with: Patient

## 2022-08-07 LAB
APTT PPP: 60.8 SEC (ref 21.2–34.1)
GLUCOSE BLD STRIP.AUTO-MCNC: 110 MG/DL (ref 65–117)
GLUCOSE BLD STRIP.AUTO-MCNC: 124 MG/DL (ref 65–117)
GLUCOSE BLD STRIP.AUTO-MCNC: 206 MG/DL (ref 65–117)
PERFORMED BY, TECHID: ABNORMAL
PERFORMED BY, TECHID: ABNORMAL
PERFORMED BY, TECHID: NORMAL
THERAPEUTIC RANGE,PTTT: ABNORMAL SEC (ref 82–109)

## 2022-08-07 PROCEDURE — 36415 COLL VENOUS BLD VENIPUNCTURE: CPT

## 2022-08-07 PROCEDURE — 74011000250 HC RX REV CODE- 250: Performed by: SURGERY

## 2022-08-07 PROCEDURE — 94762 N-INVAS EAR/PLS OXIMTRY CONT: CPT

## 2022-08-07 PROCEDURE — 82962 GLUCOSE BLOOD TEST: CPT

## 2022-08-07 PROCEDURE — 74011250636 HC RX REV CODE- 250/636: Performed by: SURGERY

## 2022-08-07 PROCEDURE — 77010033678 HC OXYGEN DAILY

## 2022-08-07 PROCEDURE — 99232 SBSQ HOSP IP/OBS MODERATE 35: CPT | Performed by: SURGERY

## 2022-08-07 PROCEDURE — 94761 N-INVAS EAR/PLS OXIMETRY MLT: CPT

## 2022-08-07 PROCEDURE — 65270000029 HC RM PRIVATE

## 2022-08-07 PROCEDURE — 74011250637 HC RX REV CODE- 250/637: Performed by: SURGERY

## 2022-08-07 PROCEDURE — 74011636637 HC RX REV CODE- 636/637: Performed by: SURGERY

## 2022-08-07 PROCEDURE — 85730 THROMBOPLASTIN TIME PARTIAL: CPT

## 2022-08-07 PROCEDURE — 74011000258 HC RX REV CODE- 258: Performed by: SURGERY

## 2022-08-07 RX ORDER — SODIUM CHLORIDE 0.9 % (FLUSH) 0.9 %
5-40 SYRINGE (ML) INJECTION EVERY 8 HOURS
Status: DISCONTINUED | OUTPATIENT
Start: 2022-08-07 | End: 2022-08-08 | Stop reason: HOSPADM

## 2022-08-07 RX ORDER — ONDANSETRON 2 MG/ML
4 INJECTION INTRAMUSCULAR; INTRAVENOUS
Status: DISCONTINUED | OUTPATIENT
Start: 2022-08-07 | End: 2022-08-08 | Stop reason: HOSPADM

## 2022-08-07 RX ORDER — ENOXAPARIN SODIUM 100 MG/ML
40 INJECTION SUBCUTANEOUS DAILY
Status: DISCONTINUED | OUTPATIENT
Start: 2022-08-07 | End: 2022-08-08

## 2022-08-07 RX ORDER — HYDROMORPHONE HYDROCHLORIDE 1 MG/ML
1 INJECTION, SOLUTION INTRAMUSCULAR; INTRAVENOUS; SUBCUTANEOUS
Status: DISCONTINUED | OUTPATIENT
Start: 2022-08-07 | End: 2022-08-08 | Stop reason: HOSPADM

## 2022-08-07 RX ORDER — ONDANSETRON 4 MG/1
4 TABLET, ORALLY DISINTEGRATING ORAL
Status: DISCONTINUED | OUTPATIENT
Start: 2022-08-07 | End: 2022-08-08 | Stop reason: HOSPADM

## 2022-08-07 RX ORDER — SODIUM CHLORIDE 0.9 % (FLUSH) 0.9 %
5-40 SYRINGE (ML) INJECTION AS NEEDED
Status: DISCONTINUED | OUTPATIENT
Start: 2022-08-07 | End: 2022-08-08 | Stop reason: HOSPADM

## 2022-08-07 RX ADMIN — ENOXAPARIN SODIUM 40 MG: 100 INJECTION SUBCUTANEOUS at 09:02

## 2022-08-07 RX ADMIN — HYDROMORPHONE HYDROCHLORIDE 1 MG: 1 INJECTION, SOLUTION INTRAMUSCULAR; INTRAVENOUS; SUBCUTANEOUS at 04:13

## 2022-08-07 RX ADMIN — COLCHICINE 0.6 MG: 0.6 TABLET, FILM COATED ORAL at 08:59

## 2022-08-07 RX ADMIN — CLINDAMYCIN IN 5 PERCENT DEXTROSE 600 MG: 12 INJECTION, SOLUTION INTRAVENOUS at 09:14

## 2022-08-07 RX ADMIN — CLINDAMYCIN IN 5 PERCENT DEXTROSE 600 MG: 12 INJECTION, SOLUTION INTRAVENOUS at 04:13

## 2022-08-07 RX ADMIN — HEPARIN SODIUM 29 UNITS/KG/HR: 10000 INJECTION, SOLUTION INTRAVENOUS at 00:45

## 2022-08-07 RX ADMIN — INSULIN LISPRO 6 UNITS: 100 INJECTION, SOLUTION INTRAVENOUS; SUBCUTANEOUS at 11:23

## 2022-08-07 RX ADMIN — PANTOPRAZOLE SODIUM 40 MG: 40 TABLET, DELAYED RELEASE ORAL at 08:59

## 2022-08-07 RX ADMIN — INSULIN LISPRO 3 UNITS: 100 INJECTION, SOLUTION INTRAVENOUS; SUBCUTANEOUS at 00:57

## 2022-08-07 RX ADMIN — OXYCODONE AND ACETAMINOPHEN 2 TABLET: 5; 325 TABLET ORAL at 16:51

## 2022-08-07 RX ADMIN — CLINDAMYCIN IN 5 PERCENT DEXTROSE 600 MG: 12 INJECTION, SOLUTION INTRAVENOUS at 15:44

## 2022-08-07 RX ADMIN — HYDROMORPHONE HYDROCHLORIDE 1 MG: 1 INJECTION, SOLUTION INTRAMUSCULAR; INTRAVENOUS; SUBCUTANEOUS at 22:05

## 2022-08-07 RX ADMIN — OXYCODONE HYDROCHLORIDE 20 MG: 10 TABLET, FILM COATED, EXTENDED RELEASE ORAL at 08:58

## 2022-08-07 RX ADMIN — SODIUM CHLORIDE, PRESERVATIVE FREE 10 ML: 5 INJECTION INTRAVENOUS at 13:37

## 2022-08-07 RX ADMIN — OXYCODONE AND ACETAMINOPHEN 2 TABLET: 5; 325 TABLET ORAL at 00:57

## 2022-08-07 RX ADMIN — LISINOPRIL 20 MG: 20 TABLET ORAL at 08:59

## 2022-08-07 RX ADMIN — HEPARIN SODIUM 31 UNITS/KG/HR: 10000 INJECTION, SOLUTION INTRAVENOUS at 06:28

## 2022-08-07 RX ADMIN — PIPERACILLIN AND TAZOBACTAM 3.38 G: 3; .375 INJECTION, POWDER, FOR SOLUTION INTRAVENOUS at 13:37

## 2022-08-07 RX ADMIN — OXYCODONE HYDROCHLORIDE 20 MG: 10 TABLET, FILM COATED, EXTENDED RELEASE ORAL at 22:05

## 2022-08-07 RX ADMIN — HEPARIN SODIUM 2000 UNITS: 1000 INJECTION, SOLUTION INTRAVENOUS; SUBCUTANEOUS at 06:28

## 2022-08-07 RX ADMIN — CLINDAMYCIN IN 5 PERCENT DEXTROSE 600 MG: 12 INJECTION, SOLUTION INTRAVENOUS at 22:05

## 2022-08-07 RX ADMIN — PIPERACILLIN AND TAZOBACTAM 3.38 G: 3; .375 INJECTION, POWDER, FOR SOLUTION INTRAVENOUS at 05:37

## 2022-08-07 RX ADMIN — SODIUM CHLORIDE, PRESERVATIVE FREE 10 ML: 5 INJECTION INTRAVENOUS at 08:59

## 2022-08-07 RX ADMIN — SODIUM CHLORIDE 75 ML/HR: 9 INJECTION, SOLUTION INTRAVENOUS at 05:37

## 2022-08-07 RX ADMIN — OXYCODONE AND ACETAMINOPHEN 2 TABLET: 5; 325 TABLET ORAL at 06:30

## 2022-08-07 RX ADMIN — HYDROMORPHONE HYDROCHLORIDE 1 MG: 1 INJECTION, SOLUTION INTRAMUSCULAR; INTRAVENOUS; SUBCUTANEOUS at 11:23

## 2022-08-07 RX ADMIN — PIPERACILLIN AND TAZOBACTAM 3.38 G: 3; .375 INJECTION, POWDER, FOR SOLUTION INTRAVENOUS at 22:05

## 2022-08-07 RX ADMIN — SODIUM CHLORIDE 75 ML/HR: 9 INJECTION, SOLUTION INTRAVENOUS at 18:10

## 2022-08-07 RX ADMIN — AMLODIPINE BESYLATE 10 MG: 5 TABLET ORAL at 08:58

## 2022-08-07 NOTE — ANESTHESIA POSTPROCEDURE EVALUATION
Procedure(s):  Right Great Toe Amputation. general    Anesthesia Post Evaluation      Multimodal analgesia: multimodal analgesia used between 6 hours prior to anesthesia start to PACU discharge  Patient location during evaluation: PACU  Patient participation: complete - patient participated  Level of consciousness: awake  Pain score: 0  Pain management: adequate  Airway patency: patent  Anesthetic complications: no  Cardiovascular status: acceptable  Respiratory status: acceptable  Hydration status: acceptable  Post anesthesia nausea and vomiting:  controlled  Final Post Anesthesia Temperature Assessment:  Normothermia (36.0-37.5 degrees C)      INITIAL Post-op Vital signs:   Vitals Value Taken Time   /55 08/06/22 2137   Temp 36.5 °C (97.7 °F) 08/06/22 2137   Pulse 74 08/06/22 2138   Resp 16 08/06/22 2138   SpO2 94 % 08/06/22 2138   Vitals shown include unvalidated device data.

## 2022-08-07 NOTE — OP NOTES
This is operative report on [place] [address], patient's YOB: 1960. Date of surgery: August 6, 2022. Diagnosis: Right great toe gangrene. Postop diagnosis: Same as above. Procedure: Right great toe amputation at the metatarsal level. Anesthesia: General with LMA. Anesthesiologist: Dr. Heladio Eric  EBL: Minimal  Complication: None  Specimen: Right great toe  Implants: None  Assistant: None    Indication for surgery: Mr. Sophy Germain is currently hospitalized with right great toe gangrene requiring amputation. Patient had a previous right leg revascularization procedure done multiple times. Despite wound has not healed. Patient required amputation at the metatarsal level. We discussed rational for the surgery, surgical risks benefits complication. Patient has signed surgical consent. Description of procedure: Patient was brought to the operating table comfortable supine position. Followed by general anesthesia was initiated. Followed by patient's right foot was prepped usual sterile technique using Betadine solution. Followed by sterile drapes of procedure patient. At this time timeout was called after confirmation was made procedure commenced. Circumlinear incision was made at the base of the necrotic tissue at the great toe level of the right foot using #15 blade. Full-thickness skin incision was made circumferentially and musculature and tendon was divided as well. Sesamoid bone and the first metatarsal bone was divided as well distally. I used electric saw to divide first metatarsal bone. Followed by using a rongeur surrounding tissue was divided as well. Minimal blood flow was noted. After hemostasis obtained wounds are partially closed using 3-0 Vicryl sutures. Skin was partially closed with 2-0 Prolene sutures. Medial wounds are left open. Open wound was packed with iodine soaked 4 x 4 gauze and Kerlix roll. Patient tolerated procedure well without any complication. Patient was awakened and extubated in the OR transferred recovery area in stable condition.

## 2022-08-07 NOTE — BRIEF OP NOTE
Brief Postoperative Note    Patient: Alexis Wooten  YOB: 1960  MRN: [id number]    Date of Procedure: 8/6/2022     Pre-Op Diagnosis: right great toe gangrene    Post-Op Diagnosis: same    Procedure(s):  Right Great Toe Amputation    Surgeon(s):  Jun, Wenda Lesch, MD    Surgical Assistant: None    Anesthesia: General     Estimated Blood Loss (mL): Minimal    Complications: None    Specimens:   ID Type Source Tests Collected by Time Destination   1 : RIGHT GREAT TOE Preservative Toe  Jun, Wenda Lesch, MD 8/6/2022 2126 Pathology        Implants: none    Drains:     Findings: as above    Electronically Signed by Brigitte Lombardi MD on 8/6/2022 at 9:28 PM

## 2022-08-07 NOTE — PROGRESS NOTES
Problem: Diabetes Self-Management  Goal: *Developing strategies to promote health/change behavior  Description: Define the ABC's of diabetes; identify appropriate screenings, schedule and personal plan for screenings.   Outcome: Progressing Towards Goal     Problem: Pain  Goal: *Control of Pain  Outcome: Progressing Towards Goal     Problem: Infection - Risk of, Surgical Site Infection  Goal: *Absence of surgical site infection signs and symptoms  Outcome: Progressing Towards Goal

## 2022-08-07 NOTE — PROGRESS NOTES
PROGRESS NOTE      Chief Complaints:  Patient has no new complaints  HPI and  Objective:    Patient has no fever. Review of Systems:  Rest review of system negative, personally reviewed  EXAM:  Visit Vitals  BP (!) 145/79   Pulse 64   Temp 97.8 °F (36.6 °C)   Resp 20   Ht 6' 1\" (1.854 m)   Wt 222 lb (100.7 kg)   SpO2 95%   BMI 29.29 kg/m²       Patient is awake. And alert  Head and neck atraumatic, normocephalic. ENT: No hoarse voice  Cardiac system regular rate rhythm. Pulmonary no audible wheeze  Chest wall excursion normal with respiration cycle  Abdomen is soft not particularly distended. Neurologically nonfocal.  Skin is warm and moist.  Psychosocial: Cooperative. Vascular examination as previously noted no changes. Recent Results (from the past 24 hour(s))   PTT    Collection Time: 08/06/22 10:51 AM   Result Value Ref Range    aPTT 37.9 (H) 21.2 - 34.1 sec    aPTT, therapeutic range   82 - 109 sec   GLUCOSE, POC    Collection Time: 08/06/22 11:46 AM   Result Value Ref Range    Glucose (POC) 127 (H) 65 - 117 mg/dL    Performed by Deana Montenegro    GLUCOSE, POC    Collection Time: 08/06/22  6:03 PM   Result Value Ref Range    Glucose (POC) 105 65 - 117 mg/dL    Performed by Deana Montenegro    GLUCOSE, POC    Collection Time: 08/06/22 11:49 PM   Result Value Ref Range    Glucose (POC) 154 (H) 65 - 117 mg/dL    Performed by Azar Lau    PTT    Collection Time: 08/07/22  4:58 AM   Result Value Ref Range    aPTT 60.8 (H) 21.2 - 34.1 sec    aPTT, therapeutic range   82 - 109 sec   GLUCOSE, POC    Collection Time: 08/07/22  5:59 AM   Result Value Ref Range    Glucose (POC) 110 65 - 117 mg/dL    Performed by Lio MANCUSO        ASSESSMENT:   Patient is 65 y.o. with diagnosis of : Active Problems:    Toe gangrene ([address] (8/2/2022)      Limb ischemia (8/2/2022)        PLAN:                 I changed on the right foot great toe amputation site wound wounds are clean.   I packed the wound with iodoform packing    We will teach patient's wife about wound care with iodoform packing changes to be done twice daily on the great toe amputation site.

## 2022-08-08 VITALS
WEIGHT: 222 LBS | DIASTOLIC BLOOD PRESSURE: 82 MMHG | BODY MASS INDEX: 29.42 KG/M2 | HEART RATE: 62 BPM | TEMPERATURE: 98.3 F | RESPIRATION RATE: 16 BRPM | OXYGEN SATURATION: 98 % | HEIGHT: 73 IN | SYSTOLIC BLOOD PRESSURE: 147 MMHG

## 2022-08-08 LAB
GLUCOSE BLD STRIP.AUTO-MCNC: 127 MG/DL (ref 65–117)
GLUCOSE BLD STRIP.AUTO-MCNC: 140 MG/DL (ref 65–117)
GLUCOSE BLD STRIP.AUTO-MCNC: 160 MG/DL (ref 65–117)
PERFORMED BY, TECHID: ABNORMAL

## 2022-08-08 PROCEDURE — 74011000258 HC RX REV CODE- 258: Performed by: SURGERY

## 2022-08-08 PROCEDURE — 99239 HOSP IP/OBS DSCHRG MGMT >30: CPT | Performed by: SURGERY

## 2022-08-08 PROCEDURE — 74011250636 HC RX REV CODE- 250/636: Performed by: SURGERY

## 2022-08-08 PROCEDURE — 82962 GLUCOSE BLOOD TEST: CPT

## 2022-08-08 PROCEDURE — 74011250637 HC RX REV CODE- 250/637: Performed by: SURGERY

## 2022-08-08 PROCEDURE — 74011636637 HC RX REV CODE- 636/637: Performed by: SURGERY

## 2022-08-08 RX ORDER — OXYCODONE AND ACETAMINOPHEN 10; 325 MG/1; MG/1
1 TABLET ORAL
Qty: 30 TABLET | Refills: 0 | Status: SHIPPED | OUTPATIENT
Start: 2022-08-08 | End: 2022-08-22

## 2022-08-08 RX ORDER — AMOXICILLIN AND CLAVULANATE POTASSIUM 875; 125 MG/1; MG/1
1 TABLET, FILM COATED ORAL 2 TIMES DAILY
Qty: 20 TABLET | Refills: 0 | Status: SHIPPED | OUTPATIENT
Start: 2022-08-08 | End: 2022-08-27

## 2022-08-08 RX ADMIN — OXYCODONE HYDROCHLORIDE 20 MG: 10 TABLET, FILM COATED, EXTENDED RELEASE ORAL at 09:48

## 2022-08-08 RX ADMIN — CLINDAMYCIN IN 5 PERCENT DEXTROSE 600 MG: 12 INJECTION, SOLUTION INTRAVENOUS at 09:49

## 2022-08-08 RX ADMIN — CLINDAMYCIN IN 5 PERCENT DEXTROSE 600 MG: 12 INJECTION, SOLUTION INTRAVENOUS at 05:04

## 2022-08-08 RX ADMIN — OXYCODONE AND ACETAMINOPHEN 2 TABLET: 5; 325 TABLET ORAL at 09:52

## 2022-08-08 RX ADMIN — PIPERACILLIN AND TAZOBACTAM 3.38 G: 3; .375 INJECTION, POWDER, FOR SOLUTION INTRAVENOUS at 05:00

## 2022-08-08 RX ADMIN — COLCHICINE 0.6 MG: 0.6 TABLET, FILM COATED ORAL at 09:48

## 2022-08-08 RX ADMIN — OXYCODONE AND ACETAMINOPHEN 2 TABLET: 5; 325 TABLET ORAL at 13:45

## 2022-08-08 RX ADMIN — AMLODIPINE BESYLATE 10 MG: 5 TABLET ORAL at 09:48

## 2022-08-08 RX ADMIN — LISINOPRIL 20 MG: 20 TABLET ORAL at 09:48

## 2022-08-08 RX ADMIN — INSULIN LISPRO 3 UNITS: 100 INJECTION, SOLUTION INTRAVENOUS; SUBCUTANEOUS at 12:41

## 2022-08-08 RX ADMIN — APIXABAN 10 MG: 5 TABLET, FILM COATED ORAL at 09:48

## 2022-08-08 RX ADMIN — PANTOPRAZOLE SODIUM 40 MG: 40 TABLET, DELAYED RELEASE ORAL at 09:48

## 2022-08-08 RX ADMIN — OXYCODONE AND ACETAMINOPHEN 2 TABLET: 5; 325 TABLET ORAL at 05:01

## 2022-08-08 RX ADMIN — SODIUM CHLORIDE 75 ML/HR: 9 INJECTION, SOLUTION INTRAVENOUS at 05:04

## 2022-08-08 NOTE — PROGRESS NOTES
1145. CM met with patient at the bedside to follow up on DCP. Patient does not have medical coverage so he will not be able to get [facility]. Patient states his spouses friend is a [facility] nurse who will be assisting his wife with monitoring the wound. Primary nurse aware of patient not receiving HH. Dr. Belinda Macedo informed.      [address], [address]

## 2022-08-08 NOTE — PROGRESS NOTES
Discharge plan of care/case management plan validated with provider discharge order. Patient discharged home with wife. Ivs removed. Discharge summary given with no questions or concerns. Patient wheeled down safely.

## 2022-08-08 NOTE — PROGRESS NOTES
Spiritual Care Assessment/Progress Note  [facility]      NAME: Gareth Villar      MRN: [id number]  AGE: 65 y.o. SEX: male  Samaritan Affiliation: Other   Language: English     8/8/2022     Total Time (in minutes): 25     Spiritual Assessment begun in [address] through conversation with:         [x]Patient        [x] Family    [] Friend(s)        Reason for Consult: Initial/Spiritual assessment, patient floor     Spiritual beliefs: (Please include comment if needed)     [x] Identifies with a rene tradition:         [x] Supported by a rene community:            [] Claims no spiritual orientation:           [] Seeking spiritual identity:                [] Adheres to an individual form of spirituality:           [] Not able to assess:                           Identified resources for coping:      [x] Prayer                               [] Music                  [] Guided Imagery     [] Family/friends                 [] Pet visits     [] Devotional reading                         [] Unknown     [] Other:                                              Interventions offered during this visit: (See comments for more details)    Patient Interventions: Affirmation of emotions/emotional suffering, Affirmation of rene, Catharsis/review of pertinent events in supportive environment, Coping skills reviewed/reinforced, Iconic (affirming the presence of God/Higher Power), Life review/legacy, Initial/Spiritual assessment, patient floor, Normalization of emotional/spiritual concerns, Prayer (assurance of), Samaritan beliefs/image of God discussed, Bridging     Family/Friend(s):  Affirmation of emotions/emotional suffering, Affirmation of rene, Bridging, Catharsis/review of pertinent events in supportive environment, Coping skills reviewed/reinforced, Life review/legacy, Prayer (assurance of), Initial Assessment, Normalization of emotional/spiritual concerns     Plan of Care:     [] Support spiritual and/or cultural needs    [] Support AMD and/or advance care planning process      [] Support grieving process   [] Coordinate Rites and/or Rituals    [] Coordination with community clergy   [] No spiritual needs identified at this time   [] Detailed Plan of Care below (See Comments)  [] Make referral to Music Therapy  [] Make referral to Pet Therapy     [] Make referral to Addiction services  [] Make referral to [facility]  [] Make referral to Spiritual Care Partner  [] No future visits requested        [x] Contact Spiritual Care for further referrals     Comments:  Visited patient in [address] [facility] for initial assessment. Patient and wife were present during the visit. Patient shared about his medical situation, his coping skills and future care plans. Shared about how he would spend his time recovering and acknowledged his wife's suggestion of spending more time with his grand son. Shared about outdoor activities he enjoys and many at his Taoist who are praying for him. Provided supportive presence while facilitating storytelling and exploring their needs. Normalized his difficulties while affirming his health care, familial support and spiritual resources. Advised of  availability. Contact chaplains for further referrals. Chaplain Darrion Diop M.Div.    can be reached by calling the  at [facility]  [phone]

## 2022-08-08 NOTE — DISCHARGE INSTRUCTIONS
Daily wound care on the right foot twice with iodoform packing as instructed. Take Eliquis 10 mg p.o. twice daily for next 7 days and switch to 5 mg p.o. twice daily dose. Finish your antibiotics.

## 2022-08-08 NOTE — DISCHARGE SUMMARY
This is discharge summary for [place] [address], patient's YOB: 1960. Brief Hospital course: Mr. Sophy Germain was admitted to hospital and underwent a right great toe gangrene. Postop patient is doing well. Patient currently have a partially open wound right great toe amputation site. Requiring wound care. Patient will be sent home today with follow-up with Dr. Abraham Lutz in 1 to 2 weeks time. and patient's family was instructed about wound care. and I will also have a home nurse visit as well arranged. He will need to take Eliquis 10 mg p.o. twice daily for next 7 days and transition to 5 mg p.o. twice daily dose. Teresita Merritt

## 2022-08-11 ENCOUNTER — DOCUMENTATION ONLY (OUTPATIENT)
Dept: SURGERY | Age: 62
End: 2022-08-11

## 2022-08-15 ENCOUNTER — OFFICE VISIT (OUTPATIENT)
Dept: SURGERY | Age: 62
End: 2022-08-15

## 2022-08-15 ENCOUNTER — TELEPHONE (OUTPATIENT)
Dept: SURGERY | Age: 62
End: 2022-08-15

## 2022-08-15 VITALS
HEART RATE: 71 BPM | OXYGEN SATURATION: 99 % | HEIGHT: 73 IN | RESPIRATION RATE: 20 BRPM | SYSTOLIC BLOOD PRESSURE: 159 MMHG | BODY MASS INDEX: 29.03 KG/M2 | TEMPERATURE: 98 F | DIASTOLIC BLOOD PRESSURE: 76 MMHG | WEIGHT: 219 LBS

## 2022-08-15 DIAGNOSIS — I96 GANGRENE OF TOE (HCC): ICD-10-CM

## 2022-08-15 DIAGNOSIS — M79.604 PAIN OF RIGHT LOWER EXTREMITY: Primary | ICD-10-CM

## 2022-08-15 PROCEDURE — 99024 POSTOP FOLLOW-UP VISIT: CPT | Performed by: SURGERY

## 2022-08-15 RX ORDER — AMLODIPINE AND BENAZEPRIL HYDROCHLORIDE 5; 20 MG/1; MG/1
CAPSULE ORAL
COMMUNITY
End: 2022-08-22 | Stop reason: SDUPTHER

## 2022-08-15 RX ORDER — OXYCODONE AND ACETAMINOPHEN 10; 325 MG/1; MG/1
1 TABLET ORAL
Qty: 21 TABLET | Refills: 0 | Status: SHIPPED | OUTPATIENT
Start: 2022-08-15 | End: 2022-08-22

## 2022-08-15 RX ORDER — OXYCODONE HCL 20 MG/1
20 TABLET, FILM COATED, EXTENDED RELEASE ORAL EVERY 12 HOURS
Qty: 60 TABLET | Refills: 0 | Status: SHIPPED | OUTPATIENT
Start: 2022-08-15 | End: 2022-09-14

## 2022-08-15 RX ORDER — AMOXICILLIN AND CLAVULANATE POTASSIUM 875; 125 MG/1; MG/1
1 TABLET, FILM COATED ORAL 2 TIMES DAILY
Qty: 20 TABLET | Refills: 0 | Status: SHIPPED | OUTPATIENT
Start: 2022-08-15 | End: 2022-08-24 | Stop reason: SDUPTHER

## 2022-08-15 NOTE — PROGRESS NOTES
SUBJECTIVE:  Patient is a here today follow-up right great toe amputation. Past Medical History:  No date: Diabetes ([address]  No date: Hypertension  Past Surgical History:  2022: HX AMPUTATION TOE; Right      Comment:  Right great toe  2022: VASCULAR SURGERY PROCEDURE UNLIST; Right  @[facility]@  Social History    Tobacco Use      Smoking status: Former        Packs/day: 1.50        Years: 15.00        Pack years: 22.5        Types: Cigarettes        Quit date: 2022        Years since quittin.1      Smokeless tobacco: Never    Alcohol use: Not Currently    Prior to Admission medications :  Medication amLODIPine-benazepril (LOTREL) 5-20 mg per capsule, Sig amlodipine 5 mg-benazepril 20 mg capsule TAKE 1 CAPSULE BY MOUTH ONCE DAILY, Start Date , End Date , Taking? Yes, Authorizing Provider Provider, Dano    Medication amoxicillin-clavulanate (AUGMENTIN) 875-125 mg per tablet, Sig Take 1 Tablet by mouth two (2) times a day for 10 days. , Start Date 8/15/22, End Date 22, Taking? Yes, Authorizing Provider Guanakito Dunn MD    Medication oxyCODONE ER (OxyCONTIN) 20 mg ER tablet, Sig Take 1 Tablet by mouth every twelve (12) hours for 30 days. Max Daily Amount: 40 mg., Start Date 8/15/22, End Date 22, Taking? Yes, Authorizing Provider Guanakito Dunn MD    Medication oxyCODONE-acetaminophen (PERCOCET 10)  mg per tablet, Sig Take 1 Tablet by mouth every eight (8) hours as needed for Pain for up to 7 days. Max Daily Amount: 3 Tablets. , Start Date 8/15/22, End Date 22, Taking? Yes, Authorizing Provider Guanakito Dunn MD    Medication amoxicillin-clavulanate (AUGMENTIN) 875-125 mg per tablet, Sig Take 1 Tablet by mouth two (2) times a day for 10 days. , Start Date 22, End Date 22, Taking? Yes, Authorizing Provider Guanakito Dunn MD    Medication apixaban (Eliquis) 5 mg tablet, Sig Take 1 Tablet by mouth two (2) times a day.  Take 2 tabs (10mg ) twice daily for next 7 days then 1 tab (5mg) twice daily afterwards. , Start Date 8/8/22, End Date , Taking? Yes, Authorizing Provider Bert Villalba MD    Medication oxyCODONE-acetaminophen (PERCOCET 10)  mg per tablet, Sig Take 1 Tablet by mouth every eight (8) hours as needed for Pain for up to 14 days. Max Daily Amount: 3 Tablets. , Start Date 8/8/22, End Date 8/22/22, Taking? Yes, Authorizing Provider Bert Villalba MD    Medication colchicine 0.6 mg tablet, Sig Take 0.6 mg by mouth daily. , Start Date , End Date , Taking? Yes, Authorizing Provider Provider, Historical    Medication lisinopriL (PRINIVIL, ZESTRIL) 20 mg tablet, Sig Take 1 Tablet by mouth daily. , Start Date 7/9/22, End Date , Taking? Yes, Authorizing Provider Kristopher Marie MD    Medication pantoprazole (PROTONIX) 40 mg tablet, Sig Take 40 mg by mouth daily. , Start Date , End Date , Taking? Yes, Authorizing Provider Provider, Historical    Medication metFORMIN (GLUCOPHAGE) 500 mg tablet, Sig Take 500 mg by mouth daily (with dinner). , Start Date , End Date , Taking? Yes, Authorizing Provider Provider, Historical    Medication amoxicillin-clavulanate (AUGMENTIN) 875-125 mg per tablet, Sig Take 1 Tablet by mouth every twelve (12) hours. Patient not taking: Reported on 8/15/2022, Start Date 7/28/22, End Date 10/8/22, Taking? , Authorizing Provider Provider, Historical    Medication oxyCODONE ER (OxyCONTIN) 20 mg ER tablet, Sig Take 1 Tablet by mouth two (2) times a day for 30 days. Max Daily Amount: 40 mg. Patient not taking: Reported on 8/15/2022, Start Date 7/28/22, End Date 8/27/22, Taking? , Authorizing Provider Kristopher Marie MD    Medication amLODIPine (NORVASC) 10 mg tablet, Sig Take 1 Tablet by mouth daily. Patient not taking: Reported on 8/15/2022, Start Date 7/9/22, End Date , Taking? , Authorizing Provider Kristopher Marie MD      No Known Allergies      OBJECTIVE:  BP (!) 159/76 (BP 1 Location: Left upper arm, BP Patient Position: Sitting, BP Cuff Size: Adult)   Pulse 71   Temp 98 °F (36.7 °C) (Temporal)   Resp 20   Ht 6' 1\" (1.854 m)   Wt 219 lb (99.3 kg)   SpO2 99%   BMI 28.89 kg/m²     PT IS PLEASANT AND AWAKE AND ALERT. WOUND EXAM:  Appears to be second toe medial aspect is dried up gangrene. ASSESSMENT:  Problem List Items Addressed This Visit        Other    Gangrene of toe [address]   Other Visit Diagnoses     Pain of right lower extremity    -  Primary    Relevant Medications    oxyCODONE ER (OxyCONTIN) 20 mg ER tablet    oxyCODONE-acetaminophen (PERCOCET 10)  mg per tablet          PLAN:  Continue wound care with iodine swabs between the webspace of her right foot and use also Medihoney ointments on the second toe and rectal area this be covered with iodoform and I will reassess him next week. I am afraid he will need second toe amputation or TMA if wound does not appear to be fair.

## 2022-08-15 NOTE — TELEPHONE ENCOUNTER
Lorri Maloney the pharmacist [phone] from [facility] called needing to speak with Dr. Bam Francois. She said that Dr. Bam Francois called in some pain medication for Mr. [employer], and he's already getting pain medications from another provider. Please have Dr. Bam Francois give Lorri Maloney a call.  Thanks

## 2022-08-15 NOTE — TELEPHONE ENCOUNTER
[facility] [employer] called this afternoon and stated all of his medication was ready to be picked up at [facility] but couldn't get his pain medication because Dr Bam Francois has to release it for him to be able to pick it up, please call patient if needed, [phone]

## 2022-08-15 NOTE — PROGRESS NOTES
Chief Complaint   Patient presents with    Surgical Follow-up     S/P right great toe amputation 8/6/2022     Visit Vitals  BP (!) 159/76 (BP 1 Location: Left upper arm, BP Patient Position: Sitting, BP Cuff Size: Adult)   Pulse 71   Temp 98 °F (36.7 °C) (Temporal)   Resp 20   Ht 6' 1\" (1.854 m)   Wt 219 lb (99.3 kg)   SpO2 99%   BMI 28.89 kg/m²

## 2022-08-15 NOTE — TELEPHONE ENCOUNTER
[facility] and spoke with pharmacy tech. She states the pharmacist needs to speak directly with Dr. Grady Pruitt. I verbalized understanding and advised them he was out of the office for lunch and I will give him the message.

## 2022-08-19 ENCOUNTER — TELEPHONE (OUTPATIENT)
Dept: SURGERY | Age: 62
End: 2022-08-19

## 2022-08-19 NOTE — TELEPHONE ENCOUNTER
Mr. Mantilla [facility] [phone] called this morning. Wanting Dr. Nguyen Ready to send his prescription for the Oxycodone ER 20mg, that was sent to the [facility] in [address] [place] [address] to be [facility] to [facility] in [address] [place] [address]. He was here in the office on the 15th of August, and they still don't have that medication yet. Please give him a call with any questions.  Thanks

## 2022-08-19 NOTE — TELEPHONE ENCOUNTER
Called patient back. He states that [facility] doesn't have the Oxycodone ER in stock and patient would like to have it sent to [facility] in [place]. I verbalized understanding.

## 2022-08-19 NOTE — TELEPHONE ENCOUNTER
Message was given to  Dr. Dominic Graham to send to [address] [place] [address]. Dr. Dominic Graham says he will call the pt.

## 2022-08-19 NOTE — TELEPHONE ENCOUNTER
Mr Sadie Foster called and stated Dr Eliseo Barton had not called him as of 410pm, I spoke w/ Mona who said she would message Dr Eliseo Barton to remind him to call patient

## 2022-08-22 ENCOUNTER — OFFICE VISIT (OUTPATIENT)
Dept: SURGERY | Age: 62
End: 2022-08-22

## 2022-08-22 VITALS
TEMPERATURE: 98.2 F | WEIGHT: 215 LBS | DIASTOLIC BLOOD PRESSURE: 70 MMHG | SYSTOLIC BLOOD PRESSURE: 134 MMHG | BODY MASS INDEX: 28.49 KG/M2 | HEART RATE: 71 BPM | OXYGEN SATURATION: 98 % | HEIGHT: 73 IN

## 2022-08-22 DIAGNOSIS — I73.9 PERIPHERAL VASCULAR DISEASE (HCC): Primary | ICD-10-CM

## 2022-08-22 DIAGNOSIS — I96 GANGRENE OF TOE (HCC): ICD-10-CM

## 2022-08-22 PROCEDURE — 99024 POSTOP FOLLOW-UP VISIT: CPT | Performed by: SURGERY

## 2022-08-22 RX ORDER — ENOXAPARIN SODIUM 100 MG/ML
100 INJECTION SUBCUTANEOUS EVERY 12 HOURS
Qty: 12 EACH | Refills: 0 | Status: SHIPPED | OUTPATIENT
Start: 2022-08-22 | End: 2022-09-02

## 2022-08-22 NOTE — PROGRESS NOTES
Chief Complaint   Patient presents with    Follow-up     1 week follow up      Visit Vitals  /70 (BP 1 Location: Left upper arm, BP Patient Position: Sitting, BP Cuff Size: Adult)   Pulse 71   Temp 98.2 °F (36.8 °C) (Temporal)   Ht 6' 1\" (1.854 m)   Wt 215 lb (97.5 kg)   SpO2 98%   BMI 28.37 kg/m²

## 2022-08-23 NOTE — TELEPHONE ENCOUNTER
Mr [facility] called this afternoon stated that he left a voicemail this morning regarding his 2 medications he needed filled (augmentin & percocet) please call patient back when able [phone]

## 2022-08-23 NOTE — TELEPHONE ENCOUNTER
Spoke with patient. He states Dr. Dominic Graham wanted him to continue his Augmentin, but he only has 2 pills left. Patient also is requesting a refill for his pain medication. He states to send both medications to [facility] in [place]. He did  his Lovenox that Dr. Dominic Graham sent and has started that.

## 2022-08-24 DIAGNOSIS — M79.671 RIGHT FOOT PAIN: Primary | ICD-10-CM

## 2022-08-24 RX ORDER — OXYCODONE AND ACETAMINOPHEN 10; 325 MG/1; MG/1
1 TABLET ORAL
Qty: 30 TABLET | Refills: 0 | Status: SHIPPED | OUTPATIENT
Start: 2022-08-24 | End: 2022-09-23

## 2022-08-24 RX ORDER — AMOXICILLIN AND CLAVULANATE POTASSIUM 875; 125 MG/1; MG/1
1 TABLET, FILM COATED ORAL 2 TIMES DAILY
Qty: 20 TABLET | Refills: 0 | Status: SHIPPED | OUTPATIENT
Start: 2022-08-24 | End: 2022-09-02

## 2022-08-24 RX ORDER — CLINDAMYCIN HYDROCHLORIDE 300 MG/1
300 CAPSULE ORAL 4 TIMES DAILY
Qty: 40 CAPSULE | Refills: 0 | Status: SHIPPED | OUTPATIENT
Start: 2022-08-24 | End: 2022-09-03

## 2022-08-24 NOTE — TELEPHONE ENCOUNTER
Called patient to let him know that Dr. Bentley Walton had refilled his pain medication and antibiotics.

## 2022-08-27 PROBLEM — I73.9 PERIPHERAL VASCULAR DISEASE (HCC): Status: ACTIVE | Noted: 2022-08-27

## 2022-08-27 RX ORDER — AMOXICILLIN AND CLAVULANATE POTASSIUM 875; 125 MG/1; MG/1
TABLET, FILM COATED ORAL
Qty: 20 TABLET | Refills: 0 | Status: SHIPPED | OUTPATIENT
Start: 2022-08-27 | End: 2022-09-02

## 2022-08-27 RX ORDER — AMLODIPINE AND BENAZEPRIL HYDROCHLORIDE 5; 20 MG/1; MG/1
CAPSULE ORAL
Qty: 90 CAPSULE | Refills: 2 | Status: SHIPPED | OUTPATIENT
Start: 2022-08-27

## 2022-08-27 NOTE — PROGRESS NOTES
SUBJECTIVE:  Patient is here today right foot examination. Past Medical History:  No date: Diabetes ([address]  No date: Hypertension  Past Surgical History:  2022: HX AMPUTATION TOE; Right      Comment:  Right great toe  2022: VASCULAR SURGERY PROCEDURE UNLIST; Right  @[facility]@  Social History    Tobacco Use      Smoking status: Former        Packs/day: 1.50        Years: 15.00        Pack years: 22.5        Types: Cigarettes        Quit date: 2022        Years since quittin.1      Smokeless tobacco: Never    Alcohol use: Not Currently    Prior to Admission medications :  Medication amLODIPine-benazepril (LOTREL) 5-20 mg per capsule, Sig amlodipine 5 mg-benazepril 20 mg capsule TAKE 1 CAPSULE BY MOUTH ONCE DAILY, Start Date 22, End Date , Taking? Yes, Authorizing Provider Karen Valadez MD    Medication enoxaparin (LOVENOX) 100 mg/mL, Sig 100 mg by SubCUTAneous route every twelve (12) hours. , Start Date 22, End Date , Taking? Yes, Authorizing Provider Karen Valadez MD    Medication apixaban (Eliquis) 5 mg tablet, Sig Take 1 Tablet by mouth two (2) times a day. Take 2 tabs (10mg ) twice daily for next 7 days then 1 tab (5mg) twice daily afterwards. , Start Date 22, End Date , Taking? Yes, Authorizing Provider Karen Valadez MD    Medication lisinopriL (PRINIVIL, ZESTRIL) 20 mg tablet, Sig Take 1 Tablet by mouth daily. , Start Date 22, End Date , Taking? Yes, Authorizing Provider Bhumika Mercado MD    Medication pantoprazole (PROTONIX) 40 mg tablet, Sig Take 40 mg by mouth daily. , Start Date , End Date , Taking? Yes, Authorizing Provider Provider, Historical    Medication metFORMIN (GLUCOPHAGE) 500 mg tablet, Sig Take 500 mg by mouth daily (with dinner). , Start Date , End Date , Taking? Yes, Authorizing Provider Provider, Historical    Medication clindamycin (CLEOCIN) 300 mg capsule, Sig Take 1 Capsule by mouth four (4) times daily for 10 days. , Start Date 22, End Date 9/3/22, Taking? , Authorizing Provider Layman Gaucher, MD    Medication amoxicillin-clavulanate (AUGMENTIN) 875-125 mg per tablet, Sig Take 1 Tablet by mouth two (2) times a day for 10 days. , Start Date 8/24/22, End Date 9/3/22, Taking? , Authorizing Provider Veronica Martínez MD    Medication oxyCODONE-acetaminophen (PERCOCET 10)  mg per tablet, Sig Take 1 Tablet by mouth every eight (8) hours as needed for Pain for up to 30 days. Max Daily Amount: 3 Tablets. , Start Date 8/24/22, End Date 9/23/22, Taking? , Authorizing Provider Veronica Martínez MD    Medication oxyCODONE ER (OxyCONTIN) 20 mg ER tablet, Sig Take 1 Tablet by mouth every twelve (12) hours for 30 days. Max Daily Amount: 40 mg. Patient not taking: Reported on 8/22/2022, Start Date 8/15/22, End Date 9/14/22, Taking? , Authorizing Provider Veronica Martínez MD    Medication amoxicillin-clavulanate (AUGMENTIN) 875-125 mg per tablet, Sig Take 1 Tablet by mouth every twelve (12) hours. Patient not taking: Reported on 8/15/2022, Start Date 7/28/22, End Date 10/8/22, Taking? , Authorizing Provider Provider, Historical    Medication oxyCODONE ER (OxyCONTIN) 20 mg ER tablet, Sig Take 1 Tablet by mouth two (2) times a day for 30 days. Max Daily Amount: 40 mg. Patient not taking: No sig reported, Start Date 7/28/22, End Date 8/27/22, Taking? , Authorizing Provider Lieutenant Debbie MD    Medication colchicine 0.6 mg tablet, Sig Take 0.6 mg by mouth daily. Patient not taking: Reported on 8/22/2022, Start Date , End Date , Taking? , Authorizing Provider Solange, Historical    Medication amLODIPine (NORVASC) 10 mg tablet, Sig Take 1 Tablet by mouth daily. Patient not taking: No sig reported, Start Date 7/9/22, End Date , Taking? , Authorizing Provider Lieutenant Debbie MD      No Known Allergies      OBJECTIVE:  /70 (BP 1 Location: Left upper arm, BP Patient Position: Sitting, BP Cuff Size: Adult)   Pulse 71   Temp 98.2 °F (36.8 °C) (Temporal)   Ht 6' 1\" (1.854 m)   Wt 215 lb (97.5 kg)   SpO2 98%   BMI 28.37 kg/m²     PT IS PLEASANT AND AWAKE AND ALERT. WOUND EXAM:  Right foot examination shows Doppler signal noted on both DP and PT with monophasic. However fourth toe and second toe has become gangrene. ASSESSMENT:  Problem List Items Addressed This Visit        Circulatory    Peripheral vascular disease ([facility] [address] - Primary    Relevant Medications    enoxaparin (LOVENOX) 100 mg/mL       Other    Gangrene of toe (HCC)       PLAN:  Patient will require TMA on the right foot. I will arrange this next Monday. Patient currently on Eliquis. We will bridge him with Lovenox therapy. Patient will be scheduled for admission on Sunday and proceed with a right TMA on Monday.

## 2022-08-28 ENCOUNTER — HOSPITAL ENCOUNTER (INPATIENT)
Age: 62
LOS: 5 days | Discharge: HOME | DRG: 240 | End: 2022-09-02
Attending: SURGERY | Admitting: SURGERY

## 2022-08-28 ENCOUNTER — APPOINTMENT (OUTPATIENT)
Dept: GENERAL RADIOLOGY | Age: 62
DRG: 240 | End: 2022-08-28
Attending: EMERGENCY MEDICINE

## 2022-08-28 DIAGNOSIS — I96 GANGRENE (HCC): Primary | ICD-10-CM

## 2022-08-28 DIAGNOSIS — I99.8 LIMB ISCHEMIA: ICD-10-CM

## 2022-08-28 PROBLEM — L08.9 FOOT INFECTION: Status: ACTIVE | Noted: 2022-08-28

## 2022-08-28 PROBLEM — E11.52 DIABETIC WET GANGRENE OF THE FOOT (HCC): Status: ACTIVE | Noted: 2022-08-28

## 2022-08-28 LAB
ABO + RH BLD: NORMAL
ALBUMIN SERPL-MCNC: 3.6 G/DL (ref 3.5–5)
ALBUMIN/GLOB SERPL: 0.8 {RATIO} (ref 1.1–2.2)
ALP SERPL-CCNC: 93 U/L (ref 45–117)
ALT SERPL-CCNC: 46 U/L (ref 12–78)
ANION GAP SERPL CALC-SCNC: 8 MMOL/L (ref 5–15)
APTT PPP: 30.9 SEC (ref 21.2–34.1)
AST SERPL W P-5'-P-CCNC: 30 U/L (ref 15–37)
BASOPHILS # BLD: 0.1 K/UL (ref 0–0.1)
BASOPHILS # BLD: 0.1 K/UL (ref 0–0.1)
BASOPHILS NFR BLD: 1 % (ref 0–1)
BASOPHILS NFR BLD: 1 % (ref 0–1)
BILIRUB SERPL-MCNC: 0.7 MG/DL (ref 0.2–1)
BLOOD GROUP ANTIBODIES SERPL: NEGATIVE
BUN SERPL-MCNC: 10 MG/DL (ref 6–20)
BUN/CREAT SERPL: 13 (ref 12–20)
CA-I BLD-MCNC: 9.4 MG/DL (ref 8.5–10.1)
CHLORIDE SERPL-SCNC: 104 MMOL/L (ref 97–108)
CO2 SERPL-SCNC: 24 MMOL/L (ref 21–32)
CREAT SERPL-MCNC: 0.77 MG/DL (ref 0.7–1.3)
DIFFERENTIAL METHOD BLD: NORMAL
DIFFERENTIAL METHOD BLD: NORMAL
EOSINOPHIL # BLD: 0.3 K/UL (ref 0–0.4)
EOSINOPHIL # BLD: 0.4 K/UL (ref 0–0.4)
EOSINOPHIL NFR BLD: 3 % (ref 0–7)
EOSINOPHIL NFR BLD: 4 % (ref 0–7)
ERYTHROCYTE [DISTWIDTH] IN BLOOD BY AUTOMATED COUNT: 13.2 % (ref 11.5–14.5)
ERYTHROCYTE [DISTWIDTH] IN BLOOD BY AUTOMATED COUNT: 13.2 % (ref 11.5–14.5)
ERYTHROCYTE [SEDIMENTATION RATE] IN BLOOD: 30 MM/HR (ref 0–20)
GLOBULIN SER CALC-MCNC: 4.5 G/DL (ref 2–4)
GLUCOSE BLD STRIP.AUTO-MCNC: 91 MG/DL (ref 65–117)
GLUCOSE SERPL-MCNC: 170 MG/DL (ref 65–100)
HCT VFR BLD AUTO: 38.6 % (ref 36.6–50.3)
HCT VFR BLD AUTO: 42.8 % (ref 36.6–50.3)
HGB BLD-MCNC: 12.6 G/DL (ref 12.1–17)
HGB BLD-MCNC: 14.1 G/DL (ref 12.1–17)
IMM GRANULOCYTES # BLD AUTO: 0 K/UL (ref 0–0.04)
IMM GRANULOCYTES # BLD AUTO: 0 K/UL (ref 0–0.04)
IMM GRANULOCYTES NFR BLD AUTO: 0 % (ref 0–0.5)
IMM GRANULOCYTES NFR BLD AUTO: 0 % (ref 0–0.5)
LACTATE SERPL-SCNC: 2.1 MMOL/L (ref 0.4–2)
LYMPHOCYTES # BLD: 2 K/UL (ref 0.8–3.5)
LYMPHOCYTES # BLD: 2.6 K/UL (ref 0.8–3.5)
LYMPHOCYTES NFR BLD: 20 % (ref 12–49)
LYMPHOCYTES NFR BLD: 26 % (ref 12–49)
MCH RBC QN AUTO: 28.3 PG (ref 26–34)
MCH RBC QN AUTO: 28.5 PG (ref 26–34)
MCHC RBC AUTO-ENTMCNC: 32.6 G/DL (ref 30–36.5)
MCHC RBC AUTO-ENTMCNC: 32.9 G/DL (ref 30–36.5)
MCV RBC AUTO: 86.5 FL (ref 80–99)
MCV RBC AUTO: 86.7 FL (ref 80–99)
MONOCYTES # BLD: 0.6 K/UL (ref 0–1)
MONOCYTES # BLD: 0.7 K/UL (ref 0–1)
MONOCYTES NFR BLD: 7 % (ref 5–13)
MONOCYTES NFR BLD: 7 % (ref 5–13)
NEUTS SEG # BLD: 6 K/UL (ref 1.8–8)
NEUTS SEG # BLD: 6.8 K/UL (ref 1.8–8)
NEUTS SEG NFR BLD: 62 % (ref 32–75)
NEUTS SEG NFR BLD: 69 % (ref 32–75)
NRBC # BLD: 0 K/UL (ref 0–0.01)
NRBC # BLD: 0 K/UL (ref 0–0.01)
NRBC BLD-RTO: 0 PER 100 WBC
NRBC BLD-RTO: 0 PER 100 WBC
PERFORMED BY, TECHID: NORMAL
PLATELET # BLD AUTO: 274 K/UL (ref 150–400)
PLATELET # BLD AUTO: 299 K/UL (ref 150–400)
PMV BLD AUTO: 10.1 FL (ref 8.9–12.9)
PMV BLD AUTO: 10.3 FL (ref 8.9–12.9)
POTASSIUM SERPL-SCNC: 3.6 MMOL/L (ref 3.5–5.1)
PROT SERPL-MCNC: 8.1 G/DL (ref 6.4–8.2)
RBC # BLD AUTO: 4.45 M/UL (ref 4.1–5.7)
RBC # BLD AUTO: 4.95 M/UL (ref 4.1–5.7)
SODIUM SERPL-SCNC: 136 MMOL/L (ref 136–145)
SPECIMEN EXP DATE BLD: NORMAL
THERAPEUTIC RANGE,PTTT: NORMAL SEC (ref 82–109)
WBC # BLD AUTO: 9.7 K/UL (ref 4.1–11.1)
WBC # BLD AUTO: 9.9 K/UL (ref 4.1–11.1)

## 2022-08-28 PROCEDURE — 36415 COLL VENOUS BLD VENIPUNCTURE: CPT

## 2022-08-28 PROCEDURE — 85652 RBC SED RATE AUTOMATED: CPT

## 2022-08-28 PROCEDURE — 86900 BLOOD TYPING SEROLOGIC ABO: CPT

## 2022-08-28 PROCEDURE — 99285 EMERGENCY DEPT VISIT HI MDM: CPT

## 2022-08-28 PROCEDURE — 83605 ASSAY OF LACTIC ACID: CPT

## 2022-08-28 PROCEDURE — 74011000258 HC RX REV CODE- 258: Performed by: EMERGENCY MEDICINE

## 2022-08-28 PROCEDURE — 80053 COMPREHEN METABOLIC PANEL: CPT

## 2022-08-28 PROCEDURE — 74011250636 HC RX REV CODE- 250/636: Performed by: EMERGENCY MEDICINE

## 2022-08-28 PROCEDURE — 65270000029 HC RM PRIVATE

## 2022-08-28 PROCEDURE — 74011250636 HC RX REV CODE- 250/636: Performed by: SURGERY

## 2022-08-28 PROCEDURE — 99222 1ST HOSP IP/OBS MODERATE 55: CPT | Performed by: SURGERY

## 2022-08-28 PROCEDURE — 87040 BLOOD CULTURE FOR BACTERIA: CPT

## 2022-08-28 PROCEDURE — 74011250637 HC RX REV CODE- 250/637: Performed by: SURGERY

## 2022-08-28 PROCEDURE — 71045 X-RAY EXAM CHEST 1 VIEW: CPT

## 2022-08-28 PROCEDURE — 85730 THROMBOPLASTIN TIME PARTIAL: CPT

## 2022-08-28 PROCEDURE — 74011000250 HC RX REV CODE- 250: Performed by: SURGERY

## 2022-08-28 PROCEDURE — 73630 X-RAY EXAM OF FOOT: CPT

## 2022-08-28 PROCEDURE — 85025 COMPLETE CBC W/AUTO DIFF WBC: CPT

## 2022-08-28 PROCEDURE — 82962 GLUCOSE BLOOD TEST: CPT

## 2022-08-28 RX ORDER — HEPARIN SODIUM 10000 [USP'U]/100ML
17-36 INJECTION, SOLUTION INTRAVENOUS
Status: DISCONTINUED | OUTPATIENT
Start: 2022-08-28 | End: 2022-09-02

## 2022-08-28 RX ORDER — SODIUM CHLORIDE 0.9 % (FLUSH) 0.9 %
5-40 SYRINGE (ML) INJECTION AS NEEDED
Status: DISCONTINUED | OUTPATIENT
Start: 2022-08-28 | End: 2022-09-02

## 2022-08-28 RX ORDER — INSULIN LISPRO 100 [IU]/ML
INJECTION, SOLUTION INTRAVENOUS; SUBCUTANEOUS EVERY 6 HOURS
Status: DISCONTINUED | OUTPATIENT
Start: 2022-08-28 | End: 2022-09-02 | Stop reason: HOSPADM

## 2022-08-28 RX ORDER — ONDANSETRON 4 MG/1
4 TABLET, ORALLY DISINTEGRATING ORAL
Status: DISCONTINUED | OUTPATIENT
Start: 2022-08-28 | End: 2022-09-02

## 2022-08-28 RX ORDER — SODIUM CHLORIDE 0.9 % (FLUSH) 0.9 %
5-40 SYRINGE (ML) INJECTION EVERY 8 HOURS
Status: DISCONTINUED | OUTPATIENT
Start: 2022-08-28 | End: 2022-09-01

## 2022-08-28 RX ORDER — HEPARIN SODIUM 10000 [USP'U]/100ML
18-36 INJECTION, SOLUTION INTRAVENOUS
Status: DISCONTINUED | OUTPATIENT
Start: 2022-08-28 | End: 2022-08-28

## 2022-08-28 RX ORDER — SODIUM CHLORIDE 9 MG/ML
50 INJECTION, SOLUTION INTRAVENOUS ONCE
Status: COMPLETED | OUTPATIENT
Start: 2022-08-28 | End: 2022-08-28

## 2022-08-28 RX ORDER — OXYCODONE HCL 10 MG/1
20 TABLET, FILM COATED, EXTENDED RELEASE ORAL EVERY 12 HOURS
Status: DISCONTINUED | OUTPATIENT
Start: 2022-08-28 | End: 2022-09-02 | Stop reason: HOSPADM

## 2022-08-28 RX ORDER — ACETAMINOPHEN 650 MG/1
650 SUPPOSITORY RECTAL
Status: DISCONTINUED | OUTPATIENT
Start: 2022-08-28 | End: 2022-09-02 | Stop reason: HOSPADM

## 2022-08-28 RX ORDER — HEPARIN SODIUM 1000 [USP'U]/ML
80 INJECTION, SOLUTION INTRAVENOUS; SUBCUTANEOUS AS NEEDED
Status: DISCONTINUED | OUTPATIENT
Start: 2022-08-28 | End: 2022-09-02

## 2022-08-28 RX ORDER — SODIUM CHLORIDE 9 MG/ML
75 INJECTION, SOLUTION INTRAVENOUS CONTINUOUS
Status: DISCONTINUED | OUTPATIENT
Start: 2022-08-28 | End: 2022-08-29

## 2022-08-28 RX ORDER — ACETAMINOPHEN 325 MG/1
650 TABLET ORAL
Status: DISCONTINUED | OUTPATIENT
Start: 2022-08-28 | End: 2022-09-02 | Stop reason: HOSPADM

## 2022-08-28 RX ORDER — DEXTROSE MONOHYDRATE 100 MG/ML
0-250 INJECTION, SOLUTION INTRAVENOUS AS NEEDED
Status: DISCONTINUED | OUTPATIENT
Start: 2022-08-28 | End: 2022-09-02 | Stop reason: HOSPADM

## 2022-08-28 RX ORDER — MORPHINE SULFATE 4 MG/ML
4 INJECTION INTRAVENOUS ONCE
Status: ACTIVE | OUTPATIENT
Start: 2022-08-28 | End: 2022-08-29

## 2022-08-28 RX ORDER — ONDANSETRON 2 MG/ML
4 INJECTION INTRAMUSCULAR; INTRAVENOUS
Status: DISCONTINUED | OUTPATIENT
Start: 2022-08-28 | End: 2022-09-02

## 2022-08-28 RX ORDER — POLYETHYLENE GLYCOL 3350 17 G/17G
17 POWDER, FOR SOLUTION ORAL DAILY PRN
Status: DISCONTINUED | OUTPATIENT
Start: 2022-08-28 | End: 2022-09-02 | Stop reason: HOSPADM

## 2022-08-28 RX ORDER — ONDANSETRON 2 MG/ML
4 INJECTION INTRAMUSCULAR; INTRAVENOUS ONCE
Status: ACTIVE | OUTPATIENT
Start: 2022-08-28 | End: 2022-08-29

## 2022-08-28 RX ORDER — HEPARIN SODIUM 1000 [USP'U]/ML
40 INJECTION, SOLUTION INTRAVENOUS; SUBCUTANEOUS AS NEEDED
Status: DISCONTINUED | OUTPATIENT
Start: 2022-08-28 | End: 2022-09-02

## 2022-08-28 RX ORDER — OXYCODONE AND ACETAMINOPHEN 10; 325 MG/1; MG/1
1 TABLET ORAL
Status: DISCONTINUED | OUTPATIENT
Start: 2022-08-28 | End: 2022-08-29

## 2022-08-28 RX ORDER — MAGNESIUM SULFATE 100 %
4 CRYSTALS MISCELLANEOUS AS NEEDED
Status: DISCONTINUED | OUTPATIENT
Start: 2022-08-28 | End: 2022-09-02 | Stop reason: HOSPADM

## 2022-08-28 RX ADMIN — HEPARIN SODIUM AND DEXTROSE 17 UNITS/KG/HR: 10000; 5 INJECTION INTRAVENOUS at 21:20

## 2022-08-28 RX ADMIN — VANCOMYCIN HYDROCHLORIDE 1000 MG: 1 INJECTION, POWDER, LYOPHILIZED, FOR SOLUTION INTRAVENOUS at 16:42

## 2022-08-28 RX ADMIN — SODIUM CHLORIDE 50 ML/HR: 9 INJECTION, SOLUTION INTRAVENOUS at 16:40

## 2022-08-28 RX ADMIN — OXYCODONE HYDROCHLORIDE 20 MG: 10 TABLET, FILM COATED, EXTENDED RELEASE ORAL at 21:16

## 2022-08-28 RX ADMIN — SODIUM CHLORIDE, PRESERVATIVE FREE 10 ML: 5 INJECTION INTRAVENOUS at 21:19

## 2022-08-28 RX ADMIN — PIPERACILLIN SODIUM AND TAZOBACTAM SODIUM 4.5 G: 4; .5 INJECTION, POWDER, LYOPHILIZED, FOR SOLUTION INTRAVENOUS at 16:34

## 2022-08-28 NOTE — ED PROVIDER NOTES
EMERGENCY DEPARTMENT HISTORY AND PHYSICAL EXAM      Date: 8/28/2022  Patient Name: [facility]    History of Presenting Illness     Chief Complaint   Patient presents with    Foot Pain       History Provided By: Patient    HPI: [facility], 65 y.o. male with a past medical history significant diabetes and hypertension presents to the ED with chief complaint of Foot Pain  . 20-year-old with foot pain requiring amputation of toes and subsequently now necrotic. Was seen in the office and plan for OR. Achy throbbing pain 3 out of 10. No drainage. No recent antibiotics now. Tolerated the first surgery well. No calf tenderness. There are no other complaints, changes, or physical findings at this time. PCP: Alicia Nelson MD    Current Facility-Administered Medications   Medication Dose Route Frequency Provider Last Rate Last Admin    morphine injection 4 mg  4 mg IntraVENous ONCE NuckollsMario Alberto vargas MD        ondansetron [facility]) injection 4 mg  4 mg IntraVENous ONCE Mario Alberto Niño MD        vancomycin (VANCOCIN) 1,000 mg in 0.9% sodium chloride 250 mL ([id number])  1,000 mg IntraVENous NOW Casandra Holloway  mL/hr at 08/28/22 1642 1,000 mg at 08/28/22 1642     Current Outpatient Medications   Medication Sig Dispense Refill    amLODIPine-benazepril (LOTREL) 5-20 mg per capsule amlodipine 5 mg-benazepril 20 mg capsule   TAKE 1 CAPSULE BY MOUTH ONCE DAILY 90 Capsule 2    amoxicillin-clavulanate (AUGMENTIN) 875-125 mg per tablet Take 1 tablet by mouth twice daily for 10 days 20 Tablet 0    clindamycin (CLEOCIN) 300 mg capsule Take 1 Capsule by mouth four (4) times daily for 10 days. 40 Capsule 0    amoxicillin-clavulanate (AUGMENTIN) 875-125 mg per tablet Take 1 Tablet by mouth two (2) times a day for 10 days. 20 Tablet 0    oxyCODONE-acetaminophen (PERCOCET 10)  mg per tablet Take 1 Tablet by mouth every eight (8) hours as needed for Pain for up to 30 days.  Max Daily Amount: 3 Tablets. 30 Tablet 0    enoxaparin (LOVENOX) 100 mg/mL 100 mg by SubCUTAneous route every twelve (12) hours. 12 Each 0    oxyCODONE ER (OxyCONTIN) 20 mg ER tablet Take 1 Tablet by mouth every twelve (12) hours for 30 days. Max Daily Amount: 40 mg. (Patient not taking: Reported on 2022) 60 Tablet 0    apixaban (Eliquis) 5 mg tablet Take 1 Tablet by mouth two (2) times a day. Take 2 tabs (10mg ) twice daily for next 7 days then 1 tab (5mg) twice daily afterwards. 60 Tablet 3    amoxicillin-clavulanate (AUGMENTIN) 875-125 mg per tablet Take 1 Tablet by mouth every twelve (12) hours. (Patient not taking: Reported on 8/15/2022)      colchicine 0.6 mg tablet Take 0.6 mg by mouth daily. (Patient not taking: Reported on 2022)      lisinopriL (PRINIVIL, ZESTRIL) 20 mg tablet Take 1 Tablet by mouth daily. 60 Tablet 0    amLODIPine (NORVASC) 10 mg tablet Take 1 Tablet by mouth daily. (Patient not taking: No sig reported) 30 Tablet 0    pantoprazole (PROTONIX) 40 mg tablet Take 40 mg by mouth daily. metFORMIN (GLUCOPHAGE) 500 mg tablet Take 500 mg by mouth daily (with dinner).          Past History     Past Medical History:  Past Medical History:   Diagnosis Date    Diabetes ([address]     Hypertension        Past Surgical History:  Past Surgical History:   Procedure Laterality Date    HX AMPUTATION TOE Right 2022    Right great toe    VASCULAR SURGERY PROCEDURE UNLIST Right 2022       Family History:  Family History   Problem Relation Age of Onset    Cancer Mother     Diabetes Mother     Cancer Father        Social History:  Social History     Tobacco Use    Smoking status: Former     Packs/day: 1.50     Years: 15.00     Pack years: 22.50     Types: Cigarettes     Quit date: 2022     Years since quittin.1    Smokeless tobacco: Never   Vaping Use    Vaping Use: Never used   Substance Use Topics    Alcohol use: Not Currently    Drug use: Never       Allergies:  No Known Allergies      Review of Systems   Review of Systems   Constitutional: Negative. Negative for chills, fatigue and fever. HENT: Negative. Negative for congestion, ear pain, nosebleeds and sore throat. Eyes: Negative. Negative for pain, discharge and visual disturbance. Respiratory: Negative. Negative for cough, chest tightness and shortness of breath. Cardiovascular: Negative. Negative for chest pain and leg swelling. Gastrointestinal: Negative. Negative for abdominal pain, blood in stool, constipation, diarrhea, nausea and vomiting. Endocrine: Negative. Genitourinary: Negative. Negative for difficulty urinating, dysuria and flank pain. Musculoskeletal:  Positive for arthralgias. Negative for back pain and myalgias. Skin: Negative. Negative for rash and wound. Allergic/Immunologic: Negative. Neurological: Negative. Negative for dizziness, syncope, weakness, numbness and headaches. Hematological: Negative. Does not bruise/bleed easily. Psychiatric/Behavioral: Negative. Negative for agitation, confusion, hallucinations and suicidal ideas. All other systems reviewed and are negative. Physical Exam   Physical Exam  Vitals and nursing note reviewed. Constitutional:       General: He is not in acute distress. Appearance: He is normal weight. He is not ill-appearing. HENT:      Head: Normocephalic and atraumatic. Right Ear: External ear normal.      Left Ear: External ear normal.      Nose: Nose normal. No rhinorrhea. Mouth/Throat:      Mouth: Mucous membranes are moist.      Pharynx: Oropharynx is clear. Eyes:      Extraocular Movements: Extraocular movements intact. Conjunctiva/sclera: Conjunctivae normal.      Pupils: Pupils are equal, round, and reactive to light. Cardiovascular:      Rate and Rhythm: Normal rate and regular rhythm. Pulses: Normal pulses. Heart sounds: Normal heart sounds.    Pulmonary:      Effort: Pulmonary effort is normal. No respiratory distress. Breath sounds: Normal breath sounds. Abdominal:      General: Abdomen is flat. Bowel sounds are normal.      Palpations: Abdomen is soft. Musculoskeletal:         General: Tenderness present. No deformity. Normal range of motion. Cervical back: Normal range of motion and neck supple. Left lower leg: Edema present. Skin:     General: Skin is warm and dry. Capillary Refill: Capillary refill takes less than 2 seconds. Findings: No bruising, lesion or rash. Neurological:      General: No focal deficit present. Mental Status: He is alert and oriented to person, place, and time. Mental status is at baseline. Psychiatric:         Mood and Affect: Mood normal.         Behavior: Behavior normal.         Thought Content: Thought content normal.         Judgment: Judgment normal.       Diagnostic Study Results     Labs -     Recent Results (from the past 12 hour(s))   LACTIC ACID    Collection Time: 08/28/22  4:20 PM   Result Value Ref Range    Lactic acid 2.1 (HH) 0.4 - 2.0 mmol/L   CBC WITH AUTOMATED DIFF    Collection Time: 08/28/22  4:20 PM   Result Value Ref Range    WBC 9.9 4.1 - 11.1 K/uL    RBC 4.95 4.10 - 5.70 M/uL    HGB 14.1 12.1 - 17.0 g/dL    HCT 42.8 36.6 - 50.3 %    MCV 86.5 80.0 - 99.0 FL    MCH 28.5 26.0 - 34.0 PG    MCHC 32.9 30.0 - 36.5 g/dL    RDW 13.2 11.5 - 14.5 %    PLATELET 669 721 - 774 K/uL    MPV 10.3 8.9 - 12.9 FL    NRBC 0.0 0.0  WBC    ABSOLUTE NRBC 0.00 0.00 - 0.01 K/uL    NEUTROPHILS 69 32 - 75 %    LYMPHOCYTES 20 12 - 49 %    MONOCYTES 7 5 - 13 %    EOSINOPHILS 3 0 - 7 %    BASOPHILS 1 0 - 1 %    IMMATURE GRANULOCYTES 0 0 - 0.5 %    ABS. NEUTROPHILS 6.8 1.8 - 8.0 K/UL    ABS. LYMPHOCYTES 2.0 0.8 - 3.5 K/UL    ABS. MONOCYTES 0.7 0.0 - 1.0 K/UL    ABS. EOSINOPHILS 0.3 0.0 - 0.4 K/UL    ABS. BASOPHILS 0.1 0.0 - 0.1 K/UL    ABS. IMM.  GRANS. 0.0 0.00 - 0.04 K/UL    DF AUTOMATED     METABOLIC PANEL, COMPREHENSIVE    Collection Time: 08/28/22 4:20 PM   Result Value Ref Range    Sodium 136 136 - 145 mmol/L    Potassium 3.6 3.5 - 5.1 mmol/L    Chloride 104 97 - 108 mmol/L    CO2 24 21 - 32 mmol/L    Anion gap 8 5 - 15 mmol/L    Glucose 170 (H) 65 - 100 mg/dL    BUN 10 6 - 20 mg/dL    Creatinine 0.77 0.70 - 1.30 mg/dL    BUN/Creatinine ratio 13 12 - 20      GFR est AA >60 >60 ml/min/1.73m2    GFR est non-AA >60 >60 ml/min/1.73m2    Calcium 9.4 8.5 - 10.1 mg/dL    Bilirubin, total 0.7 0.2 - 1.0 mg/dL    AST (SGOT) 30 15 - 37 U/L    ALT (SGPT) 46 12 - 78 U/L    Alk. phosphatase 93 45 - 117 U/L    Protein, total 8.1 6.4 - 8.2 g/dL    Albumin 3.6 3.5 - 5.0 g/dL    Globulin 4.5 (H) 2.0 - 4.0 g/dL    A-G Ratio 0.8 (L) 1.1 - 2.2           Radiologic Studies -   XR FOOT RT MIN 3 V   Final Result   Interval amputation through the mid right first metatarsal.      XR CHEST PORT   Final Result   No acute cardiopulmonary process seen        CT Results  (Last 48 hours)      None          CXR Results  (Last 48 hours)                 08/28/22 1612  XR CHEST PORT Final result    Impression:  No acute cardiopulmonary process seen       Narrative:  EXAM: XR CHEST PORT       INDICATION: preop       COMPARISON: None. FINDINGS: A portable AP radiograph of the chest was obtained at 1557 hours. The   lungs are clear. The cardiac and mediastinal contours and pulmonary vascularity   are normal.  The bones and soft tissues are grossly within normal limits. Medical Decision Making and ED Course   I am the first provider for this patient. I reviewed the vital signs, available nursing notes, past medical history, past surgical history, family history and social history. Vital Signs-Reviewed the patient's vital signs.   Patient Vitals for the past 12 hrs:   Temp Pulse Resp BP SpO2   08/28/22 1644 98.1 °F (36.7 °C) -- -- -- 99 %   08/28/22 1634 -- -- -- (!) 144/68 --   08/28/22 1554 -- 60 16 134/81 99 %       EKG interpretation:         Records Reviewed: Previous Hospital chart. EMS run report      ED Course:   Initial assessment performed. The patients presenting problems have been discussed, and they are in agreement with the care plan formulated and outlined with them. I have encouraged them to ask questions as they arise throughout their visit. Orders Placed This Encounter    CULTURE, BLOOD, PAIRED     Standing Status:   Standing     Number of Occurrences:   1    XR CHEST PORT     Standing Status:   Standing     Number of Occurrences:   1     Order Specific Question:   Reason for Exam     Answer:   preop    XR FOOT RT MIN 3 V     Standing Status:   Standing     Number of Occurrences:   1     Order Specific Question:   Transport     Answer:   BED [2]     Order Specific Question:   Reason for Exam     Answer:   pain    LACTIC ACID, PLASMA     If lactic acid level is greater than 2, then a repeat lactic acid level is to be drawn in 6 hours. Standing Status:   Standing     Number of Occurrences:   1    LACTIC ACID, PLASMA     If initial lactic acid level is greater than 2, then a repeat lactic acid level is to be drawn in 6 hours. Standing Status:   Standing     Number of Occurrences:   51828    CBC WITH AUTOMATED DIFF     Standing Status:   Standing     Number of Occurrences:   1    COMPREHENSIVE METABOLIC PANEL     Standing Status:   Standing     Number of Occurrences:   1    SED RATE (ESR)     Standing Status:   Standing     Number of Occurrences:   1    DIET NPO     Standing Status:   Standing     Number of Occurrences:   1    TYPE & SCREEN     ENTER SURGERY DATE IF FOR PRE-OP TESTING. Standing Status:   Standing     Number of Occurrences:   1     Order Specific Question:   Has patient been transfused or pregnant in the last 3 mos. ?      Answer:   Unknown    morphine injection 4 mg    ondansetron (ZOFRAN) injection 4 mg    vancomycin (VANCOCIN) 1,000 mg in 0.9% sodium chloride 250 mL ([id number])     Order Specific Question:   Antibiotic Indications Answer:   Surgical Site Infection    DISCONTD: piperacillin-tazobactam (ZOSYN) 3.375 g in 0.9% sodium chloride (MBP/ADV) 100 mL MBP     Order Specific Question:   Antibiotic Indications     Answer:   Surgical Site Infection    0.9% sodium chloride infusion    piperacillin-tazobactam (ZOSYN) 4.5 g in 0.9% sodium chloride (MBP/ADV) 100 mL MBP     Order Specific Question:   Antibiotic Indications     Answer:   Surgical Site Infection    INITIAL PHYSICIAN ORDER: INPATIENT Surgical; 3. Patient receiving treatment that can only be provided in an inpatient setting (further clarification in H&P documentation)     Standing Status:   Standing     Number of Occurrences:   1     Order Specific Question:   Status: Answer:   INPATIENT [101]     Order Specific Question:   Type of Bed     Answer:   Surgical [18]     Order Specific Question:   Inpatient Hospitalization Certified Necessary for the Following Reasons     Answer:   3. Patient receiving treatment that can only be provided in an inpatient setting (further clarification in H&P documentation)     Order Specific Question:   Admitting Diagnosis     Answer: Foot infection [[id number]]     Order Specific Question:   Admitting Physician     Answer:   Jordin Wyatt [[id number]]     Order Specific Question:   Attending Physician     Answer:   Jordin Wyatt [[id number]]     Order Specific Question:   Estimated Length of Stay     Answer:   2 Midnights     Order Specific Question:   Discharge Plan:     Answer:   Home with Office Follow-up                 Provider Notes (Medical Decision Making):   43-year-old with necrotic gangrene of the toes scheduled for amputation tomorrow. No evidence of sepsis. No active bleeding. Plan for antibiotics preop labs and admission. Consults  ABHILASH admit            Admitted    Procedures               Disposition       Emergency Department Disposition:  Admitted      Diagnosis     Clinical Impression:   1.  Gangrene ([address] Attestations:    Nikkie De La Garza MD    Please note that this dictation was completed with [employer], the computer voice recognition software. Quite often unanticipated grammatical, syntax, homophones, and other interpretive errors are inadvertently transcribed by the computer software. Please disregard these errors. Please excuse any errors that have escaped final proofreading. Thank you.

## 2022-08-28 NOTE — ED TRIAGE NOTES
Pt states he had his left great toe amputated by Dr. iNcole eD roughly 2 weeks ago. Pt states Dr Nicole De sent him today because the other 4 toes on his right foot are necrotic, painful and need to be amputated. Pt states he feels a lot of pressure in his foot.

## 2022-08-28 NOTE — ROUTINE PROCESS
TRANSFER - OUT REPORT:    Verbal report given to naomi rn(name) on [employer]  being transferred to 519(unit) for routine progression of care       Report consisted of patients Situation, Background, Assessment and   Recommendations(SBAR). Information from the following report(s) SBAR, ED Summary, Intake/Output, MAR, and Recent Results was reviewed with the receiving nurse. Lines:   Peripheral IV 08/28/22 Anterior; Left Forearm (Active)        Opportunity for questions and clarification was provided.       Patient transported with:   [employer]

## 2022-08-28 NOTE — Clinical Note
Status[de-identified] INPATIENT [101]   Type of Bed: Surgical [18]   Inpatient Hospitalization Certified Necessary for the Following Reasons: 3.  Patient receiving treatment that can only be provided in an inpatient setting (further clarification in H&P documentation)   Admitting Diagnosis: Foot infection [[id number]]   Admitting Physician: Kyleigh Pollack   Attending Physician: Keyshawn Bob [[id number]]   Estimated Length of Stay: 2 Midnights   Discharge Plan[de-identified] Home with Office Follow-up

## 2022-08-29 ENCOUNTER — ANESTHESIA EVENT (OUTPATIENT)
Dept: SURGERY | Age: 62
DRG: 240 | End: 2022-08-29

## 2022-08-29 ENCOUNTER — ANESTHESIA (OUTPATIENT)
Dept: SURGERY | Age: 62
DRG: 240 | End: 2022-08-29

## 2022-08-29 LAB
APTT PPP: 28.1 SEC (ref 21.2–34.1)
APTT PPP: 41.5 SEC (ref 21.2–34.1)
GLUCOSE BLD STRIP.AUTO-MCNC: 126 MG/DL (ref 65–117)
GLUCOSE BLD STRIP.AUTO-MCNC: 145 MG/DL (ref 65–117)
GLUCOSE BLD STRIP.AUTO-MCNC: 85 MG/DL (ref 65–117)
GLUCOSE BLD STRIP.AUTO-MCNC: 88 MG/DL (ref 65–117)
GLUCOSE BLD STRIP.AUTO-MCNC: 98 MG/DL (ref 65–117)
PERFORMED BY, TECHID: ABNORMAL
PERFORMED BY, TECHID: ABNORMAL
PERFORMED BY, TECHID: NORMAL
THERAPEUTIC RANGE,PTTT: ABNORMAL SEC (ref 82–109)
THERAPEUTIC RANGE,PTTT: NORMAL SEC (ref 82–109)

## 2022-08-29 PROCEDURE — 74011000250 HC RX REV CODE- 250: Performed by: SURGERY

## 2022-08-29 PROCEDURE — 76010000138 HC OR TIME 0.5 TO 1 HR: Performed by: SURGERY

## 2022-08-29 PROCEDURE — 65270000029 HC RM PRIVATE

## 2022-08-29 PROCEDURE — 0QBN0ZZ EXCISION OF RIGHT METATARSAL, OPEN APPROACH: ICD-10-PCS | Performed by: SURGERY

## 2022-08-29 PROCEDURE — 76060000032 HC ANESTHESIA 0.5 TO 1 HR: Performed by: SURGERY

## 2022-08-29 PROCEDURE — 74011000250 HC RX REV CODE- 250: Performed by: NURSE ANESTHETIST, CERTIFIED REGISTERED

## 2022-08-29 PROCEDURE — 28810 AMPUTATION TOE & METATARSAL: CPT | Performed by: SURGERY

## 2022-08-29 PROCEDURE — 28820 AMPUTATION OF TOE: CPT | Performed by: SURGERY

## 2022-08-29 PROCEDURE — 0Y6M0ZB DETACHMENT AT RIGHT FOOT, PARTIAL 2ND RAY, OPEN APPROACH: ICD-10-PCS | Performed by: SURGERY

## 2022-08-29 PROCEDURE — 76210000063 HC OR PH I REC FIRST 0.5 HR: Performed by: SURGERY

## 2022-08-29 PROCEDURE — 77030040361 HC SLV COMPR DVT MDII -B: Performed by: SURGERY

## 2022-08-29 PROCEDURE — 36415 COLL VENOUS BLD VENIPUNCTURE: CPT

## 2022-08-29 PROCEDURE — 74011250636 HC RX REV CODE- 250/636: Performed by: NURSE ANESTHETIST, CERTIFIED REGISTERED

## 2022-08-29 PROCEDURE — 85730 THROMBOPLASTIN TIME PARTIAL: CPT

## 2022-08-29 PROCEDURE — 88311 DECALCIFY TISSUE: CPT

## 2022-08-29 PROCEDURE — 2709999900 HC NON-CHARGEABLE SUPPLY: Performed by: SURGERY

## 2022-08-29 PROCEDURE — 88305 TISSUE EXAM BY PATHOLOGIST: CPT

## 2022-08-29 PROCEDURE — 82962 GLUCOSE BLOOD TEST: CPT

## 2022-08-29 PROCEDURE — 74011250636 HC RX REV CODE- 250/636: Performed by: SURGERY

## 2022-08-29 PROCEDURE — 99232 SBSQ HOSP IP/OBS MODERATE 35: CPT | Performed by: SURGERY

## 2022-08-29 PROCEDURE — 0Y6V0Z1 DETACHMENT AT RIGHT 4TH TOE, HIGH, OPEN APPROACH: ICD-10-PCS | Performed by: SURGERY

## 2022-08-29 PROCEDURE — 11044 DBRDMT BONE 1ST 20 SQ CM/<: CPT | Performed by: SURGERY

## 2022-08-29 PROCEDURE — 74011250637 HC RX REV CODE- 250/637: Performed by: SURGERY

## 2022-08-29 RX ORDER — PROPOFOL 10 MG/ML
INJECTION, EMULSION INTRAVENOUS AS NEEDED
Status: DISCONTINUED | OUTPATIENT
Start: 2022-08-29 | End: 2022-08-29 | Stop reason: HOSPADM

## 2022-08-29 RX ORDER — HYDROMORPHONE HYDROCHLORIDE 1 MG/ML
1 INJECTION, SOLUTION INTRAMUSCULAR; INTRAVENOUS; SUBCUTANEOUS
Status: DISCONTINUED | OUTPATIENT
Start: 2022-08-29 | End: 2022-09-02 | Stop reason: HOSPADM

## 2022-08-29 RX ORDER — FENTANYL CITRATE 50 UG/ML
INJECTION, SOLUTION INTRAMUSCULAR; INTRAVENOUS AS NEEDED
Status: DISCONTINUED | OUTPATIENT
Start: 2022-08-29 | End: 2022-08-29 | Stop reason: HOSPADM

## 2022-08-29 RX ORDER — SODIUM CHLORIDE, SODIUM LACTATE, POTASSIUM CHLORIDE, CALCIUM CHLORIDE 600; 310; 30; 20 MG/100ML; MG/100ML; MG/100ML; MG/100ML
INJECTION, SOLUTION INTRAVENOUS
Status: DISCONTINUED | OUTPATIENT
Start: 2022-08-29 | End: 2022-08-29 | Stop reason: HOSPADM

## 2022-08-29 RX ORDER — SODIUM CHLORIDE 0.9 % (FLUSH) 0.9 %
5-40 SYRINGE (ML) INJECTION EVERY 8 HOURS
Status: DISCONTINUED | OUTPATIENT
Start: 2022-08-29 | End: 2022-08-30

## 2022-08-29 RX ORDER — METOCLOPRAMIDE HYDROCHLORIDE 5 MG/ML
INJECTION INTRAMUSCULAR; INTRAVENOUS
Status: DISPENSED
Start: 2022-08-29 | End: 2022-08-30

## 2022-08-29 RX ORDER — ONDANSETRON 4 MG/1
4 TABLET, ORALLY DISINTEGRATING ORAL
Status: DISCONTINUED | OUTPATIENT
Start: 2022-08-29 | End: 2022-09-02 | Stop reason: HOSPADM

## 2022-08-29 RX ORDER — LIDOCAINE HYDROCHLORIDE 20 MG/ML
INJECTION, SOLUTION EPIDURAL; INFILTRATION; INTRACAUDAL; PERINEURAL AS NEEDED
Status: DISCONTINUED | OUTPATIENT
Start: 2022-08-29 | End: 2022-08-29 | Stop reason: HOSPADM

## 2022-08-29 RX ORDER — MORPHINE SULFATE 4 MG/ML
4 INJECTION INTRAVENOUS ONCE
Status: COMPLETED | OUTPATIENT
Start: 2022-08-29 | End: 2022-08-29

## 2022-08-29 RX ORDER — SODIUM CHLORIDE 0.9 % (FLUSH) 0.9 %
5-40 SYRINGE (ML) INJECTION AS NEEDED
Status: DISCONTINUED | OUTPATIENT
Start: 2022-08-29 | End: 2022-09-01

## 2022-08-29 RX ORDER — SODIUM CHLORIDE 9 MG/ML
125 INJECTION, SOLUTION INTRAVENOUS CONTINUOUS
Status: DISCONTINUED | OUTPATIENT
Start: 2022-08-29 | End: 2022-09-02 | Stop reason: HOSPADM

## 2022-08-29 RX ORDER — MIDAZOLAM HYDROCHLORIDE 1 MG/ML
INJECTION, SOLUTION INTRAMUSCULAR; INTRAVENOUS AS NEEDED
Status: DISCONTINUED | OUTPATIENT
Start: 2022-08-29 | End: 2022-08-29 | Stop reason: HOSPADM

## 2022-08-29 RX ORDER — ONDANSETRON 2 MG/ML
4 INJECTION INTRAMUSCULAR; INTRAVENOUS
Status: DISCONTINUED | OUTPATIENT
Start: 2022-08-29 | End: 2022-09-02 | Stop reason: HOSPADM

## 2022-08-29 RX ORDER — ENOXAPARIN SODIUM 100 MG/ML
40 INJECTION SUBCUTANEOUS DAILY
Status: DISCONTINUED | OUTPATIENT
Start: 2022-08-30 | End: 2022-08-29

## 2022-08-29 RX ADMIN — MIDAZOLAM HYDROCHLORIDE 2 MG: 2 INJECTION, SOLUTION INTRAMUSCULAR; INTRAVENOUS at 18:56

## 2022-08-29 RX ADMIN — SODIUM CHLORIDE, PRESERVATIVE FREE 10 ML: 5 INJECTION INTRAVENOUS at 21:32

## 2022-08-29 RX ADMIN — LIDOCAINE HYDROCHLORIDE 60 MG: 20 INJECTION, SOLUTION EPIDURAL; INFILTRATION; INTRACAUDAL; PERINEURAL at 18:56

## 2022-08-29 RX ADMIN — HEPARIN SODIUM 3480 UNITS: 1000 INJECTION, SOLUTION INTRAVENOUS; SUBCUTANEOUS at 07:31

## 2022-08-29 RX ADMIN — PROPOFOL 50 MG: 10 INJECTION, EMULSION INTRAVENOUS at 19:05

## 2022-08-29 RX ADMIN — HYDROMORPHONE HYDROCHLORIDE 1 MG: 1 INJECTION, SOLUTION INTRAMUSCULAR; INTRAVENOUS; SUBCUTANEOUS at 21:27

## 2022-08-29 RX ADMIN — SODIUM CHLORIDE, PRESERVATIVE FREE 10 ML: 5 INJECTION INTRAVENOUS at 21:31

## 2022-08-29 RX ADMIN — FENTANYL CITRATE 100 MCG: 50 INJECTION, SOLUTION INTRAMUSCULAR; INTRAVENOUS at 19:01

## 2022-08-29 RX ADMIN — MORPHINE SULFATE 4 MG: 4 INJECTION INTRAVENOUS at 07:47

## 2022-08-29 RX ADMIN — PROPOFOL 50 MCG/KG/MIN: 10 INJECTION, EMULSION INTRAVENOUS at 18:59

## 2022-08-29 RX ADMIN — OXYCODONE AND ACETAMINOPHEN 1 TABLET: 10; 325 TABLET ORAL at 12:32

## 2022-08-29 RX ADMIN — HEPARIN SODIUM AND DEXTROSE 19 UNITS/KG/HR: 10000; 5 INJECTION INTRAVENOUS at 22:31

## 2022-08-29 RX ADMIN — CEFAZOLIN SODIUM 2 G: 1 INJECTION, POWDER, FOR SOLUTION INTRAMUSCULAR; INTRAVENOUS at 18:56

## 2022-08-29 RX ADMIN — SODIUM CHLORIDE 125 ML/HR: 9 INJECTION, SOLUTION INTRAVENOUS at 20:38

## 2022-08-29 RX ADMIN — SODIUM CHLORIDE, POTASSIUM CHLORIDE, SODIUM LACTATE AND CALCIUM CHLORIDE: 600; 310; 30; 20 INJECTION, SOLUTION INTRAVENOUS at 18:51

## 2022-08-29 RX ADMIN — HEPARIN SODIUM AND DEXTROSE 19 UNITS/KG/HR: 10000; 5 INJECTION INTRAVENOUS at 12:28

## 2022-08-29 RX ADMIN — OXYCODONE HYDROCHLORIDE 20 MG: 10 TABLET, FILM COATED, EXTENDED RELEASE ORAL at 21:30

## 2022-08-29 RX ADMIN — SODIUM CHLORIDE, PRESERVATIVE FREE 10 ML: 5 INJECTION INTRAVENOUS at 05:59

## 2022-08-29 RX ADMIN — PROPOFOL 50 MG: 10 INJECTION, EMULSION INTRAVENOUS at 18:56

## 2022-08-29 RX ADMIN — SODIUM CHLORIDE, PRESERVATIVE FREE 10 ML: 5 INJECTION INTRAVENOUS at 14:52

## 2022-08-29 RX ADMIN — OXYCODONE HYDROCHLORIDE 20 MG: 10 TABLET, FILM COATED, EXTENDED RELEASE ORAL at 09:37

## 2022-08-29 NOTE — PROGRESS NOTES
Reason for Readmission:     foot pain          RUR Score/Risk Level:     18%    PCP: First and Last name:  Jovita Soto   Name of Practice:    Are you a current patient: Yes/No: Yes   Approximate date of last visit: 06/30/2022   Can you participate in a virtual visit with your PCP:     Is a Care Conference indicated:       Did you attend your follow up appointment (s): If not, why not:         Resources/supports as identified by patient/family:          Top Challenges facing patient (as identified by patient/family and CM): Finances/Medication cost?       Transportation        Support system or lack thereof? Living arrangements? Self-care/ADLs/Cognition? Current Advanced Directive/Advance Care Plan:             Plan for utilizing home health:   declined              Transition of Care Plan:    Based on readmission, the patient's previous Plan of Care   has been evaluated and/or modified. The current Transition of Care Plan is:           CM met with patient at bedside to complete DCP assessment. Patient's wife also present, and patient gave consent for her to be present for assessment. Patient lives with his wife in a single story home with steps to enter and egress. He ambulates with a crutch, and his wife assists him in completing ADLs. He is able to drive. His home pharmacy is [facility] in [address] Home health services were declined at this time. Current discharge dispo: home self care, his wife will transport. CM will continue to follow.

## 2022-08-29 NOTE — PROGRESS NOTES
Primary Nurse Antwan Salazar RN and Gary Abraham RN performed a dual skin assessment on this patient Impairment noted- see wound doc flow sheet  Phil score is 20.

## 2022-08-29 NOTE — PROGRESS NOTES
PROGRESS NOTE      Chief Complaints:  Has no new complaints. HPI and  Objective:    Patient is awake and alert. Wound examination shows no change since yesterday. Review of Systems:  Rest of review system negative, personally reviewed  EXAM:  Visit Vitals  /78   Pulse 60   Temp 97.6 °F (36.4 °C)   Resp 16   Ht 6' 1\" (1.854 m)   Wt 214 lb 15.2 oz (97.5 kg)   SpO2 99%   BMI 28.36 kg/m²       Patient is awake and alert  Head and neck atraumatic, normocephalic. ENT: No hoarse voice  Cardiac system regular rate rhythm. Pulmonary no audible wheeze  Chest wall excursion normal with respiration cycle  Abdomen is soft not particularly distended. Neurologically nonfocal.  Skin is warm and moist.  Psychosocial: Cooperative. Vascular examination as previously noted no changes. Recent Results (from the past 24 hour(s))   TYPE & SCREEN    Collection Time: 08/28/22  4:18 PM   Result Value Ref Range    Crossmatch Expiration 08/31/2022,2359     ABO/Rh(D) A Positive     Antibody screen Negative    LACTIC ACID    Collection Time: 08/28/22  4:20 PM   Result Value Ref Range    Lactic acid 2.1 (HH) 0.4 - 2.0 mmol/L   CBC WITH AUTOMATED DIFF    Collection Time: 08/28/22  4:20 PM   Result Value Ref Range    WBC 9.9 4.1 - 11.1 K/uL    RBC 4.95 4.10 - 5.70 M/uL    HGB 14.1 12.1 - 17.0 g/dL    HCT 42.8 36.6 - 50.3 %    MCV 86.5 80.0 - 99.0 FL    MCH 28.5 26.0 - 34.0 PG    MCHC 32.9 30.0 - 36.5 g/dL    RDW 13.2 11.5 - 14.5 %    PLATELET 838 941 - 379 K/uL    MPV 10.3 8.9 - 12.9 FL    NRBC 0.0 0.0  WBC    ABSOLUTE NRBC 0.00 0.00 - 0.01 K/uL    NEUTROPHILS 69 32 - 75 %    LYMPHOCYTES 20 12 - 49 %    MONOCYTES 7 5 - 13 %    EOSINOPHILS 3 0 - 7 %    BASOPHILS 1 0 - 1 %    IMMATURE GRANULOCYTES 0 0 - 0.5 %    ABS. NEUTROPHILS 6.8 1.8 - 8.0 K/UL    ABS. LYMPHOCYTES 2.0 0.8 - 3.5 K/UL    ABS. MONOCYTES 0.7 0.0 - 1.0 K/UL    ABS. EOSINOPHILS 0.3 0.0 - 0.4 K/UL    ABS. BASOPHILS 0.1 0.0 - 0.1 K/UL    ABS. IMM.  GRANS. 0.0 0.00 - 0.04 K/UL    DF AUTOMATED     METABOLIC PANEL, COMPREHENSIVE    Collection Time: 08/28/22  4:20 PM   Result Value Ref Range    Sodium 136 136 - 145 mmol/L    Potassium 3.6 3.5 - 5.1 mmol/L    Chloride 104 97 - 108 mmol/L    CO2 24 21 - 32 mmol/L    Anion gap 8 5 - 15 mmol/L    Glucose 170 (H) 65 - 100 mg/dL    BUN 10 6 - 20 mg/dL    Creatinine 0.77 0.70 - 1.30 mg/dL    BUN/Creatinine ratio 13 12 - 20      GFR est AA >60 >60 ml/min/1.73m2    GFR est non-AA >60 >60 ml/min/1.73m2    Calcium 9.4 8.5 - 10.1 mg/dL    Bilirubin, total 0.7 0.2 - 1.0 mg/dL    AST (SGOT) 30 15 - 37 U/L    ALT (SGPT) 46 12 - 78 U/L    Alk. phosphatase 93 45 - 117 U/L    Protein, total 8.1 6.4 - 8.2 g/dL    Albumin 3.6 3.5 - 5.0 g/dL    Globulin 4.5 (H) 2.0 - 4.0 g/dL    A-G Ratio 0.8 (L) 1.1 - 2.2     SED RATE (ESR)    Collection Time: 08/28/22  4:20 PM   Result Value Ref Range    Sed rate, automated 30 (H) 0 - 20 mm/hr   GLUCOSE, POC    Collection Time: 08/28/22  7:32 PM   Result Value Ref Range    Glucose (POC) 91 65 - 117 mg/dL    Performed by Tamy Nathan ([place])    PTT    Collection Time: 08/28/22  8:33 PM   Result Value Ref Range    aPTT 30.9 21.2 - 34.1 sec    aPTT, therapeutic range   82 - 109 sec   CBC WITH AUTOMATED DIFF    Collection Time: 08/28/22  8:33 PM   Result Value Ref Range    WBC 9.7 4.1 - 11.1 K/uL    RBC 4.45 4.10 - 5.70 M/uL    HGB 12.6 12.1 - 17.0 g/dL    HCT 38.6 36.6 - 50.3 %    MCV 86.7 80.0 - 99.0 FL    MCH 28.3 26.0 - 34.0 PG    MCHC 32.6 30.0 - 36.5 g/dL    RDW 13.2 11.5 - 14.5 %    PLATELET 444 227 - 687 K/uL    MPV 10.1 8.9 - 12.9 FL    NRBC 0.0 0.0  WBC    ABSOLUTE NRBC 0.00 0.00 - 0.01 K/uL    NEUTROPHILS 62 32 - 75 %    LYMPHOCYTES 26 12 - 49 %    MONOCYTES 7 5 - 13 %    EOSINOPHILS 4 0 - 7 %    BASOPHILS 1 0 - 1 %    IMMATURE GRANULOCYTES 0 0 - 0.5 %    ABS. NEUTROPHILS 6.0 1.8 - 8.0 K/UL    ABS. LYMPHOCYTES 2.6 0.8 - 3.5 K/UL    ABS. MONOCYTES 0.6 0.0 - 1.0 K/UL    ABS.  EOSINOPHILS 0.4 0.0 - 0.4 K/UL    ABS. BASOPHILS 0.1 0.0 - 0.1 K/UL    ABS. IMM. GRANS. 0.0 0.00 - 0.04 K/UL    DF AUTOMATED     GLUCOSE, POC    Collection Time: 08/29/22 12:27 AM   Result Value Ref Range    Glucose (POC) 126 (H) 65 - 117 mg/dL    Performed by Judge Garcia ([place])    PTT    Collection Time: 08/29/22  4:03 AM   Result Value Ref Range    aPTT 41.5 (H) 21.2 - 34.1 sec    aPTT, therapeutic range   82 - 109 sec   GLUCOSE, POC    Collection Time: 08/29/22  5:59 AM   Result Value Ref Range    Glucose (POC) 88 65 - 117 mg/dL    Performed by Judge Garcia [facility])        ASSESSMENT:   Patient is 65 y.o. with diagnosis of : Active Problems:    Foot infection (8/28/2022)      Diabetic wet gangrene of the foot ([address] (8/28/2022)        PLAN:                 Patient is scheduled for right partial fourth toe amputation and second toe amputation today. Patient did have a breakfast this morning. Heparin drip will be held 2 hours prior to surgery.

## 2022-08-29 NOTE — PROGRESS NOTES
Problem: Falls - Risk of  Goal: *Absence of Falls  Description: Document Kathryn Jefferson Fall Risk and appropriate interventions in the flowsheet.   Outcome: Progressing Towards Goal  Note: Fall Risk Interventions:            Medication Interventions: Patient to call before getting OOB, Teach patient to arise slowly                   Problem: Patient Education: Go to Patient Education Activity  Goal: Patient/Family Education  Outcome: Progressing Towards Goal

## 2022-08-29 NOTE — PROGRESS NOTES
Pt. Informed emergency has to go to OR first prior to him. Verbalizes understanding and wife updated.

## 2022-08-29 NOTE — ANESTHESIA PREPROCEDURE EVALUATION
Relevant Problems   HEMATOLOGY   (+) Osteomyelitis (HCC)       Anesthetic History   No history of anesthetic complications            Review of Systems / Medical History  Patient summary reviewed, nursing notes reviewed and pertinent labs reviewed    Pulmonary                Comments: SNORING. Former smoker. Neuro/Psych              Cardiovascular    Hypertension          PAD         GI/Hepatic/Renal     GERD           Endo/Other    Diabetes    Obesity, arthritis (Rheumatoid arthritis. ) and anemia    Comments: Gout. Other Findings   Comments: Right BIG TOE INFECTION. OSTEOMYELITIS. \"Diabetic wet gangrene of the foot. \"    HEPARIN INFUSION HELD AT 15:06     Today 0403     PTT 41.5 High     BREAKFAST AT 10:00AM                         Physical Exam    Airway  Mallampati: II  TM Distance: 4 - 6 cm  Neck ROM: normal range of motion, short neck   Mouth opening: Normal     Cardiovascular    Rhythm: regular  Rate: normal         Dental      Comments: DENTURES.     Pulmonary  Breath sounds clear to auscultation               Abdominal  GI exam deferred       Other Findings            Anesthetic Plan    ASA: 4, emergent  Anesthesia type: MAC          Induction: Intravenous  Anesthetic plan and risks discussed with: Patient and Spouse      REGLAN 10mg  I/V AT 16:06  ZOFRAN 4MG I/V AT  16:06

## 2022-08-29 NOTE — H&P
History and Physical    Chief complaints: Toe gangrene   History of Presenting Illness:  Tammy Carlos is a 65 y.o. very pleasant gentleman with right leg limb ischemia underwent multiple revascularization procedures including common femoral artery above-knee popliteal bypass followed by below-knee angioplasty. Patient currently on Eliquis therapy. Patient also had a previous great toe amputation on the right foot. Now second toe and tip of the fourth toe become gangrene. Patient has no fever chills. Pain is moderate. Past Medical History:   Diagnosis Date    Diabetes ([address]     Hypertension       Past Surgical History:   Procedure Laterality Date    HX AMPUTATION TOE Right 2022    Right great toe    VASCULAR SURGERY PROCEDURE UNLIST Right 2022     Family History   Problem Relation Age of Onset    Cancer Mother     Diabetes Mother     Cancer Father       Social History     Tobacco Use    Smoking status: Former     Packs/day: 1.50     Years: 15.00     Pack years: 22.50     Types: Cigarettes     Quit date: 2022     Years since quittin.1    Smokeless tobacco: Never   Substance Use Topics    Alcohol use: Not Currently       Prior to Admission medications    Medication Sig Start Date End Date Taking? Authorizing Provider   amLODIPine-benazepril (LOTREL) 5-20 mg per capsule amlodipine 5 mg-benazepril 20 mg capsule   TAKE 1 CAPSULE BY MOUTH ONCE DAILY 22  Yes Meche Martínez MD   clindamycin (CLEOCIN) 300 mg capsule Take 1 Capsule by mouth four (4) times daily for 10 days. 8/24/22 9/3/22 Yes Meche Martínez MD   oxyCODONE-acetaminophen (PERCOCET 10)  mg per tablet Take 1 Tablet by mouth every eight (8) hours as needed for Pain for up to 30 days. Max Daily Amount: 3 Tablets. 22 Yes Meche Martínez MD   enoxaparin (LOVENOX) 100 mg/mL 100 mg by SubCUTAneous route every twelve (12) hours.  22  Yes Loyd Fraser MD   oxyCODONE ER (OxyCONTIN) 20 mg ER tablet Take 1 Tablet by mouth every twelve (12) hours for 30 days. Max Daily Amount: 40 mg. 8/15/22 9/14/22 Yes Gayatri Martínez MD   apixaban (Eliquis) 5 mg tablet Take 1 Tablet by mouth two (2) times a day. Take 2 tabs (10mg ) twice daily for next 7 days then 1 tab (5mg) twice daily afterwards. 8/8/22  Yes Tae Albert MD   pantoprazole (PROTONIX) 40 mg tablet Take 40 mg by mouth daily. Yes Provider, Historical   metFORMIN (GLUCOPHAGE) 500 mg tablet Take 500 mg by mouth daily (with dinner). Yes Provider, Historical   amoxicillin-clavulanate (AUGMENTIN) 875-125 mg per tablet Take 1 tablet by mouth twice daily for 10 days  Patient not taking: Reported on 8/28/2022 8/27/22   Gayatri Martínez MD   amoxicillin-clavulanate (AUGMENTIN) 875-125 mg per tablet Take 1 Tablet by mouth two (2) times a day for 10 days. Patient not taking: Reported on 8/28/2022 8/24/22 9/3/22  Gayatri Martínez MD   amoxicillin-clavulanate (AUGMENTIN) 875-125 mg per tablet Take 1 Tablet by mouth every twelve (12) hours. Patient not taking: No sig reported 7/28/22 10/8/22  Provider, Historical   colchicine 0.6 mg tablet Take 0.6 mg by mouth daily. Patient not taking: No sig reported    Provider, Historical   lisinopriL (PRINIVIL, ZESTRIL) 20 mg tablet Take 1 Tablet by mouth daily. Patient not taking: Reported on 8/28/2022 7/9/22   Gia Ferrer MD   amLODIPine (NORVASC) 10 mg tablet Take 1 Tablet by mouth daily. Patient not taking: No sig reported 7/9/22   Nazia Schuster MD     No Known Allergies     Review of Systems:  Pertinent review of systems discussed in HPI, and rest of organ systems personally reviewed and they are negative. Objective:   Vital signs reviewed:      Visit Vitals  /78   Pulse 60   Temp 97.6 °F (36.4 °C)   Resp 16   Ht 6' 1\" (1.854 m)   Wt 214 lb 15.2 oz (97.5 kg)   SpO2 99%   BMI 28.36 kg/m²       Physical Exam:   General appearance:   Patient is awake and alert, not in particular distress.   Head and neck atraumatic normocephalic. ENT shows normal oral mucosa, no jaundice no hoarse voice. Eyes: Pupil equal gaze appropriate. Cardiac system regular rate rhythm. Pulmonary: No audible wheeze. Chest wall: Chest wall excursion normal with respiration cycle, there is no deformity or chest trauma. Abdomen: Soft not tender or distended, bowel sounds active. There is no obvious palpable mass, or hernia. Neurologic: Nonfocal.  Cranial nerves intact, no new focal findings. Musculoskeletal system: Motor function normal limits, motor function 5 out of 5, range of motion normal in all 4 extremity  Skin: Warm and moist.  Hematologic system: No obvious bruising. Psychosocial: Appropriate and cooperative. Vascular examination: She has a have a 3+ monophasic signal on right DP and PT. Wound description as discussed above. Data Review: Labs are reviewed. Discussed  Recent Results (from the past 24 hour(s))   TYPE & SCREEN    Collection Time: 08/28/22  4:18 PM   Result Value Ref Range    Crossmatch Expiration 08/31/2022,2359     ABO/Rh(D) A Positive     Antibody screen Negative    LACTIC ACID    Collection Time: 08/28/22  4:20 PM   Result Value Ref Range    Lactic acid 2.1 (HH) 0.4 - 2.0 mmol/L   CBC WITH AUTOMATED DIFF    Collection Time: 08/28/22  4:20 PM   Result Value Ref Range    WBC 9.9 4.1 - 11.1 K/uL    RBC 4.95 4.10 - 5.70 M/uL    HGB 14.1 12.1 - 17.0 g/dL    HCT 42.8 36.6 - 50.3 %    MCV 86.5 80.0 - 99.0 FL    MCH 28.5 26.0 - 34.0 PG    MCHC 32.9 30.0 - 36.5 g/dL    RDW 13.2 11.5 - 14.5 %    PLATELET 092 337 - 629 K/uL    MPV 10.3 8.9 - 12.9 FL    NRBC 0.0 0.0  WBC    ABSOLUTE NRBC 0.00 0.00 - 0.01 K/uL    NEUTROPHILS 69 32 - 75 %    LYMPHOCYTES 20 12 - 49 %    MONOCYTES 7 5 - 13 %    EOSINOPHILS 3 0 - 7 %    BASOPHILS 1 0 - 1 %    IMMATURE GRANULOCYTES 0 0 - 0.5 %    ABS. NEUTROPHILS 6.8 1.8 - 8.0 K/UL    ABS. LYMPHOCYTES 2.0 0.8 - 3.5 K/UL    ABS. MONOCYTES 0.7 0.0 - 1.0 K/UL    ABS.  EOSINOPHILS 0.3 0.0 - 0.4 K/UL    ABS. BASOPHILS 0.1 0.0 - 0.1 K/UL    ABS. IMM. GRANS. 0.0 0.00 - 0.04 K/UL    DF AUTOMATED     METABOLIC PANEL, COMPREHENSIVE    Collection Time: 08/28/22  4:20 PM   Result Value Ref Range    Sodium 136 136 - 145 mmol/L    Potassium 3.6 3.5 - 5.1 mmol/L    Chloride 104 97 - 108 mmol/L    CO2 24 21 - 32 mmol/L    Anion gap 8 5 - 15 mmol/L    Glucose 170 (H) 65 - 100 mg/dL    BUN 10 6 - 20 mg/dL    Creatinine 0.77 0.70 - 1.30 mg/dL    BUN/Creatinine ratio 13 12 - 20      GFR est AA >60 >60 ml/min/1.73m2    GFR est non-AA >60 >60 ml/min/1.73m2    Calcium 9.4 8.5 - 10.1 mg/dL    Bilirubin, total 0.7 0.2 - 1.0 mg/dL    AST (SGOT) 30 15 - 37 U/L    ALT (SGPT) 46 12 - 78 U/L    Alk. phosphatase 93 45 - 117 U/L    Protein, total 8.1 6.4 - 8.2 g/dL    Albumin 3.6 3.5 - 5.0 g/dL    Globulin 4.5 (H) 2.0 - 4.0 g/dL    A-G Ratio 0.8 (L) 1.1 - 2.2     SED RATE (ESR)    Collection Time: 08/28/22  4:20 PM   Result Value Ref Range    Sed rate, automated 30 (H) 0 - 20 mm/hr   GLUCOSE, POC    Collection Time: 08/28/22  7:32 PM   Result Value Ref Range    Glucose (POC) 91 65 - 117 mg/dL    Performed by Wilder Reyna ([place])    PTT    Collection Time: 08/28/22  8:33 PM   Result Value Ref Range    aPTT 30.9 21.2 - 34.1 sec    aPTT, therapeutic range   82 - 109 sec   CBC WITH AUTOMATED DIFF    Collection Time: 08/28/22  8:33 PM   Result Value Ref Range    WBC 9.7 4.1 - 11.1 K/uL    RBC 4.45 4.10 - 5.70 M/uL    HGB 12.6 12.1 - 17.0 g/dL    HCT 38.6 36.6 - 50.3 %    MCV 86.7 80.0 - 99.0 FL    MCH 28.3 26.0 - 34.0 PG    MCHC 32.6 30.0 - 36.5 g/dL    RDW 13.2 11.5 - 14.5 %    PLATELET 039 100 - 485 K/uL    MPV 10.1 8.9 - 12.9 FL    NRBC 0.0 0.0  WBC    ABSOLUTE NRBC 0.00 0.00 - 0.01 K/uL    NEUTROPHILS 62 32 - 75 %    LYMPHOCYTES 26 12 - 49 %    MONOCYTES 7 5 - 13 %    EOSINOPHILS 4 0 - 7 %    BASOPHILS 1 0 - 1 %    IMMATURE GRANULOCYTES 0 0 - 0.5 %    ABS. NEUTROPHILS 6.0 1.8 - 8.0 K/UL    ABS.  LYMPHOCYTES 2.6 0.8 - 3.5 K/UL    ABS. MONOCYTES 0.6 0.0 - 1.0 K/UL    ABS. EOSINOPHILS 0.4 0.0 - 0.4 K/UL    ABS. BASOPHILS 0.1 0.0 - 0.1 K/UL    ABS. IMM. GRANS. 0.0 0.00 - 0.04 K/UL    DF AUTOMATED     GLUCOSE, POC    Collection Time: 08/29/22 12:27 AM   Result Value Ref Range    Glucose (POC) 126 (H) 65 - 117 mg/dL    Performed by Waqas Saeed ([place])    PTT    Collection Time: 08/29/22  4:03 AM   Result Value Ref Range    aPTT 41.5 (H) 21.2 - 34.1 sec    aPTT, therapeutic range   82 - 109 sec   GLUCOSE, POC    Collection Time: 08/29/22  5:59 AM   Result Value Ref Range    Glucose (POC) 88 65 - 117 mg/dL    Performed by Waqas Saeed [facility])              Imagings reviewed: discussed as below. No name on file. Assessment:     Active Problems:    Foot infection (8/28/2022)      Diabetic wet gangrene of the foot ([facility] [address] (8/28/2022)        Plan: We have initially planned on the right TMA however the wound appears to be somewhat better. Patient was discussed about change of amputation level. I did offer patient partial toe amputation of the right first toe and second toe amputation. Patient currently getting IV antibiotic. Patient will need a heparin drip prior to surgery. Wound care dressing has been performed personally. Patient scheduled for surgery tomorrow. I discussed with the patient rational for the level of amputations. ,  Risks benefits and complication and postop wound cares in details.

## 2022-08-30 LAB
APTT PPP: 44.2 SEC (ref 21.2–34.1)
APTT PPP: 48.6 SEC (ref 21.2–34.1)
APTT PPP: 76.5 SEC (ref 21.2–34.1)
GLUCOSE BLD STRIP.AUTO-MCNC: 108 MG/DL (ref 65–117)
GLUCOSE BLD STRIP.AUTO-MCNC: 123 MG/DL (ref 65–117)
GLUCOSE BLD STRIP.AUTO-MCNC: 136 MG/DL (ref 65–117)
GLUCOSE BLD STRIP.AUTO-MCNC: 85 MG/DL (ref 65–117)
PERFORMED BY, TECHID: ABNORMAL
PERFORMED BY, TECHID: ABNORMAL
PERFORMED BY, TECHID: NORMAL
PERFORMED BY, TECHID: NORMAL
THERAPEUTIC RANGE,PTTT: ABNORMAL SEC (ref 82–109)

## 2022-08-30 PROCEDURE — 74011250636 HC RX REV CODE- 250/636: Performed by: SURGERY

## 2022-08-30 PROCEDURE — 74011250637 HC RX REV CODE- 250/637: Performed by: SURGERY

## 2022-08-30 PROCEDURE — 85730 THROMBOPLASTIN TIME PARTIAL: CPT

## 2022-08-30 PROCEDURE — 65270000029 HC RM PRIVATE

## 2022-08-30 PROCEDURE — 82962 GLUCOSE BLOOD TEST: CPT

## 2022-08-30 PROCEDURE — 94762 N-INVAS EAR/PLS OXIMTRY CONT: CPT

## 2022-08-30 PROCEDURE — 74011000250 HC RX REV CODE- 250: Performed by: SURGERY

## 2022-08-30 PROCEDURE — 36415 COLL VENOUS BLD VENIPUNCTURE: CPT

## 2022-08-30 RX ADMIN — HYDROMORPHONE HYDROCHLORIDE 1 MG: 1 INJECTION, SOLUTION INTRAMUSCULAR; INTRAVENOUS; SUBCUTANEOUS at 23:30

## 2022-08-30 RX ADMIN — SODIUM CHLORIDE, PRESERVATIVE FREE 10 ML: 5 INJECTION INTRAVENOUS at 20:34

## 2022-08-30 RX ADMIN — HEPARIN SODIUM 3480 UNITS: 1000 INJECTION, SOLUTION INTRAVENOUS; SUBCUTANEOUS at 23:28

## 2022-08-30 RX ADMIN — HYDROMORPHONE HYDROCHLORIDE 1 MG: 1 INJECTION, SOLUTION INTRAMUSCULAR; INTRAVENOUS; SUBCUTANEOUS at 20:26

## 2022-08-30 RX ADMIN — SODIUM CHLORIDE 125 ML/HR: 9 INJECTION, SOLUTION INTRAVENOUS at 03:41

## 2022-08-30 RX ADMIN — HYDROMORPHONE HYDROCHLORIDE 1 MG: 1 INJECTION, SOLUTION INTRAMUSCULAR; INTRAVENOUS; SUBCUTANEOUS at 06:38

## 2022-08-30 RX ADMIN — HYDROMORPHONE HYDROCHLORIDE 1 MG: 1 INJECTION, SOLUTION INTRAMUSCULAR; INTRAVENOUS; SUBCUTANEOUS at 00:23

## 2022-08-30 RX ADMIN — OXYCODONE HYDROCHLORIDE 20 MG: 10 TABLET, FILM COATED, EXTENDED RELEASE ORAL at 20:26

## 2022-08-30 RX ADMIN — HYDROMORPHONE HYDROCHLORIDE 1 MG: 1 INJECTION, SOLUTION INTRAMUSCULAR; INTRAVENOUS; SUBCUTANEOUS at 17:11

## 2022-08-30 RX ADMIN — SODIUM CHLORIDE, PRESERVATIVE FREE 10 ML: 5 INJECTION INTRAVENOUS at 14:26

## 2022-08-30 RX ADMIN — HYDROMORPHONE HYDROCHLORIDE 1 MG: 1 INJECTION, SOLUTION INTRAMUSCULAR; INTRAVENOUS; SUBCUTANEOUS at 11:14

## 2022-08-30 RX ADMIN — HYDROMORPHONE HYDROCHLORIDE 1 MG: 1 INJECTION, SOLUTION INTRAMUSCULAR; INTRAVENOUS; SUBCUTANEOUS at 03:41

## 2022-08-30 RX ADMIN — HEPARIN SODIUM AND DEXTROSE 21 UNITS/KG/HR: 10000; 5 INJECTION INTRAVENOUS at 11:46

## 2022-08-30 RX ADMIN — SODIUM CHLORIDE, PRESERVATIVE FREE 10 ML: 5 INJECTION INTRAVENOUS at 06:38

## 2022-08-30 RX ADMIN — HYDROMORPHONE HYDROCHLORIDE 1 MG: 1 INJECTION, SOLUTION INTRAMUSCULAR; INTRAVENOUS; SUBCUTANEOUS at 14:24

## 2022-08-30 RX ADMIN — HEPARIN SODIUM 3480 UNITS: 1000 INJECTION, SOLUTION INTRAVENOUS; SUBCUTANEOUS at 06:49

## 2022-08-30 RX ADMIN — OXYCODONE HYDROCHLORIDE 20 MG: 10 TABLET, FILM COATED, EXTENDED RELEASE ORAL at 08:45

## 2022-08-30 RX ADMIN — SODIUM CHLORIDE, PRESERVATIVE FREE 10 ML: 5 INJECTION INTRAVENOUS at 14:24

## 2022-08-30 RX ADMIN — HEPARIN SODIUM AND DEXTROSE 25 UNITS/KG/HR: 10000; 5 INJECTION INTRAVENOUS at 23:24

## 2022-08-30 NOTE — PROGRESS NOTES
7233: Chart reviewed. Per MD note, patient POD #1 Right Foot Wound Debridement with 2nd and 4th Toe(s) Amputation. Current Dispo: Discharge home, self-care with wife. : Spouse, [address] [phone].     CM will continue to follow patient and recs of medical team.

## 2022-08-30 NOTE — OP NOTES
This is operative report on Damien Lacey, patient's YOB: 1960. Date of surgery: August 29, 2022. Surgeon: Dr. Naren Reynoso    Pre-Op diagnosis:  1. Right leg limb ischemia with a previous right leg revascularization procedure. 2.  Right great toe amputation with open wound. 3.  Right second toe and fourth toe gangrene. Postprocedure diagnosis: Same as above. Procedure:  1. Right fourth toe partial toe amputation at the proximal phalangeal joint. 2.  Right second toe amputation at the metatarsal head level. 3.  Right great toe amputation site wound debridement 2 x 5 cm, excisional wound debridement involving skin subcutaneous tissue fat muscles and first metatarsal bone. Anesthesia: MAC. Anesthesiologist: Dr. Zaki Nicole  Assistant: None  EBL: Minimal  Complication: None  Specimen: Right second and fourth toe. Implants: None    Indication for surgery: Mr. Agnes Chaparro has a previous multiple revascularization procedure for ischemic foot. Patient currently had previously right great toe amputation with open wound. Patient's second and fourth toe has become gangrene requiring further amputation. Patient was discussed about procedures in details, surgical risks benefits and complications and patient has signed surgical consent. Description of procedure: Patient was brought to the operating table comfortable supine position. Followed by anesthesia was initiated. Followed by patient's right foot was prepped using Betadine solution. Followed by sterile drapes of procedure patient. At this time timeout was called after confirmation was made procedure commenced. Using #15 blade a circumferential incision was made around the fourth toe at the proximal phalangeal bone. Gangrenous tissue was excised, first metatarsal bone was divided with a bone cutter, wounds are closed with 3-0 Vicryl sutures and 3-0 Prolene sutures.   Followed by circumferential incision was made at the base of the second toe, underlying soft tissue including musculature and tendons were divided, followed by head of the second metatarsal bone was divided with a bone cutter. Followed by excisional wound debridement was performed at the base of the previous great toe amputation site including skin subtenons tissue fat and part of the first metatarsal bone as well. Total excised wound was 2 x 5 cm. Followed by wounds irrigated with irrigation solution. Wounds are left open and packed with iodine soaked 4 x 4 gauze Kerlix Ace wrap. Patient tolerated procedure well without any complication.

## 2022-08-30 NOTE — PROGRESS NOTES
Problem: Pressure Injury - Risk of  Goal: *Prevention of pressure injury  Description: Document Phil Scale and appropriate interventions in the flowsheet. Outcome: Progressing Towards Goal  Note: Pressure Injury Interventions:             Activity Interventions: PT/OT evaluation    Mobility Interventions: HOB 30 degrees or less    Nutrition Interventions: Document food/fluid/supplement intake

## 2022-08-30 NOTE — PROGRESS NOTES
PT orders received and acknowledged, however, orders placed by CM with no WB status given in orders or documentation following right foot wound debridement with 2nd and 4th toe amputations. Will need WB status order from MD prior to OOB. CM made aware. Thank you.

## 2022-08-31 LAB
APTT PPP: 112.4 SEC (ref 21.2–34.1)
APTT PPP: 58.5 SEC (ref 21.2–34.1)
APTT PPP: 68.7 SEC (ref 21.2–34.1)
GLUCOSE BLD STRIP.AUTO-MCNC: 108 MG/DL (ref 65–100)
GLUCOSE BLD STRIP.AUTO-MCNC: 113 MG/DL (ref 65–100)
GLUCOSE BLD STRIP.AUTO-MCNC: 137 MG/DL (ref 65–100)
PERFORMED BY, TECHID: ABNORMAL
THERAPEUTIC RANGE,PTTT: ABNORMAL SEC (ref 82–109)

## 2022-08-31 PROCEDURE — 97530 THERAPEUTIC ACTIVITIES: CPT

## 2022-08-31 PROCEDURE — 85730 THROMBOPLASTIN TIME PARTIAL: CPT

## 2022-08-31 PROCEDURE — 99024 POSTOP FOLLOW-UP VISIT: CPT | Performed by: SURGERY

## 2022-08-31 PROCEDURE — 65270000029 HC RM PRIVATE

## 2022-08-31 PROCEDURE — 74011000250 HC RX REV CODE- 250: Performed by: SURGERY

## 2022-08-31 PROCEDURE — 74011250636 HC RX REV CODE- 250/636: Performed by: SURGERY

## 2022-08-31 PROCEDURE — 36415 COLL VENOUS BLD VENIPUNCTURE: CPT

## 2022-08-31 PROCEDURE — 74011250637 HC RX REV CODE- 250/637: Performed by: SURGERY

## 2022-08-31 PROCEDURE — 97161 PT EVAL LOW COMPLEX 20 MIN: CPT

## 2022-08-31 PROCEDURE — 82962 GLUCOSE BLOOD TEST: CPT

## 2022-08-31 RX ADMIN — HYDROMORPHONE HYDROCHLORIDE 1 MG: 1 INJECTION, SOLUTION INTRAMUSCULAR; INTRAVENOUS; SUBCUTANEOUS at 12:30

## 2022-08-31 RX ADMIN — HYDROMORPHONE HYDROCHLORIDE 1 MG: 1 INJECTION, SOLUTION INTRAMUSCULAR; INTRAVENOUS; SUBCUTANEOUS at 09:22

## 2022-08-31 RX ADMIN — OXYCODONE HYDROCHLORIDE 20 MG: 10 TABLET, FILM COATED, EXTENDED RELEASE ORAL at 10:11

## 2022-08-31 RX ADMIN — HYDROMORPHONE HYDROCHLORIDE 1 MG: 1 INJECTION, SOLUTION INTRAMUSCULAR; INTRAVENOUS; SUBCUTANEOUS at 15:35

## 2022-08-31 RX ADMIN — HEPARIN SODIUM AND DEXTROSE 25 UNITS/KG/HR: 10000; 5 INJECTION INTRAVENOUS at 01:08

## 2022-08-31 RX ADMIN — HEPARIN SODIUM AND DEXTROSE 23 UNITS/KG/HR: 10000; 5 INJECTION INTRAVENOUS at 07:27

## 2022-08-31 RX ADMIN — SODIUM CHLORIDE, PRESERVATIVE FREE 10 ML: 5 INJECTION INTRAVENOUS at 21:43

## 2022-08-31 RX ADMIN — SODIUM CHLORIDE, PRESERVATIVE FREE 10 ML: 5 INJECTION INTRAVENOUS at 06:06

## 2022-08-31 RX ADMIN — HEPARIN SODIUM 6950 UNITS: 1000 INJECTION, SOLUTION INTRAVENOUS; SUBCUTANEOUS at 17:33

## 2022-08-31 RX ADMIN — HEPARIN SODIUM AND DEXTROSE 25 UNITS/KG/HR: 10000; 5 INJECTION INTRAVENOUS at 17:25

## 2022-08-31 RX ADMIN — HYDROMORPHONE HYDROCHLORIDE 1 MG: 1 INJECTION, SOLUTION INTRAMUSCULAR; INTRAVENOUS; SUBCUTANEOUS at 05:59

## 2022-08-31 RX ADMIN — SODIUM CHLORIDE, PRESERVATIVE FREE 10 ML: 5 INJECTION INTRAVENOUS at 13:01

## 2022-08-31 RX ADMIN — HYDROMORPHONE HYDROCHLORIDE 1 MG: 1 INJECTION, SOLUTION INTRAMUSCULAR; INTRAVENOUS; SUBCUTANEOUS at 02:28

## 2022-08-31 RX ADMIN — HYDROMORPHONE HYDROCHLORIDE 1 MG: 1 INJECTION, SOLUTION INTRAMUSCULAR; INTRAVENOUS; SUBCUTANEOUS at 21:51

## 2022-08-31 RX ADMIN — HYDROMORPHONE HYDROCHLORIDE 1 MG: 1 INJECTION, SOLUTION INTRAMUSCULAR; INTRAVENOUS; SUBCUTANEOUS at 18:51

## 2022-08-31 RX ADMIN — OXYCODONE HYDROCHLORIDE 20 MG: 10 TABLET, FILM COATED, EXTENDED RELEASE ORAL at 20:32

## 2022-08-31 NOTE — PROGRESS NOTES
Problem: Falls - Risk of  Goal: *Absence of Falls  Description: Document [place] Fall Risk and appropriate interventions in the flowsheet.   Outcome: Progressing Towards Goal  Note: Fall Risk Interventions:            Medication Interventions: Bed/chair exit alarm, Evaluate medications/consider consulting pharmacy, Patient to call before getting OOB

## 2022-08-31 NOTE — PROGRESS NOTES
0750: Chart reviewed. Per MD note, patient POD #2 Right Foot Wound Debridement with 2nd and 4th Toe(s) Amputation. PT eval pending WB status order. Current Dispo: Discharge home, self-care with wife. : Spouse, [address] [phone].      CM will continue to follow patient and recs of medical team.

## 2022-08-31 NOTE — ROUTINE PROCESS
Bedside shift change report given to Imani Negrete RN (oncoming nurse) by Bety Henry RN (offgoing nurse). Report included the following information SBAR.

## 2022-08-31 NOTE — PROGRESS NOTES
PHYSICAL THERAPY EVALUATION  Patient: Terri Lewis [de-identified]65 y.o. male)  Date: 8/31/2022  Primary Diagnosis: Foot infection [L08.9]  Diabetic wet gangrene of the foot ([address] [E11.52]  Procedure(s) (LRB):  Right Foot Wound Debridement With 2nd And 4th Toes Amputation (Right) 2 Days Post-Op   Precautions: falls, heel touch WB right LE      ASSESSMENT  Pt is a 65 y.o. male admitted on 8/28/2022 for right LE ischemia with right 2nd and 4th toe gangrene. Pt underwent right 4th and 2nd toe amputations on 8/29/22 performed by Dr. Patrick Potts, pt was instructed in heel touch WB with post op shoe by surgeon. Pt currently being treated for right foot infection and DM wet gangrene . Pt semi-supine in bed with clean bandage in place on right LE upon PT arrival, agreeable to evaluation. Pt A&O x 4. Pt reports I ADLs/IADLs, crutches for mobility, denies falls in the last 3 months. Pt resides with wife who works 10 min from home and can assist as needed. Based on the objective data described, the patient presents with generalized weakness, impaired functional mobility, impaired amb, and impaired balance. Pt performed Rolling: Modified independent, Supine to Sit: Modified independent, Sit to Stand: Stand-by assistance. Pt amb Distance (ft): 50 Feet (ft) with Assistive Device: Gait belt;Walker, rolling, and Ambulation - Level of Assistance: Stand-by assistance;Contact guard assistance; demonstrating WBOS with short, shuffling, step through gt pattern with no LOB or knee buckling noted. Pt then performed Sit to Supine: Modified independent. Pt did fair with session today with good mobility overall but c/o increased right foot pain 6/10 with mobility. Pt education regarding elevation of right LE given, right LE placed on 2 pillows once pt returned to semi-supine position.  Reviewed stair education with pt, declined completion at this time, pt instructed in up with good down with the bad with 2 hand assistance and step to pattern, pt verbalized understanding. Pt will benefit from continued skilled PT to address above deficits and return to PLOF. Current PT DC recommendation HHPT with family care. Current Level of Function Impacting Discharge (mobility/balance): mod I to CGA    Other factors to consider for discharge: severity of deficits, acute medical state, good family support      PLAN :  Recommendations and Planned Interventions: bed mobility training, transfer training, gait training, therapeutic exercises, patient and family training/education, and therapeutic activities      Frequency/Duration: Patient will be followed by physical therapy:  2-3x/week to address goals. Recommendation for discharge: (in order for the patient to meet his/her long term goals)  Home with [address] [place] [address]    This discharge recommendation:  Has been made in collaboration with the attending provider and/or case management    IF patient discharges home will need the following DME: patient owns DME required for discharge         SUBJECTIVE:   Patient stated i'm ready to get out of here.     OBJECTIVE DATA SUMMARY:   HISTORY:    Past Medical History:   Diagnosis Date    Diabetes ([address]     Hypertension      Past Surgical History:   Procedure Laterality Date    HX AMPUTATION TOE Right 08/06/2022    Right great toe    VASCULAR SURGERY PROCEDURE UNLIST Right 07/27/2022       Home Situation  Home Environment: Private residence  # Steps to Enter: 7  Rails to Enter: Yes  Hand Rails : Bilateral  One/Two Story Residence: One story  Living Alone: No  Support Systems: Spouse/Significant Other  Patient Expects to be Discharged to[de-identified] Home with home health  Current DME Used/Available at Home: Walker, rolling, Crutches    EXAMINATION/PRESENTATION/DECISION MAKING:   Critical Behavior:  Neurologic State: Alert  Orientation Level: Oriented X4        Hearing:   Auditory  Auditory Impairment: None  Skin:  bandage right foot  Edema: none noted   Range Of Motion:  AROM: Generally decreased, functional           PROM: Generally decreased, functional           Strength:    Strength: Generally decreased, functional        Functional Mobility:  Bed Mobility:  Rolling: Modified independent  Supine to Sit: Modified independent  Sit to Supine: Modified independent  Scooting: Modified independent  Transfers:  Sit to Stand: Stand-by assistance  Stand to Sit: Stand-by assistance                       Balance:   Sitting: Intact; Without support  Standing: Intact; With support (RW utilized)  Ambulation/Gait Training:  Distance (ft): 50 Feet (ft)  Assistive Device: Gait belt;Walker, rolling  Ambulation - Level of Assistance: Stand-by assistance;Contact guard assistance     Gait Description (WDL): Exceptions to WDL     Right Side Weight Bearing: Heel     Base of Support: Widened;Shift to left     Speed/Mary: Slow;Shuffled    Therapeutic Exercises:   Pt would benefit from LE HEP to improve overall LE AROM/strength and can be further educated in next treatment session. Functional Measure:  [employer] AM-PAC 6 Clicks         Basic Mobility Inpatient Short Form  How much difficulty does the patient currently have. .. Unable A Lot A Little None   1. Turning over in bed (including adjusting bedclothes, sheets and blankets)? [] 1   [] 2   [] 3   [x] 4   2. Sitting down on and standing up from a chair with arms ( e.g., wheelchair, bedside commode, etc.)   [] 1   [] 2   [x] 3   [] 4   3. Moving from lying on back to sitting on the side of the bed? [] 1   [] 2   [] 3   [x] 4          How much help from another person does the patient currently need. .. Total A Lot A Little None   4. Moving to and from a bed to a chair (including a wheelchair)? [] 1   [] 2   [x] 3   [] 4   5. Need to walk in hospital room? [] 1   [] 2   [x] 3   [] 4   6. Climbing 3-5 steps with a railing? [] 1   [] 2   [x] 3   [] 4   © 2007, Trustees of [employer], under license to [employer]  All rights reserved Score:  Initial:  Most Recent: X (Date: 22 )   Interpretation of Tool:  Represents activities that are increasingly more difficult (i.e. Bed mobility, Transfers, Gait). Score 24 23 22-20 19-15 14-10 9-7 6   Modifier CH CI CJ CK CL CM CN         Physical Therapy Evaluation Charge Determination   History Examination Presentation Decision-Making   HIGH Complexity :3+ comorbidities / personal factors will impact the outcome/ POC  HIGH Complexity : 4+ Standardized tests and measures addressing body structure, function, activity limitation and / or participation in recreation  LOW Complexity : Stable, uncomplicated  Other outcome measures ampac 6  mod      Based on the above components, the patient evaluation is determined to be of the following complexity level: LOW     Pain Ratin/10 right foot     Activity Tolerance:   Fair and requires rest breaks    After treatment patient left in no apparent distress:   Bed locked and in lowest position Supine in bed, Call bell within reach, and Side rails x 3 and nsg updated. GOALS:    Problem: Mobility Impaired (Adult and Pediatric)  Goal: *Acute Goals and Plan of Care (Insert Text)  Description: Pt stated goal: to go home  Pt will be I with LE HEP in 7 days. Pt will perform transfers with mod I in 7 days. Pt will amb  feet with LRAD safely with mod I in 7 days. Pt will ascend/descend 4 steps with B handrails and CGA in 7 days to safely enter home. Outcome: Not Met       COMMUNICATION/EDUCATION:   The patients plan of care was discussed with: Occupational therapist, Registered nurse, and Case management. Fall prevention education was provided and the patient/caregiver indicated understanding., Patient/family have participated as able in goal setting and plan of care. , and Patient/family agree to work toward stated goals and plan of care.     Serenity Boyd, SPT, under direct supervision of Kaiden Vinson, PT, DPT provided care and treatment of pt with verbal consent from pt received.         Thank you for this referral.  Jenny Yancey, PT, DPT   Time Calculation: 25 mins

## 2022-08-31 NOTE — ROUTINE PROCESS
Had been awaiting PTT to result. Was informed by lab that machine was down and there would be a delay. PTT just resulted in the patient's EHR.

## 2022-09-01 LAB
APTT PPP: 101.8 SEC (ref 21.2–34.1)
APTT PPP: 60 SEC (ref 21.2–34.1)
APTT PPP: 82.1 SEC (ref 21.2–34.1)
GLUCOSE BLD STRIP.AUTO-MCNC: 119 MG/DL (ref 65–100)
GLUCOSE BLD STRIP.AUTO-MCNC: 125 MG/DL (ref 65–100)
GLUCOSE BLD STRIP.AUTO-MCNC: 139 MG/DL (ref 65–100)
GLUCOSE BLD STRIP.AUTO-MCNC: 140 MG/DL (ref 65–100)
GLUCOSE BLD STRIP.AUTO-MCNC: 98 MG/DL (ref 65–100)
PERFORMED BY, TECHID: ABNORMAL
PERFORMED BY, TECHID: NORMAL
THERAPEUTIC RANGE,PTTT: ABNORMAL SEC (ref 82–109)

## 2022-09-01 PROCEDURE — 65270000029 HC RM PRIVATE

## 2022-09-01 PROCEDURE — 36415 COLL VENOUS BLD VENIPUNCTURE: CPT

## 2022-09-01 PROCEDURE — 85730 THROMBOPLASTIN TIME PARTIAL: CPT

## 2022-09-01 PROCEDURE — 74011000250 HC RX REV CODE- 250: Performed by: SURGERY

## 2022-09-01 PROCEDURE — 74011250637 HC RX REV CODE- 250/637: Performed by: SURGERY

## 2022-09-01 PROCEDURE — 82962 GLUCOSE BLOOD TEST: CPT

## 2022-09-01 PROCEDURE — 74011250636 HC RX REV CODE- 250/636: Performed by: SURGERY

## 2022-09-01 RX ADMIN — HYDROMORPHONE HYDROCHLORIDE 1 MG: 1 INJECTION, SOLUTION INTRAMUSCULAR; INTRAVENOUS; SUBCUTANEOUS at 04:41

## 2022-09-01 RX ADMIN — SODIUM CHLORIDE 125 ML/HR: 9 INJECTION, SOLUTION INTRAVENOUS at 19:55

## 2022-09-01 RX ADMIN — SODIUM CHLORIDE 125 ML/HR: 9 INJECTION, SOLUTION INTRAVENOUS at 11:23

## 2022-09-01 RX ADMIN — HYDROMORPHONE HYDROCHLORIDE 1 MG: 1 INJECTION, SOLUTION INTRAMUSCULAR; INTRAVENOUS; SUBCUTANEOUS at 17:57

## 2022-09-01 RX ADMIN — HEPARIN SODIUM AND DEXTROSE 29 UNITS/KG/HR: 10000; 5 INJECTION INTRAVENOUS at 15:53

## 2022-09-01 RX ADMIN — HEPARIN SODIUM AND DEXTROSE 27 UNITS/KG/HR: 10000; 5 INJECTION INTRAVENOUS at 00:30

## 2022-09-01 RX ADMIN — HEPARIN SODIUM 3480 UNITS: 1000 INJECTION, SOLUTION INTRAVENOUS; SUBCUTANEOUS at 15:04

## 2022-09-01 RX ADMIN — SODIUM CHLORIDE, PRESERVATIVE FREE 10 ML: 5 INJECTION INTRAVENOUS at 07:47

## 2022-09-01 RX ADMIN — HYDROMORPHONE HYDROCHLORIDE 1 MG: 1 INJECTION, SOLUTION INTRAMUSCULAR; INTRAVENOUS; SUBCUTANEOUS at 11:23

## 2022-09-01 RX ADMIN — OXYCODONE HYDROCHLORIDE 20 MG: 10 TABLET, FILM COATED, EXTENDED RELEASE ORAL at 22:34

## 2022-09-01 RX ADMIN — HYDROMORPHONE HYDROCHLORIDE 1 MG: 1 INJECTION, SOLUTION INTRAMUSCULAR; INTRAVENOUS; SUBCUTANEOUS at 14:57

## 2022-09-01 RX ADMIN — SODIUM CHLORIDE, PRESERVATIVE FREE 10 ML: 5 INJECTION INTRAVENOUS at 15:49

## 2022-09-01 RX ADMIN — HYDROMORPHONE HYDROCHLORIDE 1 MG: 1 INJECTION, SOLUTION INTRAMUSCULAR; INTRAVENOUS; SUBCUTANEOUS at 00:32

## 2022-09-01 RX ADMIN — OXYCODONE HYDROCHLORIDE 20 MG: 10 TABLET, FILM COATED, EXTENDED RELEASE ORAL at 08:44

## 2022-09-01 RX ADMIN — HYDROMORPHONE HYDROCHLORIDE 1 MG: 1 INJECTION, SOLUTION INTRAMUSCULAR; INTRAVENOUS; SUBCUTANEOUS at 23:51

## 2022-09-01 RX ADMIN — HEPARIN SODIUM AND DEXTROSE 27 UNITS/KG/HR: 10000; 5 INJECTION INTRAVENOUS at 04:01

## 2022-09-01 RX ADMIN — HYDROMORPHONE HYDROCHLORIDE 1 MG: 1 INJECTION, SOLUTION INTRAMUSCULAR; INTRAVENOUS; SUBCUTANEOUS at 07:17

## 2022-09-01 RX ADMIN — HYDROMORPHONE HYDROCHLORIDE 1 MG: 1 INJECTION, SOLUTION INTRAMUSCULAR; INTRAVENOUS; SUBCUTANEOUS at 21:15

## 2022-09-01 NOTE — PROGRESS NOTES
Problem: Falls - Risk of  Goal: *Absence of Falls  Description: Document Candelario Andrews Fall Risk and appropriate interventions in the flowsheet.   Outcome: Progressing Towards Goal  Note: Fall Risk Interventions:            Medication Interventions: Patient to call before getting OOB    Elimination Interventions: Call light in reach              Problem: Patient Education: Go to Patient Education Activity  Goal: Patient/Family Education  Outcome: Progressing Towards Goal     Problem: Patient Education: Go to Patient Education Activity  Goal: Patient/Family Education  Outcome: Progressing Towards Goal     Problem: Patient Education: Go to Patient Education Activity  Goal: Patient/Family Education  Outcome: Progressing Towards Goal

## 2022-09-01 NOTE — PROGRESS NOTES
PROGRESS NOTE      Chief Complaints:  Patient has no new complaints. HPI and  Objective:    Patient examined this morning. She looks comfortable. Pain is well under control. I examined the patient's right foot. Dressing is changed. Wounds are clean and dry. Review of Systems:  Rest of review of system negative, personally reviewed. EXAM:  Visit Vitals  /81   Pulse 65   Temp 98.8 °F (37.1 °C)   Resp 16   Ht 6' 1\" (1.854 m)   Wt 214 lb 15.2 oz (97.5 kg)   SpO2 95%   BMI 28.36 kg/m²       Patient is awake and alert. Head and neck atraumatic, normocephalic. ENT: No hoarse voice  Cardiac system regular rate rhythm. Pulmonary no audible wheeze  Chest wall excursion normal with respiration cycle  Abdomen is soft not particularly distended. Neurologically nonfocal.  Skin is warm and moist.  Psychosocial: Cooperative. Vascular examination as previously noted no changes.     Recent Results (from the past 24 hour(s))   PTT    Collection Time: 08/31/22  5:04 AM   Result Value Ref Range    aPTT 112.4 (HH) 21.2 - 34.1 sec    aPTT, therapeutic range   82 - 109 sec   GLUCOSE, POC    Collection Time: 08/31/22  6:04 AM   Result Value Ref Range    Glucose (POC) 108 (H) 65 - 100 mg/dL    Performed by Tiara Fraire, POC    Collection Time: 08/31/22 12:06 PM   Result Value Ref Range    Glucose (POC) 113 (H) 65 - 100 mg/dL    Performed by Andrew Dumont    PTT    Collection Time: 08/31/22  1:19 PM   Result Value Ref Range    aPTT 68.7 (H) 21.2 - 34.1 sec    aPTT, therapeutic range   82 - 109 sec   GLUCOSE, POC    Collection Time: 08/31/22  5:09 PM   Result Value Ref Range    Glucose (POC) 137 (H) 65 - 100 mg/dL    Performed by Ling Proctor    PTT    Collection Time: 08/31/22 10:45 PM   Result Value Ref Range    aPTT 58.5 (H) 21.2 - 34.1 sec    aPTT, therapeutic range   82 - 109 sec   GLUCOSE, POC    Collection Time: 09/01/22 12:12 AM   Result Value Ref Range    Glucose (POC) 119 (H) 65 - 100 mg/dL Performed by Clemente Morales        ASSESSMENT:   Patient is 65 y.o. with diagnosis of : Active Problems:    Foot infection (8/28/2022)      Diabetic wet gangrene of the foot ([facility] [address] (8/28/2022)        PLAN:                 Will continue local wound care for now. Continue IV antibiotic. Most likely discharge planning for tomorrow. We will continue wound care as outpatient. We will show patient's wife about wound care.

## 2022-09-01 NOTE — PROGRESS NOTES
Bedside shift change report given to Bryanna Ferrera RN (oncoming nurse) by Eduardo Lopez RN (offgoing nurse). Report included the following information SBAR, Intake/Output, MAR, and Recent Results.

## 2022-09-02 VITALS
TEMPERATURE: 97.9 F | RESPIRATION RATE: 17 BRPM | WEIGHT: 231.26 LBS | HEIGHT: 73 IN | BODY MASS INDEX: 30.65 KG/M2 | OXYGEN SATURATION: 98 % | SYSTOLIC BLOOD PRESSURE: 136 MMHG | HEART RATE: 55 BPM | DIASTOLIC BLOOD PRESSURE: 77 MMHG

## 2022-09-02 LAB
APTT PPP: 68.5 SEC (ref 21.2–34.1)
GLUCOSE BLD STRIP.AUTO-MCNC: 118 MG/DL (ref 65–100)
GLUCOSE BLD STRIP.AUTO-MCNC: 130 MG/DL (ref 65–100)
PERFORMED BY, TECHID: ABNORMAL
PERFORMED BY, TECHID: ABNORMAL
THERAPEUTIC RANGE,PTTT: ABNORMAL SEC (ref 82–109)

## 2022-09-02 PROCEDURE — 99024 POSTOP FOLLOW-UP VISIT: CPT | Performed by: SURGERY

## 2022-09-02 PROCEDURE — 36415 COLL VENOUS BLD VENIPUNCTURE: CPT

## 2022-09-02 PROCEDURE — 74011250637 HC RX REV CODE- 250/637: Performed by: SURGERY

## 2022-09-02 PROCEDURE — 74011250636 HC RX REV CODE- 250/636: Performed by: SURGERY

## 2022-09-02 PROCEDURE — 85730 THROMBOPLASTIN TIME PARTIAL: CPT

## 2022-09-02 PROCEDURE — 97116 GAIT TRAINING THERAPY: CPT

## 2022-09-02 PROCEDURE — 82962 GLUCOSE BLOOD TEST: CPT

## 2022-09-02 RX ORDER — OXYCODONE AND ACETAMINOPHEN 10; 325 MG/1; MG/1
1 TABLET ORAL
Qty: 30 TABLET | Refills: 0 | Status: SHIPPED | OUTPATIENT
Start: 2022-09-02 | End: 2022-09-16

## 2022-09-02 RX ORDER — HYDROMORPHONE HYDROCHLORIDE 1 MG/ML
1 INJECTION, SOLUTION INTRAMUSCULAR; INTRAVENOUS; SUBCUTANEOUS ONCE
Status: COMPLETED | OUTPATIENT
Start: 2022-09-02 | End: 2022-09-02

## 2022-09-02 RX ADMIN — HYDROMORPHONE HYDROCHLORIDE 1 MG: 1 INJECTION, SOLUTION INTRAMUSCULAR; INTRAVENOUS; SUBCUTANEOUS at 04:09

## 2022-09-02 RX ADMIN — HEPARIN SODIUM AND DEXTROSE 29 UNITS/KG/HR: 10000; 5 INJECTION INTRAVENOUS at 07:09

## 2022-09-02 RX ADMIN — HYDROMORPHONE HYDROCHLORIDE 1 MG: 1 INJECTION, SOLUTION INTRAMUSCULAR; INTRAVENOUS; SUBCUTANEOUS at 10:16

## 2022-09-02 RX ADMIN — HEPARIN SODIUM AND DEXTROSE 29 UNITS/KG/HR: 10000; 5 INJECTION INTRAVENOUS at 03:54

## 2022-09-02 RX ADMIN — APIXABAN 10 MG: 5 TABLET, FILM COATED ORAL at 13:03

## 2022-09-02 RX ADMIN — HYDROMORPHONE HYDROCHLORIDE 1 MG: 1 INJECTION, SOLUTION INTRAMUSCULAR; INTRAVENOUS; SUBCUTANEOUS at 07:04

## 2022-09-02 RX ADMIN — OXYCODONE HYDROCHLORIDE 20 MG: 10 TABLET, FILM COATED, EXTENDED RELEASE ORAL at 09:19

## 2022-09-02 RX ADMIN — SODIUM CHLORIDE 125 ML/HR: 9 INJECTION, SOLUTION INTRAVENOUS at 04:03

## 2022-09-02 RX ADMIN — HYDROMORPHONE HYDROCHLORIDE 1 MG: 1 INJECTION, SOLUTION INTRAMUSCULAR; INTRAVENOUS; SUBCUTANEOUS at 12:59

## 2022-09-02 NOTE — PROGRESS NOTES
Physician Progress Note      Ginger Rm  CSN #:                  [id number]  :                       1960  ADMIT DATE:       2022 3:50 PM  [address] [place] DATE:  RESPONDING  PROVIDER #:        Gaye Diaz MD          QUERY TEXT:    Pt admitted with PVD with gangrene. Pt noted to have elevated lactic acid level. If possible, please document in the progress notes and discharge summary if you are evaluating and/or treating any of the following: The medical record reflects the following:  Risk Factors: 64 yo male with wet gangrene, DM, s/p amputation  Clinical Indicators: Lactic Acid level  = 2.1  Treatment: IV Zosyn, Ancef, Vancomycin    Thank you,  Naomy Potts RN, CCDS  Options provided:  -- Metabolic Acidosis  -- Lactic Acidosis  -- Other - I will add my own diagnosis  -- Disagree - Not applicable / Not valid  -- Disagree - Clinically unable to determine / Unknown  -- Refer to Clinical Documentation Reviewer    PROVIDER RESPONSE TEXT:    This patient has metabolic acidosis.     Query created by: Randy Starr on 2022 9:53 AM      Electronically signed by:  Gaye Diaz MD 2022 2:25 AM

## 2022-09-02 NOTE — PROGRESS NOTES
PHYSICAL THERAPY TREATMENT  Patient: Karen Dia (6 y.o. male)  Date: 9/2/2022  Diagnosis: Foot infection [L08.9]  Diabetic wet gang[facility] the foot ([facility] [E11.52] <principal problem not specified>  Procedure(s) (LRB):  Right Foot Wound Debridement With 2nd And 4th Toes Amputation (Right) 4 Days Post-Op  Precautions:    Chart, physical therapy assessment, plan of care and goals were reviewed. ASSESSMENT  Patient continues with skilled PT services and is progressing towards goals. Patient supine in bed upon approach and agreed to therapy session today. Patient was mod I for bed mobility, supine<>sit and scooting to EOB where patient demonstrated good unsupported sitting balance. Patient then was able to recall his heel touch weight bearing status and don post op shoe without assistance. Patient then was supervision for STS to RW. Patient then ambulated 40 feet to stairs with RW at [facility] with steady step to gait pattern and no LOB or knee buckling. Patient then performed stair training using EV rails at [facility] demonstrated steady gait pattern and understanding to good foot up bad foot down on stairs. Patient then ambulated 40 feet back to room with RW and was SBA for STS <> from toilet and SBA for standing at sink to wash hands. Patient then returned to seated EOB at mod I. Patient then set up for breakfast EOB and left seated EOB with call bell within reach and all needs meet. Other factors to consider for discharge: PLOF, assistance at home         PLAN :  Patient continues to benefit from skilled intervention to address the above impairments. Continue treatment per established plan of care. to address goals.     Recommendation for discharge: (in order for the patient to meet his/her long term goals)  Home with [address] [place] [address]    This discharge recommendation:  Has been made in collaboration with the attending provider and/or case management    IF patient discharges home will need the following DME: rolling walker       SUBJECTIVE:   Patient stated I hope there will be sending me home today.     OBJECTIVE DATA SUMMARY:   Critical Behavior:  Neurologic State: Alert  Orientation Level: Oriented X4  Cognition: Follows commands     Functional Mobility Training:  Bed Mobility:  Rolling: Modified independent  Supine to Sit: Modified independent  Sit to Supine: Modified independent  Scooting: Modified independent  Transfers:  Sit to Stand: Modified independent  Stand to Sit: Modified independent  Balance:  Sitting: Intact; Without support  Standing: Intact; With support  Ambulation/Gait Training:  Distance (ft): 75 Feet (ft)  Assistive Device: Gait belt;Walker, rolling  Ambulation - Level of Assistance: Stand-by assistance;Contact guard assistance  Right Side Weight Bearing: Heel  Base of Support: Widened  Speed/Mary: Slow  Stairs:  Number of Stairs Trained: 8  Stairs - Level of Assistance: Stand-by assistance;Contact guard assistance   Rail Use: Both  Pain Ratin/10    Activity Tolerance:   Bed locked and in lowest position Good  Please refer to the flowsheet for vital signs taken during this treatment. After treatment patient left in no apparent distress:   Bed returned to lowest position, Sitting in chair and Call bell within reach    COMMUNICATION/COLLABORATION:   The patients plan of care was discussed with: Registered nurse. Problem: Mobility Impaired (Adult and Pediatric)  Goal: *Acute Goals and Plan of Care (Insert Text)  Description: Pt stated goal: to go home  Pt will be I with LE HEP in 7 days. Pt will perform transfers with mod I in 7 days. Pt will amb  feet with LRAD safely with mod I in 7 days. Pt will ascend/descend 4 steps with B handrails and CGA in 7 days to safely enter home.      Outcome: Progressing Towards Goal       Rhys Pepperelor, PTA   Time Calculation: 29 mins

## 2022-09-02 NOTE — PROGRESS NOTES
DC order noted. Patient is self-pay per chart. CM met with patient at bedside and confirmed that he has no insurance. CM discussed patient needing to be educated on his wound care. Patient asked that he is taught and states that he will show his spouse later. Patient states that his spouse will pick him up after lunchtime. CM notified primary nurse. Discharge plan of care/case management plan validated with provider discharge order.

## 2022-09-02 NOTE — ROUTINE PROCESS
Discharge plan of care/case management plan validated with provider discharge order. Removed IV without complications, no tele,no herbert. Discharge to home/self care.

## 2022-09-02 NOTE — DISCHARGE INSTRUCTIONS
Right foot needs to be elevated. Daily wound care twice with wet-to-dry to the open wound with saline gauze and Kerlix roll. Fourth webspace needs to be covered with iodoform packing. Finish your antibiotics. Resume Eliquis 10 mg p.o. twice daily for next 7 days followed by Eliquis 5 mg p.o. twice daily afterwards.

## 2022-09-02 NOTE — PROGRESS NOTES
Patient assessment completed for this shift. Call bell within reach and bed in lowest position. Patient voiced understanding to call for assistance to be Rafi Glass. Patient pain controlled per self report with medication, see MAR. Right foot dressing intact. Dressings changed per order. Will continue to monitor during this shift. Problem: Falls - Risk of  Goal: *Absence of Falls  Description: Document Masood Norris Fall Risk and appropriate interventions in the flowsheet. Outcome: Progressing Towards Goal  Note: Fall Risk Interventions:            Medication Interventions: Patient to call before getting OOB    Elimination Interventions: Call light in reach              Problem: Patient Education: Go to Patient Education Activity  Goal: Patient/Family Education  Outcome: Progressing Towards Goal     Problem: Pressure Injury - Risk of  Goal: *Prevention of pressure injury  Description: Document Phil Scale and appropriate interventions in the flowsheet.   Outcome: Progressing Towards Goal  Note: Pressure Injury Interventions:  Sensory Interventions: Minimize linen layers    Moisture Interventions: Absorbent underpads    Activity Interventions: Increase time out of bed    Mobility Interventions: HOB 30 degrees or less    Nutrition Interventions: Document food/fluid/supplement intake                     Problem: Patient Education: Go to Patient Education Activity  Goal: Patient/Family Education  Outcome: Progressing Towards Goal     Problem: Patient Education: Go to Patient Education Activity  Goal: Patient/Family Education  Outcome: Progressing Towards Goal

## 2022-09-03 LAB
BACTERIA SPEC CULT: NORMAL
SPECIAL REQUESTS,SREQ: NORMAL

## 2022-09-03 NOTE — ANESTHESIA POSTPROCEDURE EVALUATION
Procedure(s):  Right Foot Wound Debridement With 2nd And 4th Toes Amputation.     MAC    Anesthesia Post Evaluation        Anesthetic complications: no  Comments: Patient already discharged         INITIAL Post-op Vital signs:   Vitals Value Taken Time   /75 08/29/22 1950   Temp 36.6 °C (97.8 °F) 08/29/22 1950   Pulse 57 08/29/22 1950   Resp 18 08/29/22 1950   SpO2 98 % 08/29/22 1930

## 2022-09-13 NOTE — DISCHARGE SUMMARY
This is discharge summary for [place] [address], patient's YOB: 1960      [address] course: Mr. Geri Diaz was admitted to hospital for outpatient medical management failure for wound care and worsening toe gangrene right foot. Patient underwent right fourth toe partial amputation and wound debridement. Patient also had a right second toe amputated. And wound was dressed with a daily wound care. Patient also had IV antibiotics. Patient did well overall during this hospital stay. Patient also did receive IV heparin as well. Patient discharged home on September 2, 2022 with follow-up Dr. Jeramy Dennis in 2 weeks. Patient will resume Eliquis therapy at home.

## 2022-09-19 ENCOUNTER — OFFICE VISIT (OUTPATIENT)
Dept: SURGERY | Age: 62
End: 2022-09-19

## 2022-09-19 VITALS
OXYGEN SATURATION: 98 % | DIASTOLIC BLOOD PRESSURE: 87 MMHG | HEIGHT: 73 IN | HEART RATE: 77 BPM | BODY MASS INDEX: 27.83 KG/M2 | SYSTOLIC BLOOD PRESSURE: 155 MMHG | WEIGHT: 210 LBS | TEMPERATURE: 98.6 F

## 2022-09-19 DIAGNOSIS — M79.604 PAIN OF RIGHT LOWER EXTREMITY: Primary | ICD-10-CM

## 2022-09-19 DIAGNOSIS — I73.9 PERIPHERAL VASCULAR DISEASE (HCC): ICD-10-CM

## 2022-09-19 DIAGNOSIS — I99.8 LIMB ISCHEMIA: ICD-10-CM

## 2022-09-19 PROCEDURE — 99024 POSTOP FOLLOW-UP VISIT: CPT | Performed by: SURGERY

## 2022-09-19 RX ORDER — AMOXICILLIN AND CLAVULANATE POTASSIUM 500; 125 MG/1; MG/1
1 TABLET, FILM COATED ORAL 2 TIMES DAILY
Qty: 20 TABLET | Refills: 1 | Status: SHIPPED | OUTPATIENT
Start: 2022-09-19

## 2022-09-19 RX ORDER — CLINDAMYCIN HYDROCHLORIDE 300 MG/1
300 CAPSULE ORAL 4 TIMES DAILY
Qty: 40 CAPSULE | Refills: 0 | Status: SHIPPED | OUTPATIENT
Start: 2022-09-19 | End: 2022-09-29

## 2022-09-19 RX ORDER — OXYCODONE AND ACETAMINOPHEN 10; 325 MG/1; MG/1
1 TABLET ORAL
Qty: 30 TABLET | Refills: 0 | Status: SHIPPED | OUTPATIENT
Start: 2022-09-19 | End: 2022-09-28 | Stop reason: SDUPTHER

## 2022-09-19 NOTE — PROGRESS NOTES
Chief Complaint   Patient presents with    Follow-up     Post op visit - S/P right foot debridement & toe amputation 8/29/2022     Visit Vitals  BP (!) 155/87 (BP 1 Location: Right upper arm, BP Patient Position: Sitting, BP Cuff Size: Adult)   Pulse 77   Temp 98.6 °F (37 °C) (Temporal)   Ht 6' 1\" (1.854 m)   Wt 210 lb (95.3 kg)   SpO2 98%   BMI 27.71 kg/m²

## 2022-09-23 ENCOUNTER — TELEPHONE (OUTPATIENT)
Dept: SURGERY | Age: 62
End: 2022-09-23

## 2022-09-23 PROBLEM — M79.604 PAIN OF RIGHT LOWER EXTREMITY: Status: ACTIVE | Noted: 2022-09-23

## 2022-09-23 NOTE — TELEPHONE ENCOUNTER
Called patient back. Advised him to take both antibiotics that Dr. Yumiko Medina sent in. Also told him that Dr. Yumiko Medina did send in pain medicine. He states that's the 10-325mg, but he also needs the \"time release\" Oxycodone. I verbalized understanding and made him aware that Dr. Yumiko Medina is currently out of town and will be back on Monday, but I will make him aware of patient's request and just incase there is a delay in getting that second pain medication refilled. Patient verbalized understanding.

## 2022-09-23 NOTE — TELEPHONE ENCOUNTER
Mr Fay Mei called this morning and stated when he was here Monday Dr Louis Jones told him to stay on antibiotics and pain medication, well there were 2 antibiotics that were called in to his pharmacy and he didn't know which one he is supposed to take and no pain medication was sent.  Please call when able [phone]

## 2022-09-23 NOTE — PROGRESS NOTES
VASCULAR FOLLOW UP      Subjective:   CHIEF COMPLAINTS:  Right foot exam  PRESENTATION OF ILLNESS:  Vee Pond is a 65 y.o. very pleasant man is here today for follow-up exam right foot. Patient had previous right leg bypass surgery followed by wound debridement multiple times. Past Medical History:   Diagnosis Date    Diabetes ([address]     Hypertension       Past Surgical History:   Procedure Laterality Date    HX AMPUTATION TOE Right 2022    Right great toe    VASCULAR SURGERY PROCEDURE UNLIST Right 2022     Family History   Problem Relation Age of Onset    Cancer Mother     Diabetes Mother     Cancer Father       Social History     Tobacco Use    Smoking status: Former     Packs/day: 1.50     Years: 15.00     Pack years: 22.50     Types: Cigarettes     Quit date: 2022     Years since quittin.2    Smokeless tobacco: Never   Substance Use Topics    Alcohol use: Not Currently       Prior to Admission medications    Medication Sig Start Date End Date Taking? Authorizing Provider   oxyCODONE-acetaminophen (PERCOCET 10)  mg per tablet Take 1 Tablet by mouth every six (6) hours as needed for Pain for up to 14 days. Max Daily Amount: 4 Tablets. 9/19/22 10/3/22 Yes Everardo Wise MD   clindamycin (CLEOCIN) 300 mg capsule Take 1 Capsule by mouth four (4) times daily for 10 days. 22 Yes Kortney Martínez MD   amoxicillin-clavulanate (AUGMENTIN) 500-125 mg per tablet Take 1 Tablet by mouth two (2) times a day. 22  Yes Everardo Wise MD   amLODIPine-benazepril (LOTREL) 5-20 mg per capsule amlodipine 5 mg-benazepril 20 mg capsule   TAKE 1 CAPSULE BY MOUTH ONCE DAILY 22   Kortney Martínez MD   oxyCODONE-acetaminophen (PERCOCET 10)  mg per tablet Take 1 Tablet by mouth every eight (8) hours as needed for Pain for up to 30 days. Max Daily Amount: 3 Tablets. 22  Everardo Wise MD   apixaban (Eliquis) 5 mg tablet Take 1 Tablet by mouth two (2) times a day. Take 2 tabs (10mg ) twice daily for next 7 days then 1 tab (5mg) twice daily afterwards. 8/8/22   Mauricio, Lisa Garvey MD   colchicine 0.6 mg tablet Take 0.6 mg by mouth daily. Patient not taking: No sig reported    Provider, Historical   lisinopriL (PRINIVIL, ZESTRIL) 20 mg tablet Take 1 Tablet by mouth daily. Patient not taking: No sig reported 7/9/22   Thomas Ferrer MD   amLODIPine (NORVASC) 10 mg tablet Take 1 Tablet by mouth daily. Patient not taking: No sig reported 7/9/22   Thomas Ferrer MD   pantoprazole (PROTONIX) 40 mg tablet Take 40 mg by mouth daily. Provider, Historical   metFORMIN (GLUCOPHAGE) 500 mg tablet Take 500 mg by mouth daily (with dinner). Provider, Historical     No Known Allergies     Review of Systems:  I reviewed the rest of organ systems personally and they were negative signed by Dr. Janel Aaron    Objective:   Visit Vitals  BP (!) 155/87 (BP 1 Location: Right upper arm, BP Patient Position: Sitting, BP Cuff Size: Adult)   Pulse 77   Temp 98.6 °F (37 °C) (Temporal)   Ht 6' 1\" (1.854 m)   Wt 210 lb (95.3 kg)   SpO2 98%   BMI 27.71 kg/m²     VITAL SIGNS REVIEWED. Physical Exam:  Patient is well-nourished pleasant in conversation is appropriate. Head and neck examination atraumatic, normocephalic. Gaze appropriate. Conversation appropriate. Neck examination shows supple. No mass. No obvious carotid bruit. Chest examination shows lungs are clear bilaterally well-expanded, no crackles or wheezes. Cardiovascular system regular rate, no obvious murmur. Skin warm to touch  and moist, no skin lesions. Abdomen is soft ,not tender or distended bowel sounds present. No palpable mass. Neurological examinations, no focal neuro deficits moving all 4 extremities. Cranial nerves intact. Sensation is intact as well. Hematologic: No obvious bruise or swelling or obvious lymphadenopathy. Psychosocial: Appropriate. Has good effect.   Musculoskeletal system: No muscle wasting, appropriate movements upper and lower extremity. Vascular examination: I examined the right foot wound appears to granulating. Data Review:   Admission on 08/28/2022, Discharged on 09/02/2022   Component Date Value Ref Range Status    Special Requests: 08/28/2022 No Special Requests    Final    Culture result: 08/28/2022 No growth 6 days    Final    Lactic acid 08/28/2022 2.1 (A)  0.4 - 2.0 mmol/L Final    WBC 08/28/2022 9.9  4.1 - 11.1 K/uL Final    RBC 08/28/2022 4.95  4.10 - 5.70 M/uL Final    HGB 08/28/2022 14.1  12.1 - 17.0 g/dL Final    HCT 08/28/2022 42.8  36.6 - 50.3 % Final    MCV 08/28/2022 86.5  80.0 - 99.0 FL Final    MCH 08/28/2022 28.5  26.0 - 34.0 PG Final    MCHC 08/28/2022 32.9  30.0 - 36.5 g/dL Final    RDW 08/28/2022 13.2  11.5 - 14.5 % Final    PLATELET 13/91/2357 790  150 - 400 K/uL Final    MPV 08/28/2022 10.3  8.9 - 12.9 FL Final    NRBC 08/28/2022 0.0  0.0  WBC Final    ABSOLUTE NRBC 08/28/2022 0.00  0.00 - 0.01 K/uL Final    NEUTROPHILS 08/28/2022 69  32 - 75 % Final    LYMPHOCYTES 08/28/2022 20  12 - 49 % Final    MONOCYTES 08/28/2022 7  5 - 13 % Final    EOSINOPHILS 08/28/2022 3  0 - 7 % Final    BASOPHILS 08/28/2022 1  0 - 1 % Final    IMMATURE GRANULOCYTES 08/28/2022 0  0 - 0.5 % Final    ABS. NEUTROPHILS 08/28/2022 6.8  1.8 - 8.0 K/UL Final    ABS. LYMPHOCYTES 08/28/2022 2.0  0.8 - 3.5 K/UL Final    ABS. MONOCYTES 08/28/2022 0.7  0.0 - 1.0 K/UL Final    ABS. EOSINOPHILS 08/28/2022 0.3  0.0 - 0.4 K/UL Final    ABS. BASOPHILS 08/28/2022 0.1  0.0 - 0.1 K/UL Final    ABS. IMM.  GRANS. 08/28/2022 0.0  0.00 - 0.04 K/UL Final    DF 08/28/2022 AUTOMATED    Final    Sodium 08/28/2022 136  136 - 145 mmol/L Final    Potassium 08/28/2022 3.6  3.5 - 5.1 mmol/L Final    Chloride 08/28/2022 104  97 - 108 mmol/L Final    CO2 08/28/2022 24  21 - 32 mmol/L Final    Anion gap 08/28/2022 8  5 - 15 mmol/L Final    Glucose 08/28/2022 170 (A)  65 - 100 mg/dL Final    BUN 08/28/2022 10  6 - 20 mg/dL Final    Creatinine 08/28/2022 0.77  0.70 - 1.30 mg/dL Final    BUN/Creatinine ratio 08/28/2022 13  12 - 20   Final    GFR est AA 08/28/2022 >60  >60 ml/min/1.73m2 Final    GFR est non-AA 08/28/2022 >60  >60 ml/min/1.73m2 Final    Calcium 08/28/2022 9.4  8.5 - 10.1 mg/dL Final    Bilirubin, total 08/28/2022 0.7  0.2 - 1.0 mg/dL Final    AST (SGOT) 08/28/2022 30  15 - 37 U/L Final    ALT (SGPT) 08/28/2022 46  12 - 78 U/L Final    Alk. phosphatase 08/28/2022 93  45 - 117 U/L Final    Protein, total 08/28/2022 8.1  6.4 - 8.2 g/dL Final    Albumin 08/28/2022 3.6  3.5 - 5.0 g/dL Final    Globulin 08/28/2022 4.5 (A)  2.0 - 4.0 g/dL Final    A-G Ratio 08/28/2022 0.8 (A)  1.1 - 2.2   Final    Sed rate, automated 08/28/2022 30 (A)  0 - 20 mm/hr Final    Crossmatch Expiration 08/28/2022 08/31/2022,2359   Final    ABO/Rh(D) 08/28/2022 A Positive   Final    Antibody screen 08/28/2022 Negative   Final    aPTT 08/28/2022 30.9  21.2 - 34.1 sec Final    aPTT, therapeutic range 08/28/2022    82 - 109 sec Final    WBC 08/28/2022 9.7  4.1 - 11.1 K/uL Final    RBC 08/28/2022 4.45  4.10 - 5.70 M/uL Final    HGB 08/28/2022 12.6  12.1 - 17.0 g/dL Final    HCT 08/28/2022 38.6  36.6 - 50.3 % Final    MCV 08/28/2022 86.7  80.0 - 99.0 FL Final    MCH 08/28/2022 28.3  26.0 - 34.0 PG Final    MCHC 08/28/2022 32.6  30.0 - 36.5 g/dL Final    RDW 08/28/2022 13.2  11.5 - 14.5 % Final    PLATELET 54/82/8307 849  150 - 400 K/uL Final    MPV 08/28/2022 10.1  8.9 - 12.9 FL Final    NRBC 08/28/2022 0.0  0.0  WBC Final    ABSOLUTE NRBC 08/28/2022 0.00  0.00 - 0.01 K/uL Final    NEUTROPHILS 08/28/2022 62  32 - 75 % Final    LYMPHOCYTES 08/28/2022 26  12 - 49 % Final    MONOCYTES 08/28/2022 7  5 - 13 % Final    EOSINOPHILS 08/28/2022 4  0 - 7 % Final    BASOPHILS 08/28/2022 1  0 - 1 % Final    IMMATURE GRANULOCYTES 08/28/2022 0  0 - 0.5 % Final    ABS. NEUTROPHILS 08/28/2022 6.0  1.8 - 8.0 K/UL Final    ABS.  LYMPHOCYTES 08/28/2022 2.6  0.8 - 3.5 K/UL Final    ABS. MONOCYTES 08/28/2022 0.6  0.0 - 1.0 K/UL Final    ABS. EOSINOPHILS 08/28/2022 0.4  0.0 - 0.4 K/UL Final    ABS. BASOPHILS 08/28/2022 0.1  0.0 - 0.1 K/UL Final    ABS. IMM.  GRANS. 08/28/2022 0.0  0.00 - 0.04 K/UL Final    DF 08/28/2022 AUTOMATED    Final    Glucose (POC) 08/28/2022 91  65 - 117 mg/dL Final    Performed by 08/28/2022 Lydia Cullison ([place])   Final    aPTT 08/29/2022 41.5 (A)  21.2 - 34.1 sec Final    aPTT, therapeutic range 08/29/2022    82 - 109 sec Final    Glucose (POC) 08/29/2022 126 (A)  65 - 117 mg/dL Final    Performed by 08/29/2022 Brittny Lamprey Cydnee ([place])   Final    Glucose (POC) 08/29/2022 88  65 - 117 mg/dL Final    Performed by 08/29/2022 Monicafrrebeka Lamprey Cyndee ([place])   Final    Glucose (POC) 08/29/2022 145 (A)  65 - 117 mg/dL Final    Performed by 08/29/2022 WOODY ELLIS   Final    Glucose (POC) 08/29/2022 85  65 - 117 mg/dL Final    Performed by 08/29/2022 RICARDO LIN   Final    aPTT 08/29/2022 28.1  21.2 - 34.1 sec Final    aPTT, therapeutic range 08/29/2022    82 - 109 sec Final    aPTT 08/30/2022 44.2 (A)  21.2 - 34.1 sec Final    aPTT, therapeutic range 08/30/2022    82 - 109 sec Final    Glucose (POC) 08/29/2022 98  65 - 117 mg/dL Final    Performed by 08/29/2022 Ned Chris   Final    Glucose (POC) 08/30/2022 85  65 - 117 mg/dL Final    Performed by 08/30/2022 Hal Carlos   Final    aPTT 08/30/2022 48.6 (A)  21.2 - 34.1 sec Final    aPTT, therapeutic range 08/30/2022    82 - 109 sec Final    Glucose (POC) 08/30/2022 108  65 - 117 mg/dL Final    Performed by 08/30/2022 Gabi Mcclure   Final    Glucose (POC) 08/30/2022 136 (A)  65 - 117 mg/dL Final    Performed by 08/30/2022 Rehana Huerta RN ([place])   Final    aPTT 08/30/2022 76.5 (A)  21.2 - 34.1 sec Final    aPTT, therapeutic range 08/30/2022    82 - 109 sec Final    aPTT 08/31/2022 112.4 (A)  21.2 - 34.1 sec Final    aPTT, therapeutic range 08/31/2022    82 - 109 sec Final    Glucose (POC) 08/30/2022 123 (A)  65 - 117 mg/dL Final    Performed by 08/30/2022 Brie Aponte   Final    Glucose (POC) 08/31/2022 108 (A)  65 - 100 mg/dL Final    Performed by 08/31/2022 [facility] ABENA   Final    aPTT 08/31/2022 68.7 (A)  21.2 - 34.1 sec Final    aPTT, therapeutic range 08/31/2022    82 - 109 sec Final    Glucose (POC) 08/31/2022 113 (A)  65 - 100 mg/dL Final    Performed by 08/31/2022 Klaus [facility]   Final    Glucose (POC) 08/31/2022 137 (A)  65 - 100 mg/dL Final    Performed by 08/31/2022 Tracy Skinner   Final    aPTT 08/31/2022 58.5 (A)  21.2 - 34.1 sec Final    aPTT, therapeutic range 08/31/2022    82 - 109 sec Final    Glucose (POC) 09/01/2022 119 (A)  65 - 100 mg/dL Final    Performed by 09/01/2022 [facility] Zacariasсветлана Jaelyn   Final    aPTT 09/01/2022 101.8 (A)  21.2 - 34.1 sec Final    aPTT, therapeutic range 09/01/2022    82 - 109 sec Final    Glucose (POC) 09/01/2022 98  65 - 100 mg/dL Final    Performed by 09/01/2022 TARIQ BARTOLOME   Final    aPTT 09/01/2022 60.0 (A)  21.2 - 34.1 sec Final    aPTT, therapeutic range 09/01/2022    82 - 109 sec Final    Glucose (POC) 09/01/2022 125 (A)  65 - 100 mg/dL Final    Performed by 09/01/2022 Lemon Gambler   Final    Glucose (POC) 09/01/2022 139 (A)  65 - 100 mg/dL Final    Performed by 09/01/2022 Marine Riceer   Final    aPTT 09/01/2022 82.1 (A)  21.2 - 34.1 sec Final    aPTT, therapeutic range 09/01/2022    82 - 109 sec Final    aPTT 09/02/2022 68.5 (A)  21.2 - 34.1 sec Final    aPTT, therapeutic range 09/02/2022    82 - 109 sec Final    Glucose (POC) 09/01/2022 140 (A)  65 - 100 mg/dL Final    Performed by 09/01/2022 Sanjeev Alex   Final    Glucose (POC) 09/02/2022 130 (A)  65 - 100 mg/dL Final    Performed by 09/02/2022 Sanjeev Alex   Final    Glucose (POC) 09/02/2022 118 (A)  65 - 100 mg/dL Final    Performed by 09/02/2022 Roddie Snellen   Final        Assessment:     Problem List Items Addressed This Visit          Circulatory    Limb ischemia    Peripheral vascular disease ([facility] [address]       Other    Pain of right lower extremity - Primary    Relevant Medications    oxyCODONE-acetaminophen (PERCOCET 10)  mg per tablet           Plan:     Continue local wound care as instructed. I will remove the sutures on the right fourth toe partial amputation site in 2 weeks time. Patient was also provided refills on antibiotics today.         Radha Ramey MD

## 2022-09-26 ENCOUNTER — TELEPHONE (OUTPATIENT)
Dept: SURGERY | Age: 62
End: 2022-09-26

## 2022-09-26 NOTE — TELEPHONE ENCOUNTER
Pt wanted to know if Dr. Bentley Walton ws going to change him off his Eliquis now or not and wanted the nurse to give him a call about it

## 2022-09-27 NOTE — TELEPHONE ENCOUNTER
Patient called - asked if he needs to continue the Eliquis or is Dr. Anna Cruz going to switch him to another blood thinner. Patient states he will run out tomorrow and that it's an expensive medication he doesn't want to refill if Dr. Anna Cruz is going to change it.

## 2022-09-27 NOTE — TELEPHONE ENCOUNTER
Spoke with patient and let him know that Dr. Antelmo Cash is aware of the refill request and states he will take care of it.

## 2022-09-27 NOTE — TELEPHONE ENCOUNTER
Called patient and let him know that Dr. Laila Walls wants him to continue the Eliquis. Patient verbalized understanding.

## 2022-09-28 DIAGNOSIS — M79.604 PAIN OF RIGHT LOWER EXTREMITY: ICD-10-CM

## 2022-09-28 RX ORDER — OXYCODONE AND ACETAMINOPHEN 10; 325 MG/1; MG/1
1 TABLET ORAL
Qty: 30 TABLET | Refills: 0 | Status: SHIPPED | OUTPATIENT
Start: 2022-09-28 | End: 2022-10-12

## 2022-09-30 ENCOUNTER — TELEPHONE (OUTPATIENT)
Dept: SURGERY | Age: 62
End: 2022-09-30

## 2022-09-30 NOTE — TELEPHONE ENCOUNTER
I called patient to remind of appointment on Monday. He has requested that you give him a call.  [phone]

## 2022-10-03 ENCOUNTER — OFFICE VISIT (OUTPATIENT)
Dept: SURGERY | Age: 62
End: 2022-10-03

## 2022-10-03 VITALS
SYSTOLIC BLOOD PRESSURE: 140 MMHG | WEIGHT: 213 LBS | OXYGEN SATURATION: 98 % | TEMPERATURE: 98 F | BODY MASS INDEX: 28.23 KG/M2 | DIASTOLIC BLOOD PRESSURE: 76 MMHG | HEIGHT: 73 IN | HEART RATE: 66 BPM

## 2022-10-03 DIAGNOSIS — M79.605 PAIN OF LEFT LOWER EXTREMITY: Primary | ICD-10-CM

## 2022-10-03 DIAGNOSIS — Z09 POSTOPERATIVE EXAMINATION: ICD-10-CM

## 2022-10-03 PROCEDURE — 99024 POSTOP FOLLOW-UP VISIT: CPT | Performed by: SURGERY

## 2022-10-03 RX ORDER — AMOXICILLIN AND CLAVULANATE POTASSIUM 875; 125 MG/1; MG/1
1 TABLET, FILM COATED ORAL 2 TIMES DAILY
Qty: 14 TABLET | Refills: 0 | Status: SHIPPED | OUTPATIENT
Start: 2022-10-03 | End: 2022-10-10

## 2022-10-03 RX ORDER — OXYCODONE HCL 20 MG/1
20 TABLET, FILM COATED, EXTENDED RELEASE ORAL EVERY 12 HOURS
Qty: 60 TABLET | Refills: 0 | Status: SHIPPED | OUTPATIENT
Start: 2022-10-03 | End: 2022-11-02

## 2022-10-03 RX ORDER — CLINDAMYCIN HYDROCHLORIDE 300 MG/1
300 CAPSULE ORAL 4 TIMES DAILY
Qty: 28 CAPSULE | Refills: 0 | Status: SHIPPED | OUTPATIENT
Start: 2022-10-03 | End: 2022-10-10

## 2022-10-03 NOTE — TELEPHONE ENCOUNTER
Late Entry: 9/30/2022  I spoke with patient and he states it's supposed to be the ER pain medication that needs to be refilled.  I verbalized understanding and told him I'd let Dr. Sandra Gray know as he was currently at the hospital.

## 2022-10-06 PROBLEM — M79.605 PAIN OF LEFT LOWER EXTREMITY: Status: ACTIVE | Noted: 2022-10-06

## 2022-10-06 NOTE — PROGRESS NOTES
SUBJECTIVE:  Patient is follow-up today wound checkup right foot          Past Medical History:  No date: Diabetes ([address]  No date: Hypertension  Past Surgical History:  2022: HX AMPUTATION TOE; Right      Comment:  Right great toe  2022: VASCULAR SURGERY PROCEDURE UNLIST; Right  @[facility]@  Social History    Tobacco Use      Smoking status: Former        Packs/day: 1.50        Years: 15.00        Pack years: 22.5        Types: Cigarettes        Quit date: 2022        Years since quittin.2      Smokeless tobacco: Never    Alcohol use: Not Currently    Prior to Admission medications :  Medication oxyCODONE ER (OxyCONTIN) 20 mg ER tablet, Sig Take 1 Tablet by mouth every twelve (12) hours for 30 days. Max Daily Amount: 40 mg., Start Date 10/3/22, End Date 22, Taking? Yes, Authorizing Provider Cheri Diaz MD    Medication amoxicillin-clavulanate (AUGMENTIN) 875-125 mg per tablet, Sig Take 1 Tablet by mouth two (2) times a day for 7 days. , Start Date 10/3/22, End Date 10/10/22, Taking? Yes, Authorizing Provider Cheri Diaz MD    Medication clindamycin (CLEOCIN) 300 mg capsule, Sig Take 1 Capsule by mouth four (4) times daily for 7 days. , Start Date 10/3/22, End Date 10/10/22, Taking? Yes, Authorizing Provider Cheri Diaz MD    Medication oxyCODONE-acetaminophen (PERCOCET 10)  mg per tablet, Sig Take 1 Tablet by mouth every six (6) hours as needed for Pain for up to 14 days. Max Daily Amount: 4 Tablets. , Start Date 22, End Date 10/12/22, Taking? , Authorizing Provider Violet Martínez MD    Medication amoxicillin-clavulanate (AUGMENTIN) 500-125 mg per tablet, Sig Take 1 Tablet by mouth two (2) times a day., Start Date 22, End Date , Taking? , Authorizing Provider Violet Martínez MD    Medication amLODIPine-benazepril (LOTREL) 5-20 mg per capsule, Sig amlodipine 5 mg-benazepril 20 mg capsule TAKE 1 CAPSULE BY MOUTH ONCE DAILY, Start Date 22, End Date , Taking? , Authorizing Provider Lorri Larkin MD    Medication apixaban (Eliquis) 5 mg tablet, Sig Take 1 Tablet by mouth two (2) times a day. Take 2 tabs (10mg ) twice daily for next 7 days then 1 tab (5mg) twice daily afterwards. , Start Date 8/8/22, End Date , Taking? , Authorizing Provider Valente Martínez MD    Medication colchicine 0.6 mg tablet, Sig Take 0.6 mg by mouth daily. Patient not taking: No sig reported, Start Date , End Date , Taking? , Authorizing Provider Provider, Historical    Medication lisinopriL (PRINIVIL, ZESTRIL) 20 mg tablet, Sig Take 1 Tablet by mouth daily. Patient not taking: No sig reported, Start Date 7/9/22, End Date , Taking? , Authorizing Provider Matthew Castrejon MD    Medication amLODIPine (NORVASC) 10 mg tablet, Sig Take 1 Tablet by mouth daily. Patient not taking: No sig reported, Start Date 7/9/22, End Date , Taking? , Authorizing Provider Matthew Castrejon MD    Medication pantoprazole (PROTONIX) 40 mg tablet, Sig Take 40 mg by mouth daily. , Start Date , End Date , Taking? , Authorizing Provider Provider, Historical    Medication metFORMIN (GLUCOPHAGE) 500 mg tablet, Sig Take 500 mg by mouth daily (with dinner). , Start Date , End Date , Taking? , Authorizing Provider Provider, Historical      No Known Allergies      OBJECTIVE:  BP (!) 140/76 (BP 1 Location: Right upper arm, BP Patient Position: Sitting, BP Cuff Size: Adult)   Pulse 66   Temp 98 °F (36.7 °C) (Temporal)   Ht 6' 1\" (1.854 m)   Wt 213 lb (96.6 kg)   SpO2 98%   BMI 28.10 kg/m²     PT IS PLEASANT AND AWAKE AND ALERT. WOUND EXAM:  Right foot looks excellent with granulation tissue. ASSESSMENT:  Problem List Items Addressed This Visit        Other    Pain of left lower extremity - Primary    Relevant Medications    oxyCODONE ER (OxyCONTIN) 20 mg ER tablet   Other Visit Diagnoses     Postoperative examination              PLAN:  Patient will be seen me 3 weeks follow-up for another wound examination.   Continue local wound care. Patient was provided with additional pain medication today.

## 2022-10-21 ENCOUNTER — TELEPHONE (OUTPATIENT)
Dept: SURGERY | Age: 62
End: 2022-10-21

## 2022-10-21 NOTE — TELEPHONE ENCOUNTER
Mr Justo Mccrary called this morning and requested to speak to [address], I advised him that she was busy at the moment but I could leave her a message to call him back, he did not tell me what it was in regards to but just to have her call him back, [phone]

## 2022-10-21 NOTE — TELEPHONE ENCOUNTER
Called patient back. He would like his Oxycodone 10-325mg refill sent to [facility] in [place]. He states he will run out of his medicine over the weekend. I advised him I would let Dr. Paresh Jaeger know.

## 2022-10-23 DIAGNOSIS — M79.606 PAIN OF LOWER EXTREMITY, UNSPECIFIED LATERALITY: Primary | ICD-10-CM

## 2022-10-23 RX ORDER — OXYCODONE AND ACETAMINOPHEN 10; 325 MG/1; MG/1
1 TABLET ORAL
Qty: 30 TABLET | Refills: 0 | Status: SHIPPED | OUTPATIENT
Start: 2022-10-23 | End: 2022-11-06

## 2022-10-28 ENCOUNTER — OFFICE VISIT (OUTPATIENT)
Dept: SURGERY | Age: 62
End: 2022-10-28

## 2022-10-28 VITALS
OXYGEN SATURATION: 97 % | BODY MASS INDEX: 29.24 KG/M2 | DIASTOLIC BLOOD PRESSURE: 80 MMHG | RESPIRATION RATE: 18 BRPM | WEIGHT: 221.6 LBS | HEART RATE: 61 BPM | SYSTOLIC BLOOD PRESSURE: 140 MMHG | TEMPERATURE: 97.9 F

## 2022-10-28 DIAGNOSIS — Z09 POSTOPERATIVE EXAMINATION: ICD-10-CM

## 2022-10-28 DIAGNOSIS — I73.9 PERIPHERAL VASCULAR DISEASE (HCC): ICD-10-CM

## 2022-10-28 DIAGNOSIS — I99.8 LIMB ISCHEMIA: Primary | ICD-10-CM

## 2022-10-28 PROCEDURE — 99024 POSTOP FOLLOW-UP VISIT: CPT | Performed by: SURGERY

## 2022-10-28 NOTE — PROGRESS NOTES
Chief Complaint   Patient presents with    Follow-up     Check right foot    Visit Vitals  BP (!) 140/80 (BP 1 Location: Right upper arm, BP Patient Position: Sitting, BP Cuff Size: Large adult) Comment: manual recheck   Pulse 61   Temp 97.9 °F (36.6 °C) (Oral)   Resp 18   Wt 221 lb 9.6 oz (100.5 kg)   SpO2 97%   BMI 29.24 kg/m²    1. Have you been to the ER, urgent care clinic since your last visit? Hospitalized since your last visit? No    2. Have you seen or consulted any other health care providers outside of the [address] [place] [address] since your last visit? Include any pap smears or colon screening.  No

## 2022-11-01 NOTE — PROGRESS NOTES
SUBJECTIVE:  Patient is here today for follow-up exam right foot. We are currently doing wet-to-dry dressing on the open wound right great toe amputation site. Past Medical History:  No date: Diabetes ([address]  No date: Hypertension  Past Surgical History:  2022: HX AMPUTATION TOE; Right      Comment:  Right great toe  2022: VASCULAR SURGERY PROCEDURE UNLIST; Right  [unfilled]  Social History    Tobacco Use      Smoking status: Former        Packs/day: 1.50        Years: 15.00        Pack years: 22.5        Types: Cigarettes        Quit date: 2022        Years since quittin.3      Smokeless tobacco: Never    Alcohol use: Not Currently    Prior to Admission medications :  Medication oxyCODONE-acetaminophen (PERCOCET 10)  mg per tablet, Sig Take 1 Tablet by mouth every eight (8) hours as needed for Pain for up to 14 days. Max Daily Amount: 3 Tablets. , Start Date 10/23/22, End Date 22, Taking? Yes, Authorizing Provider Maximiliano Mclaughlin MD    Medication oxyCODONE ER (OxyCONTIN) 20 mg ER tablet, Sig Take 1 Tablet by mouth every twelve (12) hours for 30 days. Max Daily Amount: 40 mg., Start Date 10/3/22, End Date 22, Taking? Yes, Authorizing Provider Maximiliano Mclaughlin MD    Medication amoxicillin-clavulanate (AUGMENTIN) 500-125 mg per tablet, Sig Take 1 Tablet by mouth two (2) times a day., Start Date 22, End Date , Taking? Yes, Authorizing Provider Maximiliano Mclaughlin MD    Medication amLODIPine-benazepril (LOTREL) 5-20 mg per capsule, Sig amlodipine 5 mg-benazepril 20 mg capsule TAKE 1 CAPSULE BY MOUTH ONCE DAILY, Start Date 22, End Date , Taking? Yes, Authorizing Provider Maximiliano Mclaughlin MD    Medication apixaban (Eliquis) 5 mg tablet, Sig Take 1 Tablet by mouth two (2) times a day. Take 2 tabs (10mg ) twice daily for next 7 days then 1 tab (5mg) twice daily afterwards. , Start Date 22, End Date , Taking?  Yes, Authorizing Provider Maximiliano Mclaughlin MD    Medication pantoprazole (PROTONIX) 40 mg tablet, Sig Take 40 mg by mouth daily. , Start Date , End Date , Taking? Yes, Authorizing Provider Provider, Historical    Medication metFORMIN (GLUCOPHAGE) 500 mg tablet, Sig Take 500 mg by mouth daily (with dinner). , Start Date , End Date , Taking? Yes, Authorizing Provider Provider, Historical      No Known Allergies      OBJECTIVE:  BP (!) 140/80 (BP 1 Location: Right upper arm, BP Patient Position: Sitting, BP Cuff Size: Large adult) Comment: manual recheck  Pulse 61   Temp 97.9 °F (36.6 °C) (Oral)   Resp 18   Wt 221 lb 9.6 oz (100.5 kg)   SpO2 97%   BMI 29.24 kg/m²     PT IS PLEASANT AND AWAKE AND ALERT. WOUND EXAM:  Wound appears to granulating and wound has shrunk about 70%. ASSESSMENT:  Problem List Items Addressed This Visit        Circulatory    Limb ischemia - Primary    Peripheral vascular disease ([facility] [address]   Other Visit Diagnoses     Postoperative examination              PLAN:  Continue local wound care as instructed. Patient will be reassessed 1 month follow-up. I anticipate wound should be closed by then.

## 2022-11-18 ENCOUNTER — OFFICE VISIT (OUTPATIENT)
Dept: SURGERY | Age: 62
End: 2022-11-18

## 2022-11-18 VITALS
WEIGHT: 219.2 LBS | BODY MASS INDEX: 29.69 KG/M2 | SYSTOLIC BLOOD PRESSURE: 142 MMHG | HEIGHT: 72 IN | DIASTOLIC BLOOD PRESSURE: 78 MMHG | RESPIRATION RATE: 18 BRPM | TEMPERATURE: 98.1 F | OXYGEN SATURATION: 99 % | HEART RATE: 61 BPM

## 2022-11-18 DIAGNOSIS — I73.9 PERIPHERAL VASCULAR DISEASE (HCC): Primary | ICD-10-CM

## 2022-11-18 PROCEDURE — 99024 POSTOP FOLLOW-UP VISIT: CPT | Performed by: SURGERY

## 2022-11-18 NOTE — PROGRESS NOTES
Identified pt with two pt identifiers (name and ). Reviewed chart in preparation for visit and have obtained necessary documentation. Jesús Huntley is a 65 y.o. male  Chief Complaint   Patient presents with    Follow-up     Right Foot Swelling      Visit Vitals  BP (!) 170/80 (BP 1 Location: Left upper arm, BP Patient Position: Sitting)   Pulse 61   Temp 98.1 °F (36.7 °C) (Temporal)   Resp 18   Ht 6' (1.829 m)   Wt 219 lb 3.2 oz (99.4 kg)   SpO2 99%   BMI 29.73 kg/m²       1. Have you been to the ER, urgent care clinic since your last visit? Hospitalized since your last visit? No    2. Have you seen or consulted any other health care providers outside of the [address] [place] [address] since your last visit? Include any pap smears or colon screening. No    Patient and provider made aware of elevated BP x2. Patient asymptomatic. Patient reminded to monitor BP, continue to take BP medications if prescribed, and follow up with PCP/Cardiologist.  Patient expressed understanding and agreement.

## 2022-11-21 NOTE — PROGRESS NOTES
SUBJECTIVE:  Patient is here today right foot wound examination. Patient doing well. Doing daily wet-to-dry dressing. Past Medical History:  No date: Diabetes ([address]  No date: Hypertension  Past Surgical History:  2022: HX AMPUTATION TOE; Right      Comment:  Right great toe  2022: VASCULAR SURGERY PROCEDURE UNLIST; Right  [unfilled]  Social History    Tobacco Use      Smoking status: Former        Packs/day: 1.50        Years: 15.00        Pack years: 22.5        Types: Cigarettes        Quit date: 2022        Years since quittin.3      Smokeless tobacco: Never    Alcohol use: Not Currently    Prior to Admission medications :  Medication amoxicillin-clavulanate (AUGMENTIN) 500-125 mg per tablet, Sig Take 1 Tablet by mouth two (2) times a day., Start Date 22, End Date , Taking? Yes, Authorizing Provider Agata Hernandez MD    Medication amLODIPine-benazepril (LOTREL) 5-20 mg per capsule, Sig amlodipine 5 mg-benazepril 20 mg capsule TAKE 1 CAPSULE BY MOUTH ONCE DAILY, Start Date 22, End Date , Taking? Yes, Authorizing Provider Agata Hernandez MD    Medication apixaban (Eliquis) 5 mg tablet, Sig Take 1 Tablet by mouth two (2) times a day. Take 2 tabs (10mg ) twice daily for next 7 days then 1 tab (5mg) twice daily afterwards. , Start Date 22, End Date , Taking? Yes, Authorizing Provider Agata Hernandez MD    Medication pantoprazole (PROTONIX) 40 mg tablet, Sig Take 40 mg by mouth daily. , Start Date , End Date , Taking? Yes, Authorizing Provider Provider, Historical    Medication metFORMIN (GLUCOPHAGE) 500 mg tablet, Sig Take 500 mg by mouth daily (with dinner). , Start Date , End Date , Taking?  Yes, Authorizing Provider Provider, Historical      No Known Allergies      OBJECTIVE:  BP (!) 142/78 (BP 1 Location: Left upper arm, BP Patient Position: Sitting)   Pulse 61   Temp 98.1 °F (36.7 °C) (Temporal)   Resp 18   Ht 6' (1.829 m)   Wt 219 lb 3.2 oz (99.4 kg)   SpO2 99%   BMI 29.73 kg/m²     PT IS PLEASANT AND AWAKE AND ALERT. WOUND EXAM:  Wounds are closed 95%. ASSESSMENT:  Problem List Items Addressed This Visit        Circulatory    Peripheral vascular disease ([facility] [address] - Primary       PLAN:  From now on we will do Aquacel Ag wound application on the open wound on the right foot. And the patient will be reassessed 1 month follow-up.

## 2023-01-09 ENCOUNTER — OFFICE VISIT (OUTPATIENT)
Dept: SURGERY | Age: 63
End: 2023-01-09

## 2023-01-09 VITALS
TEMPERATURE: 98 F | RESPIRATION RATE: 16 BRPM | OXYGEN SATURATION: 98 % | HEART RATE: 68 BPM | SYSTOLIC BLOOD PRESSURE: 184 MMHG | WEIGHT: 242 LBS | DIASTOLIC BLOOD PRESSURE: 80 MMHG | HEIGHT: 72 IN | BODY MASS INDEX: 32.78 KG/M2

## 2023-01-09 DIAGNOSIS — I73.9 PERIPHERAL VASCULAR DISEASE (HCC): Primary | ICD-10-CM

## 2023-01-09 PROCEDURE — 99213 OFFICE O/P EST LOW 20 MIN: CPT | Performed by: SURGERY

## 2023-01-09 RX ORDER — ONDANSETRON 4 MG/1
TABLET, ORALLY DISINTEGRATING ORAL
COMMUNITY

## 2023-01-09 RX ORDER — GABAPENTIN 100 MG/1
CAPSULE ORAL
COMMUNITY

## 2023-01-09 RX ORDER — CLOPIDOGREL BISULFATE 75 MG/1
TABLET ORAL
COMMUNITY

## 2023-01-09 RX ORDER — TRAMADOL HYDROCHLORIDE 50 MG/1
TABLET ORAL
COMMUNITY

## 2023-01-09 RX ORDER — CLINDAMYCIN HYDROCHLORIDE 300 MG/1
CAPSULE ORAL
COMMUNITY

## 2023-01-09 RX ORDER — SILDENAFIL CITRATE 20 MG/1
TABLET ORAL
COMMUNITY

## 2023-01-09 NOTE — PROGRESS NOTES
1. Have you been to the ER, urgent care clinic since your last visit? Hospitalized since your last visit? No    2. Have you seen or consulted any other health care providers outside of the [address] [place] [address] since your last visit? Include any pap smears or colon screening.  No  Chief Complaint   Patient presents with    Follow-up     1 month f/u     Visit Vitals  BP (!) 183/90 (BP 1 Location: Left upper arm, BP Patient Position: Sitting, BP Cuff Size: Large adult)   Pulse 68   Temp 98 °F (36.7 °C) (Temporal)   Resp 16   Ht 6' (1.829 m)   Wt 242 lb (109.8 kg)   SpO2 98%   BMI 32.82 kg/m²     Visit Vitals  BP (!) 184/80 (BP 1 Location: Left upper arm, BP Patient Position: Sitting, BP Cuff Size: Large adult)   Pulse 68   Temp 98 °F (36.7 °C) (Temporal)   Resp 16   Ht 6' (1.829 m)   Wt 242 lb (109.8 kg)   SpO2 98%   BMI 32.82 kg/m²

## 2023-01-12 NOTE — PROGRESS NOTES
VASCULAR FOLLOW UP      Subjective:   CHIEF COMPLAINTS:  Right foot wound  PRESENTATION OF ILLNESS:  Vickie Diaz is a 65 y.o. very pleasant gentleman is here today recheck wound. Patient currently doing Aquacel Ag. Patient had a also bypass surgery on the right leg previously. Past Medical History:   Diagnosis Date    Diabetes ([address]     Hypertension       Past Surgical History:   Procedure Laterality Date    HX AMPUTATION TOE Right 2022    Right great toe    [place] UNLISTED PROCEDURE VASCULAR SURGERY Right 2022     Family History   Problem Relation Age of Onset    Cancer Mother     Diabetes Mother     Cancer Father       Social History     Tobacco Use    Smoking status: Former     Packs/day: 1.50     Years: 15.00     Pack years: 22.50     Types: Cigarettes     Quit date: 2022     Years since quittin.5    Smokeless tobacco: Never   Substance Use Topics    Alcohol use: Not Currently       Prior to Admission medications    Medication Sig Start Date End Date Taking? Authorizing Provider   sildenafiL (REVATIO) 20 mg tablet sildenafil (pulmonary hypertension) 20 mg tablet   TAKE 1 TO 3 TABLETS BY MOUTH EVERY DAY AS NEEDED   Yes Provider, Historical   amLODIPine-benazepril (LOTREL) 5-20 mg per capsule amlodipine 5 mg-benazepril 20 mg capsule   TAKE 1 CAPSULE BY MOUTH ONCE DAILY 22  Yes Martita Martínez MD   apixaban (Eliquis) 5 mg tablet Take 1 Tablet by mouth two (2) times a day. Take 2 tabs (10mg ) twice daily for next 7 days then 1 tab (5mg) twice daily afterwards. 22  Yes Anish Cuadra MD   pantoprazole (PROTONIX) 40 mg tablet Take 40 mg by mouth daily. Yes Provider, Historical   metFORMIN (GLUCOPHAGE) 500 mg tablet Take 500 mg by mouth daily (with dinner). Yes Provider, Historical   traMADoL (ULTRAM) 50 mg tablet tramadol 50 mg tablet   Take 1 tablet every 6 hours by oral route as needed for 7 days.   Patient not taking: Reported on 2023    Provider, Historical   ondansetron (ZOFRAN ODT) 4 mg disintegrating tablet ondansetron 4 mg disintegrating tablet   DISSOLVE 1 TABLET IN MOUTH EVERY 8 HOURS AS NEEDED FOR NAUSEA FOR VOMITING  Patient not taking: Reported on 1/9/2023    Provider, Historical   gabapentin (NEURONTIN) 100 mg capsule gabapentin 100 mg capsule   TAKE 2 CAPSULES BY MOUTH TWICE DAILY FOR 10 DOSES. MAX DAILY AMOUNT 400MG  Patient not taking: Reported on 1/9/2023    Provider, Historical   clopidogreL (PLAVIX) 75 mg tab clopidogrel 75 mg tablet   TAKE 1 TABLET BY MOUTH ONCE DAILY  Patient not taking: Reported on 1/9/2023    Provider, Historical   clindamycin (CLEOCIN) 300 mg capsule clindamycin HCl 300 mg capsule   TAKE 1 CAPSULE BY MOUTH THREE TIMES DAILY  Patient not taking: Reported on 1/9/2023    Provider, Historical   amoxicillin-clavulanate (AUGMENTIN) 500-125 mg per tablet Take 1 Tablet by mouth two (2) times a day. Patient not taking: Reported on 1/9/2023 9/19/22   Mauricio, Kelvin Jackson MD     No Known Allergies     Review of Systems:  I reviewed the rest of organ systems personally and they were negative signed by Dr. Deann Grace    Objective:   Visit Vitals  BP (!) 184/80 (BP 1 Location: Left upper arm, BP Patient Position: Sitting, BP Cuff Size: Large adult)   Pulse 68   Temp 98 °F (36.7 °C) (Temporal)   Resp 16   Ht 6' (1.829 m)   Wt 242 lb (109.8 kg)   SpO2 98%   BMI 32.82 kg/m²     VITAL SIGNS REVIEWED. Physical Exam:  Patient is well-nourished pleasant in conversation is appropriate. Head and neck examination atraumatic, normocephalic. Gaze appropriate. Conversation appropriate. Neck examination shows supple. No mass. No obvious carotid bruit. Chest examination shows lungs are clear bilaterally well-expanded, no crackles or wheezes. Cardiovascular system regular rate, no obvious murmur. Skin warm to touch  and moist, no skin lesions. Abdomen is soft ,not tender or distended bowel sounds present. No palpable mass.   Neurological examinations, no focal neuro deficits moving all 4 extremities. Cranial nerves intact. Sensation is intact as well. Hematologic: No obvious bruise or swelling or obvious lymphadenopathy. Psychosocial: Appropriate. Has good effect. Musculoskeletal system: No muscle wasting, appropriate movements upper and lower extremity. Vascular examination: Wounds are almost healed up. Excellent Doppler signal noted. Data Review:   No visits with results within 1 Month(s) from this visit. Latest known visit with results is:   Admission on 08/28/2022, Discharged on 09/02/2022   Component Date Value Ref Range Status    Special Requests: 08/28/2022 No Special Requests    Final    Culture result: 08/28/2022 No growth 6 days    Final    Lactic acid 08/28/2022 2.1 (A)  0.4 - 2.0 mmol/L Final    WBC 08/28/2022 9.9  4.1 - 11.1 K/uL Final    RBC 08/28/2022 4.95  4.10 - 5.70 M/uL Final    HGB 08/28/2022 14.1  12.1 - 17.0 g/dL Final    HCT 08/28/2022 42.8  36.6 - 50.3 % Final    MCV 08/28/2022 86.5  80.0 - 99.0 FL Final    MCH 08/28/2022 28.5  26.0 - 34.0 PG Final    MCHC 08/28/2022 32.9  30.0 - 36.5 g/dL Final    RDW 08/28/2022 13.2  11.5 - 14.5 % Final    PLATELET 59/25/9034 808  150 - 400 K/uL Final    MPV 08/28/2022 10.3  8.9 - 12.9 FL Final    NRBC 08/28/2022 0.0  0.0  WBC Final    ABSOLUTE NRBC 08/28/2022 0.00  0.00 - 0.01 K/uL Final    NEUTROPHILS 08/28/2022 69  32 - 75 % Final    LYMPHOCYTES 08/28/2022 20  12 - 49 % Final    MONOCYTES 08/28/2022 7  5 - 13 % Final    EOSINOPHILS 08/28/2022 3  0 - 7 % Final    BASOPHILS 08/28/2022 1  0 - 1 % Final    IMMATURE GRANULOCYTES 08/28/2022 0  0 - 0.5 % Final    ABS. NEUTROPHILS 08/28/2022 6.8  1.8 - 8.0 K/UL Final    ABS. LYMPHOCYTES 08/28/2022 2.0  0.8 - 3.5 K/UL Final    ABS. MONOCYTES 08/28/2022 0.7  0.0 - 1.0 K/UL Final    ABS. EOSINOPHILS 08/28/2022 0.3  0.0 - 0.4 K/UL Final    ABS. BASOPHILS 08/28/2022 0.1  0.0 - 0.1 K/UL Final    ABS. IMM.  GRANS. 08/28/2022 0.0  0.00 - 0.04 K/UL Final    DF 08/28/2022 AUTOMATED    Final    Sodium 08/28/2022 136  136 - 145 mmol/L Final    Potassium 08/28/2022 3.6  3.5 - 5.1 mmol/L Final    Chloride 08/28/2022 104  97 - 108 mmol/L Final    CO2 08/28/2022 24  21 - 32 mmol/L Final    Anion gap 08/28/2022 8  5 - 15 mmol/L Final    Glucose 08/28/2022 170 (A)  65 - 100 mg/dL Final    BUN 08/28/2022 10  6 - 20 mg/dL Final    Creatinine 08/28/2022 0.77  0.70 - 1.30 mg/dL Final    BUN/Creatinine ratio 08/28/2022 13  12 - 20   Final    GFR est AA 08/28/2022 >60  >60 ml/min/1.73m2 Final    GFR est non-AA 08/28/2022 >60  >60 ml/min/1.73m2 Final    Calcium 08/28/2022 9.4  8.5 - 10.1 mg/dL Final    Bilirubin, total 08/28/2022 0.7  0.2 - 1.0 mg/dL Final    AST (SGOT) 08/28/2022 30  15 - 37 U/L Final    ALT (SGPT) 08/28/2022 46  12 - 78 U/L Final    Alk.  phosphatase 08/28/2022 93  45 - 117 U/L Final    Protein, total 08/28/2022 8.1  6.4 - 8.2 g/dL Final    Albumin 08/28/2022 3.6  3.5 - 5.0 g/dL Final    Globulin 08/28/2022 4.5 (A)  2.0 - 4.0 g/dL Final    A-G Ratio 08/28/2022 0.8 (A)  1.1 - 2.2   Final    Sed rate, automated 08/28/2022 30 (A)  0 - 20 mm/hr Final    Crossmatch Expiration 08/28/2022 08/31/2022,2359   Final    ABO/Rh(D) 08/28/2022 A Positive   Final    Antibody screen 08/28/2022 Negative   Final    aPTT 08/28/2022 30.9  21.2 - 34.1 sec Final    aPTT, therapeutic range 08/28/2022    82 - 109 sec Final    WBC 08/28/2022 9.7  4.1 - 11.1 K/uL Final    RBC 08/28/2022 4.45  4.10 - 5.70 M/uL Final    HGB 08/28/2022 12.6  12.1 - 17.0 g/dL Final    HCT 08/28/2022 38.6  36.6 - 50.3 % Final    MCV 08/28/2022 86.7  80.0 - 99.0 FL Final    MCH 08/28/2022 28.3  26.0 - 34.0 PG Final    MCHC 08/28/2022 32.6  30.0 - 36.5 g/dL Final    RDW 08/28/2022 13.2  11.5 - 14.5 % Final    PLATELET 89/14/6508 393  150 - 400 K/uL Final    MPV 08/28/2022 10.1  8.9 - 12.9 FL Final    NRBC 08/28/2022 0.0  0.0  WBC Final    ABSOLUTE NRBC 08/28/2022 0.00  0.00 - 0.01 K/uL Final    NEUTROPHILS 08/28/2022 62  32 - 75 % Final    LYMPHOCYTES 08/28/2022 26  12 - 49 % Final    MONOCYTES 08/28/2022 7  5 - 13 % Final    EOSINOPHILS 08/28/2022 4  0 - 7 % Final    BASOPHILS 08/28/2022 1  0 - 1 % Final    IMMATURE GRANULOCYTES 08/28/2022 0  0 - 0.5 % Final    ABS. NEUTROPHILS 08/28/2022 6.0  1.8 - 8.0 K/UL Final    ABS. LYMPHOCYTES 08/28/2022 2.6  0.8 - 3.5 K/UL Final    ABS. MONOCYTES 08/28/2022 0.6  0.0 - 1.0 K/UL Final    ABS. EOSINOPHILS 08/28/2022 0.4  0.0 - 0.4 K/UL Final    ABS. BASOPHILS 08/28/2022 0.1  0.0 - 0.1 K/UL Final    ABS. IMM.  GRANS. 08/28/2022 0.0  0.00 - 0.04 K/UL Final    DF 08/28/2022 AUTOMATED    Final    Glucose (POC) 08/28/2022 91  65 - 117 mg/dL Final    Performed by 08/28/2022 Dorene Campbell ([place])   Final    aPTT 08/29/2022 41.5 (A)  21.2 - 34.1 sec Final    aPTT, therapeutic range 08/29/2022    82 - 109 sec Final    Glucose (POC) 08/29/2022 126 (A)  65 - 117 mg/dL Final    Performed by 08/29/2022 Azeem Otto Bronaugh ([place])   Final    Glucose (POC) 08/29/2022 88  65 - 117 mg/dL Final    Performed by 08/29/2022 Brandonamonye Otto Bronaugh ([place])   Final    Glucose (POC) 08/29/2022 145 (A)  65 - 117 mg/dL Final    Performed by 08/29/2022 WOODY ELLIS   Final    Glucose (POC) 08/29/2022 85  65 - 117 mg/dL Final    Performed by 08/29/2022 RICARDO LIN   Final    aPTT 08/29/2022 28.1  21.2 - 34.1 sec Final    aPTT, therapeutic range 08/29/2022    82 - 109 sec Final    aPTT 08/30/2022 44.2 (A)  21.2 - 34.1 sec Final    aPTT, therapeutic range 08/30/2022    82 - 109 sec Final    Glucose (POC) 08/29/2022 98  65 - 117 mg/dL Final    Performed by 08/29/2022 Gareth Calloway   Final    Glucose (POC) 08/30/2022 85  65 - 117 mg/dL Final    Performed by 08/30/2022 Aliya Enriquez   Final    aPTT 08/30/2022 48.6 (A)  21.2 - 34.1 sec Final    aPTT, therapeutic range 08/30/2022    82 - 109 sec Final    Glucose (POC) 08/30/2022 108  65 - 117 mg/dL Final    Performed by 08/30/2022 Brittni Greer   Final Glucose (POC) 08/30/2022 136 (A)  65 - 117 mg/dL Final    Performed by 08/30/2022 Cheo Doyle RN ([place])   Final    aPTT 08/30/2022 76.5 (A)  21.2 - 34.1 sec Final    aPTT, therapeutic range 08/30/2022    82 - 109 sec Final    aPTT 08/31/2022 112.4 (A)  21.2 - 34.1 sec Final    aPTT, therapeutic range 08/31/2022    82 - 109 sec Final    Glucose (POC) 08/30/2022 123 (A)  65 - 117 mg/dL Final    Performed by 08/30/2022 Delbert Zamorano   Final    Glucose (POC) 08/31/2022 108 (A)  65 - 100 mg/dL Final    Performed by 08/31/2022 [facility] ABE[facility]   Final    aPTT 08/31/2022 68.7 (A)  21.2 - 34.1 sec Final    aPTT, therapeutic range 08/31/2022    82 - 109 sec Final    Glucose (POC) 08/31/2022 113 (A)  65 - 100 mg/dL Final    Performed by 08/31/2022 Val Stewart   Final    Glucose (POC) 08/31/2022 137 (A)  65 - 100 mg/dL Final    Performed by 08/31/2022 Amarilys Hill   Final    aPTT 08/31/2022 58.5 (A)  21.2 - 34.1 sec Final    aPTT, therapeutic range 08/31/2022    82 - 109 sec Final    Glucose (POC) 09/01/2022 119 (A)  65 - 100 mg/dL Final    Performed by 09/01/2022 [facility] Marroquin   Final    aPTT 09/01/2022 101.8 (A)  21.2 - 34.1 sec Final    aPTT, therapeutic range 09/01/2022    82 - 109 sec Final    Glucose (POC) 09/01/2022 98  65 - 100 mg/dL Final    Performed by 09/01/2022 MARCIANO MANCUSO   Final    aPTT 09/01/2022 60.0 (A)  21.2 - 34.1 sec Final    aPTT, therapeutic range 09/01/2022    82 - 109 sec Final    Glucose (POC) 09/01/2022 125 (A)  65 - 100 mg/dL Final    Performed by 09/01/2022 Lashell Augustin   Final    Glucose (POC) 09/01/2022 139 (A)  65 - 100 mg/dL Final    Performed by 09/01/2022 Niko Krishna   Final    aPTT 09/01/2022 82.1 (A)  21.2 - 34.1 sec Final    aPTT, therapeutic range 09/01/2022    82 - 109 sec Final    aPTT 09/02/2022 68.5 (A)  21.2 - 34.1 sec Final    aPTT, therapeutic range 09/02/2022    82 - 109 sec Final    Glucose (POC) 09/01/2022 140 (A)  65 - 100 mg/dL Final    Performed by 09/01/2022 Thurlow Dales   Final    Glucose (POC) 09/02/2022 130 (A)  65 - 100 mg/dL Final    Performed by 09/02/2022 Thurlow Dales   Final    Glucose (POC) 09/02/2022 118 (A)  65 - 100 mg/dL Final    Performed by 09/02/2022 Annalisa Zimmerman   Final        Assessment:     Problem List Items Addressed This Visit    None          Plan:     Patient will be reassessed in 1 month follow-up continue local wound care with Aquacel Ag. Continue anticoagulant therapy at the moment.         Juliet Stone MD

## 2023-02-07 RX ORDER — APIXABAN 5 MG/1
TABLET, FILM COATED ORAL
Qty: 60 TABLET | Refills: 0 | Status: SHIPPED | OUTPATIENT
Start: 2023-02-07

## 2023-02-17 ENCOUNTER — OFFICE VISIT (OUTPATIENT)
Dept: SURGERY | Age: 63
End: 2023-02-17

## 2023-02-17 VITALS
SYSTOLIC BLOOD PRESSURE: 182 MMHG | OXYGEN SATURATION: 99 % | HEART RATE: 76 BPM | TEMPERATURE: 98.1 F | DIASTOLIC BLOOD PRESSURE: 85 MMHG | RESPIRATION RATE: 16 BRPM | WEIGHT: 248 LBS | BODY MASS INDEX: 33.59 KG/M2 | HEIGHT: 72 IN

## 2023-02-17 DIAGNOSIS — I73.9 PERIPHERAL VASCULAR DISEASE (HCC): Primary | ICD-10-CM

## 2023-02-17 NOTE — PROGRESS NOTES
Identified pt with two pt identifiers (name and ). Reviewed chart in preparation for visit and have obtained necessary documentation. Anjel Osorio is a 65 y.o. male  Chief Complaint   Patient presents with    Follow-up     Legs      Visit Vitals  BP (!) 182/85 (BP 1 Location: Right arm, BP Patient Position: Sitting)   Pulse 76   Temp 98.1 °F (36.7 °C) (Temporal)   Resp 16   Ht 6' (1.829 m)   Wt 248 lb (112.5 kg)   SpO2 99%   BMI 33.63 kg/m²       1. Have you been to the ER, urgent care clinic since your last visit? Hospitalized since your last visit? No    2. Have you seen or consulted any other health care providers outside of the [address] [place] [address] since your last visit? Include any pap smears or colon screening.  No

## 2023-02-25 NOTE — PROGRESS NOTES
VASCULAR FOLLOW UP      Subjective:   CHIEF COMPLAINTS:  Wound examination  PRESENTATION OF ILLNESS:  Danny Gongora is a 65 y.o. very pleasant gentleman is here today wound examination right foot. Patient says wound has healed. Patient had previous right leg bypass surgery for ischemic limb. Patient currently on Plavix and Eliquis. Patient denies any bleeding complications. Patient denies any chest pain shortness of breath. Denies any foot pain. Past Medical History:   Diagnosis Date    Diabetes ([facility] [address]     Hypertension       Past Surgical History:   Procedure Laterality Date    HX AMPUTATION TOE Right 2022    Right great toe    MI UNLISTED PROCEDURE VASCULAR SURGERY Right 2022     Family History   Problem Relation Age of Onset    Cancer Mother     Diabetes Mother     Cancer Father       Social History     Tobacco Use    Smoking status: Former     Packs/day: 1.50     Years: 15.00     Pack years: 22.50     Types: Cigarettes     Quit date: 2022     Years since quittin.6    Smokeless tobacco: Never   Substance Use Topics    Alcohol use: Not Currently       Prior to Admission medications    Medication Sig Start Date End Date Taking? Authorizing Provider   Eliquis 5 mg tablet TAKE 2 TABLETS BY MOUTH TWICE DAILY FOR 7 DAYS THEN  1  TABLET  TWICE  DAILY  AFTERWARDS 23  Yes Karley Martínez MD   sildenafiL (REVATIO) 20 mg tablet sildenafil (pulmonary hypertension) 20 mg tablet   TAKE 1 TO 3 TABLETS BY MOUTH EVERY DAY AS NEEDED   Yes Provider, Historical   amLODIPine-benazepril (LOTREL) 5-20 mg per capsule amlodipine 5 mg-benazepril 20 mg capsule   TAKE 1 CAPSULE BY MOUTH ONCE DAILY 22  Yes Karley Martínez MD   pantoprazole (PROTONIX) 40 mg tablet Take 40 mg by mouth daily. Yes Provider, Historical   metFORMIN (GLUCOPHAGE) 500 mg tablet Take 500 mg by mouth daily (with dinner).    Yes Provider, Historical   traMADoL (ULTRAM) 50 mg tablet tramadol 50 mg tablet   Take 1 tablet every 6 hours by oral route as needed for 7 days. Patient not taking: No sig reported    Provider, Historical   ondansetron (ZOFRAN ODT) 4 mg disintegrating tablet ondansetron 4 mg disintegrating tablet   DISSOLVE 1 TABLET IN MOUTH EVERY 8 HOURS AS NEEDED FOR NAUSEA FOR VOMITING  Patient not taking: No sig reported    Provider, Historical   gabapentin (NEURONTIN) 100 mg capsule gabapentin 100 mg capsule   TAKE 2 CAPSULES BY MOUTH TWICE DAILY FOR 10 DOSES. MAX DAILY AMOUNT 400MG  Patient not taking: No sig reported    Provider, Historical   clopidogreL (PLAVIX) 75 mg tab clopidogrel 75 mg tablet   TAKE 1 TABLET BY MOUTH ONCE DAILY  Patient not taking: No sig reported    Provider, Historical   clindamycin (CLEOCIN) 300 mg capsule clindamycin HCl 300 mg capsule   TAKE 1 CAPSULE BY MOUTH THREE TIMES DAILY  Patient not taking: No sig reported    Provider, Historical   amoxicillin-clavulanate (AUGMENTIN) 500-125 mg per tablet Take 1 Tablet by mouth two (2) times a day. Patient not taking: No sig reported 9/19/22   Mauricio, Dania Boeck, MD     No Known Allergies     Review of Systems:  I reviewed the rest of organ systems personally and they were negative signed by Dr. Kamran Parham    Objective:   Visit Vitals  BP (!) 182/85 (BP 1 Location: Right arm, BP Patient Position: Sitting)   Pulse 76   Temp 98.1 °F (36.7 °C) (Temporal)   Resp 16   Ht 6' (1.829 m)   Wt 248 lb (112.5 kg)   SpO2 99%   BMI 33.63 kg/m²     VITAL SIGNS REVIEWED. Physical Exam:  Patient is well-nourished pleasant in conversation is appropriate. Head and neck examination atraumatic, normocephalic. Gaze appropriate. Conversation appropriate. Neck examination shows supple. No mass. No obvious carotid bruit. Chest examination shows lungs are clear bilaterally well-expanded, no crackles or wheezes. Cardiovascular system regular rate, no obvious murmur. Skin warm to touch  and moist, no skin lesions. Abdomen is soft ,not tender or distended bowel sounds present.   No palpable mass.  Neurological examinations, no focal neuro deficits moving all 4 extremities. Cranial nerves intact. Sensation is intact as well. Hematologic: No obvious bruise or swelling or obvious lymphadenopathy. Psychosocial: Appropriate. Has good effect. Musculoskeletal system: No muscle wasting, appropriate movements upper and lower extremity. Vascular examination: Patient has dopplerable signal over the graft on the right leg and also DP and PT monophasic signal noted. Toe amputation site wound has healed up. Data Review:   No visits with results within 1 Month(s) from this visit. Latest known visit with results is:   Admission on 08/28/2022, Discharged on 09/02/2022   Component Date Value Ref Range Status    Special Requests: 08/28/2022 No Special Requests    Final    Culture result: 08/28/2022 No growth 6 days    Final    Lactic acid 08/28/2022 2.1 (A)  0.4 - 2.0 mmol/L Final    WBC 08/28/2022 9.9  4.1 - 11.1 K/uL Final    RBC 08/28/2022 4.95  4.10 - 5.70 M/uL Final    HGB 08/28/2022 14.1  12.1 - 17.0 g/dL Final    HCT 08/28/2022 42.8  36.6 - 50.3 % Final    MCV 08/28/2022 86.5  80.0 - 99.0 FL Final    MCH 08/28/2022 28.5  26.0 - 34.0 PG Final    MCHC 08/28/2022 32.9  30.0 - 36.5 g/dL Final    RDW 08/28/2022 13.2  11.5 - 14.5 % Final    PLATELET 84/93/0833 729  150 - 400 K/uL Final    MPV 08/28/2022 10.3  8.9 - 12.9 FL Final    NRBC 08/28/2022 0.0  0.0  WBC Final    ABSOLUTE NRBC 08/28/2022 0.00  0.00 - 0.01 K/uL Final    NEUTROPHILS 08/28/2022 69  32 - 75 % Final    LYMPHOCYTES 08/28/2022 20  12 - 49 % Final    MONOCYTES 08/28/2022 7  5 - 13 % Final    EOSINOPHILS 08/28/2022 3  0 - 7 % Final    BASOPHILS 08/28/2022 1  0 - 1 % Final    IMMATURE GRANULOCYTES 08/28/2022 0  0 - 0.5 % Final    ABS. NEUTROPHILS 08/28/2022 6.8  1.8 - 8.0 K/UL Final    ABS. LYMPHOCYTES 08/28/2022 2.0  0.8 - 3.5 K/UL Final    ABS. MONOCYTES 08/28/2022 0.7  0.0 - 1.0 K/UL Final    ABS.  EOSINOPHILS 08/28/2022 0.3  0.0 - 0.4 K/UL Final    ABS. BASOPHILS 08/28/2022 0.1  0.0 - 0.1 K/UL Final    ABS. IMM. GRANS. 08/28/2022 0.0  0.00 - 0.04 K/UL Final    DF 08/28/2022 AUTOMATED    Final    Sodium 08/28/2022 136  136 - 145 mmol/L Final    Potassium 08/28/2022 3.6  3.5 - 5.1 mmol/L Final    Chloride 08/28/2022 104  97 - 108 mmol/L Final    CO2 08/28/2022 24  21 - 32 mmol/L Final    Anion gap 08/28/2022 8  5 - 15 mmol/L Final    Glucose 08/28/2022 170 (A)  65 - 100 mg/dL Final    BUN 08/28/2022 10  6 - 20 mg/dL Final    Creatinine 08/28/2022 0.77  0.70 - 1.30 mg/dL Final    BUN/Creatinine ratio 08/28/2022 13  12 - 20   Final    GFR est AA 08/28/2022 >60  >60 ml/min/1.73m2 Final    GFR est non-AA 08/28/2022 >60  >60 ml/min/1.73m2 Final    Calcium 08/28/2022 9.4  8.5 - 10.1 mg/dL Final    Bilirubin, total 08/28/2022 0.7  0.2 - 1.0 mg/dL Final    AST (SGOT) 08/28/2022 30  15 - 37 U/L Final    ALT (SGPT) 08/28/2022 46  12 - 78 U/L Final    Alk.  phosphatase 08/28/2022 93  45 - 117 U/L Final    Protein, total 08/28/2022 8.1  6.4 - 8.2 g/dL Final    Albumin 08/28/2022 3.6  3.5 - 5.0 g/dL Final    Globulin 08/28/2022 4.5 (A)  2.0 - 4.0 g/dL Final    A-G Ratio 08/28/2022 0.8 (A)  1.1 - 2.2   Final    Sed rate, automated 08/28/2022 30 (A)  0 - 20 mm/hr Final    Crossmatch Expiration 08/28/2022 08/31/2022,2359   Final    ABO/Rh(D) 08/28/2022 A Positive   Final    Antibody screen 08/28/2022 Negative   Final    aPTT 08/28/2022 30.9  21.2 - 34.1 sec Final    aPTT, therapeutic range 08/28/2022    82 - 109 sec Final    WBC 08/28/2022 9.7  4.1 - 11.1 K/uL Final    RBC 08/28/2022 4.45  4.10 - 5.70 M/uL Final    HGB 08/28/2022 12.6  12.1 - 17.0 g/dL Final    HCT 08/28/2022 38.6  36.6 - 50.3 % Final    MCV 08/28/2022 86.7  80.0 - 99.0 FL Final    MCH 08/28/2022 28.3  26.0 - 34.0 PG Final    MCHC 08/28/2022 32.6  30.0 - 36.5 g/dL Final    RDW 08/28/2022 13.2  11.5 - 14.5 % Final    PLATELET 75/18/7509 749  150 - 400 K/uL Final    MPV 08/28/2022 10.1  8.9 - 12.9 FL Final    NRBC 08/28/2022 0.0  0.0  WBC Final    ABSOLUTE NRBC 08/28/2022 0.00  0.00 - 0.01 K/uL Final    NEUTROPHILS 08/28/2022 62  32 - 75 % Final    LYMPHOCYTES 08/28/2022 26  12 - 49 % Final    MONOCYTES 08/28/2022 7  5 - 13 % Final    EOSINOPHILS 08/28/2022 4  0 - 7 % Final    BASOPHILS 08/28/2022 1  0 - 1 % Final    IMMATURE GRANULOCYTES 08/28/2022 0  0 - 0.5 % Final    ABS. NEUTROPHILS 08/28/2022 6.0  1.8 - 8.0 K/UL Final    ABS. LYMPHOCYTES 08/28/2022 2.6  0.8 - 3.5 K/UL Final    ABS. MONOCYTES 08/28/2022 0.6  0.0 - 1.0 K/UL Final    ABS. EOSINOPHILS 08/28/2022 0.4  0.0 - 0.4 K/UL Final    ABS. BASOPHILS 08/28/2022 0.1  0.0 - 0.1 K/UL Final    ABS. IMM.  GRANS. 08/28/2022 0.0  0.00 - 0.04 K/UL Final    DF 08/28/2022 AUTOMATED    Final    Glucose (POC) 08/28/2022 91  65 - 117 mg/dL Final    Performed by 08/28/2022 Paresh Space ([place])   Final    aPTT 08/29/2022 41.5 (A)  21.2 - 34.1 sec Final    aPTT, therapeutic range 08/29/2022    82 - 109 sec Final    Glucose (POC) 08/29/2022 126 (A)  65 - 117 mg/dL Final    Performed by 08/29/2022 Donia Dues [place]dee ([place])   Final    Glucose (POC) 08/29/2022 88  65 - 117 mg/dL Final    Performed by 08/29/2022 Donia Dues [place]ard (Float [place])   Final    Glucose (POC) 08/29/2022 145 (A)  65 - 117 mg/dL Final    Performed by 08/29/2022 WOODY ELLIS   Final    Glucose (POC) 08/29/2022 85  65 - 117 mg/dL Final    Performed by 08/29/2022 RICARDO LIN   Final    aPTT 08/29/2022 28.1  21.2 - 34.1 sec Final    aPTT, therapeutic range 08/29/2022    82 - 109 sec Final    aPTT 08/30/2022 44.2 (A)  21.2 - 34.1 sec Final    aPTT, therapeutic range 08/30/2022    82 - 109 sec Final    Glucose (POC) 08/29/2022 98  65 - 117 mg/dL Final    Performed by 08/29/2022 Bruna Gibson   Final    Glucose (POC) 08/30/2022 85  65 - 117 mg/dL Final    Performed by 08/30/2022 Hector Arguelles   Final    aPTT 08/30/2022 48.6 (A)  21.2 - 34.1 sec Final    aPTT, therapeutic range 08/30/2022    82 - 109 sec Final    Glucose (POC) 08/30/2022 108  65 - 117 mg/dL Final    Performed by 08/30/2022 Mark Madera   Final    Glucose (POC) 08/30/2022 136 (A)  65 - 117 mg/dL Final    Performed by 08/30/2022 Mine Rodríguez RN (Traveler)   Final    aPTT 08/30/2022 76.5 (A)  21.2 - 34.1 sec Final    aPTT, therapeutic range 08/30/2022    82 - 109 sec Final    aPTT 08/31/2022 112.4 (A)  21.2 - 34.1 sec Final    aPTT, therapeutic range 08/31/2022    82 - 109 sec Final    Glucose (POC) 08/30/2022 123 (A)  65 - 117 mg/dL Final    Performed by 08/30/2022 Chris Nichols   Final    Glucose (POC) 08/31/2022 108 (A)  65 - 100 mg/dL Final    Performed by 08/31/2022 Priti Carr   Final    aPTT 08/31/2022 68.7 (A)  21.2 - 34.1 sec Final    aPTT, therapeutic range 08/31/2022    82 - 109 sec Final    Glucose (POC) 08/31/2022 113 (A)  65 - 100 mg/dL Final    Performed by 08/31/2022 Patience Farmer   Final    Glucose (POC) 08/31/2022 137 (A)  65 - 100 mg/dL Final    Performed by 08/31/2022 Angelica Hdz   Final    aPTT 08/31/2022 58.5 (A)  21.2 - 34.1 sec Final    aPTT, therapeutic range 08/31/2022    82 - 109 sec Final    Glucose (POC) 09/01/2022 119 (A)  65 - 100 mg/dL Final    Performed by 09/01/2022 [facility] Trip Claudio   Final    aPTT 09/01/2022 101.8 (A)  21.2 - 34.1 sec Final    aPTT, therapeutic range 09/01/2022    82 - 109 sec Final    Glucose (POC) 09/01/2022 98  65 - 100 mg/dL Final    Performed by 09/01/2022 MARCIANO MANCUSO   Final    aPTT 09/01/2022 60.0 (A)  21.2 - 34.1 sec Final    aPTT, therapeutic range 09/01/2022    82 - 109 sec Final    Glucose (POC) 09/01/2022 125 (A)  65 - 100 mg/dL Final    Performed by 09/01/2022 Shirlene Roof   Final    Glucose (POC) 09/01/2022 139 (A)  65 - 100 mg/dL Final    Performed by 09/01/2022 Tejada Carmen   Final    aPTT 09/01/2022 82.1 (A)  21.2 - 34.1 sec Final    aPTT, therapeutic range 09/01/2022    82 - 109 sec Final    aPTT 09/02/2022 68.5 (A)  21.2 - 34.1 sec Final aPTT, therapeutic range 09/02/2022    82 - 109 sec Final    Glucose (POC) 09/01/2022 140 (A)  65 - 100 mg/dL Final    Performed by 09/01/2022 Maribell Canner   Final    Glucose (POC) 09/02/2022 130 (A)  65 - 100 mg/dL Final    Performed by 09/02/2022 Maribell Ariellaer   Final    Glucose (POC) 09/02/2022 118 (A)  65 - 100 mg/dL Final    Performed by 09/02/2022 Tanisha Rosas   Final        Assessment:     Problem List Items Addressed This Visit          Circulatory    Peripheral vascular disease ([facility] [address] - Primary           Plan:     Patient will need to cover scabbed area with Aquacel Ag. Advised to continue to be active and exercising. We will maintain current Plavix and Eliquis. Probably next visit we will discontinue Eliquis. Patient will be reassessed in 6 months for surveillance vascular examination.         Sheila Dc MD

## 2023-03-28 ENCOUNTER — TELEPHONE (OUTPATIENT)
Dept: SURGERY | Age: 63
End: 2023-03-28

## 2023-03-28 NOTE — TELEPHONE ENCOUNTER
[facility] is requesting a refill on all of his medications prescribed by Dr. Elsy Patton, to be sent to [facility] (on file). Please request refill when available.  Phone: [phone]

## 2023-03-28 NOTE — TELEPHONE ENCOUNTER
Called the patient, and spoke with the patient regarding his refill request. I advised the patient to call his pharmacy to fax over the refill request to us. Pt understands and will call his pharmacy.

## 2023-05-20 RX ORDER — AMLODIPINE BESYLATE AND BENAZEPRIL HYDROCHLORIDE 5; 20 MG/1; MG/1
CAPSULE ORAL
COMMUNITY
Start: 2022-08-27

## 2023-05-20 RX ORDER — SILDENAFIL CITRATE 20 MG/1
TABLET ORAL
COMMUNITY

## 2023-05-20 RX ORDER — TRAMADOL HYDROCHLORIDE 50 MG/1
TABLET ORAL
COMMUNITY

## 2023-05-20 RX ORDER — GABAPENTIN 100 MG/1
CAPSULE ORAL
COMMUNITY

## 2023-05-20 RX ORDER — ONDANSETRON 4 MG/1
TABLET, ORALLY DISINTEGRATING ORAL
COMMUNITY

## 2023-05-20 RX ORDER — PANTOPRAZOLE SODIUM 40 MG/1
40 TABLET, DELAYED RELEASE ORAL DAILY
COMMUNITY

## 2023-05-20 RX ORDER — CLINDAMYCIN HYDROCHLORIDE 300 MG/1
CAPSULE ORAL
COMMUNITY

## 2023-05-20 RX ORDER — CLOPIDOGREL BISULFATE 75 MG/1
TABLET ORAL
COMMUNITY

## 2023-05-20 RX ORDER — AMOXICILLIN AND CLAVULANATE POTASSIUM 500; 125 MG/1; MG/1
1 TABLET, FILM COATED ORAL 2 TIMES DAILY
COMMUNITY
Start: 2022-09-19

## 2023-09-15 ENCOUNTER — OFFICE VISIT (OUTPATIENT)
Age: 63
End: 2023-09-15

## 2023-09-15 VITALS
SYSTOLIC BLOOD PRESSURE: 189 MMHG | DIASTOLIC BLOOD PRESSURE: 94 MMHG | OXYGEN SATURATION: 98 % | HEART RATE: 70 BPM | HEIGHT: 72 IN | RESPIRATION RATE: 17 BRPM | BODY MASS INDEX: 34 KG/M2 | WEIGHT: 251 LBS

## 2023-09-15 DIAGNOSIS — I73.9 PERIPHERAL VASCULAR DISEASE (HCC): Primary | ICD-10-CM

## 2023-09-15 PROCEDURE — 99213 OFFICE O/P EST LOW 20 MIN: CPT | Performed by: SURGERY

## 2023-09-18 NOTE — PROGRESS NOTES
Vascular History and Physical    Patient: Jacquelyn Closs  MRN: [id number]    YOB: 1960  Age: 59 y.o. Sex: male     Chief Complaint:  Chief Complaint   Patient presents with    Follow-up     Peripheral vascular disease       History of Present Illness: Jacquelyn Closs is a 59 y.o. very pleasant gentleman is here today follow-up. Patient denies any chest pain or shortness of breath. He does have occasional cramps on the right leg. He had previous right great toe amputation with bypass surgery. Patient currently on Eliquis however he is taking only 1 tablet due to expense of the medication. Social History:  Social Connections: Not on file       Past Medical History:  Past Medical History:   Diagnosis Date    Diabetes ([address])     Hypertension        Surgical History:  Past Surgical History:   Procedure Laterality Date    TOE AMPUTATION Right 08/06/2022    Right great toe    VASCULAR SURGERY Right 07/27/2022       Allergies:  No Known Allergies    Current Meds:  Current Outpatient Medications   Medication Sig Dispense Refill    amLODIPine-benazepril (LOTREL) 5-20 MG per capsule amlodipine 5 mg-benazepril 20 mg capsule   TAKE 1 CAPSULE BY MOUTH ONCE DAILY      apixaban (ELIQUIS) 5 MG TABS tablet TAKE 2 TABLETS BY MOUTH TWICE DAILY FOR 7 DAYS THEN  1  TABLET  BY  MOUTH  TWICE  DAILY  AFTERWARDS      clopidogrel (PLAVIX) 75 MG tablet clopidogrel 75 mg tablet   TAKE 1 TABLET BY MOUTH ONCE DAILY      pantoprazole (PROTONIX) 40 MG tablet Take 1 tablet by mouth daily      traMADol (ULTRAM) 50 MG tablet tramadol 50 mg tablet   Take 1 tablet every 6 hours by oral route as needed for 7 days.       amoxicillin-clavulanate (AUGMENTIN) 500-125 MG per tablet Take 1 tablet by mouth 2 times daily (Patient not taking: Reported on 9/15/2023)      clindamycin (CLEOCIN) 300 MG capsule clindamycin HCl 300 mg capsule   TAKE 1 CAPSULE BY MOUTH THREE TIMES DAILY (Patient not taking: Reported on 9/15/2023)      gabapentin (NEURONTIN)

## 2023-11-16 ENCOUNTER — TELEPHONE (OUTPATIENT)
Age: 63
End: 2023-11-16

## 2023-11-16 NOTE — TELEPHONE ENCOUNTER
Raisa Cali called from [employer]ability [employer]. He wanted to know if Miguel Ángel Antonio would fill out paperwork for [facility] regarding his disability. I gave him our fax number to fax over the records request form.

## 2023-12-11 ENCOUNTER — HOSPITAL ENCOUNTER (OUTPATIENT)
Facility: HOSPITAL | Age: 63
Discharge: HOME | End: 2023-12-14
Attending: SURGERY

## 2023-12-11 ENCOUNTER — HOSPITAL ENCOUNTER (OUTPATIENT)
Facility: HOSPITAL | Age: 63
Discharge: HOME | End: 2023-12-13
Attending: SURGERY

## 2023-12-11 ENCOUNTER — HOSPITAL ENCOUNTER (OUTPATIENT)
Facility: HOSPITAL | Age: 63
Discharge: HOME | End: 2023-12-14

## 2023-12-11 DIAGNOSIS — I73.9 PERIPHERAL VASCULAR DISEASE (HCC): ICD-10-CM

## 2023-12-11 DIAGNOSIS — I73.9 PERIPHERAL VASCULAR DISEASE, UNSPECIFIED (HCC): ICD-10-CM

## 2023-12-11 LAB
CREAT SERPL-MCNC: 1.06 MG/DL (ref 0.7–1.3)
VAS LEFT ABI: 0.9
VAS LEFT ARM BP: 175 MMHG
VAS LEFT DORSALIS PEDIS BP: 145 MMHG
VAS LEFT PTA BP: 160 MMHG
VAS RIGHT ABI: 0.85
VAS RIGHT ARM BP: 178 MMHG
VAS RIGHT DORSALIS PEDIS BP: 152 MMHG
VAS RIGHT PTA BP: 148 MMHG

## 2023-12-11 PROCEDURE — 82565 ASSAY OF CREATININE: CPT

## 2023-12-11 PROCEDURE — 36415 COLL VENOUS BLD VENIPUNCTURE: CPT

## 2023-12-11 PROCEDURE — 93922 UPR/L XTREMITY ART 2 LEVELS: CPT

## 2023-12-11 PROCEDURE — 75635 CT ANGIO ABDOMINAL ARTERIES: CPT

## 2023-12-11 PROCEDURE — 6360000004 HC RX CONTRAST MEDICATION: Performed by: SURGERY

## 2023-12-11 RX ADMIN — IOPAMIDOL 100 ML: 755 INJECTION, SOLUTION INTRAVENOUS at 10:23

## 2023-12-15 ENCOUNTER — OFFICE VISIT (OUTPATIENT)
Age: 63
End: 2023-12-15

## 2023-12-15 VITALS
WEIGHT: 249.25 LBS | HEIGHT: 72 IN | BODY MASS INDEX: 33.76 KG/M2 | SYSTOLIC BLOOD PRESSURE: 154 MMHG | DIASTOLIC BLOOD PRESSURE: 80 MMHG | HEART RATE: 68 BPM | TEMPERATURE: 98 F | OXYGEN SATURATION: 96 % | RESPIRATION RATE: 18 BRPM

## 2023-12-15 DIAGNOSIS — I73.9 PERIPHERAL VASCULAR DISEASE (HCC): Primary | ICD-10-CM

## 2023-12-15 PROCEDURE — 99213 OFFICE O/P EST LOW 20 MIN: CPT | Performed by: SURGERY

## 2023-12-15 ASSESSMENT — PATIENT HEALTH QUESTIONNAIRE - PHQ9
SUM OF ALL RESPONSES TO PHQ QUESTIONS 1-9: 0
2. FEELING DOWN, DEPRESSED OR HOPELESS: 0
SUM OF ALL RESPONSES TO PHQ QUESTIONS 1-9: 0
1. LITTLE INTEREST OR PLEASURE IN DOING THINGS: 0
SUM OF ALL RESPONSES TO PHQ QUESTIONS 1-9: 0
SUM OF ALL RESPONSES TO PHQ9 QUESTIONS 1 & 2: 0
SUM OF ALL RESPONSES TO PHQ QUESTIONS 1-9: 0

## 2023-12-15 NOTE — PROGRESS NOTES
Vascular History and Physical    Patient: Tres Ramos  MRN: [id number]    YOB: 1960  Age: 59 y.o. Sex: male     Chief Complaint:  Chief Complaint   Patient presents with    Follow-up     PVD  Offers no Complaints. History of Present Illness: Tres Ramos is a 59 y.o. very pleasant gentleman is here today vascular assessment. Last time patient had concerning findings on the left foot. It appears the graft has occluded. Patient has moderate pain. Since last visit he was able to find a family doctor. He has history of hypertension. His blood pressure still too high. Patient has resumed Eliquis. Social History:  Social Connections: Not on file       Past Medical History:  Past Medical History:   Diagnosis Date    Diabetes ([address])     Hypertension        Surgical History:  Past Surgical History:   Procedure Laterality Date    TOE AMPUTATION Right 08/06/2022    Right great toe    VASCULAR SURGERY Right 07/27/2022       Allergies:  No Known Allergies    Current Meds:  Current Outpatient Medications   Medication Sig Dispense Refill    amLODIPine-benazepril (LOTREL) 5-20 MG per capsule amlodipine 5 mg-benazepril 20 mg capsule   TAKE 1 CAPSULE BY MOUTH ONCE DAILY      apixaban (ELIQUIS) 5 MG TABS tablet TAKE 2 TABLETS BY MOUTH TWICE DAILY FOR 7 DAYS THEN  1  TABLET  BY  MOUTH  TWICE  DAILY  AFTERWARDS      metFORMIN (GLUCOPHAGE) 500 MG tablet Take 1 tablet by mouth Daily with supper      pantoprazole (PROTONIX) 40 MG tablet Take 1 tablet by mouth daily      traMADol (ULTRAM) 50 MG tablet tramadol 50 mg tablet   Take 1 tablet every 6 hours by oral route as needed for 7 days.       amoxicillin-clavulanate (AUGMENTIN) 500-125 MG per tablet Take 1 tablet by mouth 2 times daily (Patient not taking: Reported on 9/15/2023)      clindamycin (CLEOCIN) 300 MG capsule clindamycin HCl 300 mg capsule   TAKE 1 CAPSULE BY MOUTH THREE TIMES DAILY (Patient not taking: Reported on 9/15/2023)      clopidogrel (PLAVIX)

## 2023-12-15 NOTE — PROGRESS NOTES
Chief Complaint   Patient presents with    Follow-up     PVD  Offers no Complaints. 1. Have you been to the ER, urgent care clinic since your last visit? Hospitalized since your last visit? No    2. Have you seen or consulted any other health care providers outside of the [address] [place] [address] since your last visit? Include any pap smears or colon screening.  No

## 2024-01-10 ENCOUNTER — TELEPHONE (OUTPATIENT)
Age: 64
End: 2024-01-10

## 2024-01-10 NOTE — TELEPHONE ENCOUNTER
Patient called wanted to know about paperwork that he stated was filled out by nurse and faxed back to social security office and was stated that they were not able to retrieve off electronic system so wanted to see if can have re-faxed and sent through [employer] to him instead.     Did not see any filled out and signed social security paperwork in media for patient, advised patient would make note and send request to nurse team.

## 2024-01-16 ENCOUNTER — TELEPHONE (OUTPATIENT)
Age: 64
End: 2024-01-16

## 2024-01-16 NOTE — TELEPHONE ENCOUNTER
Called and spoke with Dominic Oliveira 2 patient identifiers used. I informed him of what was going on with the paperwork and that our manager spoke with Jailyn and this will be completed within the next hour.

## 2024-01-16 NOTE — TELEPHONE ENCOUNTER
I spoke with Callie Pozo @ [facility] ([facility] [phone] regarding the patient's medical records request. Patient stated that he had called to check on it and [employer] told him that they don't process our record requests, only for [facility]. Per Callie the customer service number the patient was calling [phone] was not correct. The correct customer service number is [phone].I sent Callie the Mr. Oliveira's request as she did not see it in the system. Callie will have it escalated for processing. Fabiola was made aware of this and will reach out to the patient.

## 2024-04-29 ENCOUNTER — TELEPHONE (OUTPATIENT)
Age: 64
End: 2024-04-29

## 2024-04-29 NOTE — TELEPHONE ENCOUNTER
Patient called today requesting a refill on eliquis to [facility] in [place]. He has been without for 2 days now.   [phone]

## 2024-04-30 ENCOUNTER — TELEPHONE (OUTPATIENT)
Age: 64
End: 2024-04-30

## 2024-04-30 NOTE — TELEPHONE ENCOUNTER
Patient is calling because he hasn't been on his blood thinners for 3 days now, he has been calling about getting his Eliquis refilled asap.

## 2024-04-30 NOTE — TELEPHONE ENCOUNTER
I called and spoke with  he sent in the prescription incorrectly the directions should say 1 tablet twice daily. I called and spoke with the pharmacy to change it but the pharmacist ask that we just send a new prescription. I called  he gave me verbal orders to send in a new prescription. Eliquis 5MG tablets 1 tablet twice daily for 30 days with 5 refills.

## 2024-05-03 RX ORDER — APIXABAN 5 MG/1
TABLET, FILM COATED ORAL
Qty: 60 TABLET | Refills: 0 | Status: SHIPPED | OUTPATIENT
Start: 2024-05-03

## 2025-03-31 RX ORDER — APIXABAN 5 MG/1
5 TABLET, FILM COATED ORAL 2 TIMES DAILY
Qty: 60 TABLET | Refills: 0 | Status: SHIPPED | OUTPATIENT
Start: 2025-03-31

## (undated) DEVICE — SPONGE GZ W4XL4IN COT 12 PLY TYP VII WVN C FLD DSGN

## (undated) DEVICE — GAUZE,SPONGE,4"X4",16PLY,STRL,LF,10/TRAY: Brand: MEDLINE

## (undated) DEVICE — 3M™ STERI-STRIP™ REINFORCED ADHESIVE SKIN CLOSURES, R1547, 1/2 IN X 4 IN (12 MM X 100 MM), 6 STRIPS/ENVELOPE: Brand: 3M™ STERI-STRIP™

## (undated) DEVICE — LAMINECTOMY ARM CRADLE FOAM POSITIONER: Brand: CARDINAL HEALTH

## (undated) DEVICE — TUBING PRESS INJ FLX SH 30IN --

## (undated) DEVICE — APPLIER RMFG CLP LIG SM 9IN --

## (undated) DEVICE — SOLUTION IRRIG 500ML 0.9% SOD CHL USP POUR PLAS BTL

## (undated) DEVICE — Device

## (undated) DEVICE — SUTURE N ABSRB MONOFILAMENT 6-0 BV1 30 IN BLU PROLENE EPM8709

## (undated) DEVICE — TOWEL SURG W17XL27IN STD BLU COT NONFENESTRATED PREWASHED

## (undated) DEVICE — 48" PROBE COVER W/GEL, ULTRASOUND, STERILE: Brand: SITE-RITE

## (undated) DEVICE — SYR 10ML LUER LOK 1/5ML GRAD --

## (undated) DEVICE — GLOVE ORANGE PI 7 1/2   MSG9075

## (undated) DEVICE — DRAPE,REIN 53X77,STERILE: Brand: MEDLINE

## (undated) DEVICE — SYR 20ML LL STRL LF --

## (undated) DEVICE — 3-0 COATED VICRYL PLUS UNDYED 1X27" SH --

## (undated) DEVICE — KIT NDL 5FR L10CM GWIRE L40CM DIA0.018IN NDL 21GA L7CM NIT

## (undated) DEVICE — Device: Brand: QUICK-CROSS SUPPORT CATHETER

## (undated) DEVICE — DRESSING TRNSPAR W4XL4.8IN FLM SURESITE 123

## (undated) DEVICE — SOL INJ SOD CL 0.9% 1000ML BG --

## (undated) DEVICE — CATHETER PTCA L150CM BLLN L150MM DIA5MM SHTH 5FR 0.014IN

## (undated) DEVICE — RELI® BLUNT FILL NEEDLE, 18G X 1.5", 100EA/BX, 100BX/CS: Brand: RELI®

## (undated) DEVICE — MICROPUNCTURE INTRODUCER SET SILHOUETTE TRANSITIONLESS PUSH-PLUS DESIGN - STIFFENED CANNULA WITH STAINLESS STEEL WIRE GUIDE: Brand: MICROPUNCTURE

## (undated) DEVICE — COUNTER NEEDLEXL 60 COUNT HLD 60 COUNT DBL BLK MAG BX LCK W/

## (undated) DEVICE — SUTURE PROL SZ 5-0 L24IN NONABSORBABLE BLU L9.3MM BV-1 3/8 9702H

## (undated) DEVICE — NEEDLE HYPO 25GA L1.5IN BLU POLYPR HUB S STL REG BVL STR

## (undated) DEVICE — HT WINN 80 GUIDE WIRE .014" X 300 CM: Brand: HI-TORQUE WINN

## (undated) DEVICE — CATHETER ANGIO 5FR L65CM 0.035IN RIM SEL BRAID SFT RADPQ

## (undated) DEVICE — MINOR GENERAL PACK: Brand: MEDLINE INDUSTRIES, INC.

## (undated) DEVICE — SUTURE PROL SZ 7-0 L30IN NONABSORBABLE BLU L9.3MM BV-1 1/2 8703H

## (undated) DEVICE — CATHETER PTCA L150CM BLLN L60MM OD2.5MM SHTH OD4FR 0.014IN

## (undated) DEVICE — DESTINATION PERIPHERAL GUIDING SHEATH: Brand: DESTINATION

## (undated) DEVICE — SOL INJ SOD CL 0.9% 500ML BG --

## (undated) DEVICE — INTENDED FOR TISSUE SEPARATION, AND OTHER PROCEDURES THAT REQUIRE A SHARP SURGICAL BLADE TO PUNCTURE OR CUT.: Brand: BARD-PARKER ® CARBON RIB-BACK BLADES

## (undated) DEVICE — SUT SLK 2-0SH 30IN BLK --

## (undated) DEVICE — SURGIFOAM SPNG SZ 100

## (undated) DEVICE — NAVICROSS SUPPORT CATHETER: Brand: NAVICROSS

## (undated) DEVICE — RADIFOCUS GLIDEWIRE: Brand: GLIDEWIRE

## (undated) DEVICE — GLOW 'N TELL 55CM TAPE (20 STRIPS): Brand: VASCUTAPE RADIOPAQUE TAPE

## (undated) DEVICE — SYR LR LCK 1ML GRAD NSAF 30ML --

## (undated) DEVICE — PREP PAD BNS: Brand: CONVERTORS

## (undated) DEVICE — TOWEL,OR,DSP,ST,BLUE,DLX,6/PK,12PK/CS: Brand: MEDLINE

## (undated) DEVICE — GARMENT,MEDLINE,DVT,INT,CALF,MED, GEN2: Brand: MEDLINE

## (undated) DEVICE — KIT ANGIO DEL CNTRST 1 TBNG GTR

## (undated) DEVICE — T-DRAPE,EXTREMITY,STERILE: Brand: MEDLINE

## (undated) DEVICE — 3M™ MICROFOAM™ TAPE 1528-4: Brand: 3M™ MICROFOAM™

## (undated) DEVICE — SUT VCRL + 2-0 27IN SH UD --

## (undated) DEVICE — DEVICE INFL SYR BLLN ENDO 30 -- INTELLI

## (undated) DEVICE — CATHETER PTCA L150CM BLLN L120MM DIA2.5MM INTRO SHTH DIA4FR

## (undated) DEVICE — DRAPE EQUIP C ARM 74X42 IN MOB XR W/ TIE RUBBER BND LF

## (undated) DEVICE — CATHETER ARTHERECTOMY L145X151CM TIP L5.9CM SHTH 6FR

## (undated) DEVICE — PREP SKN CHLRAPRP APL 26ML STR --

## (undated) DEVICE — DEVICE TORQ DIA0.018-0.038IN GRN ERGO 1 HND FOR PTFE GWIRE

## (undated) DEVICE — BASIC SINGLE BASIN-LF: Brand: MEDLINE INDUSTRIES, INC.

## (undated) DEVICE — ARMADA 35 LL PTA CATHETER 6 MM X 250 MM X 135 CM / OVER-THE-WIRE: Brand: ARMADA

## (undated) DEVICE — COVER PRB INTOP 96X6 IN LF

## (undated) DEVICE — SYRINGE IRRIG 60ML SFT PLIABLE BLB EZ TO GRP 1 HND USE W/

## (undated) DEVICE — TRANSFER SET 3": Brand: MEDLINE INDUSTRIES, INC.

## (undated) DEVICE — SUT MONOCRYL PLUS UD 4-0 --

## (undated) DEVICE — APPLIER CLP L9.375IN APER 2.1MM CLS L3.8MM 20 SM TI CLP

## (undated) DEVICE — WET SKIN PREP TRAY: Brand: MEDLINE INDUSTRIES, INC.

## (undated) DEVICE — TTL1LYR 16FR10ML 100%SIL TMPST TR: Brand: MEDLINE

## (undated) DEVICE — Device: Brand: JELCO

## (undated) DEVICE — SOUTHSIDE TURNOVER: Brand: MEDLINE INDUSTRIES, INC.

## (undated) DEVICE — MINOR VASCULAR PROCEDURE: Brand: MEDLINE INDUSTRIES, INC.

## (undated) DEVICE — SHEET, DRAPE, SPLIT, STERILE: Brand: MEDLINE

## (undated) DEVICE — 3M™ TEGADERM™ TRANSPARENT FILM DRESSING FRAME STYLE, 1626W, 4 IN X 4-3/4 IN (10 CM X 12 CM), 50/CT 4CT/CASE: Brand: 3M™ TEGADERM™

## (undated) DEVICE — INTENDED TO BE USED TO OCCLUDE, RETRACT AND IDENTIFY ARTERIES, VEINS, TENDONS AND NERVES IN SURGICAL PROCEDURES: Brand: STERION®  VESSEL LOOP

## (undated) DEVICE — DRAPE,TOP,102X53,STERILE: Brand: MEDLINE

## (undated) DEVICE — INTENDED FOR TISSUE SEPARATION, AND OTHER PROCEDURES THAT REQUIRE A SHARP SURGICAL BLADE TO PUNCTURE OR CUT.: Brand: BARD-PARKER SAFETY BLADES SIZE 15, STERILE

## (undated) DEVICE — ADHESIVE LIQ H2O INSOLUBLE 3 CC LO RISK N STN MASTISOL

## (undated) DEVICE — DEVICE EMBOLC PROTCT 5MMX320CM -- SPIDERFX

## (undated) DEVICE — DEVICE TORQ 0009 0018IN GRN PTFE GWIRE ANGIO COAT PIN VISE

## (undated) DEVICE — SYSTEM ATHRCTMY SHTH 7FR L114CM TIP L9.6CM VES DIA3.5-7MM

## (undated) DEVICE — CATH BLLN PTA 0.14 3X60X150 -- NANOCROSS ELITE OTW

## (undated) DEVICE — HT WINN 40 GUIDE WIRE .014" X 300 CM: Brand: HI-TORQUE WINN

## (undated) DEVICE — SPONGE GZ W4XL4IN 16 PLY DATA MSTR TAGGED DBL RADPQ

## (undated) DEVICE — APPLIER LIG CLP M L11IN TI STR RNG HNDL FOR 20 CLP DISP

## (undated) DEVICE — CATHETER MARINER BRAID RIM 5FX65 CM .035 IN.DIAGNOSTIC

## (undated) DEVICE — SYR 50ML LR LCK 1ML GRAD NSAF --

## (undated) DEVICE — 3M™ TEGADERM™ TRANSPARENT FILM DRESSING FIRST AID STYLE, 1621, 4 IN X 4-3/4 IN, 50 BAGS/CARTON, 4 CARTONS/CASE: Brand: 3M™ TEGADERM™

## (undated) DEVICE — 96"PROBE COVER (US)10PK: Brand: SITE-RITE

## (undated) DEVICE — SUTURE PERMAHAND SZ 3-0 L30IN NONABSORBABLE BLK SILK BRAID A304H

## (undated) DEVICE — 3M™ TEGADERM™ TRANSPARENT FILM DRESSING FRAME STYLE, 1627, 4 IN X 10 IN (10 CM X 25 CM), 20/CT 4CT/CASE: Brand: 3M™ TEGADERM™

## (undated) DEVICE — BANDAGE,GAUZE,BULKEE II,4.5"X4.1YD,STRL: Brand: MEDLINE

## (undated) DEVICE — SURGICAL PROCEDURE TRAY CRD CATH 3 PRT

## (undated) DEVICE — DECANTER BAG 9": Brand: MEDLINE INDUSTRIES, INC.

## (undated) DEVICE — PERCUTANEOUS ENTRY THINWALL NEEDLE  ONE-PART: Brand: COOK

## (undated) DEVICE — SPONGE LAP 18X18IN STRL -- 5/PK

## (undated) DEVICE — BAG WST CUST CNTRST DEL SET

## (undated) DEVICE — INTENDED FOR TISSUE SEPARATION, AND OTHER PROCEDURES THAT REQUIRE A SHARP SURGICAL BLADE TO PUNCTURE OR CUT.: Brand: BARD-PARKER SAFETY BLADES SIZE 11, STERILE

## (undated) DEVICE — SUTURE ABSORBABLE MONOFILAMENT 1-0 CT1 27 IN VIO PDS + PDP341H

## (undated) DEVICE — CATHETER PTCA L150CM BLLN L120MM DIA2.5MM SHTH DIA5FR GWIRE

## (undated) DEVICE — SKIN PREP POV IOD SOL BTL 4OZ --

## (undated) DEVICE — PINNACLE INTRODUCER SHEATH: Brand: PINNACLE

## (undated) DEVICE — 3M™ MEDIPORE™ H SOFT CLOTH SURGICAL TAPE, 2863, 3 IN X 10 YD, 12/CASE: Brand: 3M™ MEDIPORE™

## (undated) DEVICE — SPONGE: SPECIALTY PEANUT XR 100/CS: Brand: MEDICAL ACTION INDUSTRIES

## (undated) DEVICE — GEL US 20GM NONIRRITATING OVERWRAPPED FILE PCH TRNSMIT